# Patient Record
Sex: MALE | Race: BLACK OR AFRICAN AMERICAN | NOT HISPANIC OR LATINO | Employment: OTHER | URBAN - METROPOLITAN AREA
[De-identification: names, ages, dates, MRNs, and addresses within clinical notes are randomized per-mention and may not be internally consistent; named-entity substitution may affect disease eponyms.]

---

## 2023-04-12 DIAGNOSIS — R12 HEARTBURN: ICD-10-CM

## 2023-04-12 RX ORDER — ASCORBIC ACID 500 MG
TABLET ORAL
COMMUNITY

## 2023-04-12 RX ORDER — METFORMIN HYDROCHLORIDE 500 MG/1
TABLET, EXTENDED RELEASE ORAL
COMMUNITY
Start: 2019-12-04 | End: 2023-07-17 | Stop reason: SDUPTHER

## 2023-04-12 RX ORDER — BLOOD SUGAR DIAGNOSTIC
STRIP MISCELLANEOUS
COMMUNITY
Start: 2022-05-17 | End: 2024-03-12 | Stop reason: WASHOUT

## 2023-04-12 RX ORDER — LISINOPRIL AND HYDROCHLOROTHIAZIDE 12.5; 2 MG/1; MG/1
2 TABLET ORAL DAILY
COMMUNITY
Start: 2015-07-20 | End: 2023-07-10 | Stop reason: ENTERED-IN-ERROR

## 2023-04-12 RX ORDER — OMEPRAZOLE 40 MG/1
1 CAPSULE, DELAYED RELEASE ORAL DAILY
COMMUNITY
Start: 2013-02-28 | End: 2023-04-12 | Stop reason: SDUPTHER

## 2023-04-12 RX ORDER — LOSARTAN POTASSIUM 50 MG/1
1 TABLET ORAL DAILY
COMMUNITY
Start: 2023-03-27 | End: 2023-07-10 | Stop reason: SDUPTHER

## 2023-04-12 RX ORDER — CARVEDILOL 25 MG/1
1 TABLET ORAL 2 TIMES DAILY
COMMUNITY
Start: 2020-06-30 | End: 2023-07-10 | Stop reason: DRUGHIGH

## 2023-04-12 RX ORDER — NITROGLYCERIN 0.4 MG/1
0.4 TABLET SUBLINGUAL EVERY 5 MIN PRN
COMMUNITY
Start: 2022-08-11

## 2023-04-12 RX ORDER — AMLODIPINE BESYLATE 10 MG/1
1 TABLET ORAL DAILY
COMMUNITY
Start: 2019-12-04 | End: 2023-07-10 | Stop reason: SDUPTHER

## 2023-04-12 RX ORDER — ATORVASTATIN CALCIUM 80 MG/1
1 TABLET, FILM COATED ORAL DAILY
COMMUNITY
Start: 2019-12-04 | End: 2023-07-17 | Stop reason: SDUPTHER

## 2023-04-13 RX ORDER — OMEPRAZOLE 40 MG/1
40 CAPSULE, DELAYED RELEASE ORAL DAILY
Qty: 30 CAPSULE | Refills: 0 | Status: SHIPPED | OUTPATIENT
Start: 2023-04-13 | End: 2023-06-05 | Stop reason: SDUPTHER

## 2023-05-31 ENCOUNTER — TELEPHONE (OUTPATIENT)
Dept: PRIMARY CARE | Facility: CLINIC | Age: 83
End: 2023-05-31

## 2023-05-31 ENCOUNTER — APPOINTMENT (OUTPATIENT)
Dept: PRIMARY CARE | Facility: CLINIC | Age: 83
End: 2023-05-31
Payer: MEDICARE

## 2023-06-05 ENCOUNTER — OFFICE VISIT (OUTPATIENT)
Dept: PRIMARY CARE | Facility: CLINIC | Age: 83
End: 2023-06-05
Payer: MEDICARE

## 2023-06-05 VITALS
HEIGHT: 66 IN | OXYGEN SATURATION: 94 % | WEIGHT: 147.8 LBS | SYSTOLIC BLOOD PRESSURE: 164 MMHG | HEART RATE: 72 BPM | BODY MASS INDEX: 23.75 KG/M2 | DIASTOLIC BLOOD PRESSURE: 78 MMHG

## 2023-06-05 DIAGNOSIS — R06.2 WHEEZING: ICD-10-CM

## 2023-06-05 DIAGNOSIS — Z00.00 MEDICARE ANNUAL WELLNESS VISIT, SUBSEQUENT: Primary | Chronic | ICD-10-CM

## 2023-06-05 DIAGNOSIS — E11.22 TYPE 2 DIABETES MELLITUS WITH STAGE 3B CHRONIC KIDNEY DISEASE, WITHOUT LONG-TERM CURRENT USE OF INSULIN (MULTI): ICD-10-CM

## 2023-06-05 DIAGNOSIS — J43.8 OTHER EMPHYSEMA (MULTI): ICD-10-CM

## 2023-06-05 DIAGNOSIS — J30.9 ALLERGIC RHINITIS, UNSPECIFIED SEASONALITY, UNSPECIFIED TRIGGER: ICD-10-CM

## 2023-06-05 DIAGNOSIS — R06.02 SOB (SHORTNESS OF BREATH): ICD-10-CM

## 2023-06-05 DIAGNOSIS — R05.9 COUGH, UNSPECIFIED TYPE: ICD-10-CM

## 2023-06-05 DIAGNOSIS — R05.3 CHRONIC COUGH: ICD-10-CM

## 2023-06-05 DIAGNOSIS — R12 HEARTBURN: ICD-10-CM

## 2023-06-05 DIAGNOSIS — E13.51: ICD-10-CM

## 2023-06-05 DIAGNOSIS — N18.32 TYPE 2 DIABETES MELLITUS WITH STAGE 3B CHRONIC KIDNEY DISEASE, WITHOUT LONG-TERM CURRENT USE OF INSULIN (MULTI): ICD-10-CM

## 2023-06-05 DIAGNOSIS — I10 PRIMARY HYPERTENSION: ICD-10-CM

## 2023-06-05 PROBLEM — R05.2 SUBACUTE COUGH: Status: ACTIVE | Noted: 2023-06-05

## 2023-06-05 PROBLEM — R05.2 SUBACUTE COUGH: Status: RESOLVED | Noted: 2023-06-05 | Resolved: 2023-06-05

## 2023-06-05 PROCEDURE — G0439 PPPS, SUBSEQ VISIT: HCPCS | Performed by: INTERNAL MEDICINE

## 2023-06-05 PROCEDURE — 3077F SYST BP >= 140 MM HG: CPT | Performed by: INTERNAL MEDICINE

## 2023-06-05 PROCEDURE — 1170F FXNL STATUS ASSESSED: CPT | Performed by: INTERNAL MEDICINE

## 2023-06-05 PROCEDURE — 3078F DIAST BP <80 MM HG: CPT | Performed by: INTERNAL MEDICINE

## 2023-06-05 PROCEDURE — 1160F RVW MEDS BY RX/DR IN RCRD: CPT | Performed by: INTERNAL MEDICINE

## 2023-06-05 PROCEDURE — 1036F TOBACCO NON-USER: CPT | Performed by: INTERNAL MEDICINE

## 2023-06-05 PROCEDURE — 1159F MED LIST DOCD IN RCRD: CPT | Performed by: INTERNAL MEDICINE

## 2023-06-05 RX ORDER — PREDNISONE 20 MG/1
TABLET ORAL
Qty: 15 TABLET | Refills: 0 | Status: SHIPPED | OUTPATIENT
Start: 2023-06-05 | End: 2023-10-02 | Stop reason: ENTERED-IN-ERROR

## 2023-06-05 RX ORDER — AZITHROMYCIN 250 MG/1
TABLET, FILM COATED ORAL
Qty: 6 TABLET | Refills: 0 | Status: SHIPPED | OUTPATIENT
Start: 2023-06-05 | End: 2023-06-10

## 2023-06-05 RX ORDER — OMEPRAZOLE 40 MG/1
40 CAPSULE, DELAYED RELEASE ORAL DAILY
Qty: 90 CAPSULE | Refills: 2 | Status: SHIPPED | OUTPATIENT
Start: 2023-06-05 | End: 2024-02-26 | Stop reason: SDUPTHER

## 2023-06-05 RX ORDER — MONTELUKAST SODIUM 10 MG/1
10 TABLET ORAL NIGHTLY
Qty: 90 TABLET | Refills: 2 | Status: SHIPPED | OUTPATIENT
Start: 2023-06-05 | End: 2024-02-26 | Stop reason: SDUPTHER

## 2023-06-05 RX ORDER — AZITHROMYCIN 250 MG/1
TABLET, FILM COATED ORAL
Qty: 6 TABLET | Refills: 0 | Status: SHIPPED | OUTPATIENT
Start: 2023-06-05 | End: 2023-06-05 | Stop reason: SDUPTHER

## 2023-06-05 RX ORDER — ALBUTEROL SULFATE 90 UG/1
2 AEROSOL, METERED RESPIRATORY (INHALATION) EVERY 4 HOURS PRN
Qty: 8.5 G | Refills: 0 | Status: SHIPPED | OUTPATIENT
Start: 2023-06-05 | End: 2023-06-21 | Stop reason: SDUPTHER

## 2023-06-05 RX ORDER — FLUTICASONE PROPIONATE AND SALMETEROL 250; 50 UG/1; UG/1
1 POWDER RESPIRATORY (INHALATION)
Qty: 1 EACH | Refills: 1 | Status: SHIPPED | OUTPATIENT
Start: 2023-06-05 | End: 2023-08-07 | Stop reason: SDUPTHER

## 2023-06-05 RX ORDER — FLUTICASONE PROPIONATE 50 MCG
1 SPRAY, SUSPENSION (ML) NASAL DAILY
Qty: 16 G | Refills: 5 | Status: SHIPPED | OUTPATIENT
Start: 2023-06-05 | End: 2023-06-05 | Stop reason: SDUPTHER

## 2023-06-05 RX ORDER — ALBUTEROL SULFATE 90 UG/1
2 AEROSOL, METERED RESPIRATORY (INHALATION) EVERY 4 HOURS PRN
Qty: 8.5 G | Refills: 0 | Status: SHIPPED | OUTPATIENT
Start: 2023-06-05 | End: 2023-06-05 | Stop reason: SDUPTHER

## 2023-06-05 RX ORDER — PREDNISONE 20 MG/1
20 TABLET ORAL SEE ADMIN INSTRUCTIONS
Qty: 15 TABLET | Refills: 0 | Status: SHIPPED | OUTPATIENT
Start: 2023-06-05 | End: 2023-06-05

## 2023-06-05 RX ORDER — FLUTICASONE PROPIONATE 50 MCG
1 SPRAY, SUSPENSION (ML) NASAL DAILY
Qty: 16 G | Refills: 5 | Status: SHIPPED | OUTPATIENT
Start: 2023-06-05 | End: 2023-08-07 | Stop reason: SDUPTHER

## 2023-06-05 ASSESSMENT — ACTIVITIES OF DAILY LIVING (ADL)
DRESSING: INDEPENDENT
TAKING_MEDICATION: INDEPENDENT
GROCERY_SHOPPING: INDEPENDENT
BATHING: INDEPENDENT
MANAGING_FINANCES: INDEPENDENT
DOING_HOUSEWORK: INDEPENDENT

## 2023-06-05 ASSESSMENT — ENCOUNTER SYMPTOMS
WHEEZING: 1
HEARTBURN: 1
SHORTNESS OF BREATH: 1
HEMOPTYSIS: 0
HEADACHES: 0
SORE THROAT: 0
CHILLS: 0
FEVER: 0
COUGH: 1

## 2023-06-05 ASSESSMENT — PATIENT HEALTH QUESTIONNAIRE - PHQ9
SUM OF ALL RESPONSES TO PHQ9 QUESTIONS 1 AND 2: 0
2. FEELING DOWN, DEPRESSED OR HOPELESS: NOT AT ALL
1. LITTLE INTEREST OR PLEASURE IN DOING THINGS: NOT AT ALL

## 2023-06-05 NOTE — PROGRESS NOTES
Subjective   Reason for Visit: Mann Abrams is an 82 y.o. male here for a Medicare Wellness visit.          Reviewed all medications by prescribing practitioner or clinical pharmacist (such as prescriptions, OTCs, herbal therapies and supplements) and documented in the medical record.    Cough  This is a new problem. The current episode started 1 to 4 weeks ago. The problem has been gradually worsening. The problem occurs every few hours. The cough is Non-productive. Associated symptoms include heartburn, nasal congestion, postnasal drip, shortness of breath and wheezing. Pertinent negatives include no chest pain, chills, ear congestion, ear pain, fever, headaches, hemoptysis or sore throat. He has tried OTC cough suppressant for the symptoms. The treatment provided no relief. His past medical history is significant for asthma and emphysema.       HTN ON MEDICATIONS UNCONTROLLED         DIABETES WITH CKD STAGE III B         DIABETES WITH PVD     Patient Self Assessment of Health Status  Patient Self Assessment: Fair    Nutrition and Exercise  Current Diet: Well Balanced Diet  Adequate Fluid Intake: Yes  Caffeine: Yes  Exercise Frequency: Infrequently    Functional Ability/Level of Safety  Cognitive Impairment Observed: No cognitive impairment observed  Cognitive Impairment Reported: No cognitive impairment reported by patient or family    Home Safety Risk Factors: None    Patient Care Team:  Leah Owens MD as PCP - General  Leah Owens MD as PCP - [employer] PCP     Review of Systems   Constitutional:  Negative for chills and fever.   HENT:  Positive for postnasal drip. Negative for ear pain and sore throat.    Respiratory:  Positive for cough, shortness of breath and wheezing. Negative for hemoptysis.    Cardiovascular:  Negative for chest pain.   Gastrointestinal:  Positive for heartburn.   Neurological:  Negative for headaches.       Objective   Vitals:  /78   Pulse 72   Ht 1.67 m  "(5' 5.75\")   Wt 67 kg (147 lb 12.8 oz)   SpO2 94%   BMI 24.04 kg/m²       Physical Exam    Lab Results   Component Value Date    WBC 5.0 06/05/2020    HGB 12.4 (L) 06/05/2020    HCT 38.2 (L) 06/05/2020     06/05/2020    CHOL 133 12/28/2022    TRIG 127 04/21/2022    HDL 47.3 12/28/2022    LDLDIRECT 76 12/28/2022    ALT 25 12/28/2022    AST 21 12/28/2022     (L) 12/28/2022    K 4.0 12/28/2022    CL 92 (L) 12/28/2022    CREATININE 1.43 (H) 12/28/2022    BUN 17 12/28/2022    CO2 26 12/28/2022    HGBA1C 6.5 (A) 02/01/2023       Assessment/Plan   Problem List Items Addressed This Visit    None           A/P         ZITHROMAX TAKE     DIRECTED   DRINK ADEQUATE AMOUNT OF FLUID   MONTELUKAST FOLLOW THE DIRECTION   ALBUTEROL INHALER USE WRITTEN   PREDNISONE 20MG 1 PILL IN AM AND HALF PILL IN PM FOR 10 DAYS  WIXELLA INHALER  250-50MCG 1 PUFF BID , RINSE MOUTH AFTER USING THE INHALER   PFT WITHOUT ABG   F/U WITH ME IN 2WKS TIME       Advance Care Planning Note     Discussion Date: 06/05/23   Discussion Participants: patient    The patient wishes to discuss Advance Care Planning today and the following is a brief summary of our discussion.     Patient has capacity to make their own medical decisions: Yes  Health Care Agent/Surrogate Decision Maker documented in chart: No    Documents on file and valid:  Advance Directive/Living Will: No   Health Care Power of : No  Other: NA    Communication of Medical Status/Prognosis:   NA     Communication of Treatment Goals/Options:         Discussed with the patient end-of-life choices patient is DNR if he is terminally ill and sick with poor or bad outcome he does not have a living will or healthcare power of  he will think about getting it done later on whenever he can and then will bring it back into the next office visit so that it can be scanned into the chart for the purpose of documentation    Treatment Decisions  DISCUSSED    Time Statement: Total " face to face time spent on advance care planning was 5  minutes with 5 minutes spent in counseling, including the explanation.    Nataliya Grant MD  6/5/2023 7:56 AM

## 2023-06-21 ENCOUNTER — OFFICE VISIT (OUTPATIENT)
Dept: PRIMARY CARE | Facility: CLINIC | Age: 83
End: 2023-06-21
Payer: MEDICARE

## 2023-06-21 VITALS
WEIGHT: 153 LBS | HEART RATE: 73 BPM | SYSTOLIC BLOOD PRESSURE: 156 MMHG | DIASTOLIC BLOOD PRESSURE: 70 MMHG | OXYGEN SATURATION: 93 % | BODY MASS INDEX: 24.88 KG/M2

## 2023-06-21 DIAGNOSIS — E11.29 DIABETES MELLITUS WITH MICROALBUMINURIA (MULTI): ICD-10-CM

## 2023-06-21 DIAGNOSIS — E13.51: ICD-10-CM

## 2023-06-21 DIAGNOSIS — I10 PRIMARY HYPERTENSION: ICD-10-CM

## 2023-06-21 DIAGNOSIS — J43.8 OTHER EMPHYSEMA (MULTI): Primary | Chronic | ICD-10-CM

## 2023-06-21 DIAGNOSIS — R80.9 DIABETES MELLITUS WITH MICROALBUMINURIA (MULTI): ICD-10-CM

## 2023-06-21 DIAGNOSIS — E11.22 TYPE 2 DIABETES MELLITUS WITH STAGE 3B CHRONIC KIDNEY DISEASE, WITHOUT LONG-TERM CURRENT USE OF INSULIN (MULTI): ICD-10-CM

## 2023-06-21 DIAGNOSIS — R06.2 WHEEZING: ICD-10-CM

## 2023-06-21 DIAGNOSIS — R05.3 CHRONIC COUGH: ICD-10-CM

## 2023-06-21 DIAGNOSIS — N18.32 TYPE 2 DIABETES MELLITUS WITH STAGE 3B CHRONIC KIDNEY DISEASE, WITHOUT LONG-TERM CURRENT USE OF INSULIN (MULTI): ICD-10-CM

## 2023-06-21 LAB
ALBUMIN (MG/L) IN URINE: 512.3 MG/L
ALBUMIN/CREATININE (UG/MG) IN URINE: 814.5 UG/MG CRT (ref 0–30)
CREATININE (MG/DL) IN URINE: 62.9 MG/DL (ref 20–370)

## 2023-06-21 PROCEDURE — 99213 OFFICE O/P EST LOW 20 MIN: CPT | Performed by: INTERNAL MEDICINE

## 2023-06-21 PROCEDURE — 82570 ASSAY OF URINE CREATININE: CPT

## 2023-06-21 PROCEDURE — 1159F MED LIST DOCD IN RCRD: CPT | Performed by: INTERNAL MEDICINE

## 2023-06-21 PROCEDURE — 1036F TOBACCO NON-USER: CPT | Performed by: INTERNAL MEDICINE

## 2023-06-21 PROCEDURE — 1160F RVW MEDS BY RX/DR IN RCRD: CPT | Performed by: INTERNAL MEDICINE

## 2023-06-21 PROCEDURE — 3077F SYST BP >= 140 MM HG: CPT | Performed by: INTERNAL MEDICINE

## 2023-06-21 PROCEDURE — 83036 HEMOGLOBIN GLYCOSYLATED A1C: CPT

## 2023-06-21 PROCEDURE — 82043 UR ALBUMIN QUANTITATIVE: CPT

## 2023-06-21 PROCEDURE — 3078F DIAST BP <80 MM HG: CPT | Performed by: INTERNAL MEDICINE

## 2023-06-21 RX ORDER — ALBUTEROL SULFATE 90 UG/1
2 AEROSOL, METERED RESPIRATORY (INHALATION) EVERY 4 HOURS PRN
Qty: 8.5 G | Refills: 3 | Status: SHIPPED | OUTPATIENT
Start: 2023-06-21 | End: 2023-11-30 | Stop reason: SDUPTHER

## 2023-06-21 ASSESSMENT — ENCOUNTER SYMPTOMS
COUGH: 0
PND: 0
CONSTITUTIONAL NEGATIVE: 1
ORTHOPNEA: 0
PALPITATIONS: 0
WHEEZING: 1
HYPERTENSION: 1
BLURRED VISION: 0
SHORTNESS OF BREATH: 1

## 2023-06-21 ASSESSMENT — PATIENT HEALTH QUESTIONNAIRE - PHQ9
1. LITTLE INTEREST OR PLEASURE IN DOING THINGS: NOT AT ALL
2. FEELING DOWN, DEPRESSED OR HOPELESS: NOT AT ALL
SUM OF ALL RESPONSES TO PHQ9 QUESTIONS 1 AND 2: 0

## 2023-06-21 NOTE — PROGRESS NOTES
Patient ID: Mann Abrams is a 83 y.o. male who presents for Anticoagulation and Follow-up.    /70   Pulse 73   Wt 69.4 kg (153 lb)   SpO2 93%   BMI 24.88 kg/m²     Cough  This is a chronic problem. The current episode started 1 to 4 weeks ago. The problem has been resolved. Associated symptoms include shortness of breath and wheezing. Pertinent negatives include no chest pain, nasal congestion or postnasal drip. Risk factors for lung disease include smoking/tobacco exposure. He has tried a beta-agonist inhaler and steroid inhaler for the symptoms. The treatment provided significant relief.   Hypertension  This is a chronic problem. The current episode started more than 1 year ago. The problem is unchanged. The problem is uncontrolled. Associated symptoms include shortness of breath. Pertinent negatives include no blurred vision, chest pain, orthopnea, palpitations, peripheral edema or PND. Agents associated with hypertension include steroids. Risk factors for coronary artery disease include male gender, diabetes mellitus and dyslipidemia. Past treatments include angiotensin blockers and beta blockers. The current treatment provides mild improvement. There are no compliance problems.  Hypertensive end-organ damage includes kidney disease. There is no history of angina, CAD/MI, CVA, heart failure, left ventricular hypertrophy, PVD or retinopathy. Identifiable causes of hypertension include chronic renal disease. There is no history of sleep apnea.       CKD stage 3b stable     Subjective     Review of Systems   Constitutional: Negative.    HENT:  Negative for postnasal drip.    Eyes:  Negative for blurred vision.   Respiratory:  Positive for shortness of breath and wheezing. Negative for cough.    Cardiovascular:  Negative for chest pain, palpitations, orthopnea and PND.   All other systems reviewed and are negative.      Objective     Physical Exam  Vitals and nursing note reviewed.   Cardiovascular:       Rate and Rhythm: Normal rate and regular rhythm.      Pulses: Normal pulses.      Heart sounds: Normal heart sounds. No murmur heard.  Pulmonary:      Breath sounds: Examination of the right-upper field reveals decreased breath sounds and wheezing. Examination of the left-upper field reveals decreased breath sounds and wheezing. Examination of the right-middle field reveals decreased breath sounds and wheezing. Examination of the left-middle field reveals decreased breath sounds and wheezing. Examination of the right-lower field reveals decreased breath sounds and wheezing. Examination of the left-lower field reveals decreased breath sounds and wheezing. Decreased breath sounds and wheezing present.         Lab Results   Component Value Date    WBC 5.0 06/05/2020    HGB 12.4 (L) 06/05/2020    HCT 38.2 (L) 06/05/2020    MCV 95 06/05/2020     06/05/2020           Problem List Items Addressed This Visit          Respiratory    Other emphysema (CMS/[facility]) - Primary    Relevant Medications    albuterol (ProAir HFA) 90 mcg/actuation inhaler    Chronic cough       Circulatory    Primary hypertension    DM (diabetes mellitus), secondary, w/peripheral vascular complications (CMS/[facility])       Endocrine/Metabolic    Type 2 diabetes mellitus with stage 3b chronic kidney disease, without long-term current use of insulin (CMS/[facility])    Relevant Orders    Hemoglobin A1c    Albumin, urine, random     Other Visit Diagnoses       Wheezing        Relevant Medications    albuterol (ProAir HFA) 90 mcg/actuation inhaler               A/P        Since his cough is better  I told him to continue Wixela inhaler as prescribed and as demonstrated how to use it  He will use albuterol inhaler only as needed  He is scheduled to have the  PFT on 23rd of this month  He will follow-up with me at the end of the month to go over the PFT test results and follow-up on the management of his blood pressure medication  We will get the A1c, urine  microalbumin today

## 2023-06-22 LAB
ESTIMATED AVERAGE GLUCOSE FOR HBA1C: 157 MG/DL
HEMOGLOBIN A1C/HEMOGLOBIN TOTAL IN BLOOD: 7.1 %

## 2023-06-26 ENCOUNTER — TELEPHONE (OUTPATIENT)
Dept: PRIMARY CARE | Facility: CLINIC | Age: 83
End: 2023-06-26
Payer: MEDICARE

## 2023-07-03 ASSESSMENT — ENCOUNTER SYMPTOMS
WHEEZING: 1
SHORTNESS OF BREATH: 1

## 2023-07-10 ENCOUNTER — OFFICE VISIT (OUTPATIENT)
Dept: PRIMARY CARE | Facility: CLINIC | Age: 83
End: 2023-07-10
Payer: MEDICARE

## 2023-07-10 VITALS
BODY MASS INDEX: 24.23 KG/M2 | DIASTOLIC BLOOD PRESSURE: 73 MMHG | HEART RATE: 75 BPM | SYSTOLIC BLOOD PRESSURE: 172 MMHG | OXYGEN SATURATION: 94 % | WEIGHT: 149 LBS

## 2023-07-10 DIAGNOSIS — E11.22 TYPE 2 DIABETES MELLITUS WITH STAGE 3B CHRONIC KIDNEY DISEASE, WITHOUT LONG-TERM CURRENT USE OF INSULIN (MULTI): ICD-10-CM

## 2023-07-10 DIAGNOSIS — N18.32 STAGE 3B CHRONIC KIDNEY DISEASE (CKD) (MULTI): ICD-10-CM

## 2023-07-10 DIAGNOSIS — I10 PRIMARY HYPERTENSION: Primary | ICD-10-CM

## 2023-07-10 DIAGNOSIS — E13.51: ICD-10-CM

## 2023-07-10 DIAGNOSIS — N18.32 TYPE 2 DIABETES MELLITUS WITH STAGE 3B CHRONIC KIDNEY DISEASE, WITHOUT LONG-TERM CURRENT USE OF INSULIN (MULTI): ICD-10-CM

## 2023-07-10 DIAGNOSIS — J43.8 OTHER EMPHYSEMA (MULTI): ICD-10-CM

## 2023-07-10 PROCEDURE — 1126F AMNT PAIN NOTED NONE PRSNT: CPT | Performed by: INTERNAL MEDICINE

## 2023-07-10 PROCEDURE — 1159F MED LIST DOCD IN RCRD: CPT | Performed by: INTERNAL MEDICINE

## 2023-07-10 PROCEDURE — 99213 OFFICE O/P EST LOW 20 MIN: CPT | Performed by: INTERNAL MEDICINE

## 2023-07-10 PROCEDURE — 3078F DIAST BP <80 MM HG: CPT | Performed by: INTERNAL MEDICINE

## 2023-07-10 PROCEDURE — 3077F SYST BP >= 140 MM HG: CPT | Performed by: INTERNAL MEDICINE

## 2023-07-10 PROCEDURE — 1160F RVW MEDS BY RX/DR IN RCRD: CPT | Performed by: INTERNAL MEDICINE

## 2023-07-10 PROCEDURE — 1036F TOBACCO NON-USER: CPT | Performed by: INTERNAL MEDICINE

## 2023-07-10 RX ORDER — DOXAZOSIN 4 MG/1
4 TABLET ORAL NIGHTLY
Qty: 90 TABLET | Refills: 2 | Status: SHIPPED | OUTPATIENT
Start: 2023-07-10 | End: 2023-07-25

## 2023-07-10 RX ORDER — AMLODIPINE BESYLATE 10 MG/1
10 TABLET ORAL DAILY
Qty: 90 TABLET | Refills: 2 | Status: SHIPPED | OUTPATIENT
Start: 2023-07-10 | End: 2024-02-26 | Stop reason: SDUPTHER

## 2023-07-10 RX ORDER — CARVEDILOL 25 MG/1
TABLET ORAL
Qty: 90 TABLET | Refills: 3 | Status: SHIPPED | OUTPATIENT
Start: 2023-07-10 | End: 2023-07-17 | Stop reason: SDUPTHER

## 2023-07-10 RX ORDER — LOSARTAN POTASSIUM 50 MG/1
50 TABLET ORAL DAILY
Qty: 90 TABLET | Refills: 3 | Status: SHIPPED | OUTPATIENT
Start: 2023-07-10 | End: 2023-07-25 | Stop reason: SDUPTHER

## 2023-07-10 ASSESSMENT — ENCOUNTER SYMPTOMS
ORTHOPNEA: 0
NECK PAIN: 0
PND: 0
SHORTNESS OF BREATH: 0
WHEEZING: 1
BLURRED VISION: 0
CONSTITUTIONAL NEGATIVE: 1
HYPERTENSION: 1
PALPITATIONS: 0
HEADACHES: 0

## 2023-07-10 NOTE — PROGRESS NOTES
Patient ID: Mann Abrams is a 83 y.o. male who presents for Medicare Annual Wellness Visit Subsequent.    /73   Pulse 75   Wt 67.6 kg (149 lb)   SpO2 94%   BMI 24.23 kg/m²     Shortness of Breath  This is a recurrent problem. The current episode started more than 1 month ago. The problem occurs daily. The problem has been gradually worsening. Associated symptoms include wheezing. Pertinent negatives include no chest pain, headaches, neck pain, orthopnea or PND. There is no history of a heart failure.   Hypertension  This is a chronic problem. The current episode started more than 1 year ago. The problem is unchanged. The problem is uncontrolled. Pertinent negatives include no anxiety, blurred vision, chest pain, headaches, malaise/fatigue, neck pain, orthopnea, palpitations, peripheral edema, PND or shortness of breath. There are no associated agents to hypertension. Risk factors for coronary artery disease include male gender, diabetes mellitus and dyslipidemia. Past treatments include angiotensin blockers, calcium channel blockers and diuretics. The current treatment provides significant improvement. There are no compliance problems.  There is no history of angina, kidney disease, CAD/MI, CVA, heart failure, left ventricular hypertrophy, PVD or retinopathy. There is no history of chronic renal disease or sleep apnea.       Subjective     Review of Systems   Constitutional: Negative.  Negative for malaise/fatigue.   Eyes:  Negative for blurred vision.   Respiratory:  Positive for wheezing. Negative for shortness of breath.    Cardiovascular:  Negative for chest pain, palpitations, orthopnea and PND.   Musculoskeletal:  Negative for neck pain.   Neurological:  Negative for headaches.   All other systems reviewed and are negative.      Objective     Physical Exam  Vitals and nursing note reviewed.   Neck:      Vascular: No carotid bruit.   Cardiovascular:      Rate and Rhythm: Normal rate and regular  rhythm.      Pulses: Normal pulses.      Heart sounds: Normal heart sounds.   Pulmonary:      Effort: Pulmonary effort is normal.      Breath sounds: Normal breath sounds.   Musculoskeletal:      Right lower leg: No edema.      Left lower leg: No edema.   Lymphadenopathy:      Cervical: No cervical adenopathy.   Skin:     Capillary Refill: Capillary refill takes more than 3 seconds.   Neurological:      General: No focal deficit present.      Mental Status: He is oriented to person, place, and time.   Psychiatric:         Mood and Affect: Mood normal.         Behavior: Behavior normal.         Thought Content: Thought content normal.         Judgment: Judgment normal.         Lab Results   Component Value Date    WBC 5.0 06/05/2020    HGB 12.4 (L) 06/05/2020    HCT 38.2 (L) 06/05/2020    MCV 95 06/05/2020     06/05/2020           Problem List Items Addressed This Visit       Primary hypertension - Primary    Relevant Medications    doxazosin (Cardura) 4 mg tablet    amLODIPine (Norvasc) 10 mg tablet    losartan (Cozaar) 50 mg tablet    Other emphysema (CMS/[facility])    Type 2 diabetes mellitus with stage 3b chronic kidney disease, without long-term current use of insulin (CMS/[facility])    DM (diabetes mellitus), secondary, w/peripheral vascular complications (CMS/[facility])    Stage 3b chronic kidney disease (CKD) (CMS/[facility])              A/P         METFORMIN 500MG ER 1 PILL EVERY DAY FILLED  LOSARTAN 50MG 1 PILL EVERY DAY FILLED  AMLODIPINE 10MG 1 PILL EVERY DAY FILLED   DOXAZOSIN 4MG 1 PILL EVERY DAY   RECHECK BLOOD PRESSURE IN 1 MONTH TIME  NO EXTRA SALT WHILE EATING   REDUCE CARVEDILOL 25MG HALF PILL BID

## 2023-07-17 DIAGNOSIS — E11.69 TYPE 2 DIABETES MELLITUS WITH OTHER SPECIFIED COMPLICATION, WITHOUT LONG-TERM CURRENT USE OF INSULIN (MULTI): Primary | ICD-10-CM

## 2023-07-17 DIAGNOSIS — E78.5 HYPERLIPIDEMIA, UNSPECIFIED HYPERLIPIDEMIA TYPE: ICD-10-CM

## 2023-07-17 DIAGNOSIS — I10 HYPERTENSION, UNSPECIFIED TYPE: ICD-10-CM

## 2023-07-17 RX ORDER — ATORVASTATIN CALCIUM 80 MG/1
80 TABLET, FILM COATED ORAL DAILY
Qty: 90 TABLET | Refills: 2 | Status: SHIPPED | OUTPATIENT
Start: 2023-07-17 | End: 2024-04-22 | Stop reason: SDUPTHER

## 2023-07-17 RX ORDER — CARVEDILOL 25 MG/1
TABLET ORAL
Qty: 90 TABLET | Refills: 3 | Status: SHIPPED | OUTPATIENT
Start: 2023-07-17

## 2023-07-17 RX ORDER — METFORMIN HYDROCHLORIDE 500 MG/1
500 TABLET, EXTENDED RELEASE ORAL
Qty: 90 TABLET | Refills: 2 | Status: SHIPPED | OUTPATIENT
Start: 2023-07-17 | End: 2023-08-11 | Stop reason: SDUPTHER

## 2023-07-25 DIAGNOSIS — I10 PRIMARY HYPERTENSION: ICD-10-CM

## 2023-07-25 RX ORDER — LOSARTAN POTASSIUM 50 MG/1
50 TABLET ORAL 2 TIMES DAILY
Qty: 180 TABLET | Refills: 3 | Status: SHIPPED | OUTPATIENT
Start: 2023-07-25 | End: 2024-02-05 | Stop reason: SDUPTHER

## 2023-08-07 ENCOUNTER — TELEPHONE (OUTPATIENT)
Dept: PRIMARY CARE | Facility: CLINIC | Age: 83
End: 2023-08-07
Payer: MEDICARE

## 2023-08-07 DIAGNOSIS — J30.9 ALLERGIC RHINITIS, UNSPECIFIED SEASONALITY, UNSPECIFIED TRIGGER: ICD-10-CM

## 2023-08-07 DIAGNOSIS — J43.8 OTHER EMPHYSEMA (MULTI): ICD-10-CM

## 2023-08-07 NOTE — TELEPHONE ENCOUNTER
Pt. States wants to make sure he should still be using Wixela. If so, he would like a refill sent to [facility] pharmacy. Also requesting Rx of Fluticasone nasal spray to go to mail order, does not want it at [facility].

## 2023-08-08 RX ORDER — FLUTICASONE PROPIONATE AND SALMETEROL 250; 50 UG/1; UG/1
1 POWDER RESPIRATORY (INHALATION)
Qty: 1 EACH | Refills: 6 | Status: SHIPPED | OUTPATIENT
Start: 2023-08-08 | End: 2024-01-19 | Stop reason: SDUPTHER

## 2023-08-08 RX ORDER — FLUTICASONE PROPIONATE AND SALMETEROL 250; 50 UG/1; UG/1
1 POWDER RESPIRATORY (INHALATION)
Qty: 1 EACH | Refills: 1 | Status: SHIPPED | OUTPATIENT
Start: 2023-08-08 | End: 2023-08-08 | Stop reason: SDUPTHER

## 2023-08-08 RX ORDER — FLUTICASONE PROPIONATE 50 MCG
1 SPRAY, SUSPENSION (ML) NASAL DAILY
Qty: 16 G | Refills: 5 | Status: SHIPPED | OUTPATIENT
Start: 2023-08-08 | End: 2024-01-19 | Stop reason: SDUPTHER

## 2023-08-11 DIAGNOSIS — E11.69 TYPE 2 DIABETES MELLITUS WITH OTHER SPECIFIED COMPLICATION, WITHOUT LONG-TERM CURRENT USE OF INSULIN (MULTI): ICD-10-CM

## 2023-08-11 RX ORDER — METFORMIN HYDROCHLORIDE 500 MG/1
500 TABLET, EXTENDED RELEASE ORAL
Qty: 90 TABLET | Refills: 2 | Status: SHIPPED | OUTPATIENT
Start: 2023-08-11 | End: 2023-08-14 | Stop reason: SDUPTHER

## 2023-08-14 ENCOUNTER — OFFICE VISIT (OUTPATIENT)
Dept: PRIMARY CARE | Facility: CLINIC | Age: 83
End: 2023-08-14
Payer: MEDICARE

## 2023-08-14 VITALS
SYSTOLIC BLOOD PRESSURE: 161 MMHG | HEART RATE: 73 BPM | DIASTOLIC BLOOD PRESSURE: 73 MMHG | WEIGHT: 149 LBS | BODY MASS INDEX: 24.23 KG/M2 | OXYGEN SATURATION: 95 %

## 2023-08-14 DIAGNOSIS — S89.92XA KNEE INJURIES, LEFT, INITIAL ENCOUNTER: Primary | Chronic | ICD-10-CM

## 2023-08-14 DIAGNOSIS — M25.562 ACUTE PAIN OF LEFT KNEE: ICD-10-CM

## 2023-08-14 PROCEDURE — 3078F DIAST BP <80 MM HG: CPT | Performed by: INTERNAL MEDICINE

## 2023-08-14 PROCEDURE — 1160F RVW MEDS BY RX/DR IN RCRD: CPT | Performed by: INTERNAL MEDICINE

## 2023-08-14 PROCEDURE — 20610 DRAIN/INJ JOINT/BURSA W/O US: CPT | Performed by: INTERNAL MEDICINE

## 2023-08-14 PROCEDURE — 96372 THER/PROPH/DIAG INJ SC/IM: CPT | Performed by: INTERNAL MEDICINE

## 2023-08-14 PROCEDURE — 1159F MED LIST DOCD IN RCRD: CPT | Performed by: INTERNAL MEDICINE

## 2023-08-14 PROCEDURE — 1126F AMNT PAIN NOTED NONE PRSNT: CPT | Performed by: INTERNAL MEDICINE

## 2023-08-14 PROCEDURE — 3077F SYST BP >= 140 MM HG: CPT | Performed by: INTERNAL MEDICINE

## 2023-08-14 PROCEDURE — 99214 OFFICE O/P EST MOD 30 MIN: CPT | Performed by: INTERNAL MEDICINE

## 2023-08-14 PROCEDURE — 1036F TOBACCO NON-USER: CPT | Performed by: INTERNAL MEDICINE

## 2023-08-14 RX ORDER — TRIAMCINOLONE ACETONIDE 40 MG/ML
40 INJECTION, SUSPENSION INTRA-ARTICULAR; INTRAMUSCULAR ONCE
Status: COMPLETED | OUTPATIENT
Start: 2023-08-14 | End: 2023-08-14

## 2023-08-14 RX ADMIN — TRIAMCINOLONE ACETONIDE 40 MG: 40 INJECTION, SUSPENSION INTRA-ARTICULAR; INTRAMUSCULAR at 09:31

## 2023-08-14 RX ADMIN — TRIAMCINOLONE ACETONIDE 40 MG: 40 INJECTION, SUSPENSION INTRA-ARTICULAR; INTRAMUSCULAR at 09:35

## 2023-08-14 ASSESSMENT — ENCOUNTER SYMPTOMS
LOSS OF MOTION: 0
LOSS OF SENSATION IN FEET: 0
CONSTITUTIONAL NEGATIVE: 1
NUMBNESS: 0
DEPRESSION: 0
OCCASIONAL FEELINGS OF UNSTEADINESS: 0
INABILITY TO BEAR WEIGHT: 0

## 2023-08-14 ASSESSMENT — PATIENT HEALTH QUESTIONNAIRE - PHQ9
1. LITTLE INTEREST OR PLEASURE IN DOING THINGS: NOT AT ALL
1. LITTLE INTEREST OR PLEASURE IN DOING THINGS: NOT AT ALL
2. FEELING DOWN, DEPRESSED OR HOPELESS: NOT AT ALL
1. LITTLE INTEREST OR PLEASURE IN DOING THINGS: NOT AT ALL
SUM OF ALL RESPONSES TO PHQ9 QUESTIONS 1 AND 2: 0
SUM OF ALL RESPONSES TO PHQ9 QUESTIONS 1 AND 2: 0
2. FEELING DOWN, DEPRESSED OR HOPELESS: NOT AT ALL
SUM OF ALL RESPONSES TO PHQ9 QUESTIONS 1 AND 2: 0
2. FEELING DOWN, DEPRESSED OR HOPELESS: NOT AT ALL

## 2023-08-14 NOTE — PROGRESS NOTES
Patient ID: Mann Abrams is a 83 y.o. male who presents for Knee Injury (Hurt left knee 3 weeks ago) and Follow-up.    /73   Pulse 73   Wt 67.6 kg (149 lb)   SpO2 95%   BMI 24.23 kg/m²     Knee Pain   The incident occurred more than 1 week ago. Incident location: UNKNOWN. The pain is present in the left knee. The quality of the pain is described as aching. The pain is at a severity of 6/10. The pain is moderate. The pain has been Constant since onset. Pertinent negatives include no inability to bear weight, loss of motion or numbness. The symptoms are aggravated by movement and palpation. He has tried nothing for the symptoms. The treatment provided mild relief.       Left knee injury causing left knee pain   FEELS STIFF , NO OPEN WOUND   HURTS TO WALK , NO SWELLING         Subjective     Review of Systems   Constitutional: Negative.    Musculoskeletal:         LEFT KNEE PAIN , FEELS STIFF    Neurological:  Negative for numbness.   All other systems reviewed and are negative.      Objective     Physical Exam  Vitals and nursing note reviewed.   Cardiovascular:      Rate and Rhythm: Normal rate and regular rhythm.      Pulses: Normal pulses.      Heart sounds: Normal heart sounds. No murmur heard.  Pulmonary:      Effort: Pulmonary effort is normal.      Breath sounds: Normal breath sounds.   Musculoskeletal:      Right lower leg: No edema.      Left lower leg: No edema.      Comments: LEFT KNEE EXTREME FLEXION PAINFUL   COARSE CREPITATION    Skin:     Capillary Refill: Capillary refill takes more than 3 seconds.   Psychiatric:         Mood and Affect: Mood normal.         Behavior: Behavior normal.         Thought Content: Thought content normal.         Judgment: Judgment normal.         Lab Results   Component Value Date    WBC 5.0 06/05/2020    HGB 12.4 (L) 06/05/2020    HCT 38.2 (L) 06/05/2020    MCV 95 06/05/2020     06/05/2020           Problem List Items Addressed This Visit    None  Visit  Diagnoses       Knee injuries, left, initial encounter  (Chronic)   -  Primary    Relevant Orders    XR knee left 3 views    Acute pain of left knee        Relevant Orders    XR knee left 3 views                     A/P         X-RAY LEFT  KNEE  ARTHRITIS   With patient's consent and after sterilizing the skin of the left knee with alcohol swab x4 and spraying with topical anesthetic adequately to numb the area injection Kenalog 80 mg  + 1 ml 1% lidocaine injected into the left knee pain is immediately relieved no complications noted        Patient ID: Mann Abrams is a 83 y.o. male.    Joint Injection Large/Arthrocentesis: L knee on 8/14/2023 9:27 AM  Indications: (PAIN LEFT KNEE)  Details: 22 G needle, anteromedial approach (guidance: Under manual guidance)  Medications: (With patient's consent and after sterilizing the skin of the left knee with alcohol swab x4 and spraying with topical anesthetic adequately to numb the area injection Kenalog 80 + 1 cc 1% lidocaine injected into the left knee patient tolerated the procedure very well no complications noted pain is immediately relieved)  Outcome: tolerated well, no immediate complications  Immediately prior to procedure a time out was called to verify the correct patient, procedure, equipment, support staff and site/side marked as required.

## 2023-08-15 RX ORDER — METFORMIN HYDROCHLORIDE 500 MG/1
500 TABLET, EXTENDED RELEASE ORAL
Qty: 90 TABLET | Refills: 3 | Status: SHIPPED | OUTPATIENT
Start: 2023-08-15 | End: 2024-06-10 | Stop reason: ENTERED-IN-ERROR

## 2023-08-28 LAB
ALBUMIN (G/DL) IN SER/PLAS: 4.1 G/DL (ref 3.4–5)
ALBUMIN (MG/L) IN URINE: 1560.8 MG/L
ALBUMIN/CREATININE (UG/MG) IN URINE: 1406.1 UG/MG CRT (ref 0–30)
ANION GAP IN SER/PLAS: 16 MMOL/L (ref 10–20)
APPEARANCE, URINE: CLEAR
BILIRUBIN, URINE: NEGATIVE
BLOOD, URINE: NEGATIVE
CALCIDIOL (25 OH VITAMIN D3) (NG/ML) IN SER/PLAS: 49 NG/ML
CALCIUM (MG/DL) IN SER/PLAS: 9.5 MG/DL (ref 8.6–10.6)
CARBON DIOXIDE, TOTAL (MMOL/L) IN SER/PLAS: 22 MMOL/L (ref 21–32)
CHLORIDE (MMOL/L) IN SER/PLAS: 100 MMOL/L (ref 98–107)
COLOR, URINE: YELLOW
COMPLEMENT C3 (MG/DL) IN SER/PLAS: 134 MG/DL (ref 87–200)
COMPLEMENT C4 (MG/DL) IN SER/PLAS: 39 MG/DL (ref 10–50)
CREATININE (MG/DL) IN SER/PLAS: 1.52 MG/DL (ref 0.5–1.3)
CREATININE (MG/DL) IN URINE: 111 MG/DL (ref 20–370)
CREATININE (MG/DL) IN URINE: 111 MG/DL (ref 20–370)
GFR MALE: 46 ML/MIN/1.73M2
GLUCOSE (MG/DL) IN SER/PLAS: 162 MG/DL (ref 74–99)
GLUCOSE, URINE: NEGATIVE MG/DL
HEPATITIS B VIRUS SURFACE AG PRESENCE IN SERUM: NONREACTIVE
HEPATITIS C VIRUS AB PRESENCE IN SERUM: NONREACTIVE
KETONES, URINE: NEGATIVE MG/DL
LEUKOCYTE ESTERASE, URINE: NEGATIVE
MUCUS, URINE: NORMAL /LPF
NITRITE, URINE: NEGATIVE
PH, URINE: 5 (ref 5–8)
PHOSPHATE (MG/DL) IN SER/PLAS: 3.3 MG/DL (ref 2.5–4.9)
POTASSIUM (MMOL/L) IN SER/PLAS: 4 MMOL/L (ref 3.5–5.3)
PROTEIN (MG/DL) IN URINE: 230 MG/DL (ref 5–25)
PROTEIN, URINE: ABNORMAL MG/DL
PROTEIN/CREATININE (MG/MG) IN URINE: 2.07 MG/MG CREAT (ref 0–0.17)
RBC, URINE: <1 /HPF (ref 0–5)
SODIUM (MMOL/L) IN SER/PLAS: 134 MMOL/L (ref 136–145)
SPECIFIC GRAVITY, URINE: 1.02 (ref 1–1.03)
SQUAMOUS EPITHELIAL CELLS, URINE: <1 /HPF
UREA NITROGEN (MG/DL) IN SER/PLAS: 25 MG/DL (ref 6–23)
UROBILINOGEN, URINE: <2 MG/DL (ref 0–1.9)
WBC, URINE: <1 /HPF (ref 0–5)

## 2023-08-29 LAB
ANA PATTERN: ABNORMAL
ANA TITER: ABNORMAL
ANTI-CENTROMERE: <0.2 AI
ANTI-CHROMATIN: <0.2 AI
ANTI-DNA (DS): <1 IU/ML
ANTI-JO-1 IGG: <0.2 AI
ANTI-NUCLEAR ANTIBODY (ANA): POSITIVE
ANTI-RIBOSOMAL P: <0.2 AI
ANTI-RNP: <0.2 AI
ANTI-SCL-70: <0.2 AI
ANTI-SM/RNP: <0.2 AI
ANTI-SM: <0.2 AI
ANTI-SSA: <0.2 AI
ANTI-SSB: <0.2 AI
IMMUNOGLOBULIN LIGHT CHAINS KAPPA/LAMBDA (MASS RATIO) IN SERUM: 1.57 (ref 0.26–1.65)
IMMUNOGLOBULIN LIGHT CHAINS.KAPPA (MG/DL) IN SERUM: 4.09 MG/DL (ref 0.33–1.94)
IMMUNOGLOBULIN LIGHT CHAINS.LAMBDA (MG/DL) IN SERUM: 2.61 MG/DL (ref 0.57–2.63)

## 2023-09-01 ENCOUNTER — OFFICE VISIT (OUTPATIENT)
Dept: PRIMARY CARE | Facility: CLINIC | Age: 83
End: 2023-09-01
Payer: MEDICARE

## 2023-09-01 VITALS
BODY MASS INDEX: 24.46 KG/M2 | DIASTOLIC BLOOD PRESSURE: 67 MMHG | OXYGEN SATURATION: 94 % | HEART RATE: 76 BPM | WEIGHT: 150.4 LBS | SYSTOLIC BLOOD PRESSURE: 162 MMHG

## 2023-09-01 DIAGNOSIS — J43.8 OTHER EMPHYSEMA (MULTI): ICD-10-CM

## 2023-09-01 DIAGNOSIS — N18.32 STAGE 3B CHRONIC KIDNEY DISEASE (CKD) (MULTI): ICD-10-CM

## 2023-09-01 DIAGNOSIS — N18.32 TYPE 2 DIABETES MELLITUS WITH STAGE 3B CHRONIC KIDNEY DISEASE, WITHOUT LONG-TERM CURRENT USE OF INSULIN (MULTI): ICD-10-CM

## 2023-09-01 DIAGNOSIS — E11.22 TYPE 2 DIABETES MELLITUS WITH STAGE 3B CHRONIC KIDNEY DISEASE, WITHOUT LONG-TERM CURRENT USE OF INSULIN (MULTI): ICD-10-CM

## 2023-09-01 DIAGNOSIS — I10 HYPERTENSION, UNCONTROLLED: Primary | ICD-10-CM

## 2023-09-01 LAB
ALBUMIN (G/DL) IN SER/PLAS: 3.7 G/DL (ref 3.4–5)
ANCA IFA PATTERN: NORMAL
ANCA IFA TITER: NORMAL
ANION GAP IN SER/PLAS: 15 MMOL/L (ref 10–20)
CALCIUM (MG/DL) IN SER/PLAS: 9.1 MG/DL (ref 8.6–10.6)
CARBON DIOXIDE, TOTAL (MMOL/L) IN SER/PLAS: 23 MMOL/L (ref 21–32)
CHLORIDE (MMOL/L) IN SER/PLAS: 101 MMOL/L (ref 98–107)
CHOLESTEROL (MG/DL) IN SER/PLAS: 165 MG/DL (ref 0–199)
CHOLESTEROL IN HDL (MG/DL) IN SER/PLAS: 68.8 MG/DL
CHOLESTEROL IN LDL (MG/DL) IN SER/PLAS BY DIRECT ASSAY: 72 MG/DL (ref 0–129)
CHOLESTEROL/HDL RATIO: 2.4
CREATININE (MG/DL) IN SER/PLAS: 1.55 MG/DL (ref 0.5–1.3)
ERYTHROCYTE DISTRIBUTION WIDTH (RATIO) BY AUTOMATED COUNT: 13.4 % (ref 11.5–14.5)
ERYTHROCYTE MEAN CORPUSCULAR HEMOGLOBIN CONCENTRATION (G/DL) BY AUTOMATED: 33.4 G/DL (ref 32–36)
ERYTHROCYTE MEAN CORPUSCULAR VOLUME (FL) BY AUTOMATED COUNT: 96 FL (ref 80–100)
ERYTHROCYTES (10*6/UL) IN BLOOD BY AUTOMATED COUNT: 3.44 X10E12/L (ref 4.5–5.9)
GFR MALE: 45 ML/MIN/1.73M2
GLUCOSE (MG/DL) IN SER/PLAS: 155 MG/DL (ref 74–99)
HEMATOCRIT (%) IN BLOOD BY AUTOMATED COUNT: 32.9 % (ref 41–52)
HEMOGLOBIN (G/DL) IN BLOOD: 11 G/DL (ref 13.5–17.5)
IRON (UG/DL) IN SER/PLAS: 108 UG/DL (ref 35–150)
IRON BINDING CAPACITY (UG/DL) IN SER/PLAS: 290 UG/DL (ref 240–445)
IRON SATURATION (%) IN SER/PLAS: 37 % (ref 25–45)
LEUKOCYTES (10*3/UL) IN BLOOD BY AUTOMATED COUNT: 6.4 X10E9/L (ref 4.4–11.3)
MAGNESIUM (MG/DL) IN SER/PLAS: 2.01 MG/DL (ref 1.6–2.4)
MYELOPEROXIDASE (MPO) AB, IGG: 0 AU/ML (ref 0–19)
NATRIURETIC PEPTIDE B (PG/ML) IN SER/PLAS: 124 PG/ML (ref 0–99)
NON-HDL CHOLESTEROL: 96 MG/DL
NRBC (PER 100 WBCS) BY AUTOMATED COUNT: 0 /100 WBC (ref 0–0)
PHOSPHATE (MG/DL) IN SER/PLAS: 3.8 MG/DL (ref 2.5–4.9)
PLATELETS (10*3/UL) IN BLOOD AUTOMATED COUNT: 235 X10E9/L (ref 150–450)
POTASSIUM (MMOL/L) IN SER/PLAS: 3.9 MMOL/L (ref 3.5–5.3)
SERINE PROTEINASE 3 (PR3) AB, IGG: 2 AU/ML (ref 0–19)
SODIUM (MMOL/L) IN SER/PLAS: 135 MMOL/L (ref 136–145)
TRIGLYCERIDE (MG/DL) IN SER/PLAS: 75 MG/DL (ref 0–149)
TROPONIN I, HIGH SENSITIVITY: 15 NG/L (ref 0–53)
UREA NITROGEN (MG/DL) IN SER/PLAS: 22 MG/DL (ref 6–23)

## 2023-09-01 PROCEDURE — 1126F AMNT PAIN NOTED NONE PRSNT: CPT | Performed by: INTERNAL MEDICINE

## 2023-09-01 PROCEDURE — 1159F MED LIST DOCD IN RCRD: CPT | Performed by: INTERNAL MEDICINE

## 2023-09-01 PROCEDURE — 3078F DIAST BP <80 MM HG: CPT | Performed by: INTERNAL MEDICINE

## 2023-09-01 PROCEDURE — 1036F TOBACCO NON-USER: CPT | Performed by: INTERNAL MEDICINE

## 2023-09-01 PROCEDURE — 3077F SYST BP >= 140 MM HG: CPT | Performed by: INTERNAL MEDICINE

## 2023-09-01 PROCEDURE — 1160F RVW MEDS BY RX/DR IN RCRD: CPT | Performed by: INTERNAL MEDICINE

## 2023-09-01 PROCEDURE — 99213 OFFICE O/P EST LOW 20 MIN: CPT | Performed by: INTERNAL MEDICINE

## 2023-09-01 ASSESSMENT — ENCOUNTER SYMPTOMS
OCCASIONAL FEELINGS OF UNSTEADINESS: 0
PALPITATIONS: 0
LOSS OF SENSATION IN FEET: 0
PND: 0
SHORTNESS OF BREATH: 0
CONSTITUTIONAL NEGATIVE: 1
HYPERTENSION: 1
DEPRESSION: 0
HEADACHES: 0
ORTHOPNEA: 0
BLURRED VISION: 0

## 2023-09-01 ASSESSMENT — PATIENT HEALTH QUESTIONNAIRE - PHQ9
1. LITTLE INTEREST OR PLEASURE IN DOING THINGS: NOT AT ALL
2. FEELING DOWN, DEPRESSED OR HOPELESS: NOT AT ALL
SUM OF ALL RESPONSES TO PHQ9 QUESTIONS 1 AND 2: 0
SUM OF ALL RESPONSES TO PHQ9 QUESTIONS 1 AND 2: 0
2. FEELING DOWN, DEPRESSED OR HOPELESS: NOT AT ALL
1. LITTLE INTEREST OR PLEASURE IN DOING THINGS: NOT AT ALL

## 2023-09-01 NOTE — PROGRESS NOTES
Patient ID: Mann Abrams is a 83 y.o. male who presents for Follow-up (2 week follow up).    /67   Pulse 76   Wt 68.2 kg (150 lb 6.4 oz)   SpO2 94%   BMI 24.46 kg/m²     Hypertension  This is a chronic problem. The current episode started more than 1 year ago. The problem is unchanged. The problem is uncontrolled. Pertinent negatives include no anxiety, blurred vision, chest pain, headaches, orthopnea, palpitations, peripheral edema, PND or shortness of breath. There are no associated agents to hypertension. Risk factors for coronary artery disease include male gender. Past treatments include ACE inhibitors, calcium channel blockers and beta blockers. The current treatment provides no improvement. Hypertensive end-organ damage includes kidney disease. There is no history of angina, CAD/MI, CVA, heart failure, left ventricular hypertrophy, PVD or retinopathy. There is no history of chronic renal disease or sleep apnea.       Subjective     Review of Systems   Constitutional: Negative.    Eyes:  Negative for blurred vision.   Respiratory:  Negative for shortness of breath.    Cardiovascular:  Negative for chest pain, palpitations, orthopnea and PND.   Neurological:  Negative for headaches.   All other systems reviewed and are negative.      Objective     Physical Exam  Vitals and nursing note reviewed.   Neck:      Vascular: No carotid bruit.   Cardiovascular:      Rate and Rhythm: Normal rate and regular rhythm.      Pulses: Normal pulses.      Heart sounds: Normal heart sounds. No murmur heard.  Pulmonary:      Effort: Pulmonary effort is normal.      Breath sounds: Normal breath sounds.   Musculoskeletal:      Right lower leg: No edema.      Left lower leg: No edema.   Skin:     Capillary Refill: Capillary refill takes more than 3 seconds.   Neurological:      General: No focal deficit present.      Mental Status: He is oriented to person, place, and time. Mental status is at baseline.   Psychiatric:          Mood and Affect: Mood normal.         Behavior: Behavior normal.         Thought Content: Thought content normal.         Judgment: Judgment normal.         Lab Results   Component Value Date    WBC 5.0 06/05/2020    HGB 12.4 (L) 06/05/2020    HCT 38.2 (L) 06/05/2020    MCV 95 06/05/2020     06/05/2020           Problem List Items Addressed This Visit       Other emphysema (CMS/[facility])    Type 2 diabetes mellitus with stage 3b chronic kidney disease, without long-term current use of insulin (CMS/[facility])    Hypertension, uncontrolled - Primary    Stage 3b chronic kidney disease (CKD) (CMS/[facility])            A/P        START  SPIRONOLACTONE 12.5MG 1 PILL EVERY DAY   WILL  SEE HIM ON 10/2023

## 2023-09-02 LAB
ALBUMIN ELP: 4 G/DL (ref 3.4–5)
ALBUMIN/PROTEIN TOTAL (%) IN URINE BY ELECTROPHORESIS: 83 %
ALPHA 1 GLOBULIN/PROTEIN TOTAL (%) IN URINE BY ELECTROPHORESIS: 2.7 %
ALPHA 1: 0.3 G/DL (ref 0.2–0.6)
ALPHA 2 GLOBULIN/PROTEIN TOTAL (%) IN URINE BY ELECTROPHORESIS: 3.8 %
ALPHA 2: 0.7 G/DL (ref 0.4–1.1)
BETA GLOBULIN/PROTEIN TOTAL (%) IN URINE BY ELECTROPHORESIS: 6.6 %
BETA: 0.8 G/DL (ref 0.5–1.2)
GAMMA GLOBULIN/PROTEIN TOTAL (%) IN URINE BY ELECTROPHORESIS: 3.9 %
GAMMA GLOBULIN: 0.8 G/DL (ref 0.5–1.4)
IMMUNOFIXATION INTERPRETATION: NORMAL
PATH REVIEW - SERUM IMMUNOFIXATION: NORMAL
PATH REVIEW - URINE IMMUNOFIXATION: NORMAL
PATH REVIEW-SERUM PROTEIN ELECTROPHORESIS: NORMAL
PATH REVIEW-URINE PROTEIN ELECTROPHORESIS: NORMAL
PROTEIN (MG/DL) IN URINE: 230 MG/DL (ref 5–25)
PROTEIN ELECTROPHORESIS INTERPRETATION: NORMAL
PROTEIN TOTAL: 6.6 G/DL (ref 6.4–8.2)
SERUM IMMUNOFIXATION INTERPRETATION: NORMAL
UPEP INTERPRETATION: ABNORMAL

## 2023-09-04 LAB
CRYOCRIT IMMUNODIFFUSION (SJC): NORMAL
CRYOCRIT IMMUNOFIXATION (SJC): NORMAL
CRYOGLOBULIN  (SJC): NEGATIVE
PERCENT CRYOCRIT (SJC): NORMAL %
RHEUMATOID FACTOR (SJC): NORMAL IU/ML

## 2023-09-29 ASSESSMENT — ENCOUNTER SYMPTOMS
HYPERTENSION: 1
SHORTNESS OF BREATH: 1

## 2023-10-02 ENCOUNTER — TELEPHONE (OUTPATIENT)
Dept: PRIMARY CARE | Facility: CLINIC | Age: 83
End: 2023-10-02

## 2023-10-02 ENCOUNTER — OFFICE VISIT (OUTPATIENT)
Dept: PRIMARY CARE | Facility: CLINIC | Age: 83
End: 2023-10-02
Payer: MEDICARE

## 2023-10-02 VITALS
DIASTOLIC BLOOD PRESSURE: 72 MMHG | BODY MASS INDEX: 23.35 KG/M2 | HEART RATE: 76 BPM | SYSTOLIC BLOOD PRESSURE: 154 MMHG | WEIGHT: 143.6 LBS | OXYGEN SATURATION: 95 %

## 2023-10-02 DIAGNOSIS — E13.51: ICD-10-CM

## 2023-10-02 DIAGNOSIS — G89.29 CHRONIC PAIN OF LEFT KNEE: Primary | ICD-10-CM

## 2023-10-02 DIAGNOSIS — I10 HYPERTENSION, UNSPECIFIED TYPE: ICD-10-CM

## 2023-10-02 DIAGNOSIS — E11.22 TYPE 2 DIABETES MELLITUS WITH STAGE 3B CHRONIC KIDNEY DISEASE, WITHOUT LONG-TERM CURRENT USE OF INSULIN (MULTI): ICD-10-CM

## 2023-10-02 DIAGNOSIS — J43.8 OTHER EMPHYSEMA (MULTI): ICD-10-CM

## 2023-10-02 DIAGNOSIS — I10 HYPERTENSION, UNCONTROLLED: Primary | Chronic | ICD-10-CM

## 2023-10-02 DIAGNOSIS — M25.562 CHRONIC PAIN OF LEFT KNEE: Primary | ICD-10-CM

## 2023-10-02 DIAGNOSIS — N18.32 STAGE 3B CHRONIC KIDNEY DISEASE (CKD) (MULTI): ICD-10-CM

## 2023-10-02 DIAGNOSIS — N18.32 TYPE 2 DIABETES MELLITUS WITH STAGE 3B CHRONIC KIDNEY DISEASE, WITHOUT LONG-TERM CURRENT USE OF INSULIN (MULTI): ICD-10-CM

## 2023-10-02 PROCEDURE — 3078F DIAST BP <80 MM HG: CPT | Performed by: INTERNAL MEDICINE

## 2023-10-02 PROCEDURE — 1126F AMNT PAIN NOTED NONE PRSNT: CPT | Performed by: INTERNAL MEDICINE

## 2023-10-02 PROCEDURE — 1159F MED LIST DOCD IN RCRD: CPT | Performed by: INTERNAL MEDICINE

## 2023-10-02 PROCEDURE — 3077F SYST BP >= 140 MM HG: CPT | Performed by: INTERNAL MEDICINE

## 2023-10-02 PROCEDURE — 1160F RVW MEDS BY RX/DR IN RCRD: CPT | Performed by: INTERNAL MEDICINE

## 2023-10-02 PROCEDURE — 1036F TOBACCO NON-USER: CPT | Performed by: INTERNAL MEDICINE

## 2023-10-02 PROCEDURE — 99213 OFFICE O/P EST LOW 20 MIN: CPT | Performed by: INTERNAL MEDICINE

## 2023-10-02 RX ORDER — CARVEDILOL 25 MG/1
25 TABLET ORAL
Qty: 180 TABLET | Refills: 3 | Status: SHIPPED | OUTPATIENT
Start: 2023-10-02 | End: 2024-03-12 | Stop reason: ALTCHOICE

## 2023-10-02 RX ORDER — SPIRONOLACTONE 25 MG/1
0.5 TABLET ORAL 2 TIMES DAILY
COMMUNITY
Start: 2023-08-28

## 2023-10-02 ASSESSMENT — ENCOUNTER SYMPTOMS
PND: 0
LOSS OF SENSATION IN FEET: 0
SHORTNESS OF BREATH: 1
HYPERTENSION: 1
OCCASIONAL FEELINGS OF UNSTEADINESS: 0
BLURRED VISION: 0
CONSTITUTIONAL NEGATIVE: 1
DEPRESSION: 0

## 2023-10-02 ASSESSMENT — PATIENT HEALTH QUESTIONNAIRE - PHQ9
2. FEELING DOWN, DEPRESSED OR HOPELESS: NOT AT ALL
1. LITTLE INTEREST OR PLEASURE IN DOING THINGS: NOT AT ALL
SUM OF ALL RESPONSES TO PHQ9 QUESTIONS 1 AND 2: 0
2. FEELING DOWN, DEPRESSED OR HOPELESS: NOT AT ALL
SUM OF ALL RESPONSES TO PHQ9 QUESTIONS 1 AND 2: 0
1. LITTLE INTEREST OR PLEASURE IN DOING THINGS: NOT AT ALL

## 2023-10-02 NOTE — PROGRESS NOTES
Patient ID: Mann Abrams is a 83 y.o. male who presents for Follow-up.    /72   Pulse 76   Wt 65.1 kg (143 lb 9.6 oz)   SpO2 95%   BMI 23.35 kg/m²     Hypertension  This is a chronic problem. The current episode started more than 1 year ago. The problem has been gradually improving since onset. The problem is uncontrolled. Associated symptoms include shortness of breath. Pertinent negatives include no anxiety, blurred vision, malaise/fatigue, peripheral edema or PND. There are no associated agents to hypertension. Risk factors for coronary artery disease include male gender and dyslipidemia.       Subjective     Review of Systems   Constitutional: Negative.  Negative for malaise/fatigue.   Eyes:  Negative for blurred vision.   Respiratory:  Positive for shortness of breath.    Cardiovascular:  Negative for PND.   All other systems reviewed and are negative.      Objective     Physical Exam  Vitals and nursing note reviewed.   Cardiovascular:      Rate and Rhythm: Normal rate and regular rhythm.      Pulses: Normal pulses.      Heart sounds: Normal heart sounds.   Pulmonary:      Effort: Pulmonary effort is normal.      Breath sounds: Normal breath sounds.   Musculoskeletal:      Right lower leg: No edema.      Left lower leg: No edema.   Skin:     Capillary Refill: Capillary refill takes more than 3 seconds.   Neurological:      General: No focal deficit present.      Mental Status: He is oriented to person, place, and time.   Psychiatric:         Mood and Affect: Mood normal.         Behavior: Behavior normal.         Thought Content: Thought content normal.         Judgment: Judgment normal.         Lab Results   Component Value Date    WBC 6.4 09/01/2023    HGB 11.0 (L) 09/01/2023    HCT 32.9 (L) 09/01/2023    MCV 96 09/01/2023     09/01/2023           Problem List Items Addressed This Visit       Other emphysema (CMS/[facility])    Type 2 diabetes mellitus with stage 3b chronic kidney disease,  without long-term current use of insulin (CMS/[facility])    DM (diabetes mellitus), secondary, w/peripheral vascular complications (CMS/[facility])    Hypertension, uncontrolled - Primary    Stage 3b chronic kidney disease (CKD) (CMS/[facility])            A/P         CONTINUE THE SAME MEDICATION   WHAT YOU ARE TAKING NOW

## 2023-10-18 PROBLEM — E78.00 PURE HYPERCHOLESTEROLEMIA: Status: ACTIVE | Noted: 2023-10-18

## 2023-10-18 PROBLEM — R05.3 PERSISTENT COUGH: Status: ACTIVE | Noted: 2023-10-18

## 2023-10-18 PROBLEM — I73.9 PERIPHERAL VASCULAR DISEASE (CMS-HCC): Status: ACTIVE | Noted: 2023-10-18

## 2023-10-18 PROBLEM — H26.9 CATARACT: Status: ACTIVE | Noted: 2023-10-18

## 2023-10-18 PROBLEM — Z96.1 PSEUDOPHAKIA OF LEFT EYE: Status: ACTIVE | Noted: 2023-10-18

## 2023-10-18 PROBLEM — R07.9 CHEST PAIN, EXERTIONAL: Status: ACTIVE | Noted: 2023-10-18

## 2023-10-18 PROBLEM — E78.5 HYPERLIPIDEMIA: Status: ACTIVE | Noted: 2023-10-18

## 2023-10-18 PROBLEM — H52.10 MYOPIA WITH ASTIGMATISM AND PRESBYOPIA: Status: ACTIVE | Noted: 2023-10-18

## 2023-10-18 PROBLEM — Q66.70 CAVUS DEFORMITY OF FOOT: Status: ACTIVE | Noted: 2023-10-18

## 2023-10-18 PROBLEM — D64.9 MILD ANEMIA: Status: ACTIVE | Noted: 2023-10-18

## 2023-10-18 PROBLEM — K21.9 GERD (GASTROESOPHAGEAL REFLUX DISEASE): Status: ACTIVE | Noted: 2023-10-18

## 2023-10-18 PROBLEM — E11.22 CHRONIC KIDNEY DISEASE IN TYPE 2 DIABETES MELLITUS (MULTI): Status: ACTIVE | Noted: 2023-10-18

## 2023-10-18 PROBLEM — R94.39 ABNORMAL STRESS TEST: Status: ACTIVE | Noted: 2023-10-18

## 2023-10-18 PROBLEM — R06.02 SOB (SHORTNESS OF BREATH) ON EXERTION: Status: ACTIVE | Noted: 2023-10-18

## 2023-10-18 PROBLEM — R06.09 DYSPNEA ON MINIMAL EXERTION: Status: ACTIVE | Noted: 2023-10-18

## 2023-10-18 PROBLEM — M20.40 HAMMERTOE: Status: ACTIVE | Noted: 2023-10-18

## 2023-10-18 PROBLEM — R79.81 LOW OXYGEN SATURATION: Status: ACTIVE | Noted: 2023-10-18

## 2023-10-18 PROBLEM — M19.90 OSTEOARTHRITIS: Status: ACTIVE | Noted: 2023-10-18

## 2023-10-18 PROBLEM — L60.0 INGROWING NAIL: Status: ACTIVE | Noted: 2023-10-18

## 2023-10-18 PROBLEM — H25.11 AGE-RELATED NUCLEAR CATARACT OF RIGHT EYE: Status: ACTIVE | Noted: 2023-10-18

## 2023-10-18 PROBLEM — H52.209 MYOPIA WITH ASTIGMATISM AND PRESBYOPIA: Status: ACTIVE | Noted: 2023-10-18

## 2023-10-18 PROBLEM — E11.9 DIABETES MELLITUS (MULTI): Status: ACTIVE | Noted: 2023-10-18

## 2023-10-18 PROBLEM — H52.4 MYOPIA WITH ASTIGMATISM AND PRESBYOPIA: Status: ACTIVE | Noted: 2023-10-18

## 2023-10-18 RX ORDER — INSULIN PUMP SYRINGE, 3 ML
EACH MISCELLANEOUS
COMMUNITY

## 2023-10-19 ENCOUNTER — OFFICE VISIT (OUTPATIENT)
Dept: ORTHOPEDIC SURGERY | Facility: CLINIC | Age: 83
End: 2023-10-19
Payer: MEDICARE

## 2023-10-19 DIAGNOSIS — M25.562 CHRONIC PAIN OF LEFT KNEE: ICD-10-CM

## 2023-10-19 DIAGNOSIS — G89.29 CHRONIC PAIN OF LEFT KNEE: ICD-10-CM

## 2023-10-19 DIAGNOSIS — M17.12 PRIMARY OSTEOARTHRITIS OF LEFT KNEE: Primary | ICD-10-CM

## 2023-10-19 PROCEDURE — 1126F AMNT PAIN NOTED NONE PRSNT: CPT | Performed by: ORTHOPAEDIC SURGERY

## 2023-10-19 PROCEDURE — 1160F RVW MEDS BY RX/DR IN RCRD: CPT | Performed by: ORTHOPAEDIC SURGERY

## 2023-10-19 PROCEDURE — 99214 OFFICE O/P EST MOD 30 MIN: CPT | Performed by: ORTHOPAEDIC SURGERY

## 2023-10-19 PROCEDURE — 99204 OFFICE O/P NEW MOD 45 MIN: CPT | Performed by: ORTHOPAEDIC SURGERY

## 2023-10-19 PROCEDURE — 1036F TOBACCO NON-USER: CPT | Performed by: ORTHOPAEDIC SURGERY

## 2023-10-19 PROCEDURE — 1159F MED LIST DOCD IN RCRD: CPT | Performed by: ORTHOPAEDIC SURGERY

## 2023-10-19 ASSESSMENT — PAIN - FUNCTIONAL ASSESSMENT
PAIN_FUNCTIONAL_ASSESSMENT: NO/DENIES PAIN

## 2023-10-19 ASSESSMENT — PAIN SCALES - GENERAL
PAINLEVEL_OUTOF10: 0 - NO PAIN
PAINLEVEL_OUTOF10: 0 - NO PAIN

## 2023-10-19 NOTE — PROGRESS NOTES
Left knee pain    This 83-year-old gentleman was seen for a history of left knee pain.  The patient notes he is pain-free at present following an intra-articular steroid injection by his primary care physician.    Patient notes that August 2, 2023 was on a ladder and twisted his left knee.  He noted marked pain and swelling in the knee.  This was successfully treated with the intra-articular steroid injection.  The patient has had no pain or swelling since the steroid injection.    Patient denies any previous problems with his knee.  He has had no pain swelling giving way locking or crepitus prior to his injury.  The patient denies any neurologic symptoms in the lower extremity.    The patient was a cigarette smoker and stopped over 30 years ago.  Further complicating his situation is his past medical history which includes cervical disorder with myelopathy, hypertension, emphysema, type 2 diabetes with stage IIIb chronic kidney disease, gastroesophageal reflux disease, peripheral vascular disease and shortness of breath on exertion.    Review of systems:  A complete review of the patient's past medical history and review of 30 systems and all medications and allergies was performed. Please see adult medical record for details.    A Family history for genetic or familial inheritance issues and Social history including smoking history, alcohol consumption and exercise activities was also reviewed and significant findings noted in the patients history of present illness.    Physical Exam:  The patient is well-nourished and well-developed and in no acute distress. The patient displayed normal mood and affect. The patient's pupils were equal, round sclerae are white. Patient's respirations appear to be regular and unlabored.     Physical examination of the left knee revealed no effusion in the knee and no deformity.  There were no skin abnormalities or lymphangitis. The knee demonstrated normal alignment. There was no  tenderness about the knee, thigh or calf. There was no tenderness at the medial or lateral joint space. There was no pain with patellofemoral compression. The knee came to full extension and flexed to 120°. Straight leg raising was without lag.  There was full ligamentous stability.  The neurovascular examination of the extremity was intact although also has were only trace in the foot.The neurological exam including motor and sensory exam was performed. The vascular examination including palpation of pulses and capillary refill of the foot was performed and determined to be intact.    Imaging:  I personally reviewed and measured the radiographs including AP and lateral views of the extremity and they revealed minimal arthritic changes in the knee with mild narrowing of the medial joint space and mild varus alignment.    It is my impression that this patient has minimal arthritis in his knee.  He is currently asymptomatic.  I suspect the flareup of his knee symptoms were related to the his injury.  His problem was successfully treated by his primary care physician with the intra-articular steroid injection.    I would be delighted to see the patient back the future should  they  have further problems.    Note dictated with [employer] software.  Completed without full type editing and sent to avoid delay.

## 2023-11-30 DIAGNOSIS — R06.2 WHEEZING: ICD-10-CM

## 2023-11-30 DIAGNOSIS — J43.8 OTHER EMPHYSEMA (MULTI): Chronic | ICD-10-CM

## 2023-11-30 RX ORDER — ALBUTEROL SULFATE 90 UG/1
2 AEROSOL, METERED RESPIRATORY (INHALATION) EVERY 4 HOURS PRN
Qty: 25.5 G | Refills: 1 | Status: SHIPPED | OUTPATIENT
Start: 2023-11-30 | End: 2024-03-12 | Stop reason: WASHOUT

## 2023-12-18 ENCOUNTER — OFFICE VISIT (OUTPATIENT)
Dept: NEPHROLOGY | Facility: CLINIC | Age: 83
End: 2023-12-18
Payer: MEDICARE

## 2023-12-18 ENCOUNTER — LAB (OUTPATIENT)
Dept: LAB | Facility: LAB | Age: 83
End: 2023-12-18
Payer: MEDICARE

## 2023-12-18 VITALS
WEIGHT: 141.8 LBS | DIASTOLIC BLOOD PRESSURE: 75 MMHG | TEMPERATURE: 97.8 F | RESPIRATION RATE: 16 BRPM | BODY MASS INDEX: 23.06 KG/M2 | SYSTOLIC BLOOD PRESSURE: 155 MMHG | HEART RATE: 72 BPM

## 2023-12-18 DIAGNOSIS — N18.32 STAGE 3B CHRONIC KIDNEY DISEASE (CKD) (MULTI): Primary | ICD-10-CM

## 2023-12-18 DIAGNOSIS — N18.32 STAGE 3B CHRONIC KIDNEY DISEASE (CKD) (MULTI): ICD-10-CM

## 2023-12-18 LAB
ALBUMIN SERPL BCP-MCNC: 4 G/DL (ref 3.4–5)
ANION GAP SERPL CALC-SCNC: 15 MMOL/L (ref 10–20)
BUN SERPL-MCNC: 27 MG/DL (ref 6–23)
CALCIUM SERPL-MCNC: 9.2 MG/DL (ref 8.6–10.6)
CHLORIDE SERPL-SCNC: 103 MMOL/L (ref 98–107)
CO2 SERPL-SCNC: 22 MMOL/L (ref 21–32)
CREAT SERPL-MCNC: 1.75 MG/DL (ref 0.5–1.3)
CREAT UR-MCNC: 199.6 MG/DL (ref 20–370)
GFR SERPL CREATININE-BSD FRML MDRD: 39 ML/MIN/1.73M*2
GLUCOSE SERPL-MCNC: 153 MG/DL (ref 74–99)
MICROALBUMIN UR-MCNC: 843.9 MG/L
MICROALBUMIN/CREAT UR: 422.8 UG/MG CREAT
PHOSPHATE SERPL-MCNC: 3.8 MG/DL (ref 2.5–4.9)
POTASSIUM SERPL-SCNC: 4.5 MMOL/L (ref 3.5–5.3)
PTH-INTACT SERPL-MCNC: 65 PG/ML (ref 18.5–88)
SODIUM SERPL-SCNC: 135 MMOL/L (ref 136–145)

## 2023-12-18 PROCEDURE — 1036F TOBACCO NON-USER: CPT | Performed by: INTERNAL MEDICINE

## 2023-12-18 PROCEDURE — 82570 ASSAY OF URINE CREATININE: CPT

## 2023-12-18 PROCEDURE — 1159F MED LIST DOCD IN RCRD: CPT | Performed by: INTERNAL MEDICINE

## 2023-12-18 PROCEDURE — 99213 OFFICE O/P EST LOW 20 MIN: CPT | Performed by: INTERNAL MEDICINE

## 2023-12-18 PROCEDURE — 3078F DIAST BP <80 MM HG: CPT | Performed by: INTERNAL MEDICINE

## 2023-12-18 PROCEDURE — 36415 COLL VENOUS BLD VENIPUNCTURE: CPT

## 2023-12-18 PROCEDURE — 82043 UR ALBUMIN QUANTITATIVE: CPT

## 2023-12-18 PROCEDURE — 83970 ASSAY OF PARATHORMONE: CPT

## 2023-12-18 PROCEDURE — 1160F RVW MEDS BY RX/DR IN RCRD: CPT | Performed by: INTERNAL MEDICINE

## 2023-12-18 PROCEDURE — 1126F AMNT PAIN NOTED NONE PRSNT: CPT | Performed by: INTERNAL MEDICINE

## 2023-12-18 PROCEDURE — 80069 RENAL FUNCTION PANEL: CPT

## 2023-12-18 PROCEDURE — 3075F SYST BP GE 130 - 139MM HG: CPT | Performed by: INTERNAL MEDICINE

## 2023-12-18 ASSESSMENT — PAIN SCALES - GENERAL: PAINLEVEL: 0-NO PAIN

## 2023-12-18 NOTE — PROGRESS NOTES
"For follow up, doing well.  No complaints  No hospitalizations/illness since last visit  Home BP not higher than 130s  Denies orthostatic symptoms    RoS negative for all other systems except as noted above.    Vitals:    12/18/23 1108   BP: 155/75   Pulse: 72   Resp:    Temp:        No distress  HEENT:  moist, no pallor  No edema jyotsna LE  Breath sounds jyotsna equal, clear  S1 S2 regular, normal, no rub or murmur  Abdomen soft, non tender  AAO x3, non focal    No labs since Sept 2023    83 year old with DM, htn and CKD      1. CKD G3aA3: Likely due to long standing DM and htn    Lab Results   Component Value Date    CREATININE 1.55 (H) 09/01/2023    No components found for: \"MICROALBUCREA\", \"3\"   Lab Results   Component Value Date    GFRMALE 45 (A) 09/01/2023    GFRMALE 46 (A) 08/28/2023    GFRMALE 49 (A) 12/28/2022     Advised to repeat labs today    2. Volume status:  Euvolemic    3. Anemia:   Lab Results   Component Value Date    HGB 11.0 (L) 09/01/2023      Lab Results   Component Value Date    IRON 108 09/01/2023    TIBC 290 09/01/2023   Monitor    4. MBD:   Lab Results   Component Value Date    CALCIUM 9.1 09/01/2023    PHOS 3.8 09/01/2023      Lab Results   Component Value Date    VITD25 49 08/28/2023    VITD25 80 06/05/2020     Check Ca, PO4, 25 OH vit D and PTH before next visit    5.Acid base  Lab Results   Component Value Date    CO2 23 09/01/2023        6. Hypertension: At goal, continue current meds  Low sodium diet      RTC:  4 mo, sooner if needed based on labs        "

## 2024-01-03 ENCOUNTER — OFFICE VISIT (OUTPATIENT)
Dept: PRIMARY CARE | Facility: CLINIC | Age: 84
End: 2024-01-03
Payer: MEDICARE

## 2024-01-03 VITALS
BODY MASS INDEX: 23.93 KG/M2 | OXYGEN SATURATION: 94 % | DIASTOLIC BLOOD PRESSURE: 67 MMHG | HEIGHT: 65 IN | SYSTOLIC BLOOD PRESSURE: 127 MMHG | WEIGHT: 143.6 LBS | HEART RATE: 75 BPM

## 2024-01-03 DIAGNOSIS — N18.32 DIABETES MELLITUS DUE TO UNDERLYING CONDITION WITH STAGE 3B CHRONIC KIDNEY DISEASE, WITH LONG-TERM CURRENT USE OF INSULIN (MULTI): ICD-10-CM

## 2024-01-03 DIAGNOSIS — Z13.6 SCREENING, ISCHEMIC HEART DISEASE: ICD-10-CM

## 2024-01-03 DIAGNOSIS — J43.8 OTHER EMPHYSEMA (MULTI): ICD-10-CM

## 2024-01-03 DIAGNOSIS — E13.51: ICD-10-CM

## 2024-01-03 DIAGNOSIS — E08.22 DIABETES MELLITUS DUE TO UNDERLYING CONDITION WITH STAGE 3B CHRONIC KIDNEY DISEASE, WITH LONG-TERM CURRENT USE OF INSULIN (MULTI): ICD-10-CM

## 2024-01-03 DIAGNOSIS — Z79.4 DIABETES MELLITUS DUE TO UNDERLYING CONDITION WITH STAGE 3B CHRONIC KIDNEY DISEASE, WITH LONG-TERM CURRENT USE OF INSULIN (MULTI): ICD-10-CM

## 2024-01-03 DIAGNOSIS — Z00.00 MEDICARE ANNUAL WELLNESS VISIT, SUBSEQUENT: Primary | Chronic | ICD-10-CM

## 2024-01-03 DIAGNOSIS — R09.89 BRUIT OF LEFT CAROTID ARTERY: ICD-10-CM

## 2024-01-03 DIAGNOSIS — R09.89 FEMORAL BRUIT: ICD-10-CM

## 2024-01-03 DIAGNOSIS — K40.90 NON-RECURRENT UNILATERAL INGUINAL HERNIA WITHOUT OBSTRUCTION OR GANGRENE: ICD-10-CM

## 2024-01-03 DIAGNOSIS — E78.5 HYPERLIPIDEMIA, UNSPECIFIED HYPERLIPIDEMIA TYPE: ICD-10-CM

## 2024-01-03 LAB
ALBUMIN SERPL BCP-MCNC: 3.9 G/DL (ref 3.4–5)
ALP SERPL-CCNC: 81 U/L (ref 33–136)
ALT SERPL W P-5'-P-CCNC: 20 U/L (ref 10–52)
ANION GAP SERPL CALC-SCNC: 10 MMOL/L (ref 10–20)
AST SERPL W P-5'-P-CCNC: 18 U/L (ref 9–39)
BILIRUB SERPL-MCNC: 0.6 MG/DL (ref 0–1.2)
BUN SERPL-MCNC: 23 MG/DL (ref 6–23)
CALCIUM SERPL-MCNC: 9.3 MG/DL (ref 8.6–10.6)
CHLORIDE SERPL-SCNC: 100 MMOL/L (ref 98–107)
CO2 SERPL-SCNC: 28 MMOL/L (ref 21–32)
CREAT SERPL-MCNC: 1.7 MG/DL (ref 0.5–1.3)
CREAT UR-MCNC: 91.9 MG/DL (ref 20–370)
EST. AVERAGE GLUCOSE BLD GHB EST-MCNC: 154 MG/DL
GFR SERPL CREATININE-BSD FRML MDRD: 40 ML/MIN/1.73M*2
GLUCOSE SERPL-MCNC: 124 MG/DL (ref 74–99)
HBA1C MFR BLD: 7 %
MICROALBUMIN UR-MCNC: 259.8 MG/L
MICROALBUMIN/CREAT UR: 282.7 UG/MG CREAT
POTASSIUM SERPL-SCNC: 4.4 MMOL/L (ref 3.5–5.3)
PROT SERPL-MCNC: 6.4 G/DL (ref 6.4–8.2)
SODIUM SERPL-SCNC: 134 MMOL/L (ref 136–145)

## 2024-01-03 PROCEDURE — 99397 PER PM REEVAL EST PAT 65+ YR: CPT | Performed by: INTERNAL MEDICINE

## 2024-01-03 PROCEDURE — 83036 HEMOGLOBIN GLYCOSYLATED A1C: CPT

## 2024-01-03 PROCEDURE — 82043 UR ALBUMIN QUANTITATIVE: CPT

## 2024-01-03 PROCEDURE — 1126F AMNT PAIN NOTED NONE PRSNT: CPT | Performed by: INTERNAL MEDICINE

## 2024-01-03 PROCEDURE — 36415 COLL VENOUS BLD VENIPUNCTURE: CPT

## 2024-01-03 PROCEDURE — 82570 ASSAY OF URINE CREATININE: CPT

## 2024-01-03 PROCEDURE — 1160F RVW MEDS BY RX/DR IN RCRD: CPT | Performed by: INTERNAL MEDICINE

## 2024-01-03 PROCEDURE — 80053 COMPREHEN METABOLIC PANEL: CPT

## 2024-01-03 PROCEDURE — 3074F SYST BP LT 130 MM HG: CPT | Performed by: INTERNAL MEDICINE

## 2024-01-03 PROCEDURE — 1036F TOBACCO NON-USER: CPT | Performed by: INTERNAL MEDICINE

## 2024-01-03 PROCEDURE — G0439 PPPS, SUBSEQ VISIT: HCPCS | Performed by: INTERNAL MEDICINE

## 2024-01-03 PROCEDURE — 3078F DIAST BP <80 MM HG: CPT | Performed by: INTERNAL MEDICINE

## 2024-01-03 PROCEDURE — 1159F MED LIST DOCD IN RCRD: CPT | Performed by: INTERNAL MEDICINE

## 2024-01-03 PROCEDURE — 1170F FXNL STATUS ASSESSED: CPT | Performed by: INTERNAL MEDICINE

## 2024-01-03 ASSESSMENT — ACTIVITIES OF DAILY LIVING (ADL)
MANAGING_FINANCES: INDEPENDENT
BATHING: INDEPENDENT
DRESSING: INDEPENDENT
DOING_HOUSEWORK: INDEPENDENT
GROCERY_SHOPPING: INDEPENDENT
TAKING_MEDICATION: INDEPENDENT

## 2024-01-03 ASSESSMENT — PATIENT HEALTH QUESTIONNAIRE - PHQ9
1. LITTLE INTEREST OR PLEASURE IN DOING THINGS: NOT AT ALL
SUM OF ALL RESPONSES TO PHQ9 QUESTIONS 1 AND 2: 0
2. FEELING DOWN, DEPRESSED OR HOPELESS: NOT AT ALL
1. LITTLE INTEREST OR PLEASURE IN DOING THINGS: NOT AT ALL
SUM OF ALL RESPONSES TO PHQ9 QUESTIONS 1 AND 2: 0
2. FEELING DOWN, DEPRESSED OR HOPELESS: NOT AT ALL

## 2024-01-03 ASSESSMENT — ENCOUNTER SYMPTOMS: CONSTITUTIONAL NEGATIVE: 1

## 2024-01-03 NOTE — PROGRESS NOTES
"Patient ID: Mann Abrams is a 83 y.o. male who presents for Medicare Annual Wellness Visit Subsequent.    /67   Pulse 75   Ht 1.657 m (5' 5.25\")   Wt 65.1 kg (143 lb 9.6 oz)   SpO2 94%   BMI 23.71 kg/m²     HPI    Patient is here for Medicare wellness visit, he is doing reasonably better      Hypertension controlled  Blood pressure is 126/64  In both arms  Checked with office BP machine  And his home BP machine      Diabetes with microalbuminuria  Seeing nephrology      Diabetes with dyslipidemia   On atorvastatin 80mg 1 pill every day       Copd stable using   Wixella inhaler doing better     He has diabetes with CKD stage III    Pt does not have claudication     I HAVE NOTED SMALL  SWELLING RT GROIN       Subjective     Review of Systems   Constitutional: Negative.    All other systems reviewed and are negative.      Objective     Physical Exam  Vitals and nursing note reviewed.   Neck:      Vascular: Carotid bruit present.      Comments:   LEFT CAROTID BRUIT     No TIA/STROKE LIKE SYMPTOMS   Cardiovascular:      Rate and Rhythm: Normal rate and regular rhythm.      Pulses: Normal pulses.      Heart sounds: Normal heart sounds. No murmur heard.  Pulmonary:      Effort: Pulmonary effort is normal.      Breath sounds: Normal breath sounds.   Abdominal:      General: Abdomen is flat. There is no distension.      Palpations: Abdomen is soft. There is no mass.      Tenderness: There is no abdominal tenderness. There is no right CVA tenderness, left CVA tenderness, guarding or rebound.      Hernia: A hernia is present.      Comments: RT FEMORAL BRUIT   RT INGUINAL HERNIA    Musculoskeletal:      Right lower leg: No edema.      Left lower leg: No edema.   Skin:     Capillary Refill: Capillary refill takes more than 3 seconds.   Neurological:      General: No focal deficit present.      Mental Status: He is oriented to person, place, and time. Mental status is at baseline.         Lab Results   Component " Value Date    WBC 6.4 09/01/2023    HGB 11.0 (L) 09/01/2023    HCT 32.9 (L) 09/01/2023    MCV 96 09/01/2023     09/01/2023           Problem List Items Addressed This Visit       Diabetes mellitus (CMS/HCC) - Primary    Relevant Orders    Comprehensive metabolic panel    Hemoglobin A1c    Albumin, urine, random    Hyperlipidemia     Other Visit Diagnoses       Bruit of left carotid artery        Relevant Orders    US carotid doppler left    US carotid doppler right    Femoral bruit        Relevant Orders    Vascular US PVR with exercise    Screening, ischemic heart disease        Relevant Orders    CT cardiac scoring wo IV contrast                 A/P         CMP, LIPID, A1C , PSA   CAROTID  DUPLEX BILATERAL   CT CHEST W/O CONTRAST TO DETERMINE CCS  VASCULAR STUDIES LEFT WITH EXERCISE   FOLLOW UP AFTER THE TEST RESULT           Advance Care Planning Note     Discussion Date: 01/03/24   Discussion Participants: patient    The patient wishes to discuss Advance Care Planning today and the following is a brief summary of our discussion.     Patient has capacity to make their own medical decisions: Yes  Health Care Agent/Surrogate Decision Maker documented in chart: No    Documents on file and valid:  Advance Directive/Living Will: No   Health Care Power of : No  Other:     Communication of Medical Status/Prognosis:        Communication of Treatment Goals/Options:       Discussed with the patient end-of-life choices patient is presently full code, I could not find living will or power of  on the chart I told him he needs to get that done and when it is done then he needs to either drop this paper so that we can scanned into the chart or he can bring it personally on the next office visit so that it can be scanned into the chart for the purpose of documentation    Treatment Decisions  Goals of Care: survival is prioritized, if goals for quality or survival can reasonably be achieved     Follow Up  Plan    Team Members    Time Statement: Total face to face time spent on advance care planning was 5 minutes with 5 minutes spent in counseling, including the explanation.    Nataliya Grant MD  1/3/2024 8:49 AM

## 2024-01-04 ENCOUNTER — HOSPITAL ENCOUNTER (OUTPATIENT)
Dept: VASCULAR MEDICINE | Facility: CLINIC | Age: 84
Discharge: HOME | End: 2024-01-04
Payer: MEDICARE

## 2024-01-04 ENCOUNTER — APPOINTMENT (OUTPATIENT)
Dept: RADIOLOGY | Facility: CLINIC | Age: 84
End: 2024-01-04
Payer: MEDICARE

## 2024-01-04 ENCOUNTER — ANCILLARY PROCEDURE (OUTPATIENT)
Dept: RADIOLOGY | Facility: CLINIC | Age: 84
End: 2024-01-04
Payer: MEDICARE

## 2024-01-04 DIAGNOSIS — I73.9 PERIPHERAL VASCULAR DISEASE, UNSPECIFIED (CMS-HCC): ICD-10-CM

## 2024-01-04 DIAGNOSIS — R09.89 BRUIT OF LEFT CAROTID ARTERY: ICD-10-CM

## 2024-01-04 DIAGNOSIS — R09.89 FEMORAL BRUIT: ICD-10-CM

## 2024-01-04 DIAGNOSIS — Z13.6 SCREENING, ISCHEMIC HEART DISEASE: ICD-10-CM

## 2024-01-04 PROCEDURE — 93924 LWR XTR VASC STDY BILAT: CPT

## 2024-01-04 PROCEDURE — 93924 LWR XTR VASC STDY BILAT: CPT | Mod: MUE | Performed by: SURGERY

## 2024-01-04 PROCEDURE — 93880 EXTRACRANIAL BILAT STUDY: CPT

## 2024-01-04 PROCEDURE — 75571 CT HRT W/O DYE W/CA TEST: CPT

## 2024-01-04 PROCEDURE — 93880 EXTRACRANIAL BILAT STUDY: CPT | Performed by: RADIOLOGY

## 2024-01-14 ASSESSMENT — ENCOUNTER SYMPTOMS
SPEECH DIFFICULTY: 0
BLURRED VISION: 0
HUNGER: 0
POLYDIPSIA: 0
SWEATS: 0
SEIZURES: 0
FATIGUE: 0
TREMORS: 0
DIZZINESS: 0
BLACKOUTS: 0
WEAKNESS: 0
NERVOUS/ANXIOUS: 0
VISUAL CHANGE: 0
HEADACHES: 0
POLYPHAGIA: 0
CONFUSION: 0

## 2024-01-15 ENCOUNTER — OFFICE VISIT (OUTPATIENT)
Dept: PRIMARY CARE | Facility: CLINIC | Age: 84
End: 2024-01-15
Payer: MEDICARE

## 2024-01-15 VITALS
WEIGHT: 140.6 LBS | SYSTOLIC BLOOD PRESSURE: 140 MMHG | BODY MASS INDEX: 23.22 KG/M2 | HEART RATE: 71 BPM | DIASTOLIC BLOOD PRESSURE: 74 MMHG

## 2024-01-15 DIAGNOSIS — R93.1 ELEVATED CORONARY ARTERY CALCIUM SCORE: ICD-10-CM

## 2024-01-15 DIAGNOSIS — I65.21 CAROTID OCCLUSION, RIGHT: Primary | ICD-10-CM

## 2024-01-15 DIAGNOSIS — E11.22 TYPE 2 DIABETES MELLITUS WITH STAGE 3B CHRONIC KIDNEY DISEASE, WITHOUT LONG-TERM CURRENT USE OF INSULIN (MULTI): ICD-10-CM

## 2024-01-15 DIAGNOSIS — R09.89 BRUIT OF RIGHT CAROTID ARTERY: ICD-10-CM

## 2024-01-15 DIAGNOSIS — J43.8 OTHER EMPHYSEMA (MULTI): ICD-10-CM

## 2024-01-15 DIAGNOSIS — E78.5 HYPERLIPIDEMIA, UNSPECIFIED HYPERLIPIDEMIA TYPE: ICD-10-CM

## 2024-01-15 DIAGNOSIS — N18.32 TYPE 2 DIABETES MELLITUS WITH STAGE 3B CHRONIC KIDNEY DISEASE, WITHOUT LONG-TERM CURRENT USE OF INSULIN (MULTI): ICD-10-CM

## 2024-01-15 DIAGNOSIS — I10 PRIMARY HYPERTENSION: ICD-10-CM

## 2024-01-15 PROBLEM — I73.9 PERIPHERAL VASCULAR DISEASE (CMS-HCC): Status: RESOLVED | Noted: 2023-10-18 | Resolved: 2024-01-15

## 2024-01-15 PROCEDURE — 1159F MED LIST DOCD IN RCRD: CPT | Performed by: INTERNAL MEDICINE

## 2024-01-15 PROCEDURE — 1036F TOBACCO NON-USER: CPT | Performed by: INTERNAL MEDICINE

## 2024-01-15 PROCEDURE — 99212 OFFICE O/P EST SF 10 MIN: CPT | Performed by: INTERNAL MEDICINE

## 2024-01-15 PROCEDURE — 1160F RVW MEDS BY RX/DR IN RCRD: CPT | Performed by: INTERNAL MEDICINE

## 2024-01-15 PROCEDURE — 1126F AMNT PAIN NOTED NONE PRSNT: CPT | Performed by: INTERNAL MEDICINE

## 2024-01-15 PROCEDURE — 3078F DIAST BP <80 MM HG: CPT | Performed by: INTERNAL MEDICINE

## 2024-01-15 PROCEDURE — 3077F SYST BP >= 140 MM HG: CPT | Performed by: INTERNAL MEDICINE

## 2024-01-15 ASSESSMENT — ENCOUNTER SYMPTOMS
WEAKNESS: 0
SWEATS: 0
DIZZINESS: 0
CONSTITUTIONAL NEGATIVE: 1
DEPRESSION: 0
BLACKOUTS: 0
BLURRED VISION: 0
HEADACHES: 0
CONFUSION: 0
TREMORS: 0
OCCASIONAL FEELINGS OF UNSTEADINESS: 0
POLYDIPSIA: 0
POLYPHAGIA: 0
HUNGER: 0
VISUAL CHANGE: 0
SEIZURES: 0
LOSS OF SENSATION IN FEET: 0
NERVOUS/ANXIOUS: 0
SPEECH DIFFICULTY: 0
FATIGUE: 0

## 2024-01-15 ASSESSMENT — PATIENT HEALTH QUESTIONNAIRE - PHQ9
SUM OF ALL RESPONSES TO PHQ9 QUESTIONS 1 AND 2: 0
1. LITTLE INTEREST OR PLEASURE IN DOING THINGS: NOT AT ALL
SUM OF ALL RESPONSES TO PHQ9 QUESTIONS 1 AND 2: 0
2. FEELING DOWN, DEPRESSED OR HOPELESS: NOT AT ALL
1. LITTLE INTEREST OR PLEASURE IN DOING THINGS: NOT AT ALL
2. FEELING DOWN, DEPRESSED OR HOPELESS: NOT AT ALL

## 2024-01-15 NOTE — PROGRESS NOTES
Patient ID: Mann Abrams is a 83 y.o. male who presents for Follow-up.    /74   Pulse 71   Wt 63.8 kg (140 lb 9.6 oz)   BMI 23.22 kg/m²     Diabetes  Pertinent negatives for hypoglycemia include no confusion, dizziness, headaches, hunger, mood changes, nervousness/anxiousness, pallor, seizures, sleepiness, speech difficulty, sweats or tremors. Associated symptoms include polyuria. Pertinent negatives for diabetes include no blurred vision, no chest pain, no fatigue, no foot paresthesias, no foot ulcerations, no polydipsia, no polyphagia, no visual change and no weakness. Pertinent negatives for hypoglycemia complications include no blackouts, no hospitalization, no nocturnal hypoglycemia, no required assistance and no required glucagon injection. Symptoms are stable. Diabetic complications include nephropathy and PVD. Pertinent negatives for diabetic complications include no CVA, heart disease, impotence, peripheral neuropathy or retinopathy. Risk factors for coronary artery disease include dyslipidemia and hypertension. Current diabetic treatment includes oral agent (monotherapy). He is compliant with treatment all of the time. His weight is stable. He is following a generally healthy diet. Meal planning includes ADA exchanges. He has not had a previous visit with a dietitian. He rarely participates in exercise. He monitors blood glucose at home 1-2 x per day. He monitors urine at home <1 x per month. Blood glucose monitoring compliance is good. His home blood glucose trend is fluctuating minimally. His breakfast blood glucose is taken between 8-9 am. His breakfast blood glucose range is generally 130-140 mg/dl. His lunch blood glucose is taken between 1-2 pm. His lunch blood glucose range is generally 140-180 mg/dl. His dinner blood glucose is taken after 8 pm. His dinner blood glucose range is generally 130-140 mg/dl. His overall blood glucose range is 140-180 mg/dl. He does not see a podiatrist.Eye  exam is current.         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 Brant Garcia on March 15, 2024  He does not have any chest pain      He has history of severe COPD  Which seems to be controlled with  Steroid and anticholinergic inhalers      He was past smoker  Presently on aspirin 81 mg a day          Subjective     Review of Systems   Constitutional: Negative.  Negative for fatigue.   Eyes:  Negative for blurred vision.   Cardiovascular:  Negative for chest pain.   Endocrine: Positive for polyuria. Negative for polydipsia and polyphagia.   Genitourinary:  Negative for impotence.   Skin:  Negative for pallor.   Neurological:  Negative for dizziness, tremors, seizures, speech difficulty, weakness and headaches.   Psychiatric/Behavioral:  Negative for confusion. The patient is not nervous/anxious.    All other systems reviewed and are negative.      Objective     Physical Exam  Vitals and nursing note reviewed.   Neck:      Vascular: Carotid bruit present.      Comments: Right carotid bruit  No palpable thrill  Cardiovascular:      Rate and Rhythm: Normal rate and regular rhythm.      Pulses: Normal pulses.      Heart sounds: Normal heart sounds.   Pulmonary:      Breath sounds: Examination of the right-upper field reveals decreased breath sounds. Examination of the left-upper field reveals decreased breath sounds. Examination of the right-middle field reveals decreased breath sounds. Examination of the left-middle field reveals decreased breath  sounds. Examination of the right-lower field reveals decreased breath sounds. Examination of the left-lower field reveals decreased breath sounds. Decreased breath sounds present.         Lab Results   Component Value Date    WBC 6.4 09/01/2023    HGB 11.0 (L) 09/01/2023    HCT 32.9 (L) 09/01/2023    MCV 96 09/01/2023     09/01/2023           Problem List Items Addressed This Visit       Primary hypertension    Other emphysema (CMS/[facility])    Type 2 diabetes mellitus with stage 3b chronic kidney disease, without long-term current use of insulin (CMS/[facility])    Hyperlipidemia    Elevated coronary artery calcium score    Bruit of right carotid artery     Other Visit Diagnoses       Carotid occlusion, right    -  Primary    Relevant Orders    Referral to Vascular Surgery                 A/P         Referral to vascular Dr. PATT PRASAD   Patient will call to schedule appointment with Dr. Brant Garcia his cardiologist  I think he is scheduled on 3/15/2024  Advised to continue aspirin 81 mg every day  Diabetes seems to be reasonably controlled A1c 7  Recommend to get tighter diabetes control

## 2024-01-19 DIAGNOSIS — J43.8 OTHER EMPHYSEMA (MULTI): ICD-10-CM

## 2024-01-19 DIAGNOSIS — J30.9 ALLERGIC RHINITIS, UNSPECIFIED SEASONALITY, UNSPECIFIED TRIGGER: ICD-10-CM

## 2024-01-19 RX ORDER — FLUTICASONE PROPIONATE AND SALMETEROL 250; 50 UG/1; UG/1
1 POWDER RESPIRATORY (INHALATION)
Qty: 3 EACH | Refills: 1 | Status: SHIPPED | OUTPATIENT
Start: 2024-01-19

## 2024-01-19 RX ORDER — FLUTICASONE PROPIONATE 50 MCG
1 SPRAY, SUSPENSION (ML) NASAL DAILY
Qty: 48 G | Refills: 1 | Status: SHIPPED | OUTPATIENT
Start: 2024-01-19 | End: 2025-01-18

## 2024-02-02 ENCOUNTER — TELEPHONE (OUTPATIENT)
Dept: PRIMARY CARE | Facility: CLINIC | Age: 84
End: 2024-02-02
Payer: MEDICARE

## 2024-02-02 NOTE — TELEPHONE ENCOUNTER
Pt. States has been throwing up and has had diarrhea for the last 4 days. States no fever or body aches. Asking if there is something you could order him to help. His pharmacy is on file.

## 2024-02-05 ENCOUNTER — OFFICE VISIT (OUTPATIENT)
Dept: OPHTHALMOLOGY | Facility: CLINIC | Age: 84
End: 2024-02-05
Payer: MEDICARE

## 2024-02-05 DIAGNOSIS — H25.11 AGE-RELATED NUCLEAR CATARACT OF RIGHT EYE: Primary | ICD-10-CM

## 2024-02-05 DIAGNOSIS — I10 PRIMARY HYPERTENSION: ICD-10-CM

## 2024-02-05 DIAGNOSIS — E11.9 CONTROLLED TYPE 2 DIABETES MELLITUS WITHOUT COMPLICATION, UNSPECIFIED WHETHER LONG TERM INSULIN USE (MULTI): ICD-10-CM

## 2024-02-05 PROCEDURE — 92014 COMPRE OPH EXAM EST PT 1/>: CPT | Performed by: OPHTHALMOLOGY

## 2024-02-05 ASSESSMENT — REFRACTION_MANIFEST
OD_ADD: +3.00
OS_SPHERE: +0.75
OD_CYLINDER: -0.75
OD_AXIS: 060
OS_ADD: +3.00
OS_AXIS: 105
OD_SPHERE: -1.75
OS_CYLINDER: -0.50

## 2024-02-05 ASSESSMENT — VISUAL ACUITY
METHOD: SNELLEN - LINEAR
OD_CC: 20/50
CORRECTION_TYPE: GLASSES
OS_CC: 20/25

## 2024-02-05 ASSESSMENT — CONF VISUAL FIELD
METHOD: COUNTING FINGERS
OS_SUPERIOR_TEMPORAL_RESTRICTION: 0
OD_NORMAL: 1
OS_SUPERIOR_NASAL_RESTRICTION: 0
OS_INFERIOR_TEMPORAL_RESTRICTION: 0
OD_SUPERIOR_TEMPORAL_RESTRICTION: 0
OD_SUPERIOR_NASAL_RESTRICTION: 0
OS_INFERIOR_NASAL_RESTRICTION: 0
OS_NORMAL: 1
OD_INFERIOR_NASAL_RESTRICTION: 0
OD_INFERIOR_TEMPORAL_RESTRICTION: 0

## 2024-02-05 ASSESSMENT — ENCOUNTER SYMPTOMS
HEMATOLOGIC/LYMPHATIC NEGATIVE: 0
CARDIOVASCULAR NEGATIVE: 0
RESPIRATORY NEGATIVE: 0
MUSCULOSKELETAL NEGATIVE: 0
CONSTITUTIONAL NEGATIVE: 0
NEUROLOGICAL NEGATIVE: 0
PSYCHIATRIC NEGATIVE: 0
GASTROINTESTINAL NEGATIVE: 0
ENDOCRINE NEGATIVE: 0
ALLERGIC/IMMUNOLOGIC NEGATIVE: 0
EYES NEGATIVE: 0

## 2024-02-05 ASSESSMENT — TONOMETRY
OD_IOP_MMHG: 16
OS_IOP_MMHG: 16
IOP_METHOD: GOLDMANN APPLANATION

## 2024-02-05 ASSESSMENT — SLIT LAMP EXAM - LIDS
COMMENTS: GOOD POSITION
COMMENTS: GOOD POSITION

## 2024-02-05 ASSESSMENT — EXTERNAL EXAM - RIGHT EYE: OD_EXAM: NORMAL

## 2024-02-05 ASSESSMENT — REFRACTION_WEARINGRX
OD_AXIS: 080
OS_SPHERE: +0.25
OS_AXIS: 100
OS_CYLINDER: -0.75
OD_CYLINDER: -1.00
OD_SPHERE: -1.25

## 2024-02-05 ASSESSMENT — CUP TO DISC RATIO
OS_RATIO: .4
OD_RATIO: .4

## 2024-02-05 ASSESSMENT — EXTERNAL EXAM - LEFT EYE: OS_EXAM: NORMAL

## 2024-02-05 NOTE — PROGRESS NOTES
Assessment/Plan   Diagnoses and all orders for this visit:  Age-related nuclear cataract of right eye  Cataract not visually significant at this time. Discussed cataract surgery indications,20/50 or worse, glare dropping vision 2 lines or more and affecting activities of daily living  Observe for progression   Controlled type 2 diabetes mellitus without complication, unspecified whether long term insulin use (CMS/[facility])  no retinopathy observed on exam today od/os, pt ed to continue good BGlc, blood pressure and lipid control, rtc with any changes in vision, otherwise monitor 1 year

## 2024-02-20 RX ORDER — LOSARTAN POTASSIUM 50 MG/1
50 TABLET ORAL 2 TIMES DAILY
Qty: 60 TABLET | Refills: 1 | Status: SHIPPED | OUTPATIENT
Start: 2024-02-20 | End: 2024-03-22 | Stop reason: SDUPTHER

## 2024-02-20 NOTE — TELEPHONE ENCOUNTER
Blood Pressure Regimen      Carvedilol 25 mg Twice Daily  ( Apparently stopped?)   Amlodipine 10 mg Daily  ( Apparently Stopped? )    Spironolacton 12.5 mg Twice Daily   Losartan 100 mg Total Daily Dose)

## 2024-02-26 DIAGNOSIS — I10 PRIMARY HYPERTENSION: ICD-10-CM

## 2024-02-26 DIAGNOSIS — R12 HEARTBURN: ICD-10-CM

## 2024-02-26 DIAGNOSIS — J43.8 OTHER EMPHYSEMA (MULTI): ICD-10-CM

## 2024-03-02 RX ORDER — AMLODIPINE BESYLATE 10 MG/1
10 TABLET ORAL DAILY
Qty: 90 TABLET | Refills: 1 | Status: SHIPPED | OUTPATIENT
Start: 2024-03-02

## 2024-03-02 RX ORDER — MONTELUKAST SODIUM 10 MG/1
10 TABLET ORAL NIGHTLY
Qty: 90 TABLET | Refills: 3 | Status: SHIPPED | OUTPATIENT
Start: 2024-03-02

## 2024-03-02 RX ORDER — OMEPRAZOLE 40 MG/1
40 CAPSULE, DELAYED RELEASE ORAL DAILY
Qty: 90 CAPSULE | Refills: 3 | Status: SHIPPED | OUTPATIENT
Start: 2024-03-02

## 2024-03-12 ENCOUNTER — OFFICE VISIT (OUTPATIENT)
Dept: CARDIOLOGY | Facility: CLINIC | Age: 84
End: 2024-03-12
Payer: MEDICARE

## 2024-03-12 VITALS
BODY MASS INDEX: 22.82 KG/M2 | OXYGEN SATURATION: 95 % | WEIGHT: 142 LBS | HEART RATE: 82 BPM | HEIGHT: 66 IN | DIASTOLIC BLOOD PRESSURE: 71 MMHG | SYSTOLIC BLOOD PRESSURE: 144 MMHG

## 2024-03-12 DIAGNOSIS — R09.89 BRUIT OF RIGHT CAROTID ARTERY: ICD-10-CM

## 2024-03-12 DIAGNOSIS — R94.39 ABNORMAL STRESS TEST: ICD-10-CM

## 2024-03-12 DIAGNOSIS — R06.09 DYSPNEA ON MINIMAL EXERTION: ICD-10-CM

## 2024-03-12 DIAGNOSIS — R93.1 ELEVATED CORONARY ARTERY CALCIUM SCORE: Primary | ICD-10-CM

## 2024-03-12 DIAGNOSIS — E78.2 MIXED HYPERLIPIDEMIA: ICD-10-CM

## 2024-03-12 DIAGNOSIS — I70.1 RENAL ARTERY STENOSIS, NATIVE, BILATERAL (CMS-HCC): ICD-10-CM

## 2024-03-12 DIAGNOSIS — I10 HYPERTENSION, UNCONTROLLED: ICD-10-CM

## 2024-03-12 PROCEDURE — 3078F DIAST BP <80 MM HG: CPT | Performed by: INTERNAL MEDICINE

## 2024-03-12 PROCEDURE — 3077F SYST BP >= 140 MM HG: CPT | Performed by: INTERNAL MEDICINE

## 2024-03-12 PROCEDURE — 1159F MED LIST DOCD IN RCRD: CPT | Performed by: INTERNAL MEDICINE

## 2024-03-12 PROCEDURE — 1123F ACP DISCUSS/DSCN MKR DOCD: CPT | Performed by: INTERNAL MEDICINE

## 2024-03-12 PROCEDURE — 99417 PROLNG OP E/M EACH 15 MIN: CPT | Performed by: INTERNAL MEDICINE

## 2024-03-12 PROCEDURE — 1036F TOBACCO NON-USER: CPT | Performed by: INTERNAL MEDICINE

## 2024-03-12 PROCEDURE — 99215 OFFICE O/P EST HI 40 MIN: CPT | Mod: 25 | Performed by: INTERNAL MEDICINE

## 2024-03-12 PROCEDURE — 1160F RVW MEDS BY RX/DR IN RCRD: CPT | Performed by: INTERNAL MEDICINE

## 2024-03-12 PROCEDURE — 93010 ELECTROCARDIOGRAM REPORT: CPT | Performed by: INTERNAL MEDICINE

## 2024-03-12 PROCEDURE — 93005 ELECTROCARDIOGRAM TRACING: CPT | Performed by: INTERNAL MEDICINE

## 2024-03-12 PROCEDURE — 1126F AMNT PAIN NOTED NONE PRSNT: CPT | Performed by: INTERNAL MEDICINE

## 2024-03-12 PROCEDURE — 99215 OFFICE O/P EST HI 40 MIN: CPT | Performed by: INTERNAL MEDICINE

## 2024-03-12 ASSESSMENT — ENCOUNTER SYMPTOMS
DEPRESSION: 0
OCCASIONAL FEELINGS OF UNSTEADINESS: 0
LOSS OF SENSATION IN FEET: 0

## 2024-03-12 ASSESSMENT — PAIN SCALES - GENERAL: PAINLEVEL: 0-NO PAIN

## 2024-03-12 NOTE — ASSESSMENT & PLAN NOTE
Managed Medically   -- However, he notes that he has been having some increased shortness of breath with exertion.  --The patient's overall symptomatology and risk factors are concerning for possible obstructive coronary artery disease. Multiple options were discussed with the patient including alternatives, risks and benefits of cardiac catheterization. After prolonged discussion, it was decided to proceed with cardiac catheterization with coronary angiography.  This will be scheduled at [facility].

## 2024-03-12 NOTE — PATIENT INSTRUCTIONS
"It was a pleasure seeing you today. I would like to see you back in clinic after your heart catheterization you can call my office if questions arise between now and our next visit.     Today, we talked about your findings from your CAT scan at [facility]  -- The CAT scan showed multiple things.  The biggest 1 is that it looks like you have cholesterol and calcium building up in front of the arteries that supply the kidneys.  This is what is likely making it difficult to control your blood pressure.  -- He did see a nephrologist, and they did do a renal ultrasound, but does not appear that they did the Doppler studies to check the velocities of the arteries feeding vessels.  I have placed an order and you can have this scheduled at [facility] in the vascular department.  This will also help your vascular surgeon when you meet with them to discuss your carotid artery stenosis    We also talked about your symptoms of shortness of breath and you being somewhat tired and having to rest when you are doing daily things.  In light of your coronary artery calcium score, as well as your previous stress test, I think We need to proceed with a heart catheterization.  I have reached out to Dr. Jerez who does most of the heart catheterizations for me as he has the training to allow him to fix problems when he sees them.  Will try to have this scheduled at [facility] in late March, early April.  I think it is important.  His team will call you either later today or tomorrow to help set up an appointment that works for your schedule.    Continue on your other current medications.    Below are some Heart Health Tips that we provide to all of our patients. I hope you fing them useful.     - If you are having problems with medications, consider looking at the following websites.   --  \"GoodRx\"   --  \"[facility] [employer] [facility]\"      - We are happy to supply written prescriptions if needed to allow you to obtain your medications from " different pharmacies. Additionally, if you are having issues with mail order delivery, please let us know. We can send a limited supply of your medications to your local pharmacy.     -  We recommend you follow a heart healthy diet. Watch food labels and try not to eat more than 2,500 mg of sodium per day. Avoid foods high in salt like processed meats (lunch meats, saini, and sausage), processed foods (boxed dinners, canned soups), fried and fast foods. Monitor serving sizes and if the sodium per serving size is more than 200 mg, avoid those foods. If the sodium per serving size is between 100-200 mg, you can use those in limited quantities. Try to choose foods where the amount of sodium per serving size is less than 100 mg. Try to eat a diet rich in fruits and vegetables, whole grains, low fat dairy products, skinless poultry and fish, nuts, beans, non-tropical vegetable oils. Limit saturated fat, trans fat, sodium, red meats, and sugar-sweetened beverages.   Limit alcohol     -The combination of a reduced-calorie diet and increased physical activity is recommended. Adults should aim to get at least 150 minutes of moderate physical activity per week (30 minutes of moderate physical activities at least 5 days per week). Examples of moderate physical activities include brisk walking, swimming, aerobic dancing, heavy gardening, jumping rope, bicycling 10 MPH or faster, tennis, hiking uphill or with a heavy backpack. Please let us know if you would like to learn more about your nutrition and calories and additional options including weight loss programs to help you reach your goal.     -If you smoke, stop smoking. If you stop smoking you can help get rid of a major source of stress to your heart. Smoking makes your heart rate and blood pressure go up and increases your risk or developing cardiovascular diseases and worsen symptoms associated with heart failure.     -Obtain a BP monitor and monitor your BP daily. Check  it around the same time each day; at least 1 hour after taking your medications. Record your BP in a log and bring your log with you to your doctors appointment.     -F/u with your PCP as recommended.

## 2024-03-12 NOTE — ASSESSMENT & PLAN NOTE
Currently vascular surgery 5/2024  -=-Continue to control blood pressure, cardiovascular risk factors

## 2024-03-12 NOTE — PROGRESS NOTES
"Chief Complaint:   Follow-up (6 month)     History Of Present Illness:    Mann Abrams is a 83 y.o. male presenting with a pertinent medical history notable for chronic kidney disease stage III, diabetes mellitus, peripheral vascular disease, hypercholesterolemia who is seen today for follow up cardiovascular care.      Recall that Mr. Abrams is a very active individual who usually walks 2 miles every day in good weather or spends at least 30 minutes on a treadmill 6 days out of the week. He had one episode where he all of a sudden felt very tired while he was riding his lawnmower. He felt lightheaded so he laid down and rested briefly. After that he got up and finish mowing the lawn without any incident Following this he went and talked to his PCP who referred him on for cardiac stress testing. He underwent exercise echocardiography stress testing. He exercised for 9 minutes and was able to make 10.1 METS. On his EKG, he had ST depressions in the lateral leads. On his actual imaging he had a hyperdynamic precordium at baseline that then increased at maximal stress. Of note he had no symptoms during this time. Based off of this his stress test was recorded as abnormal and he was referred to cardiology.     Following that initial episode, he has not had any repeat occurrences of chest discomfort or the feeling that he had.  He has no personal history of coronary artery disease. He has reduced his carvedilol in the interim period, using it once daily instead of BID. States that he does this because he is more tired when taking it twice daily. Describe it as \"like all my energy is sucked out of me\"      Today ( 1/10/2023) He presents for follow up. He has been well, but in December had a \"cold\" and went to Urgent Care. Told them of cough, congestion and a \"Burning pain in his chest:\" He was referred to ER but \"they had over a hundred people in there and told me to get in line so I went home. I knew I wasn;t dying\" " "States that his pain occurred mostly when he was bending over to tie his shoes. No change in symptoms when he was walking, going about his daily activities. States that he thinks that \"lisinopril i making me cough and I see on TV that it causes Cancer.\" States that he has not had any recurrence of chest discomfort.         8/3/2023 -- He returns for follow up. He had seen his PCP who has been adjusting his medication. He had placed him on Doxazosin 4 mg Daily + Amlodipine 10 ( Had already been on this) and Losartan 50 mg ( Had been on this ) and told to decrease the Carvedilol. Probably doxazosin, but noted that his back \"felt like it was on fire\". He subsequently stopped it. Since these change, his blood pressure remains poorly controlled.      9/29/2023  -- He presents for follow up. He has followed up with Nephrology for CKD and HTN. He started spironolactone at 25 mg taken as 1/2 tablet twice daily. He notes improved blood pressure with this but also notes increased urination. He had tried to take it as a full dose once daily but noted heavy urination frequency and urgency that was difficult to manage. He notes that since starting the spironolactone that his SOB / PATEL has improved noticeably.     3/12/2024 -- He returns for follow up.  He was seen at [facility] For 4 days of Nausea .vomiting. A CT-CAP showed an inguianl hernia, diffuse atherosclerosis disease and stenosis of the renal arteries. He was provided with paperwork bhumi cantrell\" an abnormality was found an you should discuss this with your doctor\" however, no information regarding what was discovered was noted.  He notes that he continues to struggle with some shortness of breath.  He although it is better now that his blood pressure is better controlled, this still is occurring especially when going up and down stairs, or even taking his trash can out.  He notes that he to stop and rest and catch his breath.  A recent coronary artery calcium score does " "show increased calcifications specifically within the LAD territory.     Patient denies chest pain and angina. Pt denies shortness of breath, dyspnea on exertion, orthopnea, and paroxysmal nocturnal dyspnea. Pt denies worsening lower extremity edema. Pt denies palpitations or syncope. No recent falls. No fever or chills. No cough. No change in bowel or bladder habits. No travel. No sick contacts. No recent travel     Past medical history: As above.  Medications: Reviewed.  Allergies: Reviewed.  Social history: Patient denies smoking, alcohol abuse, or illicit drug use.  Family history: No sudden cardiac death or premature coronary artery disease..     Last Recorded Vitals:  Vitals:    03/12/24 0843   BP: 144/71   Patient Position: Sitting   Pulse: 82   SpO2: 95%   Weight: 64.4 kg (142 lb)   Height: 1.676 m (5' 6\")       Past Medical History:  He has a past medical history of Chronic kidney disease, stage 3 unspecified (CMS/HCC) (08/22/2022), Other secondary cataract, left eye (08/01/2016), and Person consulting for explanation of examination or test findings (01/07/2020).    Past Surgical History:  He has a past surgical history that includes Eye surgery (08/07/2014); Other surgical history (12/04/2019); and Other surgical history (12/04/2019).      Social History:  He reports that he quit smoking about 38 years ago. His smoking use included cigarettes. He has never used smokeless tobacco. He reports that he does not drink alcohol and does not use drugs.    Family History:  Family History   Problem Relation Name Age of Onset    Cancer Mother      Other (cardiac disorder) Father      Diabetes Sister          Allergies:  Doxazosin    Outpatient Medications:  Current Outpatient Medications   Medication Instructions    amLODIPine (NORVASC) 10 mg, oral, Daily    ascorbic acid (Vitamin C) 500 mg tablet oral    atorvastatin (LIPITOR) 80 mg, oral, Daily    carvedilol (Coreg) 25 mg tablet HALF PILL BID PLEASE NOTE CHANGE IN " DOSE    fluticasone (Flonase) 50 mcg/actuation nasal spray 1 spray, Each Nostril, Daily, Shake gently. Before first use, prime pump. After use, clean tip and replace cap.    fluticasone propion-salmeteroL (Wixela Inhub) 250-50 mcg/dose diskus inhaler 1 puff, inhalation, 2 times daily RT, Rinse mouth with water after use to reduce aftertaste and incidence of candidiasis. Do not swallow.    FreeStyle glucose monitoring kit miscellaneous, Use to test blood sugar twice daily    losartan (COZAAR) 50 mg, oral, 2 times daily    metFORMIN XR (GLUCOPHAGE-XR) 500 mg, oral, Daily with evening meal    montelukast (SINGULAIR) 10 mg, oral, Nightly    nitroglycerin (NITROSTAT) 0.4 mg, sublingual, Every 5 min PRN, place 1 tablet under tongue every 5 minutes for up to 3 doses as needed for chest pain. Call 911 if pain persists    omeprazole (PRILOSEC) 40 mg, oral, Daily    spironolactone (Aldactone) 25 mg tablet 0.5 tablets, oral, 2 times daily       Physical Exam:  PHYSICAL EXAMINATION    GENERAL APPEARANCE: Well developed, well nourished, in no acute distress.  VITAL SIGNS: reviewed and confirmed.   SKIN: Inspection of the skin reveals no rashes, ulcerations or petechiae.  HEENT: The sclerae were anicteric and conjunctivae were pink and moist. Extraocular movements were intact and pupils were equal, round, and reactive to light with normal accommodation. External inspection of the ears and nose showed no scars, lesions, or masses.   NECK: Supple and symmetric. There was no thyroid enlargement, and no tenderness, or masses were felt.  CHEST: Normal AP diameter and normal contour without any kyphoscoliosis.  LUNGS: Auscultation of the lungs revealed normal breath sounds without any other adventitious sounds or rubs.  CARDIOVASCULAR: There was a regular rate and rhythm without any murmurs, gallops, rubs. The carotid pulses were normal and 2+ bilaterally without bruits. Peripheral pulses were 2+ and symmetric.  ABDOMEN: Soft and  nontender with normal bowel sounds.  LYMPH NODES: No lymphadenopathy was appreciated in the neck  MUSCULOSKELETAL: Gait was normal. There was no tenderness or effusions noted. Muscle strength and tone were normal.  EXTREMITIES: No cyanosis, clubbing or edema.  NEUROLOGIC: Alert and oriented x 3. Normal affect. Gait was normal. Normal deep tendon reflexes with no pathological reflexes. Sensation to touch was normal.       Last Labs:  CBC -  Lab Results   Component Value Date    WBC 6.4 09/01/2023    HGB 11.0 (L) 09/01/2023    HCT 32.9 (L) 09/01/2023    MCV 96 09/01/2023     09/01/2023       CMP -  Lab Results   Component Value Date    CALCIUM 9.3 01/03/2024    PHOS 3.8 12/18/2023    PROT 6.4 01/03/2024    ALBUMIN 3.9 01/03/2024    AST 18 01/03/2024    ALT 20 01/03/2024    ALKPHOS 81 01/03/2024    BILITOT 0.6 01/03/2024       LIPID PANEL -   Lab Results   Component Value Date    CHOL 165 09/01/2023    TRIG 75 09/01/2023    HDL 68.8 09/01/2023    CHHDL 2.4 09/01/2023    LDLF 76 04/21/2022    VLDL 25 04/21/2022       RENAL FUNCTION PANEL -   Lab Results   Component Value Date    GLUCOSE 124 (H) 01/03/2024     (L) 01/03/2024    K 4.4 01/03/2024     01/03/2024    CO2 28 01/03/2024    ANIONGAP 10 01/03/2024    BUN 23 01/03/2024    CREATININE 1.70 (H) 01/03/2024    GFRMALE 45 (A) 09/01/2023    CALCIUM 9.3 01/03/2024    PHOS 3.8 12/18/2023    ALBUMIN 3.9 01/03/2024        Lab Results   Component Value Date     (H) 09/01/2023    HGBA1C 7.0 (H) 01/03/2024       Last Cardiology Tests:  ECG:      In Office EKG 3/12/2024 -- Sinus Rhythm at 82 BPM  -- NO Ischemic changes   In Office EKG 1/10/2023  -- Normal Sinus Rhythm @ 84 bpmNo results found for this or any previous visit from the past 1095 days.      Echo:  Echocardiogram 09/2023  1. Left ventricular systolic function is normal with a 60-65% estimated ejection fraction.  2. Spectral Doppler shows an impaired relaxation pattern of left ventricular  "diastolic filling.  3. Moderately elevated right ventricular systolic pressure. ( RVSP ~ 52 mmHg)      Ejection Fractions:  No results found for: \"EF\"    Cath:  No results found for this or any previous visit from the past 1095 days.      Stress Test:      Exercise echocardiogram stress testing 06/2020   1. Abnormal Stress Test.  2. ECG changes consistent with ischemia.  3. Hyperdynamic global left ventricular systolic function.  4. Patients baseline LV function was hyperdynamic with peak stress showing almost complete cavity obliteration . -- No regional wall motion changes noted at peak exercise.  5. Noted Increased in RVSP from baseline 31 mmHg to 54 mmHg at Peak Stress     Cardiac Imaging:  CT cardiac scoring wo IV contrast 01/04/2024  The score and distribution of calcium in the coronary arteries is as  follows:      .81,  .03,  LCx 0,  .93,      Total 1328.77      The visualized ascending thoracic aorta measures 3.5 cm in diameter.  Extensive atherosclerotic calcification at the level of the aortic  ductus. The heart is normal in size. No pericardial effusion is  present.      No gross evidence of mediastinal or hilar lymphadenopathy or masses  is identified. Scattered mediastinal lymph nodes are felt to be  reactive in nature. The visualized segments of the lungs demonstrates  peribronchial thickening with mild emphysematous changes. Subpleural  reticulation consistent with smoking-related interstitial changes..  Calcified granuloma within the lingula.      The main pulmonary artery, right and left pulmonary artery are normal  in size.      The visualized subdiaphragmatic structures appear intact.      Lab review: I have personally reviewed the laboratory result(s)   Diagnostic review: I have personally reviewed the result(s) of the EKG and Echocardiogram .   Imaging review: I have  personally reviewed the result(s) Stress Testing / OSH Imaging / CTA Coronary Artery Calcium Score "     Assessment/Plan     Problem List Items Addressed This Visit       Abnormal stress test    Overview     Exercise echocardiogram stress testing 06/2020   1. Abnormal Stress Test.  2. ECG changes consistent with ischemia.  3. Hyperdynamic global left ventricular systolic function.  4. Patients baseline LV function was hyperdynamic with peak stress showing almost complete cavity obliteration . -- No regional wall motion changes noted at peak exercise.  5. Noted Increased in RVSP from baseline 31 mmHg to 54 mmHg at Peak Stress         Current Assessment & Plan     Managed Medically   -- However, he notes that he has been having some increased shortness of breath with exertion.  --The patient's overall symptomatology and risk factors are concerning for possible obstructive coronary artery disease. Multiple options were discussed with the patient including alternatives, risks and benefits of cardiac catheterization. After prolonged discussion, it was decided to proceed with cardiac catheterization with coronary angiography.  This will be scheduled at [facility].          Relevant Orders    ECG 12 lead (Clinic Performed)    Case Request Cath Lab: Left Heart Cath with Coronary Angiography and LV (Completed)    Bruit of right carotid artery    Overview     Utilizing the peak systolic velocities, the estimated degree of  stenosis within the internal carotid arteries is greater than 70% on the right and less than 50% on the left. However, the ICA to CCA ratio on the left is closer to 70%.         Current Assessment & Plan     Currently vascular surgery 5/2024  -=-Continue to control blood pressure, cardiovascular risk factors         Relevant Orders    ECG 12 lead (Clinic Performed)    Dyspnea on minimal exertion    Relevant Orders    ECG 12 lead (Clinic Performed)    Case Request Cath Lab: Left Heart Cath with Coronary Angiography and LV (Completed)    Elevated coronary artery calcium score - Primary    Overview  "     Coronary Artery Calcium Score 1/2024  .81,  .03,  LCx 0,  .93,      Total 1328.77         Relevant Orders    ECG 12 lead (Clinic Performed)    Case Request Cath Lab: Left Heart Cath with Coronary Angiography and LV (Completed)    Hyperlipidemia    Overview     The ASCVD Risk score (Margareth JORDAN, et al., 2019) failed to calculate for the following reasons:    The 2019 ASCVD risk score is only valid for ages 40 to 79  Lab Results   Component Value Date    CHOL 165 09/01/2023    CHOL 133 12/28/2022    CHOL 150 04/21/2022     Lab Results   Component Value Date    HDL 68.8 09/01/2023    HDL 47.3 12/28/2022    HDL 48.8 04/21/2022   No results found for: \"LDLCALC\"  Lab Results   Component Value Date    TRIG 75 09/01/2023    TRIG 127 04/21/2022    TRIG 93 06/10/2021   No components found for: \"CHOLHDL\"           Relevant Orders    ECG 12 lead (Clinic Performed)    Hypertension, uncontrolled    Relevant Orders    ECG 12 lead (Clinic Performed)    Renal artery stenosis, native, bilateral (CMS/HCC)    Overview     Incidentally noted on CTA- CAP 01/2024 while at [facility]   \"Abdominal and pelvic vasculature: Atherosclerotic wall calcifications are present without abdominal aortic aneurysm. This causes severe narrowing of the left renal artery origin as well as moderate to severe right renal artery origin stenosis. No celiac axis or SMA stenosis identified. Atherosclerotic plaque within the aorta and iliacs is severe without stenosis. \"          Current Assessment & Plan     Will obtain Renal Vascular Studies to further quantify flow          Relevant Orders    Vascular US renal artery duplex complete     Extended patient encounter due to personal review of imaging from outside facility, coordination of care with additional testing for heart catheterization and liaison with performing provider    Brant Garcia, DO  "

## 2024-03-15 ENCOUNTER — APPOINTMENT (OUTPATIENT)
Dept: CARDIOLOGY | Facility: CLINIC | Age: 84
End: 2024-03-15
Payer: MEDICARE

## 2024-03-19 ENCOUNTER — HOSPITAL ENCOUNTER (OUTPATIENT)
Facility: HOSPITAL | Age: 84
Setting detail: OUTPATIENT SURGERY
End: 2024-03-19
Attending: HOSPITALIST | Admitting: HOSPITALIST
Payer: MEDICARE

## 2024-03-19 DIAGNOSIS — R93.1 ELEVATED CORONARY ARTERY CALCIUM SCORE: ICD-10-CM

## 2024-03-19 DIAGNOSIS — R06.09 DYSPNEA ON MINIMAL EXERTION: ICD-10-CM

## 2024-03-19 DIAGNOSIS — R94.39 ABNORMAL STRESS TEST: ICD-10-CM

## 2024-03-19 LAB
ATRIAL RATE: 82 BPM
P AXIS: 61 DEGREES
P OFFSET: 204 MS
P ONSET: 151 MS
PR INTERVAL: 146 MS
Q ONSET: 224 MS
QRS COUNT: 14 BEATS
QRS DURATION: 68 MS
QT INTERVAL: 354 MS
QTC CALCULATION(BAZETT): 413 MS
QTC FREDERICIA: 392 MS
R AXIS: 55 DEGREES
T AXIS: 57 DEGREES
T OFFSET: 401 MS
VENTRICULAR RATE: 82 BPM

## 2024-03-22 ENCOUNTER — APPOINTMENT (OUTPATIENT)
Dept: RADIOLOGY | Facility: HOSPITAL | Age: 84
End: 2024-03-22
Payer: MEDICARE

## 2024-03-22 ENCOUNTER — HOSPITAL ENCOUNTER (OUTPATIENT)
Dept: VASCULAR MEDICINE | Facility: HOSPITAL | Age: 84
Discharge: HOME | End: 2024-03-22
Payer: MEDICARE

## 2024-03-22 DIAGNOSIS — I10 PRIMARY HYPERTENSION: ICD-10-CM

## 2024-03-22 DIAGNOSIS — I70.1 RENAL ARTERY STENOSIS, NATIVE, BILATERAL (CMS-HCC): ICD-10-CM

## 2024-03-22 PROCEDURE — 93975 VASCULAR STUDY: CPT

## 2024-03-22 PROCEDURE — 93975 VASCULAR STUDY: CPT | Performed by: INTERNAL MEDICINE

## 2024-03-22 RX ORDER — LOSARTAN POTASSIUM 50 MG/1
50 TABLET ORAL 2 TIMES DAILY
Qty: 180 TABLET | Refills: 1 | Status: SHIPPED | OUTPATIENT
Start: 2024-03-22 | End: 2024-05-14 | Stop reason: ALTCHOICE

## 2024-04-10 DIAGNOSIS — N18.32 STAGE 3B CHRONIC KIDNEY DISEASE (CKD) (MULTI): Primary | ICD-10-CM

## 2024-04-11 ENCOUNTER — LAB (OUTPATIENT)
Dept: LAB | Facility: LAB | Age: 84
End: 2024-04-11
Payer: MEDICARE

## 2024-04-11 DIAGNOSIS — N18.32 STAGE 3B CHRONIC KIDNEY DISEASE (CKD) (MULTI): ICD-10-CM

## 2024-04-11 LAB
25(OH)D3 SERPL-MCNC: 94 NG/ML (ref 30–100)
ALBUMIN SERPL BCP-MCNC: 3.5 G/DL (ref 3.4–5)
ANION GAP SERPL CALC-SCNC: 14 MMOL/L (ref 10–20)
BUN SERPL-MCNC: 30 MG/DL (ref 6–23)
CALCIUM SERPL-MCNC: 9.2 MG/DL (ref 8.6–10.6)
CHLORIDE SERPL-SCNC: 105 MMOL/L (ref 98–107)
CO2 SERPL-SCNC: 21 MMOL/L (ref 21–32)
CREAT SERPL-MCNC: 2.08 MG/DL (ref 0.5–1.3)
CREAT UR-MCNC: 117.9 MG/DL (ref 20–370)
EGFRCR SERPLBLD CKD-EPI 2021: 31 ML/MIN/1.73M*2
GLUCOSE SERPL-MCNC: 140 MG/DL (ref 74–99)
MICROALBUMIN UR-MCNC: 124 MG/L
MICROALBUMIN/CREAT UR: 105.2 UG/MG CREAT
PHOSPHATE SERPL-MCNC: 3.6 MG/DL (ref 2.5–4.9)
POTASSIUM SERPL-SCNC: 4.2 MMOL/L (ref 3.5–5.3)
PTH-INTACT SERPL-MCNC: 46.2 PG/ML (ref 18.5–88)
SODIUM SERPL-SCNC: 136 MMOL/L (ref 136–145)

## 2024-04-11 PROCEDURE — 36415 COLL VENOUS BLD VENIPUNCTURE: CPT

## 2024-04-11 PROCEDURE — 82306 VITAMIN D 25 HYDROXY: CPT

## 2024-04-11 PROCEDURE — 82043 UR ALBUMIN QUANTITATIVE: CPT

## 2024-04-11 PROCEDURE — 80069 RENAL FUNCTION PANEL: CPT

## 2024-04-11 PROCEDURE — 83970 ASSAY OF PARATHORMONE: CPT

## 2024-04-11 PROCEDURE — 82570 ASSAY OF URINE CREATININE: CPT

## 2024-04-12 ENCOUNTER — LAB (OUTPATIENT)
Dept: LAB | Facility: LAB | Age: 84
End: 2024-04-12
Payer: MEDICARE

## 2024-04-12 DIAGNOSIS — I70.1 RENAL ARTERY STENOSIS, NATIVE, BILATERAL (CMS-HCC): ICD-10-CM

## 2024-04-12 DIAGNOSIS — E78.2 MIXED HYPERLIPIDEMIA: ICD-10-CM

## 2024-04-12 DIAGNOSIS — R09.89 BRUIT OF RIGHT CAROTID ARTERY: ICD-10-CM

## 2024-04-12 DIAGNOSIS — R93.1 ELEVATED CORONARY ARTERY CALCIUM SCORE: ICD-10-CM

## 2024-04-12 DIAGNOSIS — I10 HYPERTENSION, UNCONTROLLED: ICD-10-CM

## 2024-04-12 DIAGNOSIS — R94.39 ABNORMAL STRESS TEST: ICD-10-CM

## 2024-04-12 DIAGNOSIS — R06.09 DYSPNEA ON MINIMAL EXERTION: ICD-10-CM

## 2024-04-12 DIAGNOSIS — D50.0 IRON DEFICIENCY ANEMIA DUE TO CHRONIC BLOOD LOSS: ICD-10-CM

## 2024-04-12 LAB
ALBUMIN SERPL BCP-MCNC: 3.6 G/DL (ref 3.4–5)
ALP SERPL-CCNC: 57 U/L (ref 33–136)
ALT SERPL W P-5'-P-CCNC: 17 U/L (ref 10–52)
ANION GAP SERPL CALC-SCNC: 14 MMOL/L (ref 10–20)
AST SERPL W P-5'-P-CCNC: 17 U/L (ref 9–39)
BASOPHILS # BLD AUTO: 0.02 X10*3/UL (ref 0–0.1)
BASOPHILS NFR BLD AUTO: 0.3 %
BILIRUB SERPL-MCNC: 0.3 MG/DL (ref 0–1.2)
BNP SERPL-MCNC: 71 PG/ML (ref 0–99)
BUN SERPL-MCNC: 26 MG/DL (ref 6–23)
CALCIUM SERPL-MCNC: 9 MG/DL (ref 8.6–10.6)
CARDIAC TROPONIN I PNL SERPL HS: 8 NG/L (ref 0–53)
CHLORIDE SERPL-SCNC: 105 MMOL/L (ref 98–107)
CO2 SERPL-SCNC: 21 MMOL/L (ref 21–32)
CREAT SERPL-MCNC: 1.88 MG/DL (ref 0.5–1.3)
EGFRCR SERPLBLD CKD-EPI 2021: 35 ML/MIN/1.73M*2
EOSINOPHIL # BLD AUTO: 0.08 X10*3/UL (ref 0–0.4)
EOSINOPHIL NFR BLD AUTO: 1.3 %
ERYTHROCYTE [DISTWIDTH] IN BLOOD BY AUTOMATED COUNT: 13.4 % (ref 11.5–14.5)
GLUCOSE SERPL-MCNC: 145 MG/DL (ref 74–99)
HCT VFR BLD AUTO: 23.1 % (ref 41–52)
HGB BLD-MCNC: 7.6 G/DL (ref 13.5–17.5)
IMM GRANULOCYTES # BLD AUTO: 0.15 X10*3/UL (ref 0–0.5)
IMM GRANULOCYTES NFR BLD AUTO: 2.5 % (ref 0–0.9)
LYMPHOCYTES # BLD AUTO: 1.49 X10*3/UL (ref 0.8–3)
LYMPHOCYTES NFR BLD AUTO: 25.1 %
MCH RBC QN AUTO: 32.5 PG (ref 26–34)
MCHC RBC AUTO-ENTMCNC: 32.9 G/DL (ref 32–36)
MCV RBC AUTO: 99 FL (ref 80–100)
MONOCYTES # BLD AUTO: 0.44 X10*3/UL (ref 0.05–0.8)
MONOCYTES NFR BLD AUTO: 7.4 %
NEUTROPHILS # BLD AUTO: 3.76 X10*3/UL (ref 1.6–5.5)
NEUTROPHILS NFR BLD AUTO: 63.4 %
NRBC BLD-RTO: 0 /100 WBCS (ref 0–0)
PLATELET # BLD AUTO: 243 X10*3/UL (ref 150–450)
POTASSIUM SERPL-SCNC: 4.4 MMOL/L (ref 3.5–5.3)
PROT SERPL-MCNC: 5.8 G/DL (ref 6.4–8.2)
RBC # BLD AUTO: 2.34 X10*6/UL (ref 4.5–5.9)
SODIUM SERPL-SCNC: 136 MMOL/L (ref 136–145)
WBC # BLD AUTO: 5.9 X10*3/UL (ref 4.4–11.3)

## 2024-04-12 PROCEDURE — 84484 ASSAY OF TROPONIN QUANT: CPT

## 2024-04-12 PROCEDURE — 85025 COMPLETE CBC W/AUTO DIFF WBC: CPT

## 2024-04-12 PROCEDURE — 83540 ASSAY OF IRON: CPT

## 2024-04-12 PROCEDURE — 83880 ASSAY OF NATRIURETIC PEPTIDE: CPT

## 2024-04-12 PROCEDURE — 83550 IRON BINDING TEST: CPT

## 2024-04-12 PROCEDURE — 36415 COLL VENOUS BLD VENIPUNCTURE: CPT

## 2024-04-12 PROCEDURE — 82728 ASSAY OF FERRITIN: CPT

## 2024-04-12 PROCEDURE — 80053 COMPREHEN METABOLIC PANEL: CPT

## 2024-04-15 ENCOUNTER — OFFICE VISIT (OUTPATIENT)
Dept: NEPHROLOGY | Facility: CLINIC | Age: 84
End: 2024-04-15
Payer: MEDICARE

## 2024-04-15 ENCOUNTER — LAB (OUTPATIENT)
Dept: LAB | Facility: LAB | Age: 84
End: 2024-04-15
Payer: MEDICARE

## 2024-04-15 ENCOUNTER — TELEPHONE (OUTPATIENT)
Dept: PRIMARY CARE | Facility: CLINIC | Age: 84
End: 2024-04-15

## 2024-04-15 VITALS
TEMPERATURE: 97.9 F | BODY MASS INDEX: 23.24 KG/M2 | HEART RATE: 81 BPM | DIASTOLIC BLOOD PRESSURE: 70 MMHG | WEIGHT: 144 LBS | SYSTOLIC BLOOD PRESSURE: 133 MMHG

## 2024-04-15 DIAGNOSIS — D50.0 IRON DEFICIENCY ANEMIA DUE TO CHRONIC BLOOD LOSS: Primary | ICD-10-CM

## 2024-04-15 DIAGNOSIS — D50.0 IRON DEFICIENCY ANEMIA DUE TO CHRONIC BLOOD LOSS: ICD-10-CM

## 2024-04-15 DIAGNOSIS — D64.9 ANEMIA, UNSPECIFIED TYPE: ICD-10-CM

## 2024-04-15 DIAGNOSIS — D64.9 ANEMIA, UNSPECIFIED TYPE: Primary | ICD-10-CM

## 2024-04-15 LAB
FERRITIN SERPL-MCNC: 74 NG/ML (ref 20–300)
FOLATE SERPL-MCNC: >24 NG/ML
HGB RETIC QN: 35 PG (ref 28–38)
IMMATURE RETIC FRACTION: 28.3 %
IRON SATN MFR SERPL: 21 % (ref 25–45)
IRON SERPL-MCNC: 62 UG/DL (ref 35–150)
LDH SERPL L TO P-CCNC: 142 U/L (ref 84–246)
RETICS #: 0.14 X10*6/UL (ref 0.02–0.11)
RETICS/RBC NFR AUTO: 5.8 % (ref 0.5–2)
TIBC SERPL-MCNC: 297 UG/DL (ref 240–445)
UIBC SERPL-MCNC: 235 UG/DL (ref 110–370)
VIT B12 SERPL-MCNC: >2000 PG/ML (ref 211–911)

## 2024-04-15 PROCEDURE — 83070 ASSAY OF HEMOSIDERIN QUAL: CPT

## 2024-04-15 PROCEDURE — 85045 AUTOMATED RETICULOCYTE COUNT: CPT

## 2024-04-15 PROCEDURE — 3078F DIAST BP <80 MM HG: CPT | Performed by: INTERNAL MEDICINE

## 2024-04-15 PROCEDURE — 82607 VITAMIN B-12: CPT

## 2024-04-15 PROCEDURE — 83010 ASSAY OF HAPTOGLOBIN QUANT: CPT

## 2024-04-15 PROCEDURE — 83615 LACTATE (LD) (LDH) ENZYME: CPT

## 2024-04-15 PROCEDURE — 1160F RVW MEDS BY RX/DR IN RCRD: CPT | Performed by: INTERNAL MEDICINE

## 2024-04-15 PROCEDURE — 1159F MED LIST DOCD IN RCRD: CPT | Performed by: INTERNAL MEDICINE

## 2024-04-15 PROCEDURE — 99214 OFFICE O/P EST MOD 30 MIN: CPT | Performed by: INTERNAL MEDICINE

## 2024-04-15 PROCEDURE — 1123F ACP DISCUSS/DSCN MKR DOCD: CPT | Performed by: INTERNAL MEDICINE

## 2024-04-15 PROCEDURE — 36415 COLL VENOUS BLD VENIPUNCTURE: CPT

## 2024-04-15 PROCEDURE — 82746 ASSAY OF FOLIC ACID SERUM: CPT

## 2024-04-15 PROCEDURE — 3075F SYST BP GE 130 - 139MM HG: CPT | Performed by: INTERNAL MEDICINE

## 2024-04-15 PROCEDURE — 1036F TOBACCO NON-USER: CPT | Performed by: INTERNAL MEDICINE

## 2024-04-15 NOTE — TELEPHONE ENCOUNTER
Spoke with patient and advised of message   States is not having any of the symptoms listed below.

## 2024-04-15 NOTE — TELEPHONE ENCOUNTER
----- Message from Nataliya Grant MD sent at 4/15/2024  9:02 AM EDT -----  TELL HIM SINCE HE  HAS SEVERE ANEMIA PLEASE FIND OUT FROM HIM IF HE IS  HAVING ANY BLOOD IN STOOL , OR HAVING ANY BLACK TARRY STOOL  IF HE IS FEELING LIGHTHEADED , DIZZY , I WANT HIM TO GET MORE BLOOD TEST AND URINE TEST , ALSO NEED TO GET OTHER BLOOD TEST  ORDERED BY HIS KIDNEY DOCTOR TOO , PLEASE CONSIDER THIS A PRIORITY MESSAGE

## 2024-04-15 NOTE — PROGRESS NOTES
"For follow up, doing well.  Had a fall from ladder in March, went to [facility], not admitted  Was at [facility] in Feb for GI symptoms  BP at home 130s-135s/70s  Denies orthostatic symptoms  No Cerda but has worsening fatigue    RoS negative for all other systems except as noted above.        1/3/2024     7:40 AM 1/3/2024     8:42 AM 1/15/2024     7:35 AM 1/15/2024     7:42 AM 1/15/2024     8:03 AM 3/12/2024     8:43 AM 4/15/2024     8:19 AM   Vitals   Systolic 149 127 151 147 140 144 133   Diastolic 79 67 74 67 74 71 70   Heart Rate   71   82 81   Temp       36.6 °C (97.9 °F)   Height (in)      1.676 m (5' 6\")    Weight (lb)   140.6   142 144   BMI   23.22 kg/m2   22.92 kg/m2 23.24 kg/m2   BSA (m2)   1.71 m2   1.73 m2 1.74 m2   Visit Report Report Report Report Report Report Report Report     No distress  HEENT:  moist, pallor +  No edema jyotsna LE  Breath sounds jyotsna equal, clear  S1 S2 regular, normal, no rub or murmur  Abdomen soft  AAO x3, non focal    83 year old with DM, htn and CKD      1. CKD G3aA3 : Likely due to DM and htn  Labs from EMR reviewed.   Lab Results   Component Value Date    CREATININE 1.88 (H) 04/12/2024    No components found for: \"MICROALBUCREA\", \"3\"   Lab Results   Component Value Date    GFRMALE 45 (A) 09/01/2023    GFRMALE 46 (A) 08/28/2023    GFRMALE 49 (A) 12/28/2022     Renal function stable, 2 yr and 5 yr risk using KFRE 1.8% and 5.5%. Avoid NSAIDs and iodinated contrast. Discussed importance of good BP and blood sugar control      2. Volume status:  Euvolemic    3. Anemia:   Lab Results   Component Value Date    HGB 7.6 (L) 04/12/2024      Lab Results   Component Value Date    IRON 108 09/01/2023    TIBC 290 09/01/2023   Hgb progressively decreasing was 10.7 ion 2/2/ 24 at [facility] now 7.6, on inquiry said he had been having black stools as he was eating black oreos but now have stopped. Will send iron studies, B12, folate as add on tests, stressed that he needs to call PCP ASAP to get this worked " up. Anemia not due to CKD as CKD not severe enough and anemia is rapidly progressive indicative of ongoing blood loss    4. MBD:   Lab Results   Component Value Date    CALCIUM 9.0 04/12/2024    PHOS 3.6 04/11/2024      Lab Results   Component Value Date    VITD25 94 04/11/2024    VITD25 49 08/28/2023    VITD25 80 06/05/2020      Lab Results   Component Value Date    PTH 46.2 04/11/2024    PTH 65.0 12/18/2023       Monitor    5.Acid base  Lab Results   Component Value Date    CO2 21 04/12/2024       6. Hypertension: At goal, continue current meds   2 g/day sodium diet  Monitor BP at home and call with readings    RTC:  4 mo

## 2024-04-15 NOTE — TELEPHONE ENCOUNTER
Per Dr. Grant, Dr. Hammer has  contacted him. He has placed lab orders. These are to be done first, then get an appt. Pt. Informed.

## 2024-04-15 NOTE — TELEPHONE ENCOUNTER
Pt. States that Dr. Hammer told him that he needs to see his pcp ASAP because he is losing blood somewhere. Your first opening is this Thursday. Please advise if you need to see him sooner.

## 2024-04-16 ENCOUNTER — LAB (OUTPATIENT)
Dept: LAB | Facility: LAB | Age: 84
End: 2024-04-16
Payer: MEDICARE

## 2024-04-16 ENCOUNTER — OFFICE VISIT (OUTPATIENT)
Dept: PRIMARY CARE | Facility: CLINIC | Age: 84
End: 2024-04-16
Payer: MEDICARE

## 2024-04-16 VITALS
DIASTOLIC BLOOD PRESSURE: 69 MMHG | WEIGHT: 145.4 LBS | SYSTOLIC BLOOD PRESSURE: 139 MMHG | HEART RATE: 80 BPM | OXYGEN SATURATION: 92 % | BODY MASS INDEX: 23.47 KG/M2

## 2024-04-16 DIAGNOSIS — N18.31 TYPE 2 DIABETES MELLITUS WITH STAGE 3A CHRONIC KIDNEY DISEASE, WITH LONG-TERM CURRENT USE OF INSULIN (MULTI): ICD-10-CM

## 2024-04-16 DIAGNOSIS — N18.32 TYPE 2 DIABETES MELLITUS WITH STAGE 3B CHRONIC KIDNEY DISEASE, WITHOUT LONG-TERM CURRENT USE OF INSULIN (MULTI): ICD-10-CM

## 2024-04-16 DIAGNOSIS — D50.0 IRON DEFICIENCY ANEMIA DUE TO CHRONIC BLOOD LOSS: Chronic | ICD-10-CM

## 2024-04-16 DIAGNOSIS — K21.9 GASTROESOPHAGEAL REFLUX DISEASE WITHOUT ESOPHAGITIS: ICD-10-CM

## 2024-04-16 DIAGNOSIS — D50.0 IRON DEFICIENCY ANEMIA DUE TO CHRONIC BLOOD LOSS: Primary | Chronic | ICD-10-CM

## 2024-04-16 DIAGNOSIS — E78.2 MIXED HYPERLIPIDEMIA: ICD-10-CM

## 2024-04-16 DIAGNOSIS — I10 PRIMARY HYPERTENSION: ICD-10-CM

## 2024-04-16 DIAGNOSIS — E11.22 TYPE 2 DIABETES MELLITUS WITH STAGE 3A CHRONIC KIDNEY DISEASE, WITH LONG-TERM CURRENT USE OF INSULIN (MULTI): ICD-10-CM

## 2024-04-16 DIAGNOSIS — Z79.4 TYPE 2 DIABETES MELLITUS WITH STAGE 3A CHRONIC KIDNEY DISEASE, WITH LONG-TERM CURRENT USE OF INSULIN (MULTI): ICD-10-CM

## 2024-04-16 DIAGNOSIS — E11.22 TYPE 2 DIABETES MELLITUS WITH STAGE 3B CHRONIC KIDNEY DISEASE, WITHOUT LONG-TERM CURRENT USE OF INSULIN (MULTI): ICD-10-CM

## 2024-04-16 LAB
HAPTOGLOB SERPL-MCNC: 124 MG/DL (ref 37–246)
HEMOSIDERIN UR QL MICRO: NEGATIVE

## 2024-04-16 PROCEDURE — 36415 COLL VENOUS BLD VENIPUNCTURE: CPT

## 2024-04-16 PROCEDURE — 99214 OFFICE O/P EST MOD 30 MIN: CPT | Performed by: INTERNAL MEDICINE

## 2024-04-16 PROCEDURE — 3078F DIAST BP <80 MM HG: CPT | Performed by: INTERNAL MEDICINE

## 2024-04-16 PROCEDURE — 85027 COMPLETE CBC AUTOMATED: CPT

## 2024-04-16 PROCEDURE — 1123F ACP DISCUSS/DSCN MKR DOCD: CPT | Performed by: INTERNAL MEDICINE

## 2024-04-16 PROCEDURE — 1159F MED LIST DOCD IN RCRD: CPT | Performed by: INTERNAL MEDICINE

## 2024-04-16 PROCEDURE — 83013 H PYLORI (C-13) BREATH: CPT

## 2024-04-16 PROCEDURE — 1036F TOBACCO NON-USER: CPT | Performed by: INTERNAL MEDICINE

## 2024-04-16 PROCEDURE — 1160F RVW MEDS BY RX/DR IN RCRD: CPT | Performed by: INTERNAL MEDICINE

## 2024-04-16 PROCEDURE — 3075F SYST BP GE 130 - 139MM HG: CPT | Performed by: INTERNAL MEDICINE

## 2024-04-16 ASSESSMENT — ENCOUNTER SYMPTOMS
ABDOMINAL PAIN: 0
LIGHT-HEADEDNESS: 0
NAUSEA: 0
ABDOMINAL DISTENTION: 0
FATIGUE: 1
DIZZINESS: 0
BLOOD IN STOOL: 0

## 2024-04-16 ASSESSMENT — PATIENT HEALTH QUESTIONNAIRE - PHQ9
SUM OF ALL RESPONSES TO PHQ9 QUESTIONS 1 AND 2: 0
1. LITTLE INTEREST OR PLEASURE IN DOING THINGS: NOT AT ALL
2. FEELING DOWN, DEPRESSED OR HOPELESS: NOT AT ALL

## 2024-04-16 NOTE — PROGRESS NOTES
Patient ID: Mann Abrams is a 83 y.o. male who presents for Results (Follow up on anemia).    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   Gastrointestinal:  Negative for abdominal distention, abdominal pain, blood in stool and nausea.   Neurological:  Negative for dizziness and light-headedness.   All other systems reviewed and are negative.      Objective     Physical Exam  Vitals and nursing note reviewed.   Cardiovascular:      Rate and Rhythm: Normal rate and regular rhythm.      Pulses: Normal pulses.      Heart sounds: Normal heart sounds.   Pulmonary:      Breath sounds: Examination of the right-upper field reveals decreased breath sounds. Examination of the left-upper field reveals decreased breath sounds. Examination of the right-middle field reveals decreased breath sounds. Examination of the left-middle field reveals decreased breath sounds. Examination of the right-lower field reveals decreased breath sounds. Examination of the left-lower field reveals decreased breath sounds. Decreased breath sounds present.   Abdominal:      General: There is no distension.      Palpations: There is no mass.      Tenderness: There is no abdominal tenderness. There is no guarding or rebound.      Hernia: No hernia is present.      Comments: CRIS -  STOOL NORMAL COLOR   GUAIC NEGATIVE          Lab Results   Component Value Date    WBC 5.9 04/12/2024    HGB 7.6 (L) 04/12/2024    HCT 23.1 (L) 04/12/2024    MCV 99 04/12/2024     04/12/2024           Problem List Items Addressed This Visit       Primary hypertension    Type 2 diabetes mellitus with stage 3b chronic kidney disease, without long-term current use of insulin (Multi)    Chronic kidney disease in type 2 diabetes mellitus (Multi)    GERD (gastroesophageal reflux disease)    Relevant Orders    H. Pylori Breath Test    Hyperlipidemia    Iron deficiency anemia due to chronic blood loss - Primary    Relevant Orders    H. Pylori Breath Test    Colonoscopy Diagnostic    EGD    CBC             A/P         I TOLD HIM TO HOLD ASPIRIN   TILL FURTHER ADVISE  RECHECK CBC ON 04/30/2024   REFFERAL EGD/COLONOSCOPY   HELICOBACTER PYLORI BREATH TEST   WILL FOLLOW THROUGH

## 2024-04-17 LAB
ERYTHROCYTE [DISTWIDTH] IN BLOOD BY AUTOMATED COUNT: 13.9 % (ref 11.5–14.5)
HCT VFR BLD AUTO: 23.7 % (ref 41–52)
HGB BLD-MCNC: 7.7 G/DL (ref 13.5–17.5)
MCH RBC QN AUTO: 32.2 PG (ref 26–34)
MCHC RBC AUTO-ENTMCNC: 32.5 G/DL (ref 32–36)
MCV RBC AUTO: 99 FL (ref 80–100)
NRBC BLD-RTO: 0 /100 WBCS (ref 0–0)
PLATELET # BLD AUTO: 276 X10*3/UL (ref 150–450)
RBC # BLD AUTO: 2.39 X10*6/UL (ref 4.5–5.9)
UREA BREATH TEST QL: NEGATIVE
WBC # BLD AUTO: 5.4 X10*3/UL (ref 4.4–11.3)

## 2024-04-22 DIAGNOSIS — E78.5 HYPERLIPIDEMIA, UNSPECIFIED HYPERLIPIDEMIA TYPE: ICD-10-CM

## 2024-04-22 RX ORDER — ATORVASTATIN CALCIUM 80 MG/1
80 TABLET, FILM COATED ORAL DAILY
Qty: 90 TABLET | Refills: 1 | Status: SHIPPED | OUTPATIENT
Start: 2024-04-22

## 2024-04-30 ENCOUNTER — LAB (OUTPATIENT)
Dept: LAB | Facility: LAB | Age: 84
End: 2024-04-30
Payer: MEDICARE

## 2024-04-30 DIAGNOSIS — D64.9 ANEMIA, UNSPECIFIED TYPE: ICD-10-CM

## 2024-04-30 LAB — HCT VFR BLD AUTO: 27.2 % (ref 41–52)

## 2024-04-30 PROCEDURE — 36415 COLL VENOUS BLD VENIPUNCTURE: CPT

## 2024-04-30 PROCEDURE — 85014 HEMATOCRIT: CPT

## 2024-05-04 ENCOUNTER — APPOINTMENT (OUTPATIENT)
Dept: RADIOLOGY | Facility: HOSPITAL | Age: 84
End: 2024-05-04
Payer: MEDICARE

## 2024-05-04 ENCOUNTER — HOSPITAL ENCOUNTER (EMERGENCY)
Facility: HOSPITAL | Age: 84
Discharge: HOME | End: 2024-05-04
Attending: EMERGENCY MEDICINE
Payer: MEDICARE

## 2024-05-04 ENCOUNTER — APPOINTMENT (OUTPATIENT)
Dept: CARDIOLOGY | Facility: HOSPITAL | Age: 84
End: 2024-05-04
Payer: MEDICARE

## 2024-05-04 VITALS
TEMPERATURE: 97.5 F | DIASTOLIC BLOOD PRESSURE: 70 MMHG | SYSTOLIC BLOOD PRESSURE: 134 MMHG | RESPIRATION RATE: 16 BRPM | BODY MASS INDEX: 22.5 KG/M2 | HEART RATE: 74 BPM | HEIGHT: 66 IN | OXYGEN SATURATION: 96 % | WEIGHT: 140 LBS

## 2024-05-04 DIAGNOSIS — R06.09 DYSPNEA ON EXERTION: Primary | ICD-10-CM

## 2024-05-04 DIAGNOSIS — R53.83 OTHER FATIGUE: ICD-10-CM

## 2024-05-04 DIAGNOSIS — D64.9 ANEMIA, UNSPECIFIED TYPE: ICD-10-CM

## 2024-05-04 LAB
ABO GROUP (TYPE) IN BLOOD: NORMAL
ALBUMIN SERPL BCP-MCNC: 3.8 G/DL (ref 3.4–5)
ALP SERPL-CCNC: 68 U/L (ref 33–136)
ALT SERPL W P-5'-P-CCNC: 18 U/L (ref 10–52)
ANION GAP SERPL CALC-SCNC: 13 MMOL/L (ref 10–20)
ANTIBODY SCREEN: NORMAL
AST SERPL W P-5'-P-CCNC: 19 U/L (ref 9–39)
BASOPHILS # BLD AUTO: 0.01 X10*3/UL (ref 0–0.1)
BASOPHILS NFR BLD AUTO: 0.2 %
BILIRUB SERPL-MCNC: 0.3 MG/DL (ref 0–1.2)
BNP SERPL-MCNC: 46 PG/ML (ref 0–99)
BUN SERPL-MCNC: 27 MG/DL (ref 6–23)
CALCIUM SERPL-MCNC: 8.6 MG/DL (ref 8.6–10.3)
CARDIAC TROPONIN I PNL SERPL HS: 7 NG/L (ref 0–20)
CARDIAC TROPONIN I PNL SERPL HS: 8 NG/L (ref 0–20)
CHLORIDE SERPL-SCNC: 101 MMOL/L (ref 98–107)
CO2 SERPL-SCNC: 21 MMOL/L (ref 21–32)
CREAT SERPL-MCNC: 1.96 MG/DL (ref 0.5–1.3)
EGFRCR SERPLBLD CKD-EPI 2021: 34 ML/MIN/1.73M*2
EOSINOPHIL # BLD AUTO: 0.09 X10*3/UL (ref 0–0.4)
EOSINOPHIL NFR BLD AUTO: 1.8 %
ERYTHROCYTE [DISTWIDTH] IN BLOOD BY AUTOMATED COUNT: 12.8 % (ref 11.5–14.5)
GLUCOSE SERPL-MCNC: 170 MG/DL (ref 74–99)
HCT VFR BLD AUTO: 26 % (ref 41–52)
HGB BLD-MCNC: 8.7 G/DL (ref 13.5–17.5)
IMM GRANULOCYTES # BLD AUTO: 0.18 X10*3/UL (ref 0–0.5)
IMM GRANULOCYTES NFR BLD AUTO: 3.7 % (ref 0–0.9)
LYMPHOCYTES # BLD AUTO: 1.17 X10*3/UL (ref 0.8–3)
LYMPHOCYTES NFR BLD AUTO: 23.9 %
MCH RBC QN AUTO: 33 PG (ref 26–34)
MCHC RBC AUTO-ENTMCNC: 33.5 G/DL (ref 32–36)
MCV RBC AUTO: 99 FL (ref 80–100)
MONOCYTES # BLD AUTO: 0.41 X10*3/UL (ref 0.05–0.8)
MONOCYTES NFR BLD AUTO: 8.4 %
NEUTROPHILS # BLD AUTO: 3.03 X10*3/UL (ref 1.6–5.5)
NEUTROPHILS NFR BLD AUTO: 62 %
NRBC BLD-RTO: 0 /100 WBCS (ref 0–0)
PLATELET # BLD AUTO: 242 X10*3/UL (ref 150–450)
POTASSIUM SERPL-SCNC: 4.3 MMOL/L (ref 3.5–5.3)
PROT SERPL-MCNC: 6 G/DL (ref 6.4–8.2)
RBC # BLD AUTO: 2.64 X10*6/UL (ref 4.5–5.9)
RH FACTOR (ANTIGEN D): NORMAL
SODIUM SERPL-SCNC: 131 MMOL/L (ref 136–145)
WBC # BLD AUTO: 4.9 X10*3/UL (ref 4.4–11.3)

## 2024-05-04 PROCEDURE — 84484 ASSAY OF TROPONIN QUANT: CPT | Performed by: EMERGENCY MEDICINE

## 2024-05-04 PROCEDURE — 93005 ELECTROCARDIOGRAM TRACING: CPT

## 2024-05-04 PROCEDURE — 85025 COMPLETE CBC W/AUTO DIFF WBC: CPT | Performed by: EMERGENCY MEDICINE

## 2024-05-04 PROCEDURE — 36415 COLL VENOUS BLD VENIPUNCTURE: CPT | Performed by: EMERGENCY MEDICINE

## 2024-05-04 PROCEDURE — 71046 X-RAY EXAM CHEST 2 VIEWS: CPT

## 2024-05-04 PROCEDURE — 99223 1ST HOSP IP/OBS HIGH 75: CPT | Performed by: INTERNAL MEDICINE

## 2024-05-04 PROCEDURE — 83880 ASSAY OF NATRIURETIC PEPTIDE: CPT | Performed by: EMERGENCY MEDICINE

## 2024-05-04 PROCEDURE — 71046 X-RAY EXAM CHEST 2 VIEWS: CPT | Performed by: RADIOLOGY

## 2024-05-04 PROCEDURE — 80053 COMPREHEN METABOLIC PANEL: CPT | Performed by: EMERGENCY MEDICINE

## 2024-05-04 PROCEDURE — 99284 EMERGENCY DEPT VISIT MOD MDM: CPT | Mod: 25

## 2024-05-04 PROCEDURE — 86901 BLOOD TYPING SEROLOGIC RH(D): CPT | Performed by: EMERGENCY MEDICINE

## 2024-05-04 ASSESSMENT — COLUMBIA-SUICIDE SEVERITY RATING SCALE - C-SSRS
1. IN THE PAST MONTH, HAVE YOU WISHED YOU WERE DEAD OR WISHED YOU COULD GO TO SLEEP AND NOT WAKE UP?: NO
2. HAVE YOU ACTUALLY HAD ANY THOUGHTS OF KILLING YOURSELF?: NO
6. HAVE YOU EVER DONE ANYTHING, STARTED TO DO ANYTHING, OR PREPARED TO DO ANYTHING TO END YOUR LIFE?: NO

## 2024-05-04 ASSESSMENT — PAIN SCALES - GENERAL
PAINLEVEL_OUTOF10: 0 - NO PAIN
PAINLEVEL_OUTOF10: 0 - NO PAIN

## 2024-05-04 ASSESSMENT — PAIN - FUNCTIONAL ASSESSMENT: PAIN_FUNCTIONAL_ASSESSMENT: 0-10

## 2024-05-04 NOTE — ED PROVIDER NOTES
HPI   Chief Complaint   Patient presents with    Fatigue       HPI  Patient is an 83-year-old male with a past medical history significant for hypertension, diabetes, COPD, GERD, anemia who presented to the emergency room with a chief complaint of dyspnea on exertion.  He states that for the past 3 weeks he has been feeling short of breath when he exerts himself.  He also feels some fatigue.  At rest he does not have any symptoms.  He went to see his primary care physician and received a call last night telling him that he needed to come into the emergency room to get a blood transfusion secondary to anemia.  He denies any leg pain or swelling, chest pain, fever, chills, cough, weakness.  Denies any other symptoms at this time.  He denies any melena or hematochezia.  He has never had a blood transfusion in the past.      PMHx: As above   PSHx: Denies.  FamilyHx: Denies  SocialHx: Denies  Allergies: Doxazosin  Medications: See Medication Reconciliation     ROS  As above but otherwise denies    Physical Exam    GENERAL: Awake and Alert, No Acute Distress  HEENT: AT/NC, PERRL, EOMI, Normal Oropharynx, No Signs of Dehydration  NECK: Normal Inspection, No JVD  CARDIOVASCULAR: RRR, No M/R/G  RESPIRATORY: CTA Bilaterally, No Wheezes, Rales or Rhonchi, Chest Wall Non-tender  ABDOMEN: Soft, non-tender abdomen, Normal Bowel Sounds, No Distention  BACK: No CVA Tenderness  SKIN: Normal Color, Warm, Dry, No Rashes   EXTREMITIES: Non-Tender, Full ROM, No Pedal Edema  NEURO: A&O x 3, Normal Motor and Sensation, Normal Mood and Affect    Nursing Assessment and Vitals Reviewed    EKG showed a normal sinus rhythm at 78 bpm.  No significant interval prolongations.  No ischemic ST changes.    Medical Decision  Patient is an 83-year-old male with a past medical history significant for hypertension, diabetes, COPD, GERD, anemia who presented to the emergency room with a chief complaint of dyspnea on exertion.  He states that for the past  3 weeks he has been feeling short of breath when he exerts himself.  He also feels some fatigue.  At rest he does not have any symptoms.  He went to see his primary care physician and received a call last night telling him that he needed to come into the emergency room to get a blood transfusion secondary to anemia.  He denies any leg pain or swelling, chest pain, fever, chills, cough, weakness.  Denies any other symptoms at this time.  He denies any melena or hematochezia.  He has never had a blood transfusion in the past.    On evaluation patient is very well-appearing and in no acute distress.  Lungs are clear heart is regular.  Abdomen is soft nontender nondistended.  Vital signs show soft blood pressure patient is maintaining appropriately.    Workup is performed due to concern for shortness of breath, fatigue and reported anemia.  Patient signed out to oncoming physician, Dr Pérez, at 6 AM pending workup and disposition.                            Christos Coma Scale Score: 15                     Patient History   Past Medical History:   Diagnosis Date    Chronic kidney disease, stage 3 unspecified (Multi) 2022    CKD (chronic kidney disease), stage III    Other secondary cataract, left eye 2016    Posterior capsular opacification visually significant of left eye    Person consulting for explanation of examination or test findings 2020    Encounter to discuss test results     Past Surgical History:   Procedure Laterality Date    EYE SURGERY  2014    Eye Surgery    OTHER SURGICAL HISTORY  2019    Foot surgery    OTHER SURGICAL HISTORY  2019    Back surgery     Family History   Problem Relation Name Age of Onset    Cancer Mother      Other (cardiac disorder) Father      Diabetes Sister       Social History     Tobacco Use    Smoking status: Former     Current packs/day: 0.00     Types: Cigarettes     Quit date:      Years since quittin.3    Smokeless tobacco: Never    Substance Use Topics    Alcohol use: Never    Drug use: Never       Physical Exam   ED Triage Vitals [05/04/24 0517]   Temperature Heart Rate Respirations BP   36.4 °C (97.5 °F) 80 18 104/64      Pulse Ox Temp Source Heart Rate Source Patient Position   96 % Temporal -- --      BP Location FiO2 (%)     -- --       Physical Exam    ED Course & MDM        Medical Decision Making      Procedure  Procedures     Kerri Varner MD  05/04/24 0606

## 2024-05-04 NOTE — ED TRIAGE NOTES
Pt presents today after his doctor told him he needed a blood transfusion. Pt states he had labs drawn in the last three weeks, but states his doctor did not mention the results of his labs. When the pt saw his PCP yesterday and told him that he was tired and SOB, his doctor instructed him to come in for a transfusion.

## 2024-05-04 NOTE — CONSULTS
Inpatient consult to Cardiology  Consult performed by: Kelvin Faye DO  Consult ordered by: Salazar Pérez MD MPH  Reason for consult: jacobs, anginal equivalent        History Of Present Illness:    Mann Abrams is a 83 y.o. male with CKD, diabetes, PAD, HPL who presents to the ED with 3 weeks of progressive shortness of breath.  He reports having progressive dyspnea with almost any activity.  He called his PCP yesterday and was concerned he would need a blood transfusion as his hemoglobin was low from a couple of weeks ago.  PCP advised he come into the ER for further evaluation.  Hemoglobin of 7.7 but this was resulted 2 weeks ago.  Hemoglobin 8.7 when checked overnight.  BNP normal, troponin negative x 2.  Cardiology's been consulted whether this is anginal equivalent and whether he needs to stay for further evaluation.    He was seen by his cardiologist 3/12/2024 Dr. Garcia with some increasing dyspnea with exertion at that time.  Due to his severely elevated coronary calcium score cardiac catheterization was recommended.  Patient is unsure whether he is getting this catheterization as he was told it was not approved by insurance.  It appears he may be scheduled for heart cath 2024 with Dr. Jerez.    Reports he feels much better knowing that his hemoglobin is not severely low and he does not need a blood transfusion.  Denies any chest pain or pressure, lightheadedness dizziness presyncope or syncope.      Past Cardiology Tests (Last 3 Years):  EK2024: NSR, normal ECG  Echo:  2023:  1. Left ventricular systolic function is normal with a 60-65% estimated ejection fraction.  2. Spectral Doppler shows an impaired relaxation pattern of left ventricular diastolic filling.  3. Moderately elevated right ventricular systolic pressure.    Cath:  None    Stress Test:  2020:  1. Abnormal Stress Test.   2. ECG changes consistent with ischemia.   3. Hyperdynamic global left ventricular systolic  function.   4. Patients baseline LV function was hyperdynamic with peak stress showing almost complete cavity obliteration . No regional wall motion changes noted at peak exercise.   5. Noted Increased in RVSP from baseline 31 mmHg to 54 mmHg at Peak Stress.    Cardiac Imaging:  CT coronary calcium score 2024: 1328 (, , )    Past Medical History:  He has a past medical history of Chronic kidney disease, stage 3 unspecified (Multi) (2022), Other secondary cataract, left eye (2016), and Person consulting for explanation of examination or test findings (2020).    Past Surgical History:  He has a past surgical history that includes Eye surgery (2014); Other surgical history (2019); and Other surgical history (2019).      Social History:  He reports that he quit smoking about 38 years ago. His smoking use included cigarettes. He has never used smokeless tobacco. He reports that he does not drink alcohol and does not use drugs.    Family History:  Family History   Problem Relation Name Age of Onset    Cancer Mother      Other (cardiac disorder) Father      Diabetes Sister          Allergies:  Doxazosin    ROS:  10 point review of systems including (Constitutional, Eyes, ENMT, Respiratory, Cardiac, Gastrointestinal, Neurological, Psychiatric, and Hematologic) was performed and is otherwise negative.    Objective Data:  Last Recorded Vitals:  Vitals:    24 0815 24 0830 24 0845 24 0900   BP: 118/85 115/62 115/64 113/60   Pulse: 73 70 74 65   Resp: 18 14 15 20   Temp:       TempSrc:       SpO2: 96% 95% 94% 94%   Weight:       Height:         Medical Gas Therapy: None (Room air)  Weight  Av.5 kg (140 lb)  Min: 63.5 kg (140 lb)  Max: 63.5 kg (140 lb)      LABS:  CMP:  Results from last 7 days   Lab Units 24  0631   SODIUM mmol/L 131*   POTASSIUM mmol/L 4.3   CHLORIDE mmol/L 101   CO2 mmol/L 21   ANION GAP mmol/L 13   BUN mg/dL 27*    CREATININE mg/dL 1.96*   EGFR mL/min/1.73m*2 34*   ALBUMIN g/dL 3.8   ALT U/L 18   AST U/L 19   BILIRUBIN TOTAL mg/dL 0.3     CBC:  Results from last 7 days   Lab Units 05/04/24  0631 04/30/24  0724   WBC AUTO x10*3/uL 4.9  --    HEMOGLOBIN g/dL 8.7*  --    HEMATOCRIT % 26.0* 27.2*   PLATELETS AUTO x10*3/uL 242  --    MCV fL 99  --      COAG:     ABO:   ABO TYPE   Date Value Ref Range Status   05/04/2024 O  Final     HEME/ENDO:     CARDIAC:   Results from last 7 days   Lab Units 05/04/24  0715 05/04/24  0631   TROPHS ng/L 7 8   BNP pg/mL  --  46             Last I/O:  No intake or output data in the 24 hours ending 05/04/24 0914  Net IO Since Admission: No IO data has been entered for this period [05/04/24 0914]      Imaging Results:  XR chest 2 views    Result Date: 5/4/2024  Interpreted By:  Maggy Cleary, STUDY: XR CHEST 2 VIEWS 5/4/2024 6:32 am   INDICATION: Signs/Symptoms:sob   COMPARISON: 06/05/2023   ACCESSION NUMBER(S): [id number]   ORDERING CLINICIAN: ABY MIRANDA   TECHNIQUE: PA and lateral views   FINDINGS: The lungs appear clear. The pulmonary vascularity and cardiomediastinum appear normal. The bony structures appear intact.       Satisfactory exam. No acute abnormalities   Signed by: Maggy Cleary 5/4/2024 7:03 AM Dictation workstation:   [id number]      Inpatient Medications:          Outpatient Medications:  Current Outpatient Medications   Medication Instructions    amLODIPine (NORVASC) 10 mg, oral, Daily    ascorbic acid (Vitamin C) 500 mg tablet oral    atorvastatin (LIPITOR) 80 mg, oral, Daily    carvedilol (Coreg) 25 mg tablet HALF PILL BID PLEASE NOTE CHANGE IN DOSE    fluticasone (Flonase) 50 mcg/actuation nasal spray 1 spray, Each Nostril, Daily, Shake gently. Before first use, prime pump. After use, clean tip and replace cap.    fluticasone propion-salmeteroL (Wixela Inhub) 250-50 mcg/dose diskus inhaler 1 puff, inhalation, 2 times daily RT, Rinse mouth with water after use  to reduce aftertaste and incidence of candidiasis. Do not swallow.    FreeStyle glucose monitoring kit miscellaneous, Use to test blood sugar twice daily    losartan (COZAAR) 50 mg, oral, 2 times daily    metFORMIN XR (GLUCOPHAGE-XR) 500 mg, oral, Daily with evening meal    montelukast (SINGULAIR) 10 mg, oral, Nightly    nitroglycerin (NITROSTAT) 0.4 mg, sublingual, Every 5 min PRN, place 1 tablet under tongue every 5 minutes for up to 3 doses as needed for chest pain. Call 911 if pain persists    omeprazole (PRILOSEC) 40 mg, oral, Daily    spironolactone (Aldactone) 25 mg tablet 0.5 tablets, oral, 2 times daily       Physical Exam:  GENERAL: alert, cooperative, pleasant, in no acute distress  SKIN: warm, dry, no rash.  NECK: no JVD, no MARIE  CARDIAC: Regular rate and rhythm with no rubs, murmurs, or gallops  CHEST: Normal respiratory efforts, lungs clear to auscultation bilaterally.  ABDOMEN: soft, nontender, nondistended  EXTREMITIES: no edema  NEURO: Alert and oriented x 3.  Grossly normal.  Moves all 4 extremities.       Assessment/Plan   83 y.o. male with CKD, diabetes, PAD, HPL who presents to the ED with 3 weeks of progressive shortness of breath.    #Coronary artery disease  - Severely elevated coronary calcium score and with progressive dyspnea this could be anginal equivalent.  He is already on 2 antianginals with amlodipine and carvedilol and may be having breakthrough symptoms.  I agree that left heart catheterization is warranted, however not likely needs to be completed during this hospitalization.  Troponin and BNP are negative.  He would like to go home and proceed with hopeful catheterization later this month that seems to be tentatively scheduled.  I will ask Dr. Garcia and Dr. Jerez to reach out to him this week to confirm    #Anemia  - Hemoglobin a little bit better at 8.7.  Recommend continued further workup of this with his primary care provider.    #CKD  - Creatinine stable around  1.7-2.0    No further cardiovascular workup recommended at this time  Stable for discharge from cardiac standpoint and his cardiology team should reach out to him later this week.  Reviewed with ER attending Dr. Pérez.        Kelvin Faye DO

## 2024-05-04 NOTE — ED NOTES
The pt called his DR to walk into the ED for a blood transfusion D/T low HgB.     Julio Lunsford RN  05/04/24 3021

## 2024-05-04 NOTE — PROGRESS NOTES
Emergency Medicine Transition of Care Note.    I received Mann Abrams in signout from Dr. Fabian.  Please see the previous ED provider note for all HPI, PE and MDM up to the time of signout at 0600. This is in addition to the primary record.    In brief Mann Abrams is an 83 y.o. male presenting for   Chief Complaint   Patient presents with    Fatigue     At the time of signout we were awaiting: Remaining labs    ED Course as of 05/04/24 0936   Sat May 04, 2024   0701 ECG 12 lead  EKG ordered interpreted by ED physician demonstrates sinus rhythm.  70 bpm.  Low voltage otherwise unremarkable.  No ischemic findings. [KS]      ED Course User Index  [KS] Salazar Pérez MD MPH         Diagnoses as of 05/04/24 0936   Dyspnea on exertion   Other fatigue   Anemia, unspecified type       Medical Decision Making    Labs relatively benign with hemoglobin 8.7.  Moderate renal insufficiency but within relatively unremarkable range for patient.  Initial troponin and BNP negative chest x-ray nonacute.    Patient with what sounds like dyspnea on exertion/fatigue on exertion.  Has had abnormal cardiac stress testing in the past, has somewhat elevated cardiac calcium scores, but is not anemic and does not require transfusion with a hemoglobin of 8.7.    Concern for dyspnea/anginal equivalent.  At some point he was recommended to have a cardiac catheterization back in March but that did not happen he states based on insurance reasons.    Case discussed with cardiology who will evaluate in the ED for consult to determine if they feel this is stable enough anginal equivalent for outpatient workup or he requires further inpatient evaluation.    Disposition pending cardiology consult in ED.      Cardiology evaluated the patient in the ED.  Dr. Faye feels that the patient can be followed up as outpatient.  Likely stable anginal type equivalent concern, scheduled for heart cath in the very near future.  Patient prefers to be  discharged at this time given negative troponins, patient reasonable for outpatient follow-up as per my discussion and their discussion with the patient.    Patient has no other questions or concerns.  Patient aware of his anemia, but again no need for immediate transfusion.    Final diagnoses:   [R06.09] Dyspnea on exertion   [R53.83] Other fatigue   [D64.9] Anemia, unspecified type           Procedure  Procedures          Salazar Reeves MD,MPH  [facility] Emergency Department  [phone]

## 2024-05-07 LAB
ATRIAL RATE: 78 BPM
P AXIS: 47 DEGREES
P OFFSET: 201 MS
P ONSET: 159 MS
PR INTERVAL: 130 MS
Q ONSET: 224 MS
QRS COUNT: 13 BEATS
QRS DURATION: 70 MS
QT INTERVAL: 366 MS
QTC CALCULATION(BAZETT): 417 MS
QTC FREDERICIA: 399 MS
R AXIS: 62 DEGREES
T AXIS: 60 DEGREES
T OFFSET: 407 MS
VENTRICULAR RATE: 78 BPM

## 2024-05-09 ENCOUNTER — OFFICE VISIT (OUTPATIENT)
Dept: VASCULAR SURGERY | Facility: CLINIC | Age: 84
End: 2024-05-09
Payer: MEDICARE

## 2024-05-09 VITALS
SYSTOLIC BLOOD PRESSURE: 154 MMHG | HEART RATE: 86 BPM | HEIGHT: 66 IN | DIASTOLIC BLOOD PRESSURE: 78 MMHG | OXYGEN SATURATION: 98 % | BODY MASS INDEX: 23.46 KG/M2 | WEIGHT: 146 LBS

## 2024-05-09 DIAGNOSIS — E13.51: Primary | ICD-10-CM

## 2024-05-09 DIAGNOSIS — I65.21 CAROTID STENOSIS, ASYMPTOMATIC, RIGHT: ICD-10-CM

## 2024-05-09 DIAGNOSIS — I65.21 CAROTID OCCLUSION, RIGHT: ICD-10-CM

## 2024-05-09 PROBLEM — M25.569 KNEE PAIN: Status: ACTIVE | Noted: 2023-08-14

## 2024-05-09 PROBLEM — J30.9 ALLERGIC RHINITIS: Status: ACTIVE | Noted: 2024-05-09

## 2024-05-09 PROBLEM — R06.2 WHEEZING: Status: ACTIVE | Noted: 2023-06-05

## 2024-05-09 PROBLEM — S89.92XA INJURY OF LEFT KNEE: Status: ACTIVE | Noted: 2023-08-14

## 2024-05-09 PROBLEM — R12 HEARTBURN: Status: ACTIVE | Noted: 2024-05-09

## 2024-05-09 PROBLEM — M25.559 ARTHRALGIA OF HIP: Status: ACTIVE | Noted: 2024-05-09

## 2024-05-09 PROCEDURE — 99205 OFFICE O/P NEW HI 60 MIN: CPT | Performed by: SURGERY

## 2024-05-09 PROCEDURE — 1123F ACP DISCUSS/DSCN MKR DOCD: CPT | Performed by: SURGERY

## 2024-05-09 PROCEDURE — 3078F DIAST BP <80 MM HG: CPT | Performed by: SURGERY

## 2024-05-09 PROCEDURE — 1036F TOBACCO NON-USER: CPT | Performed by: SURGERY

## 2024-05-09 PROCEDURE — 99215 OFFICE O/P EST HI 40 MIN: CPT | Performed by: SURGERY

## 2024-05-09 PROCEDURE — 1126F AMNT PAIN NOTED NONE PRSNT: CPT | Performed by: SURGERY

## 2024-05-09 PROCEDURE — 3077F SYST BP >= 140 MM HG: CPT | Performed by: SURGERY

## 2024-05-09 PROCEDURE — 1160F RVW MEDS BY RX/DR IN RCRD: CPT | Performed by: SURGERY

## 2024-05-09 PROCEDURE — 1159F MED LIST DOCD IN RCRD: CPT | Performed by: SURGERY

## 2024-05-09 RX ORDER — ASPIRIN 81 MG/1
81 TABLET ORAL DAILY
Qty: 30 TABLET | Refills: 11 | Status: SHIPPED | OUTPATIENT
Start: 2024-05-09 | End: 2024-06-08 | Stop reason: HOSPADM

## 2024-05-09 ASSESSMENT — PAIN SCALES - GENERAL: PAINLEVEL: 0-NO PAIN

## 2024-05-09 ASSESSMENT — ENCOUNTER SYMPTOMS
LOSS OF SENSATION IN FEET: 0
DEPRESSION: 0
OCCASIONAL FEELINGS OF UNSTEADINESS: 0

## 2024-05-09 NOTE — PATIENT INSTRUCTIONS
It was a pleasure taking care of you today and appreciate your seeing us at our [facility].     Today's plan is as follows:  1) I would like to see you back at end of June (can make an appointment with myself or Chelle Serna CNP with an ultrasound on the same day  2) your catheterization with Dr. Riley in cardioloy is set up for 5/23 and they will give you a call  3) I will reach out to your doctors regarding aspirin and your anemia -this has been confirmed and we will start 81 mg daily of aspirin.      Please call the office with any questions at [phone].   You can speak to our secretaries or our clinical nurses for specific questions.   For [facility] specific questions, you can also call [phone] or email at [email or web address]  If you need coordinating your appointments and testing you can do these at the  or by calling my office shortly after your visit.

## 2024-05-09 NOTE — PROGRESS NOTES
NPV REASON: asymptomatic carotid stenosis    CURRENT ENCOUNTER:  Mann Abrams is 83 y.o. male here for asymptomatic carotid stenosis. Right hand dominant.   Medical history notable for: DM, CKD, HLP, HTN, CAD, renal artery stenosis  diabetes, COPDv (on inhalers), GERD, anemia   Not on aspirin, previously on it  On atorvastatin 80mg     No h/o CVA, vision, no focal weakness  No prior surgeries   Has an implant in the left eye -     Going up basements steps to 1st floor induces sob. No chest tightness; no pain at all.   No prior cath -   At rest he is fine  No smoking - quit in   Took aspirin for 3 months - was taken off due to anemia;  No blood in stool - in  - was ok - good for 10 yrs  No pain with walking  No h/o aneurysms or sudden death    FamHx: mom with cervical cancer;   Father - CAD  Sister: breast cancer    Recent ER for SOB - evaluated by Dr. Faye -   Hemoglobin of 7.7 but this was resulted 2 weeks ago.  Hemoglobin 8.7 when checked overnight   It appears he may be scheduled for heart cath 2024 with Dr. Jerez.     EK2024: NSR, normal ECG  Echo:  2023:  1. Left ventricular systolic function is normal with a 60-65% estimated ejection fraction.  2. Spectral Doppler shows an impaired relaxation pattern of left ventricular diastolic filling.  3. Moderately elevated right ventricular systolic pressure.    #CKD  - Creatinine stable around 1.7-2.0; GFR 34  LDL 72  A1C - 7 (2024)        PastMedHX:  Past Medical History:   Diagnosis Date    Chronic kidney disease, stage 3 unspecified (Multi) 2022    CKD (chronic kidney disease), stage III    Other secondary cataract, left eye 2016    Posterior capsular opacification visually significant of left eye    Person consulting for explanation of examination or test findings 2020    Encounter to discuss test results       Meds:     Current Outpatient Medications:     amLODIPine (Norvasc) 10 mg tablet, Take 1 tablet (10 mg) by  mouth once daily., Disp: 90 tablet, Rfl: 1    ascorbic acid (Vitamin C) 500 mg tablet, Take by mouth., Disp: , Rfl:     atorvastatin (Lipitor) 80 mg tablet, Take 1 tablet (80 mg) by mouth once daily., Disp: 90 tablet, Rfl: 1    carvedilol (Coreg) 25 mg tablet, HALF PILL BID PLEASE NOTE CHANGE IN DOSE, Disp: 90 tablet, Rfl: 3    fluticasone (Flonase) 50 mcg/actuation nasal spray, Administer 1 spray into each nostril once daily. Shake gently. Before first use, prime pump. After use, clean tip and replace cap., Disp: 48 g, Rfl: 1    fluticasone propion-salmeteroL (Wixela Inhub) 250-50 mcg/dose diskus inhaler, Inhale 1 puff 2 times a day. Rinse mouth with water after use to reduce aftertaste and incidence of candidiasis. Do not swallow., Disp: 3 each, Rfl: 1    FreeStyle glucose monitoring kit, Use to test blood sugar twice daily, Disp: , Rfl:     losartan (Cozaar) 50 mg tablet, Take 1 tablet (50 mg) by mouth 2 times a day., Disp: 180 tablet, Rfl: 1    montelukast (Singulair) 10 mg tablet, Take 1 tablet (10 mg) by mouth once daily at bedtime., Disp: 90 tablet, Rfl: 3    nitroglycerin (Nitrostat) 0.4 mg SL tablet, Place 1 tablet (0.4 mg) under the tongue every 5 minutes if needed for chest pain. place 1 tablet under tongue every 5 minutes for up to 3 doses as needed for chest pain. Call 911 if pain persists, Disp: , Rfl:     omeprazole (PriLOSEC) 40 mg DR capsule, Take 1 capsule (40 mg) by mouth once daily., Disp: 90 capsule, Rfl: 3    spironolactone (Aldactone) 25 mg tablet, Take 0.5 tablets (12.5 mg) by mouth 2 times a day., Disp: , Rfl:     metFORMIN XR (Glucophage-XR) 500 mg 24 hr tablet, Take 1 tablet (500 mg) by mouth once daily in the evening. Take with meals., Disp: 90 tablet, Rfl: 3    Allergies:   Allergies   Allergen Reactions    Doxazosin Itching       ROS:  Review of Systems     Objective:  Vitals:  Vitals:    05/09/24 0908   BP: 154/78   Pulse:    SpO2:         Exam:  No distress  Breathing comfortably    Not tachycardic  Palpable bilateral radial pulses  Abd soft, nt, nd  B/l legs well perfused feet  No leg edema  No focal deficits  Carotid pulses b/l intact  B/l radial pulses  Right PT pulse  Left AT pulse      Labs:  Lab Results   Component Value Date    WBC 4.9 05/04/2024    WBC 5.4 04/16/2024    WBC 5.9 04/12/2024    HGB 8.7 (L) 05/04/2024    HGB 7.7 (L) 04/16/2024    HGB 7.6 (L) 04/12/2024    HCT 26.0 (L) 05/04/2024    HCT 27.2 (L) 04/30/2024    HCT 23.7 (L) 04/16/2024    MCV 99 05/04/2024    MCV 99 04/16/2024    MCV 99 04/12/2024     05/04/2024     Lab Results   Component Value Date    CREATININE 1.96 (H) 05/04/2024    CREATININE 1.88 (H) 04/12/2024    CREATININE 2.08 (H) 04/11/2024    BUN 27 (H) 05/04/2024    BUN 26 (H) 04/12/2024    BUN 30 (H) 04/11/2024     (L) 05/04/2024     04/12/2024     04/11/2024    K 4.3 05/04/2024    K 4.4 04/12/2024    K 4.2 04/11/2024     05/04/2024     04/12/2024     04/11/2024    CO2 21 05/04/2024    CO2 21 04/12/2024    CO2 21 04/11/2024         Imaging:  Reviewed radiology carotid duplex from January 2024 with velocities under 300 cm² on the right side.    Assessment & Plan:  Mann Abrams is 83 y.o. male  here for asymptomatic carotid stenosis. Right hand dominant.   Medical history notable for: DM, CKD, HLP, HTN, CAD, renal artery stenosis  diabetes, COPDv (on inhalers), GERD, anemia   Not on aspirin, previously on it  On atorvastatin 80mg     No h/o CVA, vision, no focal weakness  No prior surgeries   Has an implant in the left eye -     Going up basements steps to 1st floor induces sob. No chest tightness; no pain at all.   No prior cath -   At rest he is fine  No smoking - quit in 1986  Took aspirin for 3 months - was taken off due to anemia;  Recent ER for SOB - evaluated by Dr. Faye -   Hemoglobin of 7.7 but this was resulted 2 weeks ago.  Hemoglobin 8.7 when checked overnight   It appears he may be scheduled for heart  cath 5/23/2024 with Dr. Jerez.     #CKD  - Creatinine stable around 1.7-2.0; GFR 34  LDL 72  A1C - 7 (1/2024)    1) I would like to see you back at end of June (can make an appointment with myself or Chelle Serna CNP with an ultrasound on the same day  2) your catheterization with Dr. Riley in cardioloy is set up for 5/23 and they will give you a call  3) I will reach out to your doctors regarding aspirin and your anemia -this has been confirmed and we will restart aspirin 81 mg daily.    Edel Sequeira MD, MHS, RPVI  , [employer]  Director, [facility], [facility] [place]  Co-Director, Vascular Laboratories, [facility] [place]  Division of Vascular Surgery and Endovascular Therapy  [facility]

## 2024-05-14 ENCOUNTER — LAB (OUTPATIENT)
Dept: LAB | Facility: LAB | Age: 84
End: 2024-05-14
Payer: MEDICARE

## 2024-05-14 ENCOUNTER — OFFICE VISIT (OUTPATIENT)
Dept: CARDIOLOGY | Facility: CLINIC | Age: 84
End: 2024-05-14
Payer: MEDICARE

## 2024-05-14 VITALS
RESPIRATION RATE: 18 BRPM | BODY MASS INDEX: 22.98 KG/M2 | HEIGHT: 66 IN | DIASTOLIC BLOOD PRESSURE: 67 MMHG | WEIGHT: 143 LBS | SYSTOLIC BLOOD PRESSURE: 153 MMHG | OXYGEN SATURATION: 94 % | HEART RATE: 74 BPM

## 2024-05-14 DIAGNOSIS — R07.9 CHEST PAIN, EXERTIONAL: Primary | ICD-10-CM

## 2024-05-14 DIAGNOSIS — R07.9 CHEST PAIN, EXERTIONAL: ICD-10-CM

## 2024-05-14 DIAGNOSIS — R93.1 ELEVATED CORONARY ARTERY CALCIUM SCORE: ICD-10-CM

## 2024-05-14 DIAGNOSIS — R94.39 ABNORMAL STRESS TEST: ICD-10-CM

## 2024-05-14 DIAGNOSIS — E78.2 MIXED HYPERLIPIDEMIA: ICD-10-CM

## 2024-05-14 DIAGNOSIS — D64.9 ANEMIA, UNSPECIFIED TYPE: ICD-10-CM

## 2024-05-14 LAB
ALBUMIN SERPL BCP-MCNC: 3.8 G/DL (ref 3.4–5)
ANION GAP SERPL CALC-SCNC: 13 MMOL/L (ref 10–20)
BASOPHILS # BLD AUTO: 0.02 X10*3/UL (ref 0–0.1)
BASOPHILS NFR BLD AUTO: 0.3 %
BUN SERPL-MCNC: 24 MG/DL (ref 6–23)
CALCIUM SERPL-MCNC: 8.8 MG/DL (ref 8.6–10.6)
CHLORIDE SERPL-SCNC: 105 MMOL/L (ref 98–107)
CO2 SERPL-SCNC: 22 MMOL/L (ref 21–32)
CREAT SERPL-MCNC: 1.88 MG/DL (ref 0.5–1.3)
EGFRCR SERPLBLD CKD-EPI 2021: 35 ML/MIN/1.73M*2
EOSINOPHIL # BLD AUTO: 0.1 X10*3/UL (ref 0–0.4)
EOSINOPHIL NFR BLD AUTO: 1.6 %
ERYTHROCYTE [DISTWIDTH] IN BLOOD BY AUTOMATED COUNT: 13.6 % (ref 11.5–14.5)
GLUCOSE SERPL-MCNC: 192 MG/DL (ref 74–99)
HCT VFR BLD AUTO: 26.8 % (ref 41–52)
HGB BLD-MCNC: 8.4 G/DL (ref 13.5–17.5)
IMM GRANULOCYTES # BLD AUTO: 0.15 X10*3/UL (ref 0–0.5)
IMM GRANULOCYTES NFR BLD AUTO: 2.5 % (ref 0–0.9)
LYMPHOCYTES # BLD AUTO: 1.54 X10*3/UL (ref 0.8–3)
LYMPHOCYTES NFR BLD AUTO: 25.2 %
MCH RBC QN AUTO: 32.4 PG (ref 26–34)
MCHC RBC AUTO-ENTMCNC: 31.3 G/DL (ref 32–36)
MCV RBC AUTO: 104 FL (ref 80–100)
MONOCYTES # BLD AUTO: 0.5 X10*3/UL (ref 0.05–0.8)
MONOCYTES NFR BLD AUTO: 8.2 %
NEUTROPHILS # BLD AUTO: 3.8 X10*3/UL (ref 1.6–5.5)
NEUTROPHILS NFR BLD AUTO: 62.2 %
NRBC BLD-RTO: 0 /100 WBCS (ref 0–0)
PHOSPHATE SERPL-MCNC: 3.9 MG/DL (ref 2.5–4.9)
PLATELET # BLD AUTO: 285 X10*3/UL (ref 150–450)
POTASSIUM SERPL-SCNC: 4.9 MMOL/L (ref 3.5–5.3)
RBC # BLD AUTO: 2.59 X10*6/UL (ref 4.5–5.9)
SODIUM SERPL-SCNC: 135 MMOL/L (ref 136–145)
WBC # BLD AUTO: 6.1 X10*3/UL (ref 4.4–11.3)

## 2024-05-14 PROCEDURE — 3077F SYST BP >= 140 MM HG: CPT | Performed by: INTERNAL MEDICINE

## 2024-05-14 PROCEDURE — 36415 COLL VENOUS BLD VENIPUNCTURE: CPT

## 2024-05-14 PROCEDURE — 1123F ACP DISCUSS/DSCN MKR DOCD: CPT | Performed by: INTERNAL MEDICINE

## 2024-05-14 PROCEDURE — 93005 ELECTROCARDIOGRAM TRACING: CPT | Performed by: INTERNAL MEDICINE

## 2024-05-14 PROCEDURE — 1036F TOBACCO NON-USER: CPT | Performed by: INTERNAL MEDICINE

## 2024-05-14 PROCEDURE — 1160F RVW MEDS BY RX/DR IN RCRD: CPT | Performed by: INTERNAL MEDICINE

## 2024-05-14 PROCEDURE — 99215 OFFICE O/P EST HI 40 MIN: CPT | Performed by: INTERNAL MEDICINE

## 2024-05-14 PROCEDURE — 3078F DIAST BP <80 MM HG: CPT | Performed by: INTERNAL MEDICINE

## 2024-05-14 PROCEDURE — 80069 RENAL FUNCTION PANEL: CPT

## 2024-05-14 PROCEDURE — 1159F MED LIST DOCD IN RCRD: CPT | Performed by: INTERNAL MEDICINE

## 2024-05-14 PROCEDURE — 85025 COMPLETE CBC W/AUTO DIFF WBC: CPT

## 2024-05-14 RX ORDER — ISOSORBIDE MONONITRATE 30 MG/1
30 TABLET, EXTENDED RELEASE ORAL DAILY
Qty: 90 TABLET | Refills: 3 | Status: SHIPPED | OUTPATIENT
Start: 2024-05-14 | End: 2025-05-14

## 2024-05-14 RX ORDER — OLMESARTAN MEDOXOMIL 40 MG/1
40 TABLET ORAL DAILY
Qty: 90 TABLET | Refills: 3 | Status: SHIPPED | OUTPATIENT
Start: 2024-05-14 | End: 2025-05-14

## 2024-05-14 ASSESSMENT — ENCOUNTER SYMPTOMS
DEPRESSION: 0
LOSS OF SENSATION IN FEET: 0
OCCASIONAL FEELINGS OF UNSTEADINESS: 0

## 2024-05-14 ASSESSMENT — PATIENT HEALTH QUESTIONNAIRE - PHQ9
1. LITTLE INTEREST OR PLEASURE IN DOING THINGS: NOT AT ALL
2. FEELING DOWN, DEPRESSED OR HOPELESS: NOT AT ALL
SUM OF ALL RESPONSES TO PHQ9 QUESTIONS 1 & 2: 0

## 2024-05-14 NOTE — ASSESSMENT & PLAN NOTE
He has previously had an Abnormal stress echocardiogram that previously been attributed to a hyperdynamic heart at baseline with stress causing obliteration of the LV Cavity. We have addressed this by increasing fluids and adding Carvedilol and Amlidpine which had helped symptoms, but now worsening in the setting of SEVERE CAC despite medication optimization.   The patient's overall symptomatology and risk factors are concerning for possible obstructive coronary artery disease. Multiple options were discussed with the patient including alternatives, risks and benefits of cardiac catheterization. After prolonged discussion, it was decided to proceed with cardiac catheterization with coronary angiography.  This will be scheduled at [facility].

## 2024-05-14 NOTE — ASSESSMENT & PLAN NOTE
-- Add Imdur 30 mg For additional antianginal support   -- The patient's overall symptomatology and risk factors are concerning for possible obstructive coronary artery disease. Multiple options were discussed with the patient including alternatives, risks and benefits of cardiac catheterization. After prolonged discussion, it was decided to proceed with cardiac catheterization with coronary angiography.  This will be scheduled at [facility] [place].

## 2024-05-14 NOTE — PATIENT INSTRUCTIONS
"It was a pleasure seeing you today. I would like to see you back in clinic after your heart catheterization you can call my office if questions arise between now and our next visit.     Please STOP Losartan 50 mg Twice Daily and Begin Olmesartan 40 mg Daily. This will lower your blood pressure better    Please begin Imdur 30 mg Daily. This is a long acting Nitrate to assist with your chest discomfort    Please continue to take 81 mg of Aspirin ( Either from the Drug Store or from [facility]) We need to make sure that you can tolerate this.     Please have a Blood Count performed Next week     We also talked about your symptoms of shortness of breath and you being somewhat tired and having to rest when you are doing daily things.  In light of your coronary artery calcium score, as well as your previous stress test, I think We need to proceed with a heart catheterization.  I have reached out to Dr. Jerez who does most of the heart catheterizations for me as he has the training to allow him to fix problems when he sees them. Continue on your other current medications.    Below are some Heart Health Tips that we provide to all of our patients. I hope you fing them useful.     - If you are having problems with medications, consider looking at the following websites.   --  \"GoodRx\"   --  \"[employer] [facility]\"      - We are happy to supply written prescriptions if needed to allow you to obtain your medications from different pharmacies. Additionally, if you are having issues with mail order delivery, please let us know. We can send a limited supply of your medications to your local pharmacy.     -  We recommend you follow a heart healthy diet. Watch food labels and try not to eat more than 2,500 mg of sodium per day. Avoid foods high in salt like processed meats (lunch meats, saini, and sausage), processed foods (boxed dinners, canned soups), fried and fast foods. Monitor serving sizes and if the sodium per " serving size is more than 200 mg, avoid those foods. If the sodium per serving size is between 100-200 mg, you can use those in limited quantities. Try to choose foods where the amount of sodium per serving size is less than 100 mg. Try to eat a diet rich in fruits and vegetables, whole grains, low fat dairy products, skinless poultry and fish, nuts, beans, non-tropical vegetable oils. Limit saturated fat, trans fat, sodium, red meats, and sugar-sweetened beverages.   Limit alcohol     -The combination of a reduced-calorie diet and increased physical activity is recommended. Adults should aim to get at least 150 minutes of moderate physical activity per week (30 minutes of moderate physical activities at least 5 days per week). Examples of moderate physical activities include brisk walking, swimming, aerobic dancing, heavy gardening, jumping rope, bicycling 10 MPH or faster, tennis, hiking uphill or with a heavy backpack. Please let us know if you would like to learn more about your nutrition and calories and additional options including weight loss programs to help you reach your goal.     -If you smoke, stop smoking. If you stop smoking you can help get rid of a major source of stress to your heart. Smoking makes your heart rate and blood pressure go up and increases your risk or developing cardiovascular diseases and worsen symptoms associated with heart failure.     -Obtain a BP monitor and monitor your BP daily. Check it around the same time each day; at least 1 hour after taking your medications. Record your BP in a log and bring your log with you to your doctors appointment.     -F/u with your PCP as recommended.

## 2024-05-14 NOTE — PROGRESS NOTES
"Chief Complaint:   Follow-up     History Of Present Illness:    Mann Abrams is a 83 y.o. male presenting with a pertinent medical history notable for chronic kidney disease stage III, diabetes mellitus, peripheral vascular disease, hypercholesterolemia who is seen today for follow up cardiovascular care.      Recall that Mr. Abrams is a very active individual who usually walks 2 miles every day in good weather or spends at least 30 minutes on a treadmill 6 days out of the week. He had one episode where he all of a sudden felt very tired while he was riding his lawnmower. He felt lightheaded so he laid down and rested briefly. After that he got up and finish mowing the lawn without any incident Following this he went and talked to his PCP who referred him on for cardiac stress testing. He underwent exercise echocardiography stress testing. He exercised for 9 minutes and was able to make 10.1 METS. On his EKG, he had ST depressions in the lateral leads. On his actual imaging he had a hyperdynamic precordium at baseline that then increased at maximal stress. Of note he had no symptoms during this time. Based off of this his stress test was recorded as abnormal and he was referred to cardiology.     Following that initial episode, he has not had any repeat occurrences of chest discomfort or the feeling that he had.  He has no personal history of coronary artery disease. He has reduced his carvedilol in the interim period, using it once daily instead of BID. States that he does this because he is more tired when taking it twice daily. Describe it as \"like all my energy is sucked out of me\"      Today ( 1/10/2023) He presents for follow up. He has been well, but in December had a \"cold\" and went to Urgent Care. Told them of cough, congestion and a \"Burning pain in his chest:\" He was referred to ER but \"they had over a hundred people in there and told me to get in line so I went home. I knew I wasn;t dying\" States that " "his pain occurred mostly when he was bending over to tie his shoes. No change in symptoms when he was walking, going about his daily activities. States that he thinks that \"lisinopril i making me cough and I see on TV that it causes Cancer.\" States that he has not had any recurrence of chest discomfort.         8/3/2023 -- He returns for follow up. He had seen his PCP who has been adjusting his medication. He had placed him on Doxazosin 4 mg Daily + Amlodipine 10 ( Had already been on this) and Losartan 50 mg ( Had been on this ) and told to decrease the Carvedilol. Probably doxazosin, but noted that his back \"felt like it was on fire\". He subsequently stopped it. Since these change, his blood pressure remains poorly controlled.      9/29/2023  -- He presents for follow up. He has followed up with Nephrology for CKD and HTN. He started spironolactone at 25 mg taken as 1/2 tablet twice daily. He notes improved blood pressure with this but also notes increased urination. He had tried to take it as a full dose once daily but noted heavy urination frequency and urgency that was difficult to manage. He notes that since starting the spironolactone that his SOB / PATEL has improved noticeably.     3/12/2024 -- He returns for follow up.  He was seen at [facility] For 4 days of Nausea .vomiting. A CT-CAP showed an inguianl hernia, diffuse atherosclerosis disease and stenosis of the renal arteries. He was provided with paperwork bhumi cantrell\" an abnormality was found an you should discuss this with your doctor\" however, no information regarding what was discovered was noted.  He notes that he continues to struggle with some shortness of breath.  He although it is better now that his blood pressure is better controlled, this still is occurring especially when going up and down stairs, or even taking his trash can out.  He notes that he to stop and rest and catch his breath.  A recent coronary artery calcium score does show " "increased calcifications specifically within the LAD territory.     Patient denies chest pain and angina. Pt denies shortness of breath, dyspnea on exertion, orthopnea, and paroxysmal nocturnal dyspnea. Pt denies worsening lower extremity edema. Pt denies palpitations or syncope. No recent falls. No fever or chills. No cough. No change in bowel or bladder habits. No travel. No sick contacts. No recent travel     Past medical history: As above.  Medications: Reviewed.  Allergies: Reviewed.  Social history: Patient denies smoking, alcohol abuse, or illicit drug use.  Family history: No sudden cardiac death or premature coronary artery disease..     Last Recorded Vitals:  Vitals:    05/14/24 1129   BP: 153/67   Pulse: 74   Resp: 18   SpO2: 94%   Weight: 64.9 kg (143 lb)   Height: 1.676 m (5' 6\")       Past Medical History:  He has a past medical history of Chronic kidney disease, stage 3 unspecified (Multi) (08/22/2022), Other secondary cataract, left eye (08/01/2016), and Person consulting for explanation of examination or test findings (01/07/2020).    Past Surgical History:  He has a past surgical history that includes Eye surgery (08/07/2014); Other surgical history (12/04/2019); and Other surgical history (12/04/2019).      Social History:  He reports that he quit smoking about 38 years ago. His smoking use included cigarettes. He has never used smokeless tobacco. He reports that he does not drink alcohol and does not use drugs.    Family History:  Family History   Problem Relation Name Age of Onset    Cancer Mother      Other (cardiac disorder) Father      Diabetes Sister          Allergies:  Doxazosin    Outpatient Medications:  Current Outpatient Medications   Medication Instructions    amLODIPine (NORVASC) 10 mg, oral, Daily    ascorbic acid (Vitamin C) 500 mg tablet oral    aspirin 81 mg, oral, Daily    atorvastatin (LIPITOR) 80 mg, oral, Daily    carvedilol (Coreg) 25 mg tablet HALF PILL BID PLEASE NOTE " CHANGE IN DOSE    fluticasone (Flonase) 50 mcg/actuation nasal spray 1 spray, Each Nostril, Daily, Shake gently. Before first use, prime pump. After use, clean tip and replace cap.    fluticasone propion-salmeteroL (Wixela Inhub) 250-50 mcg/dose diskus inhaler 1 puff, inhalation, 2 times daily RT, Rinse mouth with water after use to reduce aftertaste and incidence of candidiasis. Do not swallow.    FreeStyle glucose monitoring kit miscellaneous, Use to test blood sugar twice daily    isosorbide mononitrate ER (IMDUR) 30 mg, oral, Daily, Do not crush or chew.    metFORMIN XR (GLUCOPHAGE-XR) 500 mg, oral, Daily with evening meal    montelukast (SINGULAIR) 10 mg, oral, Nightly    nitroglycerin (NITROSTAT) 0.4 mg, sublingual, Every 5 min PRN, place 1 tablet under tongue every 5 minutes for up to 3 doses as needed for chest pain. Call 911 if pain persists    olmesartan (BENICAR) 40 mg, oral, Daily    omeprazole (PRILOSEC) 40 mg, oral, Daily    spironolactone (Aldactone) 25 mg tablet 0.5 tablets, oral, 2 times daily       Physical Exam:  PHYSICAL EXAMINATION    GENERAL APPEARANCE: Well developed, well nourished, in no acute distress.  VITAL SIGNS: reviewed and confirmed.   SKIN: Inspection of the skin reveals no rashes, ulcerations or petechiae.  HEENT: The sclerae were anicteric and conjunctivae were pink and moist. Extraocular movements were intact and pupils were equal, round, and reactive to light with normal accommodation. External inspection of the ears and nose showed no scars, lesions, or masses.   NECK: Supple and symmetric. There was no thyroid enlargement, and no tenderness, or masses were felt.  CHEST: Normal AP diameter and normal contour without any kyphoscoliosis.  LUNGS: Auscultation of the lungs revealed normal breath sounds without any other adventitious sounds or rubs.  CARDIOVASCULAR: There was a regular rate and rhythm without any murmurs, gallops, rubs. The carotid pulses were normal and 2+  bilaterally without bruits. Peripheral pulses were 2+ and symmetric.  ABDOMEN: Soft and nontender with normal bowel sounds.  LYMPH NODES: No lymphadenopathy was appreciated in the neck  MUSCULOSKELETAL: Gait was normal. There was no tenderness or effusions noted. Muscle strength and tone were normal.  EXTREMITIES: No cyanosis, clubbing or edema.  NEUROLOGIC: Alert and oriented x 3. Normal affect. Gait was normal. Normal deep tendon reflexes with no pathological reflexes. Sensation to touch was normal.       Last Labs:  CBC -  Lab Results   Component Value Date    WBC 4.9 05/04/2024    HGB 8.7 (L) 05/04/2024    HCT 26.0 (L) 05/04/2024    MCV 99 05/04/2024     05/04/2024       CMP -  Lab Results   Component Value Date    CALCIUM 8.6 05/04/2024    PHOS 3.6 04/11/2024    PROT 6.0 (L) 05/04/2024    ALBUMIN 3.8 05/04/2024    AST 19 05/04/2024    ALT 18 05/04/2024    ALKPHOS 68 05/04/2024    BILITOT 0.3 05/04/2024       LIPID PANEL -   Lab Results   Component Value Date    CHOL 165 09/01/2023    TRIG 75 09/01/2023    HDL 68.8 09/01/2023    CHHDL 2.4 09/01/2023    LDLF 76 04/21/2022    VLDL 25 04/21/2022       RENAL FUNCTION PANEL -   Lab Results   Component Value Date    GLUCOSE 170 (H) 05/04/2024     (L) 05/04/2024    K 4.3 05/04/2024     05/04/2024    CO2 21 05/04/2024    ANIONGAP 13 05/04/2024    BUN 27 (H) 05/04/2024    CREATININE 1.96 (H) 05/04/2024    GFRMALE 45 (A) 09/01/2023    CALCIUM 8.6 05/04/2024    PHOS 3.6 04/11/2024    ALBUMIN 3.8 05/04/2024        Lab Results   Component Value Date    BNP 46 05/04/2024    HGBA1C 7.0 (H) 01/03/2024       Last Cardiology Tests:  ECG:      In Office EKG 3/12/2024 -- Sinus Rhythm at 82 BPM  -- NO Ischemic changes   In Office EKG 1/10/2023  -- Normal Sinus Rhythm @ 84 bpmNo results found for this or any previous visit from the past 1095 days.      Echo:  Echocardiogram 09/2023  1. Left ventricular systolic function is normal with a 60-65% estimated ejection  "fraction.  2. Spectral Doppler shows an impaired relaxation pattern of left ventricular diastolic filling.  3. Moderately elevated right ventricular systolic pressure. ( RVSP ~ 52 mmHg)      Ejection Fractions:  No results found for: \"EF\"    Cath:  No results found for this or any previous visit from the past 1095 days.      Stress Test:  Exercise echocardiogram stress testing 06/2020   1. Abnormal Stress Test.  2. ECG changes consistent with ischemia.  3. Hyperdynamic global left ventricular systolic function.  4. Patients baseline LV function was hyperdynamic with peak stress showing almost complete cavity obliteration . -- No regional wall motion changes noted at peak exercise.  5. Noted Increased in RVSP from baseline 31 mmHg to 54 mmHg at Peak Stress     Cardiac Imaging:  CT cardiac scoring wo IV contrast 01/04/2024  The score and distribution of calcium in the coronary arteries is as  follows:      .81,  .03,  LCx 0,  .93,      Total 1328.77      The visualized ascending thoracic aorta measures 3.5 cm in diameter.  Extensive atherosclerotic calcification at the level of the aortic  ductus. The heart is normal in size. No pericardial effusion is  present.      No gross evidence of mediastinal or hilar lymphadenopathy or masses  is identified. Scattered mediastinal lymph nodes are felt to be  reactive in nature. The visualized segments of the lungs demonstrates  peribronchial thickening with mild emphysematous changes. Subpleural  reticulation consistent with smoking-related interstitial changes..  Calcified granuloma within the lingula.      The main pulmonary artery, right and left pulmonary artery are normal  in size.      The visualized subdiaphragmatic structures appear intact.      Lab review: I have personally reviewed the laboratory result(s)   Diagnostic review: I have personally reviewed the result(s) of the EKG and Echocardiogram .   Imaging review: I have  personally reviewed the " result(s) Stress Testing / OSH Imaging / CTA Coronary Artery Calcium Score     Assessment/Plan     Problem List Items Addressed This Visit       Abnormal stress test    Overview     Exercise echocardiogram stress testing 06/2020   1. Abnormal Stress Test.  2. ECG changes consistent with ischemia.  3. Hyperdynamic global left ventricular systolic function.  4. Patients baseline LV function was hyperdynamic with peak stress showing almost complete cavity obliteration . -- No regional wall motion changes noted at peak exercise.  5. Noted Increased in RVSP from baseline 31 mmHg to 54 mmHg at Peak Stress         Current Assessment & Plan     He has previously had an Abnormal stress echocardiogram that previously been attributed to a hyperdynamic heart at baseline with stress causing obliteration of the LV Cavity. We have addressed this by increasing fluids and adding Carvedilol and Amlidpine which had helped symptoms, but now worsening in the setting of SEVERE CAC despite medication optimization.   The patient's overall symptomatology and risk factors are concerning for possible obstructive coronary artery disease. Multiple options were discussed with the patient including alternatives, risks and benefits of cardiac catheterization. After prolonged discussion, it was decided to proceed with cardiac catheterization with coronary angiography.  This will be scheduled at [facility].          Relevant Medications    isosorbide mononitrate ER (Imdur) 30 mg 24 hr tablet    olmesartan (Benicar) 40 mg tablet    Other Relevant Orders    ECG 12 lead (Clinic Performed)    Renal function panel    CBC and Auto Differential    Chest pain, exertional - Primary    Overview     Worsening Chest Pain with Exertion in the setting of Elevated CAC. His symptoms are typical anginal symptoms at this time.   -- They are worsening in nature.   -- He notes that his tightness and pain is occurring when he walks < 100 feet         Current  "Assessment & Plan     -- Add Imdur 30 mg For additional antianginal support   -- The patient's overall symptomatology and risk factors are concerning for possible obstructive coronary artery disease. Multiple options were discussed with the patient including alternatives, risks and benefits of cardiac catheterization. After prolonged discussion, it was decided to proceed with cardiac catheterization with coronary angiography.  This will be scheduled at [facility].           Relevant Medications    isosorbide mononitrate ER (Imdur) 30 mg 24 hr tablet    olmesartan (Benicar) 40 mg tablet    Other Relevant Orders    ECG 12 lead (Clinic Performed)    Case Request Cath Lab: Left Heart Cath with Coronary Angiography and LV (Completed)    Renal function panel    CBC and Auto Differential    Elevated coronary artery calcium score    Overview      Coronary Artery Calcium Score 1/2024  .81,  .03,  LCx 0,  .93,      Total 1328.77         Relevant Medications    isosorbide mononitrate ER (Imdur) 30 mg 24 hr tablet    olmesartan (Benicar) 40 mg tablet    Other Relevant Orders    ECG 12 lead (Clinic Performed)    Renal function panel    CBC and Auto Differential    Hyperlipidemia    Overview     The ASCVD Risk score (Margareth DK, et al., 2019) failed to calculate for the following reasons:    The 2019 ASCVD risk score is only valid for ages 40 to 79  Lab Results   Component Value Date    CHOL 165 09/01/2023    CHOL 133 12/28/2022    CHOL 150 04/21/2022     Lab Results   Component Value Date    HDL 68.8 09/01/2023    HDL 47.3 12/28/2022    HDL 48.8 04/21/2022     No results found for: \"LDLCALC\"  Lab Results   Component Value Date    TRIG 75 09/01/2023    TRIG 127 04/21/2022    TRIG 93 06/10/2021     No components found for: \"CHOLHDL\"           Relevant Medications    olmesartan (Benicar) 40 mg tablet    Other Relevant Orders    ECG 12 lead (Clinic Performed)    Renal function panel    CBC and Auto " Differential       Brant Garcia, DO

## 2024-05-20 LAB
ATRIAL RATE: 74 BPM
P AXIS: 72 DEGREES
P OFFSET: 201 MS
P ONSET: 150 MS
PR INTERVAL: 146 MS
Q ONSET: 223 MS
QRS COUNT: 13 BEATS
QRS DURATION: 70 MS
QT INTERVAL: 370 MS
QTC CALCULATION(BAZETT): 410 MS
QTC FREDERICIA: 397 MS
R AXIS: 73 DEGREES
T AXIS: 64 DEGREES
T OFFSET: 408 MS
VENTRICULAR RATE: 74 BPM

## 2024-05-30 ENCOUNTER — HOSPITAL ENCOUNTER (OUTPATIENT)
Facility: HOSPITAL | Age: 84
Setting detail: OUTPATIENT SURGERY
Discharge: HOME | End: 2024-05-30
Attending: HOSPITALIST | Admitting: HOSPITALIST
Payer: MEDICARE

## 2024-05-30 VITALS
SYSTOLIC BLOOD PRESSURE: 120 MMHG | RESPIRATION RATE: 18 BRPM | DIASTOLIC BLOOD PRESSURE: 66 MMHG | TEMPERATURE: 97.2 F | HEART RATE: 83 BPM | OXYGEN SATURATION: 95 %

## 2024-05-30 DIAGNOSIS — R07.9 CHEST PAIN, EXERTIONAL: Primary | ICD-10-CM

## 2024-05-30 DIAGNOSIS — R94.39 ABNORMAL RESULT OF OTHER CARDIOVASCULAR FUNCTION STUDY: ICD-10-CM

## 2024-05-30 LAB
GLUCOSE BLD MANUAL STRIP-MCNC: 191 MG/DL (ref 74–99)
GLUCOSE BLD MANUAL STRIP-MCNC: 198 MG/DL (ref 74–99)

## 2024-05-30 PROCEDURE — C1894 INTRO/SHEATH, NON-LASER: HCPCS | Performed by: HOSPITALIST

## 2024-05-30 PROCEDURE — 7100000009 HC PHASE TWO TIME - INITIAL BASE CHARGE: Performed by: HOSPITALIST

## 2024-05-30 PROCEDURE — 2500000004 HC RX 250 GENERAL PHARMACY W/ HCPCS (ALT 636 FOR OP/ED): Performed by: HOSPITALIST

## 2024-05-30 PROCEDURE — 96373 THER/PROPH/DIAG INJ IA: CPT | Performed by: HOSPITALIST

## 2024-05-30 PROCEDURE — 99153 MOD SED SAME PHYS/QHP EA: CPT | Performed by: HOSPITALIST

## 2024-05-30 PROCEDURE — 2500000005 HC RX 250 GENERAL PHARMACY W/O HCPCS: Performed by: HOSPITALIST

## 2024-05-30 PROCEDURE — 2720000007 HC OR 272 NO HCPCS: Performed by: HOSPITALIST

## 2024-05-30 PROCEDURE — C1760 CLOSURE DEV, VASC: HCPCS | Performed by: HOSPITALIST

## 2024-05-30 PROCEDURE — C1769 GUIDE WIRE: HCPCS | Performed by: HOSPITALIST

## 2024-05-30 PROCEDURE — 7100000010 HC PHASE TWO TIME - EACH INCREMENTAL 1 MINUTE: Performed by: HOSPITALIST

## 2024-05-30 PROCEDURE — 85347 COAGULATION TIME ACTIVATED: CPT

## 2024-05-30 PROCEDURE — C1887 CATHETER, GUIDING: HCPCS | Performed by: HOSPITALIST

## 2024-05-30 PROCEDURE — 93458 L HRT ARTERY/VENTRICLE ANGIO: CPT | Performed by: HOSPITALIST

## 2024-05-30 PROCEDURE — 82947 ASSAY GLUCOSE BLOOD QUANT: CPT

## 2024-05-30 PROCEDURE — 99152 MOD SED SAME PHYS/QHP 5/>YRS: CPT | Performed by: HOSPITALIST

## 2024-05-30 PROCEDURE — 2550000001 HC RX 255 CONTRASTS: Performed by: HOSPITALIST

## 2024-05-30 PROCEDURE — 92978 ENDOLUMINL IVUS OCT C 1ST: CPT | Performed by: HOSPITALIST

## 2024-05-30 PROCEDURE — C1753 CATH, INTRAVAS ULTRASOUND: HCPCS | Performed by: HOSPITALIST

## 2024-05-30 RX ORDER — MIDAZOLAM HYDROCHLORIDE 1 MG/ML
INJECTION, SOLUTION INTRAMUSCULAR; INTRAVENOUS AS NEEDED
Status: DISCONTINUED | OUTPATIENT
Start: 2024-05-30 | End: 2024-05-30 | Stop reason: HOSPADM

## 2024-05-30 RX ORDER — HEPARIN SODIUM 1000 [USP'U]/ML
INJECTION, SOLUTION INTRAVENOUS; SUBCUTANEOUS AS NEEDED
Status: DISCONTINUED | OUTPATIENT
Start: 2024-05-30 | End: 2024-05-30 | Stop reason: HOSPADM

## 2024-05-30 RX ORDER — FENTANYL CITRATE 50 UG/ML
INJECTION, SOLUTION INTRAMUSCULAR; INTRAVENOUS AS NEEDED
Status: DISCONTINUED | OUTPATIENT
Start: 2024-05-30 | End: 2024-05-30 | Stop reason: HOSPADM

## 2024-05-30 RX ORDER — LIDOCAINE HYDROCHLORIDE 10 MG/ML
INJECTION, SOLUTION EPIDURAL; INFILTRATION; INTRACAUDAL; PERINEURAL AS NEEDED
Status: DISCONTINUED | OUTPATIENT
Start: 2024-05-30 | End: 2024-05-30 | Stop reason: HOSPADM

## 2024-05-30 RX ORDER — SODIUM CHLORIDE 9 MG/ML
INJECTION, SOLUTION INTRAVENOUS CONTINUOUS PRN
Status: COMPLETED | OUTPATIENT
Start: 2024-05-30 | End: 2024-05-30

## 2024-05-30 RX ORDER — ACETAMINOPHEN 325 MG/1
650 TABLET ORAL EVERY 6 HOURS PRN
Status: DISCONTINUED | OUTPATIENT
Start: 2024-05-30 | End: 2024-05-30 | Stop reason: HOSPADM

## 2024-05-30 RX ORDER — SODIUM CHLORIDE 9 MG/ML
350 INJECTION, SOLUTION INTRAVENOUS CONTINUOUS
Status: ACTIVE | OUTPATIENT
Start: 2024-05-30 | End: 2024-05-30

## 2024-05-30 RX ORDER — NITROGLYCERIN 40 MG/100ML
INJECTION INTRAVENOUS AS NEEDED
Status: DISCONTINUED | OUTPATIENT
Start: 2024-05-30 | End: 2024-05-30 | Stop reason: HOSPADM

## 2024-05-30 RX ORDER — ACETAMINOPHEN 160 MG/5ML
650 SOLUTION ORAL EVERY 6 HOURS PRN
Status: DISCONTINUED | OUTPATIENT
Start: 2024-05-30 | End: 2024-05-30 | Stop reason: HOSPADM

## 2024-05-30 RX ORDER — SODIUM CHLORIDE 9 MG/ML
100 INJECTION, SOLUTION INTRAVENOUS CONTINUOUS
Status: DISCONTINUED | OUTPATIENT
Start: 2024-05-30 | End: 2024-05-30

## 2024-05-30 RX ORDER — ACETAMINOPHEN 650 MG/1
650 SUPPOSITORY RECTAL EVERY 6 HOURS PRN
Status: DISCONTINUED | OUTPATIENT
Start: 2024-05-30 | End: 2024-05-30 | Stop reason: HOSPADM

## 2024-05-30 ASSESSMENT — COLUMBIA-SUICIDE SEVERITY RATING SCALE - C-SSRS
6. HAVE YOU EVER DONE ANYTHING, STARTED TO DO ANYTHING, OR PREPARED TO DO ANYTHING TO END YOUR LIFE?: NO
2. HAVE YOU ACTUALLY HAD ANY THOUGHTS OF KILLING YOURSELF?: NO
1. IN THE PAST MONTH, HAVE YOU WISHED YOU WERE DEAD OR WISHED YOU COULD GO TO SLEEP AND NOT WAKE UP?: NO

## 2024-05-30 ASSESSMENT — PAIN - FUNCTIONAL ASSESSMENT
PAIN_FUNCTIONAL_ASSESSMENT: 0-10

## 2024-05-30 ASSESSMENT — PAIN SCALES - GENERAL
PAINLEVEL_OUTOF10: 0 - NO PAIN

## 2024-05-30 NOTE — NURSING NOTE
1708 pt transferred from cath lab to PACU  Pt arrived to pacu bay 47 at this time, report received from staff. Phase 1 care started. Assuming care of pt at this time. Bedside report received from outgoing RN.     1720- 2ml removed from TR band, no bleeding or hematoma at site     1735- 2ml removed from TR band, no bleeding or hematoma at site     1750- 2ml removed from TR band, no bleeding or hematoma at site, pt eating dinner tray at this time    1805- 2ml removed from TR band, no bleeding or hematoma at site, Discharge instructions provided to pt and family. Educated on medications, effects of anesthesia, and homecoming care. Pt and family verbalizing understanding of all instructions provided at this time. All questions and concerns answered. Pt given contact information for provider.     1813     1815 Handoff given to Addie

## 2024-05-30 NOTE — ASSESSMENT & PLAN NOTE
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   -- Medical Management at this time

## 2024-05-30 NOTE — H&P
History Of Present Illness  Mann Abrams is a 83 y.o. male with past medical history significant for  CKD, diabetes, PAD, HPL. Patient with PATEL.   Plan for scheduled LHC with Dr. Riley. Denies any chest pain at present.      Past Medical History  He has a past medical history of Chronic kidney disease, stage 3 unspecified (Multi) (08/22/2022), Other secondary cataract, left eye (08/01/2016), and Person consulting for explanation of examination or test findings (01/07/2020).    Surgical History  He has a past surgical history that includes Eye surgery (08/07/2014); Other surgical history (12/04/2019); and Other surgical history (12/04/2019).     Social History  He reports that he quit smoking about 38 years ago. His smoking use included cigarettes. He has never used smokeless tobacco. He reports that he does not drink alcohol and does not use drugs.    Family History  Family History   Problem Relation Name Age of Onset    Cancer Mother      Other (cardiac disorder) Father      Diabetes Sister          Allergies  Doxazosin    Home Medications  No current facility-administered medications on file prior to encounter.     Current Outpatient Medications on File Prior to Encounter   Medication Sig Dispense Refill    amLODIPine (Norvasc) 10 mg tablet Take 1 tablet (10 mg) by mouth once daily. 90 tablet 1    ascorbic acid (Vitamin C) 500 mg tablet Take by mouth.      aspirin 81 mg EC tablet Take 1 tablet (81 mg) by mouth once daily. 30 tablet 11    atorvastatin (Lipitor) 80 mg tablet Take 1 tablet (80 mg) by mouth once daily. 90 tablet 1    carvedilol (Coreg) 25 mg tablet HALF PILL BID PLEASE NOTE CHANGE IN DOSE 90 tablet 3    fluticasone (Flonase) 50 mcg/actuation nasal spray Administer 1 spray into each nostril once daily. Shake gently. Before first use, prime pump. After use, clean tip and replace cap. 48 g 1    isosorbide mononitrate ER (Imdur) 30 mg 24 hr tablet Take 1 tablet (30 mg) by mouth once daily. Do not  crush or chew. 90 tablet 3    metFORMIN XR (Glucophage-XR) 500 mg 24 hr tablet Take 1 tablet (500 mg) by mouth once daily in the evening. Take with meals. 90 tablet 3    montelukast (Singulair) 10 mg tablet Take 1 tablet (10 mg) by mouth once daily at bedtime. 90 tablet 3    olmesartan (Benicar) 40 mg tablet Take 1 tablet (40 mg) by mouth once daily. 90 tablet 3    omeprazole (PriLOSEC) 40 mg DR capsule Take 1 capsule (40 mg) by mouth once daily. 90 capsule 3    spironolactone (Aldactone) 25 mg tablet Take 0.5 tablets (12.5 mg) by mouth 2 times a day.      fluticasone propion-salmeteroL (Wixela Inhub) 250-50 mcg/dose diskus inhaler Inhale 1 puff 2 times a day. Rinse mouth with water after use to reduce aftertaste and incidence of candidiasis. Do not swallow. 3 each 1    FreeStyle glucose monitoring kit Use to test blood sugar twice daily      nitroglycerin (Nitrostat) 0.4 mg SL tablet Place 1 tablet (0.4 mg) under the tongue every 5 minutes if needed for chest pain. place 1 tablet under tongue every 5 minutes for up to 3 doses as needed for chest pain. Call 911 if pain persists            Inpatient Medications:  Scheduled medications   Medication Dose Route Frequency     PRN medications   Medication    fentaNYL PF    heparin    heparin    lidocaine PF    midazolam    nitroglycerin    oxygen    sodium chloride    sodium chloride 0.9%     Continuous Medications   Medication Dose Last Rate    oxygen   2 L/min (05/30/24 1313)    sodium chloride        sodium chloride 0.9%  100 mL/hr      sodium chloride 0.9%   100 mL/hr (05/30/24 1312)         Review of Systems   Respiratory:          Dyspnea on exertion           Physical Exam  HENT:      Mouth/Throat:      Mouth: Mucous membranes are dry.   Cardiovascular:      Rate and Rhythm: Normal rate and regular rhythm.      Pulses:           Radial pulses are 2+ on the right side and 2+ on the left side.        Dorsalis pedis pulses are 2+ on the right side and 2+ on the left  side.      Heart sounds: No murmur heard.  Pulmonary:      Effort: Pulmonary effort is normal.      Breath sounds: Normal breath sounds.   Abdominal:      General: Abdomen is flat.      Palpations: Abdomen is soft.   Musculoskeletal:      Right lower leg: No edema.      Left lower leg: No edema.   Skin:     General: Skin is warm and dry.   Neurological:      Mental Status: He is alert and oriented to person, place, and time.        Sedation Plan      Mallampati class: II.         Last Recorded Vitals  Blood pressure 118/58, pulse 76, temperature 36.5 °C (97.7 °F), temperature source Oral, resp. rate 20, SpO2 93%.    Oxygen Dose: *2 L/min    Vitals from the Past 24 Hours  Heart Rate:  [76]   Temp:  [36.5 °C (97.7 °F)]   Resp:  [20]   BP: (118)/(58)   SpO2:  [93 %]     Oxygen Dose: *2 L/min    Relevant Results    Labs    CBC:   Recent Labs     05/14/24  1335 05/04/24  0631 04/30/24  0724 04/16/24  1724 04/12/24  0732 09/01/23  0752 06/05/20  0705   WBC 6.1 4.9  --  5.4 5.9 6.4 5.0   HGB 8.4* 8.7*  --  7.7* 7.6* 11.0* 12.4*   HCT 26.8* 26.0* 27.2* 23.7* 23.1* 32.9* 38.2*    242  --  276 243 235 281   * 99  --  99 99 96 95     BMP/CMP:   Recent Labs     05/14/24  1335 05/04/24  0631 04/12/24  0732 04/11/24  0732 01/03/24  0836 12/18/23  1323 09/01/23  0752 08/28/23  1038 12/28/22  1146 08/22/22  1029 04/21/22  0947 11/18/21  0931 06/10/21  0850 04/07/20  0910 12/04/19  0933   * 131* 136 136 134* 135*   < > 134* 128*   < > 134*   < > 135*   < > 135*   K 4.9 4.3 4.4 4.2 4.4 4.5   < > 4.0 4.0   < > 4.0   < > 3.9   < > 4.3    101 105 105 100 103   < > 100 92*   < > 99   < > 97*   < > 98   BUN 24* 27* 26* 30* 23 27*   < > 25* 17   < > 23   < > 34*   < > 28*   CREATININE 1.88* 1.96* 1.88* 2.08* 1.70* 1.75*   < > 1.52* 1.43*   < > 1.67*   < > 1.86*   < > 1.72*   CO2 22 21 21 21 28 22   < > 22 26   < > 26   < > 29   < > 29   CALCIUM 8.8 8.6 9.0 9.2 9.3 9.2   < > 9.5 9.0   < > 9.4   < > 9.7   < > 10.0  "  PROT  --  6.0* 5.8*  --  6.4  --   --  6.6 7.2  --   --   --   --   --  7.3   BILITOT  --  0.3 0.3  --  0.6  --   --   --  0.4  --   --   --   --   --  0.4   ALKPHOS  --  68 57  --  81  --   --   --  99  --   --   --   --   --  60   ALT  --  18 17  --  20  --   --   --  25  --  18  --  19  --  12   AST  --  19 17  --  18  --   --   --  21  --  20  --  20  --  17   GLUCOSE 192* 170* 145* 140* 124* 153*   < > 162* 106*   < > 132*   < > 110*   < > 113*    < > = values in this interval not displayed.      Lipid Panel:   Recent Labs     09/01/23  0752 12/28/22  1146 04/21/22  0947 06/10/21  0850 12/10/20  0900 04/07/20  0910 12/04/19  0933   CHOL 165 133 150 169 173 156 285*   HDL 68.8 47.3 48.8 57.0 54.7 55.3 61.4   CHHDL 2.4 2.8 3.1 3.0 3.2 2.8 4.6   VLDL  --   --  25 19 21 22 26   TRIG 75  --  127 93 104 111 129     Cardiac       No lab exists for component: \"CK\", \"CKMBP\"   Hemoglobin A1C:   Recent Labs     01/03/24  0836 06/21/23  0810 02/01/23  0944 08/22/22  1029 04/21/22  0947 11/18/21  0931 06/10/21  0850 12/10/20  0900 04/07/20  0910 12/04/19  0933   HGBA1C 7.0* 7.1* 6.5* 6.6* 6.8* 6.8* 6.8 6.7 7.0 6.5     TSH/ Free T4: No results for input(s): \"TSH\", \"FREET4\" in the last 30125 hours.  Iron:   Recent Labs     05/04/24  0631 04/12/24  0732 09/01/23  0752 12/28/22  1146   FERRITIN  --  74  --   --    TIBC  --  297 290  --    IRONSAT  --  21* 37  --    BNP 46 71 124* 50     Coag:     ABO: No results found for: \"ABO\"    Past Cardiology Tests (Last 3 Years):    EKG:  Recent Labs     05/14/24  1120 05/04/24  0528 03/12/24  0840   ATRRATE 74 78 82   VENTRATE 74 78 82   PRINT 146 130 146   QRSDUR 70 70 68   QTCFRED 397 399 392   QTCCALCB 410 417 413     Encounter Date: 05/14/24   ECG 12 lead (Clinic Performed)   Result Value    Ventricular Rate 74    Atrial Rate 74    AK Interval 146    QRS Duration 70    QT Interval 370    QTC Calculation(Bazett) 410    P Axis 72    R Axis 73    T Axis 64    QRS Count 13    Q Onset " 223    P Onset 150    P Offset 201    T Offset 408    QTC Fredericia 397    Narrative    Normal sinus rhythm  Normal ECG  When compared with ECG of 04-MAY-2024 05:26,  No significant change was found     Echo:  Echocardiogram:   Echocardiogram     Narrative  [facility], [id number] [facility] [address], [place] [address]  Tel [phone] and Fax [phone]    TRANSTHORACIC ECHOCARDIOGRAM REPORT      Patient Name:     PROSPER VIRGEN EDNA Reading Physician:  [id number] Brant Parish DO  Study Date:       9/15/2023       Referring           BRANT PARISH  Physician:  MRN/PID:          [id number]        PCP:  Accession/Order#: [id number]    Department          [facility]  Location:           Invasive  YOB: 1940        Fellow:  Gender:           M               Nurse:  Admit Date:                       Sonographer:        Jl Calles [facility]  Admission Status: Outpatient      Additional Staff:  Height:           167.64 cm       CC Report to:  Weight:           68.04 kg        Study Type:         Echocardiogram  BSA:              1.77 m2    Diagnosis/ICD: R06.09-Other forms of dyspnea  Indication:    PATEL  Procedure/CPT: Echo Complete w Full [id number]    Patient History:  Pertinent History: PATEL, SOB, HLD, HTN.    Study Detail: The following Echo studies were performed: 2D, M-Mode, Doppler and  color flow.      PHYSICIAN INTERPRETATION:  Left Ventricle: The left ventricular systolic function is normal, with an estimated ejection fraction of 60-65%. The calculated ejection fraction is normal at 64 % using the Kimble's Bi-plane MOD calculation. There are no regional wall motion abnormalities. The left ventricular cavity size is normal. The left ventricular septal wall thickness is mildly increased. There is normal left ventricular posterior wall thickness. Spectral Doppler shows an impaired relaxation pattern of left ventricular diastolic filling.  Left Atrium: The left atrium is upper  limits of normal in size.  Right Ventricle: The right ventricle is normal in size. There is normal right ventricular global systolic function. TAPSE = 23 mm.  Right Atrium: The right atrium is normal in size.  Aortic Valve: The aortic valve is trileaflet. There is no evidence of aortic valve regurgitation. The peak instantaneous gradient of the aortic valve is 8.6 mmHg.  Mitral Valve: The mitral valve is mildly thickened. There is trace mitral valve regurgitation.  Tricuspid Valve: The tricuspid valve is structurally normal. There is trace to mild tricuspid regurgitation. The Doppler estimated RVSP is moderately elevated at 52.5 mmHg.  Pulmonic Valve: The pulmonic valve is structurally normal. There is physiologic pulmonic valve regurgitation.  Pericardium: There is no pericardial effusion noted.  Aorta: The aortic root is normal.  Systemic Veins: The inferior vena cava appears to be of normal size.  In comparison to the previous echocardiogram(s): There are no prior studies on this patient for comparison purposes.      CONCLUSIONS:  1. Left ventricular systolic function is normal with a 60-65% estimated ejection fraction.  2. Spectral Doppler shows an impaired relaxation pattern of left ventricular diastolic filling.  3. Moderately elevated right ventricular systolic pressure.    QUANTITATIVE DATA SUMMARY:  2D MEASUREMENTS:  Normal Ranges:  LAs:           3.51 cm   (2.7-4.0cm)  IVSd:          1.27 cm   (0.6-1.1cm)  LVPWd:         0.89 cm   (0.6-1.1cm)  LVIDd:         4.36 cm   (3.9-5.9cm)  LVIDs:         2.75 cm  LV Mass Index: 91.6 g/m2  LV % FS        36.9 %    LA VOLUME:  Normal Ranges:  LA Vol A4C:        48.9 ml    (22+/-6mL/m2)  LA Vol A2C:        46.3 ml  LA Vol BP:         48.1 ml  LA Vol Index A4C:  27.6 ml/m2  LA Vol Index A2C:  26.2 ml/m2  LA Vol Index BP:   27.2 ml/m2  LA Area A4C:       17.3 cm2  LA Area A2C:       17.0 cm2  LA Major Axis A4C: 5.2 cm  LA Major Axis A2C: 5.3 cm  LA Vol A4C:        47.0  "ml  LA Vol A2C:        44.8 ml    RA VOLUME BY A/L METHOD:  Normal Ranges:  RA Area A4C: 13.3 cm2    M-MODE MEASUREMENTS:  Normal Ranges:  Ao Root: 3.20 cm (2.0-3.7cm)  LAs:     4.33 cm (2.7-4.0cm)    AORTA MEASUREMENTS:  Normal Ranges:  Ao Sinus, d: 2.90 cm (2.1-3.5cm)  Asc Ao, d:   3.00 cm (2.1-3.4cm)    LV SYSTOLIC FUNCTION BY 2D PLANIMETRY (MOD):  Normal Ranges:  EF-A4C View: 60.9 % (>=55%)  EF-A2C View: 67.6 %  EF-Biplane:  64.5 %    LV DIASTOLIC FUNCTION:  Normal Ranges:  MV Peak E:        0.70 m/s    (0.7-1.2 m/s)  MV Peak A:        1.07 m/s    (0.42-0.7 m/s)  E/A Ratio:        0.65        (1.0-2.2)  MV e'             0.07 m/s    (>8.0)  MV lateral e'     0.07 m/s  MV medial e'      0.07 m/s  MV A Dur:         108.42 msec  E/e' Ratio:       9.95        (<8.0)  PulmV Sys Darius:    64.34 cm/s  PulmV Chavira Darius:   40.77 cm/s  PulmV S/D Darius:    1.58  PulmV A Revs Darius: 42.78 cm/s  PulmV A Revs Dur: 103.81 msec    MITRAL VALVE:  Normal Ranges:  MV DT: 173 msec (150-240msec)    AORTIC VALVE:  Normal Ranges:  AoV Vmax:      1.46 m/s (<=1.7m/s)  AoV Peak P.6 mmHg (<20mmHg)  LVOT Max Darius:  1.40 m/s (<=1.1m/s)  LVOT VTI:      27.72 cm  LVOT Diameter: 1.93 cm  (1.8-2.4cm)  AoV Area,Vmax: 2.80 cm2 (2.5-4.5cm2)      RIGHT VENTRICLE:  RV Basal 3.40 cm  RV Mid   1.60 cm  RV Major 6.3 cm  TAPSE:   23.0 mm  RV s'    0.18 m/s    TRICUSPID VALVE/RVSP:  Normal Ranges:  Peak TR Velocity: 3.52 m/s  RV Syst Pressure: 52.5 mmHg (< 30mmHg)  IVC Diam:         1.20 cm    PULMONIC VALVE:  Normal Ranges:  PV Max Darius: 0.9 m/s  (0.6-0.9m/s)  PV Max PG:  3.6 mmHg    Pulmonary Veins:  PulmV A Revs Dur: 103.81 msec  PulmV A Revs Darius: 42.78 cm/s  PulmV Chavira Darius:   40.77 cm/s  PulmV S/D Darius:    1.58  PulmV Sys Darius:    64.34 cm/s    AORTA:  Asc Ao Diam 2.89 cm      [id number] Brant Garcia DO  Electronically signed on 9/15/2023 at 4:18:09 PM        Ejection Fractions:  No results found for: \"EF\"  Cath:  Coronary Angiography: No results found for " this or any previous visit from the past 1800 days.    Right Heart Cath: No results found for this or any previous visit from the past 1800 days.    Stress Test:  Nuclear:No results found for this or any previous visit from the past 1800 days.    Metabolic Stress: No results found for this or any previous visit from the past 1800 days.    Cardiac Imaging:  Cardiac Scoring:   CT cardiac scoring wo IV contrast 01/04/2024    Narrative  Interpreted By:  Jacob Whittington,  STUDY:  CT CARDIAC SCORING WO IV CONTRAST;  1/4/2024 2:58 pm    INDICATION:  Signs/Symptoms:screening ischemic heart disease.    COMPARISON:  None.    ACCESSION NUMBER(S):  [id number]    ORDERING CLINICIAN:  BOUCHRA YOO    TECHNIQUE:  Using prospective ECG gating, CT scan of the coronary arteries was  performed without intravenous contrast. Coronary calcium scoring  was  performed according to the method of Agatston.    FINDINGS:  The score and distribution of calcium in the coronary arteries is as  follows:    .81,  .03,  LCx 0,  .93,    Total 1328.77    The visualized ascending thoracic aorta measures 3.5 cm in diameter.  Extensive atherosclerotic calcification at the level of the aortic  ductus. The heart is normal in size. No pericardial effusion is  present.    No gross evidence of mediastinal or hilar lymphadenopathy or masses  is identified. Scattered mediastinal lymph nodes are felt to be  reactive in nature. The visualized segments of the lungs demonstrates  peribronchial thickening with mild emphysematous changes. Subpleural  reticulation consistent with smoking-related interstitial changes..  Calcified granuloma within the lingula.    The main pulmonary artery, right and left pulmonary artery are normal  in size.    The visualized subdiaphragmatic structures appear intact.    Impression  1. Coronary artery calcium score of 1328.77*.  2. Given patient's extremely high coronary artery calcium score  consider  correlation with anatomic or physiologic coronary artery  testing.  3. Emphysematous changes with smoking-related interstitial changes    *Coronary Artery  Agatston score    Score  risk  Very low 1-99  Mildly increased 100-299  Moderately increased >300  Moderate to severely increased >800    Timothy et al. JCCT 2016 (http://dx.doi.org/10.1016/j.jcct.2016.11.003)    [facility]O 10-Year CHD Risk with Coronary Artery Calcification can be  calcuate using link below  [email or web address]  Chelly prasad al. JACC 2015 (http://dx.doi.org/10.1016/j.j  acc.2015.08.035)    Signed by: Jacob Whittington 1/4/2024 6:26 PM  Dictation workstation:   [id number]    Cardiac MRI: No results found for this or any previous visit from the past 1800 days.         Assessment/Plan  Assessment/Plan   Principal Problem:    Chest pain, exertional  Dyspnea on exertion  -Plan for [facility] with Dr. Riley.        I spent 35 minutes in the professional and overall care of this patient.      Fahad Campbell, APRN-CNP

## 2024-05-30 NOTE — POST-PROCEDURE NOTE
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  LAD was wired using a Runthrough wire.  IVUS was performed.IVUS: Reference area of proximal LAD is a 20 mm² and MLA is 14 mm²].  Final angiogram with no complications.    Access of the Procedure:   6 Dutch right radial artery, closed with TR band.    Complications:   None.    Stents/Implants:   None.    Anticoagulation/Antiplatelet Plan:   Heparin boluses to maintain therapeutic ACT for IVC use.      Estimated Blood Loss:   2 mL    Anesthesia: Moderate Sedation Anesthesia Staff: No anesthesia staff entered.    Any Specimen(s) Removed:   No specimens collected during this procedure.    Disposition:   -Medical management of proximal LCx disease given the absence of angina or high risk stress test findings.  -Further management as per primary cardiology Dr. Brant Garcia.      Electronically signed by: Denny Riley MD, 5/30/2024 3:23 PM

## 2024-05-31 LAB
ACT BLD: 260 SEC (ref 89–169)
ACT BLD: 261 SEC (ref 89–169)

## 2024-06-05 ENCOUNTER — HOSPITAL ENCOUNTER (INPATIENT)
Facility: HOSPITAL | Age: 84
LOS: 2 days | Discharge: HOME | DRG: 379 | End: 2024-06-08
Attending: EMERGENCY MEDICINE | Admitting: INTERNAL MEDICINE
Payer: MEDICARE

## 2024-06-05 ENCOUNTER — CLINICAL SUPPORT (OUTPATIENT)
Dept: EMERGENCY MEDICINE | Facility: HOSPITAL | Age: 84
DRG: 379 | End: 2024-06-05
Payer: MEDICARE

## 2024-06-05 ENCOUNTER — APPOINTMENT (OUTPATIENT)
Dept: RADIOLOGY | Facility: HOSPITAL | Age: 84
DRG: 379 | End: 2024-06-05
Payer: MEDICARE

## 2024-06-05 DIAGNOSIS — R06.02 SOB (SHORTNESS OF BREATH) ON EXERTION: ICD-10-CM

## 2024-06-05 DIAGNOSIS — D50.0 IRON DEFICIENCY ANEMIA DUE TO CHRONIC BLOOD LOSS: ICD-10-CM

## 2024-06-05 DIAGNOSIS — N18.32 STAGE 3B CHRONIC KIDNEY DISEASE (CKD) (MULTI): ICD-10-CM

## 2024-06-05 DIAGNOSIS — R06.02 SOB (SHORTNESS OF BREATH): ICD-10-CM

## 2024-06-05 DIAGNOSIS — D64.9 ACUTE ON CHRONIC ANEMIA: ICD-10-CM

## 2024-06-05 DIAGNOSIS — R07.9 CHEST PAIN, UNSPECIFIED TYPE: Primary | ICD-10-CM

## 2024-06-05 DIAGNOSIS — R07.1 CHEST PAIN ON BREATHING: ICD-10-CM

## 2024-06-05 LAB
ABO GROUP (TYPE) IN BLOOD: NORMAL
ABO GROUP (TYPE) IN BLOOD: NORMAL
ALBUMIN SERPL BCP-MCNC: 3.6 G/DL (ref 3.4–5)
ALP SERPL-CCNC: 53 U/L (ref 33–136)
ALT SERPL W P-5'-P-CCNC: 14 U/L (ref 10–52)
ANION GAP SERPL CALC-SCNC: 15 MMOL/L (ref 10–20)
ANTIBODY SCREEN: NORMAL
APTT PPP: 21 SECONDS (ref 27–38)
AST SERPL W P-5'-P-CCNC: 46 U/L (ref 9–39)
ATRIAL RATE: 86 BPM
BASOPHILS # BLD AUTO: 0.01 X10*3/UL (ref 0–0.1)
BASOPHILS NFR BLD AUTO: 0.1 %
BILIRUB DIRECT SERPL-MCNC: 0 MG/DL (ref 0–0.3)
BILIRUB SERPL-MCNC: 0.3 MG/DL (ref 0–1.2)
BLOOD EXPIRATION DATE: NORMAL
BLOOD EXPIRATION DATE: NORMAL
BUN SERPL-MCNC: 26 MG/DL (ref 6–23)
CALCIUM SERPL-MCNC: 8.7 MG/DL (ref 8.6–10.6)
CARDIAC TROPONIN I PNL SERPL HS: 11 NG/L (ref 0–53)
CARDIAC TROPONIN I PNL SERPL HS: 11 NG/L (ref 0–53)
CHLORIDE SERPL-SCNC: 106 MMOL/L (ref 98–107)
CO2 SERPL-SCNC: 19 MMOL/L (ref 21–32)
CREAT SERPL-MCNC: 1.97 MG/DL (ref 0.5–1.3)
DISPENSE STATUS: NORMAL
DISPENSE STATUS: NORMAL
EGFRCR SERPLBLD CKD-EPI 2021: 34 ML/MIN/1.73M*2
EOSINOPHIL # BLD AUTO: 0.1 X10*3/UL (ref 0–0.4)
EOSINOPHIL NFR BLD AUTO: 1.4 %
ERYTHROCYTE [DISTWIDTH] IN BLOOD BY AUTOMATED COUNT: 13.9 % (ref 11.5–14.5)
EST. AVERAGE GLUCOSE BLD GHB EST-MCNC: 117 MG/DL
FERRITIN SERPL-MCNC: 36 NG/ML (ref 20–300)
GLUCOSE BLD MANUAL STRIP-MCNC: 105 MG/DL (ref 74–99)
GLUCOSE SERPL-MCNC: 122 MG/DL (ref 74–99)
HBA1C MFR BLD: 5.7 %
HCT VFR BLD AUTO: 17.6 % (ref 41–52)
HGB BLD-MCNC: 6 G/DL (ref 13.5–17.5)
HGB RETIC QN: 31 PG (ref 28–38)
IMM GRANULOCYTES # BLD AUTO: 0.21 X10*3/UL (ref 0–0.5)
IMM GRANULOCYTES NFR BLD AUTO: 2.9 % (ref 0–0.9)
IMMATURE RETIC FRACTION: 34.4 %
INR PPP: 1 (ref 0.9–1.1)
IRON SATN MFR SERPL: 13 % (ref 25–45)
IRON SERPL-MCNC: 39 UG/DL (ref 35–150)
LDH SERPL L TO P-CCNC: 421 U/L (ref 84–246)
LYMPHOCYTES # BLD AUTO: 1.19 X10*3/UL (ref 0.8–3)
LYMPHOCYTES NFR BLD AUTO: 16.6 %
MAGNESIUM SERPL-MCNC: 2.23 MG/DL (ref 1.6–2.4)
MCH RBC QN AUTO: 32.1 PG (ref 26–34)
MCHC RBC AUTO-ENTMCNC: 34.1 G/DL (ref 32–36)
MCV RBC AUTO: 94 FL (ref 80–100)
MONOCYTES # BLD AUTO: 0.6 X10*3/UL (ref 0.05–0.8)
MONOCYTES NFR BLD AUTO: 8.4 %
NEUTROPHILS # BLD AUTO: 5.04 X10*3/UL (ref 1.6–5.5)
NEUTROPHILS NFR BLD AUTO: 70.6 %
NRBC BLD-RTO: 0 /100 WBCS (ref 0–0)
P AXIS: 55 DEGREES
P OFFSET: 204 MS
P ONSET: 154 MS
PLATELET # BLD AUTO: 239 X10*3/UL (ref 150–450)
POTASSIUM SERPL-SCNC: 5.3 MMOL/L (ref 3.5–5.3)
PR INTERVAL: 140 MS
PRODUCT BLOOD TYPE: 5100
PRODUCT BLOOD TYPE: 5100
PRODUCT CODE: NORMAL
PRODUCT CODE: NORMAL
PROT SERPL-MCNC: 6.2 G/DL (ref 6.4–8.2)
PROTHROMBIN TIME: 10.8 SECONDS (ref 9.8–12.8)
Q ONSET: 224 MS
QRS COUNT: 14 BEATS
QRS DURATION: 62 MS
QT INTERVAL: 342 MS
QTC CALCULATION(BAZETT): 409 MS
QTC FREDERICIA: 385 MS
R AXIS: 69 DEGREES
RBC # BLD AUTO: 1.87 X10*6/UL (ref 4.5–5.9)
RETICS #: 0.13 X10*6/UL (ref 0.02–0.11)
RETICS/RBC NFR AUTO: 6.9 % (ref 0.5–2)
RH FACTOR (ANTIGEN D): NORMAL
RH FACTOR (ANTIGEN D): NORMAL
SODIUM SERPL-SCNC: 135 MMOL/L (ref 136–145)
T AXIS: 69 DEGREES
T OFFSET: 395 MS
TIBC SERPL-MCNC: 296 UG/DL (ref 240–445)
UIBC SERPL-MCNC: 257 UG/DL (ref 110–370)
UNIT ABO: NORMAL
UNIT ABO: NORMAL
UNIT NUMBER: NORMAL
UNIT NUMBER: NORMAL
UNIT RH: NORMAL
UNIT RH: NORMAL
UNIT VOLUME: 350
UNIT VOLUME: 350
VENTRICULAR RATE: 86 BPM
WBC # BLD AUTO: 7.2 X10*3/UL (ref 4.4–11.3)
XM INTEP: NORMAL
XM INTEP: NORMAL

## 2024-06-05 PROCEDURE — 85045 AUTOMATED RETICULOCYTE COUNT: CPT | Performed by: STUDENT IN AN ORGANIZED HEALTH CARE EDUCATION/TRAINING PROGRAM

## 2024-06-05 PROCEDURE — G0378 HOSPITAL OBSERVATION PER HR: HCPCS

## 2024-06-05 PROCEDURE — 83880 ASSAY OF NATRIURETIC PEPTIDE: CPT | Performed by: STUDENT IN AN ORGANIZED HEALTH CARE EDUCATION/TRAINING PROGRAM

## 2024-06-05 PROCEDURE — 83735 ASSAY OF MAGNESIUM: CPT

## 2024-06-05 PROCEDURE — 85025 COMPLETE CBC W/AUTO DIFF WBC: CPT | Performed by: EMERGENCY MEDICINE

## 2024-06-05 PROCEDURE — 86850 RBC ANTIBODY SCREEN: CPT

## 2024-06-05 PROCEDURE — 83550 IRON BINDING TEST: CPT

## 2024-06-05 PROCEDURE — 83010 ASSAY OF HAPTOGLOBIN QUANT: CPT | Mod: WESLAB | Performed by: STUDENT IN AN ORGANIZED HEALTH CARE EDUCATION/TRAINING PROGRAM

## 2024-06-05 PROCEDURE — 83036 HEMOGLOBIN GLYCOSYLATED A1C: CPT | Performed by: STUDENT IN AN ORGANIZED HEALTH CARE EDUCATION/TRAINING PROGRAM

## 2024-06-05 PROCEDURE — 71046 X-RAY EXAM CHEST 2 VIEWS: CPT

## 2024-06-05 PROCEDURE — 83615 LACTATE (LD) (LDH) ENZYME: CPT | Performed by: STUDENT IN AN ORGANIZED HEALTH CARE EDUCATION/TRAINING PROGRAM

## 2024-06-05 PROCEDURE — 96374 THER/PROPH/DIAG INJ IV PUSH: CPT

## 2024-06-05 PROCEDURE — 36415 COLL VENOUS BLD VENIPUNCTURE: CPT | Performed by: EMERGENCY MEDICINE

## 2024-06-05 PROCEDURE — 30233N1 TRANSFUSION OF NONAUTOLOGOUS RED BLOOD CELLS INTO PERIPHERAL VEIN, PERCUTANEOUS APPROACH: ICD-10-PCS | Performed by: INTERNAL MEDICINE

## 2024-06-05 PROCEDURE — C9113 INJ PANTOPRAZOLE SODIUM, VIA: HCPCS

## 2024-06-05 PROCEDURE — 71046 X-RAY EXAM CHEST 2 VIEWS: CPT | Mod: FOREIGN READ | Performed by: RADIOLOGY

## 2024-06-05 PROCEDURE — 80053 COMPREHEN METABOLIC PANEL: CPT | Performed by: EMERGENCY MEDICINE

## 2024-06-05 PROCEDURE — P9016 RBC LEUKOCYTES REDUCED: HCPCS

## 2024-06-05 PROCEDURE — 82728 ASSAY OF FERRITIN: CPT | Performed by: STUDENT IN AN ORGANIZED HEALTH CARE EDUCATION/TRAINING PROGRAM

## 2024-06-05 PROCEDURE — 82248 BILIRUBIN DIRECT: CPT | Mod: WESLAB | Performed by: STUDENT IN AN ORGANIZED HEALTH CARE EDUCATION/TRAINING PROGRAM

## 2024-06-05 PROCEDURE — 99285 EMERGENCY DEPT VISIT HI MDM: CPT

## 2024-06-05 PROCEDURE — 84484 ASSAY OF TROPONIN QUANT: CPT | Performed by: EMERGENCY MEDICINE

## 2024-06-05 PROCEDURE — 99223 1ST HOSP IP/OBS HIGH 75: CPT | Performed by: STUDENT IN AN ORGANIZED HEALTH CARE EDUCATION/TRAINING PROGRAM

## 2024-06-05 PROCEDURE — 86923 COMPATIBILITY TEST ELECTRIC: CPT

## 2024-06-05 PROCEDURE — 84484 ASSAY OF TROPONIN QUANT: CPT | Mod: 91

## 2024-06-05 PROCEDURE — 2500000004 HC RX 250 GENERAL PHARMACY W/ HCPCS (ALT 636 FOR OP/ED)

## 2024-06-05 PROCEDURE — 99285 EMERGENCY DEPT VISIT HI MDM: CPT | Mod: 25

## 2024-06-05 PROCEDURE — 36430 TRANSFUSION BLD/BLD COMPNT: CPT

## 2024-06-05 PROCEDURE — 85610 PROTHROMBIN TIME: CPT

## 2024-06-05 PROCEDURE — 82947 ASSAY GLUCOSE BLOOD QUANT: CPT

## 2024-06-05 PROCEDURE — 93005 ELECTROCARDIOGRAM TRACING: CPT

## 2024-06-05 PROCEDURE — 93010 ELECTROCARDIOGRAM REPORT: CPT

## 2024-06-05 PROCEDURE — 36415 COLL VENOUS BLD VENIPUNCTURE: CPT

## 2024-06-05 RX ORDER — PANTOPRAZOLE SODIUM 40 MG/10ML
40 INJECTION, POWDER, LYOPHILIZED, FOR SOLUTION INTRAVENOUS 2 TIMES DAILY
Status: CANCELLED | OUTPATIENT
Start: 2024-06-05

## 2024-06-05 RX ORDER — DEXTROSE 50 % IN WATER (D50W) INTRAVENOUS SYRINGE
25
Status: DISCONTINUED | OUTPATIENT
Start: 2024-06-05 | End: 2024-06-08 | Stop reason: HOSPADM

## 2024-06-05 RX ORDER — INSULIN LISPRO 100 [IU]/ML
0-5 INJECTION, SOLUTION INTRAVENOUS; SUBCUTANEOUS
Status: DISCONTINUED | OUTPATIENT
Start: 2024-06-05 | End: 2024-06-08 | Stop reason: HOSPADM

## 2024-06-05 RX ORDER — DEXTROSE 50 % IN WATER (D50W) INTRAVENOUS SYRINGE
12.5
Status: DISCONTINUED | OUTPATIENT
Start: 2024-06-05 | End: 2024-06-08 | Stop reason: HOSPADM

## 2024-06-05 RX ORDER — PANTOPRAZOLE SODIUM 40 MG/10ML
40 INJECTION, POWDER, LYOPHILIZED, FOR SOLUTION INTRAVENOUS ONCE
Status: COMPLETED | OUTPATIENT
Start: 2024-06-05 | End: 2024-06-05

## 2024-06-05 RX ADMIN — PANTOPRAZOLE SODIUM 40 MG: 40 INJECTION, POWDER, FOR SOLUTION INTRAVENOUS at 11:05

## 2024-06-05 SDOH — HEALTH STABILITY: MENTAL HEALTH: HAVE YOU ACTUALLY HAD ANY THOUGHTS OF KILLING YOURSELF?: NO

## 2024-06-05 SDOH — HEALTH STABILITY: MENTAL HEALTH: SUICIDE ASSESSMENT: ADULT (C-SSRS)

## 2024-06-05 SDOH — HEALTH STABILITY: MENTAL HEALTH: HAVE YOU WISHED YOU WERE DEAD OR WISHED YOU COULD GO TO SLEEP AND NOT WAKE UP?: NO

## 2024-06-05 SDOH — HEALTH STABILITY: MENTAL HEALTH: HAVE YOU EVER DONE ANYTHING, STARTED TO DO ANYTHING, OR PREPARED TO DO ANYTHING TO END YOUR LIFE?: NO

## 2024-06-05 ASSESSMENT — LIFESTYLE VARIABLES
TOTAL SCORE: 0
EVER HAD A DRINK FIRST THING IN THE MORNING TO STEADY YOUR NERVES TO GET RID OF A HANGOVER: NO
EVER FELT BAD OR GUILTY ABOUT YOUR DRINKING: NO
HAVE PEOPLE ANNOYED YOU BY CRITICIZING YOUR DRINKING: NO
HAVE YOU EVER FELT YOU SHOULD CUT DOWN ON YOUR DRINKING: NO

## 2024-06-05 ASSESSMENT — PAIN SCALES - GENERAL
PAINLEVEL_OUTOF10: 0 - NO PAIN

## 2024-06-05 ASSESSMENT — PAIN - FUNCTIONAL ASSESSMENT: PAIN_FUNCTIONAL_ASSESSMENT: 0-10

## 2024-06-05 ASSESSMENT — COLUMBIA-SUICIDE SEVERITY RATING SCALE - C-SSRS
2. HAVE YOU ACTUALLY HAD ANY THOUGHTS OF KILLING YOURSELF?: NO
6. HAVE YOU EVER DONE ANYTHING, STARTED TO DO ANYTHING, OR PREPARED TO DO ANYTHING TO END YOUR LIFE?: NO
1. IN THE PAST MONTH, HAVE YOU WISHED YOU WERE DEAD OR WISHED YOU COULD GO TO SLEEP AND NOT WAKE UP?: NO

## 2024-06-05 NOTE — H&P
"@Subjective   Chief Complaint:     HPI:   Mann Abrams is  83 y.o. male with a PMH of CKD stage 3 (baseline Cr 1.7-2.0), DM, PAD, HLD,  HTN and anemia who presents with chest pain/dyspnea on exertion.       On 4/12/24 at outpatient PCP it was noted his hgb was 7.6, hematocrit 23.1 down from hemoglobin 11.0, hematocrit 32.9 on 9/1/2023. He was ordered h pylori breath test, colonoscopy, EGD. Breath test was negative, did not complete scopes. Leading up this appointment, the pt noted increasing fatigue and shortness of breath and chest pain on exertion that would go away following sitting down for 5 minutes. The pain did not radiate, and felt like a burning pain that was across his chest. He went to [facility] ED on 5/4/24 for these symptoms and his hgb was 8.7 when checked again. They recommended working up anemia outpatient and did not change anginal drugs. He got a cardiac cath on 5/15/24 that showed proximal LCx has 70-80%, 40% mid RCA, ostial LAD with eccentric 30% eccentric stenosis. It was decided to not place a stent due to absence of high risk stress test findings and risk for bleeding.     Following this, his symptoms persisted and would reoccur whenever he started to walk or do any exertion. He came in today due his symptoms not improving.     Pt endorses black, maybe tarry stools, but he is taking home iron supplements that started several months ago. When showed a picture of melena, he said \"well mine aren't that ugly.\" He does not think he had this color prior to taking the iron pills. He denies any changes in stool caliber, color, consistency, or blood in stool. No there overt bleeding he can remember. He endorses a history of acid reflux that he takes omeprazole for. He says he regurgitates/vomits foods after eating. The emesis is never bloody and is clear or undigested food.  He does not have heart burn like pain. He was prescribed baby aspirin several months ago, but denies any other NSAID use. He " denies ever having pain related to eating, early satiety, fullness, weight loss, fevers/chills, or night sweats.       Cardiac history:  Left heart cath 5/30/24:  Was ordered due to high calcium score and increasing anginal symptoms.   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 Medical management of proximal LCx disease given the absence of angina or high risk stress test findings.   3. Further management as per primary cardiology Dr. Brant Garcia.    ECG 5/4/24:  Normal sinus rhythm  Low voltage QRS    CT coronary calcium score 1/4/2024:   1328 (, , )     Echo 9/15/23:   1. Left ventricular systolic function is normal with a 60-65% estimated ejection fraction.  2. Spectral Doppler shows an impaired relaxation pattern of left ventricular diastolic filling.  3. Moderately elevated right ventricular systolic pressure.    Carotid artery duplex 1/3/24:  Utilizing the peak systolic velocities, the estimated degree of  stenosis within the internal carotid arteries is greater than 70% on  the right and less than 50% on the left. However, the ICA to CCA  ratio on the left is closer to 70%.    Stress test 6/20  Exercise echocardiogram stress testing 06/2020   1. Abnormal Stress Test.  2. ECG changes consistent with ischemia.  3. Hyperdynamic global left ventricular systolic function.  4. Patients baseline LV function was hyperdynamic with peak stress showing almost complete cavity obliteration . -- No regional wall motion changes noted at peak exercise.  5. Noted Increased in RVSP from baseline 31 mmHg to 54 mmHg at Peak Stress       GI history:  Colonoscopy 7/28/20:  Internal hemroids, diverticula in sigmoid through ascending colon and no specimens were collected.     Never has had an EGD     In the ED  Vitals:   T 36.4, HR 95, /59, RR 18, SpO2 96 on RA  Labs  - CBC: wbc 7.2, Hgb 6, plt 239  -  BMP: Na 135, K 5.3, Cl 106, HCO3 19, BUN 19, Cr 1.97, glu 122  - LFT: Ca 8.7, tprot 6.2, alb 3.6, alkphos 53, AST 46, ALT 14, tbili 0.3  - Electrolytes: Mg 2.23  - Heme: PT 10.8, INR 1, aPTT 21  - hs-TnI 11  - Iron 39, TIBC 296, % saturation 13   Imaging:  CXR: There is an infiltrate medially in the left lower lobe.   ECG: NSR     In the ED, 2 Packed RBCs ordered.       PMHx:   Past Medical History:   Diagnosis Date    Chronic kidney disease, stage 3 unspecified (Multi) 08/22/2022    CKD (chronic kidney disease), stage III    Other secondary cataract, left eye 08/01/2016    Posterior capsular opacification visually significant of left eye    Person consulting for explanation of examination or test findings 01/07/2020    Encounter to discuss test results       SurgHx:   Past Surgical History:   Procedure Laterality Date    CARDIAC CATHETERIZATION N/A 5/30/2024    Procedure: Left Heart Cath with Coronary Angiography and LV;  Surgeon: Denny Riley MD;  Location: [facility] Cardiac Cath Lab;  Service: Cardiovascular;  Laterality: N/A;    EYE SURGERY  08/07/2014    Eye Surgery    OTHER SURGICAL HISTORY  12/04/2019    Foot surgery    OTHER SURGICAL HISTORY  12/04/2019    Back surgery       Allergies:   Allergies   Allergen Reactions    Doxazosin Itching       SocHx:    reports that he quit smoking about 38 years ago. His smoking use included cigarettes. He has never used smokeless tobacco. He reports that he does not drink alcohol and does not use drugs.  Has about a 30 pack year history.   FamHx:   Family History   Problem Relation Name Age of Onset    Cancer Mother      Other (cardiac disorder) Father      Diabetes Sister         Home Medications@   Prior to Admission medications    Medication Sig Start Date End Date Taking? Authorizing Provider   amLODIPine (Norvasc) 10 mg tablet Take 1 tablet (10 mg) by mouth once daily. 3/2/24   Nataliya Grant MD   ascorbic acid (Vitamin C) 500 mg tablet Take by mouth.    Historical  Provider, MD   aspirin 81 mg EC tablet Take 1 tablet (81 mg) by mouth once daily. 5/9/24 5/9/25  Edel Sequeira MD   atorvastatin (Lipitor) 80 mg tablet Take 1 tablet (80 mg) by mouth once daily. 4/22/24   Nataliya Grant MD   carvedilol (Coreg) 25 mg tablet HALF PILL BID PLEASE NOTE CHANGE IN DOSE 7/17/23   Nataliya Grant MD   fluticasone (Flonase) 50 mcg/actuation nasal spray Administer 1 spray into each nostril once daily. Shake gently. Before first use, prime pump. After use, clean tip and replace cap. 1/19/24 1/18/25  Nataliya Grant MD   fluticasone propion-salmeteroL (Wixela Inhub) 250-50 mcg/dose diskus inhaler Inhale 1 puff 2 times a day. Rinse mouth with water after use to reduce aftertaste and incidence of candidiasis. Do not swallow. 1/19/24   Nataliya Grant MD   FreeStyle glucose monitoring kit Use to test blood sugar twice daily    Historical Provider, MD   isosorbide mononitrate ER (Imdur) 30 mg 24 hr tablet Take 1 tablet (30 mg) by mouth once daily. Do not crush or chew. 5/14/24 5/14/25  Brant Garcia, DO   metFORMIN XR (Glucophage-XR) 500 mg 24 hr tablet Take 1 tablet (500 mg) by mouth once daily in the evening. Take with meals. 8/15/23 5/30/24  Nataliya Grant MD   montelukast (Singulair) 10 mg tablet Take 1 tablet (10 mg) by mouth once daily at bedtime. 3/2/24   Nataliya Grant MD   nitroglycerin (Nitrostat) 0.4 mg SL tablet Place 1 tablet (0.4 mg) under the tongue every 5 minutes if needed for chest pain. place 1 tablet under tongue every 5 minutes for up to 3 doses as needed for chest pain. Call 911 if pain persists 8/11/22   Historical Provider, MD   olmesartan (Benicar) 40 mg tablet Take 1 tablet (40 mg) by mouth once daily. 5/14/24 5/14/25  Brant Garcia DO   omeprazole (PriLOSEC) 40 mg DR capsule Take 1 capsule (40 mg) by mouth once daily. 3/2/24   Nataliya Grant MD   spironolactone (Aldactone) 25 mg tablet Take 0.5 tablets (12.5 mg) by mouth 2 times a day.  23   Historical MD Jose Luis     Medication Documentation Review Audit       Reviewed by Kun Selby RN (Registered Nurse) on 24 at 0952      Medication Order Taking? Sig Documenting Provider Last Dose Status   amLODIPine (Norvasc) 10 mg tablet [id number]  Take 1 tablet (10 mg) by mouth once daily. Nataliya Grant MD  Active   ascorbic acid (Vitamin C) 500 mg tablet [id number]  Take by mouth. Kristyn Amaya MD  Active   aspirin 81 mg EC tablet [id number]  Take 1 tablet (81 mg) by mouth once daily. Edel Sequeira MD  Active   atorvastatin (Lipitor) 80 mg tablet [id number]  Take 1 tablet (80 mg) by mouth once daily. Nataliya Grant MD  Active   carvedilol (Coreg) 25 mg tablet [id number]  HALF PILL BID PLEASE NOTE CHANGE IN DOSE Nataliya Grant MD  Active   fluticasone (Flonase) 50 mcg/actuation nasal spray [id number]  Administer 1 spray into each nostril once daily. Shake gently. Before first use, prime pump. After use, clean tip and replace cap. Nataliya Grant MD  Active   fluticasone propion-salmeteroL (Wixela Inhub) 250-50 mcg/dose diskus inhaler [id number]  Inhale 1 puff 2 times a day. Rinse mouth with water after use to reduce aftertaste and incidence of candidiasis. Do not swallow. Nataliya Grant MD  Active   FreeStyle glucose monitoring kit [id number]  Use to test blood sugar twice daily Historical Provider, MD  Active   isosorbide mononitrate ER (Imdur) 30 mg 24 hr tablet [id number]  Take 1 tablet (30 mg) by mouth once daily. Do not crush or chew. Brant Garcia,   Active   metFORMIN XR (Glucophage-XR) 500 mg 24 hr tablet [id number]  Take 1 tablet (500 mg) by mouth once daily in the evening. Take with meals. Nataliya Grant MD   [id number]   montelukast (Singulair) 10 mg tablet [id number]  Take 1 tablet (10 mg) by mouth once daily at bedtime. Nataliya Grant MD  Active   nitroglycerin (Nitrostat) 0.4 mg SL tablet [id number]  Place 1 tablet (0.4 mg) under the tongue  every 5 minutes if needed for chest pain. place 1 tablet under tongue every 5 minutes for up to 3 doses as needed for chest pain. Call 911 if pain persists Historical Provider, MD  Active   olmesartan (Benicar) 40 mg tablet [id number]  Take 1 tablet (40 mg) by mouth once daily. Brant Garcia DO  Active   omeprazole (PriLOSEC) 40 mg DR capsule [id number]  Take 1 capsule (40 mg) by mouth once daily. Nataliya Grant MD  Active   spironolactone (Aldactone) 25 mg tablet [id number]  Take 0.5 tablets (12.5 mg) by mouth 2 times a day. Historical Provider, MD  Active                    Review of Symptoms:   Constitutional: negative for fevers, chills, weight loss, weight gain, change in appetite, fatigue, weakness.  HEENT: negative for headache, changes in vision or hearing, congestion, sore throat.  Respiratory: negative for SOB, cough, hemoptysis, wheezing  Cardiovascular: negative for chest pain, palpitations, orthopnea, PND  GI: positive for once a week vomiting related to food intake. negative for dysphagia, abdominal pain, diarrhea, constipation, melena, hematochezia, BRBPR  : negative for frequency, urgency, dysuria, hematuria, incontinence  MSK: negative for myalgia, arthralgia, decreased joint ROM, LE swelling  Skin: negative for rash, wounds  Heme/lymph: negative for easy bruising, bleeding, epistaxis  Neuro: negative for LOC, numbness, tingling, tremor, vertigo, dizziness  Stool on rectal exam was dark brown/black. Not tarry     Objective     Vitals:  Vitals:    06/05/24 0759   BP: 116/59   Pulse: 95   Resp: 18   Temp: 36.4 °C (97.5 °F)   SpO2: 96%       No intake/output data recorded.    Physical exam:  Constitutional: in NAD  HEENT: sclerae anicteric, EOM grossly intact  CV: RRR, no murmurs noted  Pulm: CTAB, no increased WOB  GI: abd soft, NT, hepatomegaly   Skin: warm and dry. Cap refill less than 2 seconds   Ext: bilateral LE without pitting edema   Neuro: alert and conversant  Psych: affect  appropriate    Labs:  Results for orders placed or performed during the hospital encounter of 06/05/24 (from the past 24 hour(s))   CBC and Auto Differential   Result Value Ref Range    WBC 7.2 4.4 - 11.3 x10*3/uL    nRBC 0.0 0.0 - 0.0 /100 WBCs    RBC 1.87 (L) 4.50 - 5.90 x10*6/uL    Hemoglobin 6.0 (LL) 13.5 - 17.5 g/dL    Hematocrit 17.6 (L) 41.0 - 52.0 %    MCV 94 80 - 100 fL    MCH 32.1 26.0 - 34.0 pg    MCHC 34.1 32.0 - 36.0 g/dL    RDW 13.9 11.5 - 14.5 %    Platelets 239 150 - 450 x10*3/uL    Neutrophils % 70.6 40.0 - 80.0 %    Immature Granulocytes %, Automated 2.9 (H) 0.0 - 0.9 %    Lymphocytes % 16.6 13.0 - 44.0 %    Monocytes % 8.4 2.0 - 10.0 %    Eosinophils % 1.4 0.0 - 6.0 %    Basophils % 0.1 0.0 - 2.0 %    Neutrophils Absolute 5.04 1.60 - 5.50 x10*3/uL    Immature Granulocytes Absolute, Automated 0.21 0.00 - 0.50 x10*3/uL    Lymphocytes Absolute 1.19 0.80 - 3.00 x10*3/uL    Monocytes Absolute 0.60 0.05 - 0.80 x10*3/uL    Eosinophils Absolute 0.10 0.00 - 0.40 x10*3/uL    Basophils Absolute 0.01 0.00 - 0.10 x10*3/uL   Comprehensive metabolic panel   Result Value Ref Range    Glucose 122 (H) 74 - 99 mg/dL    Sodium 135 (L) 136 - 145 mmol/L    Potassium 5.3 3.5 - 5.3 mmol/L    Chloride 106 98 - 107 mmol/L    Bicarbonate 19 (L) 21 - 32 mmol/L    Anion Gap 15 10 - 20 mmol/L    Urea Nitrogen 26 (H) 6 - 23 mg/dL    Creatinine 1.97 (H) 0.50 - 1.30 mg/dL    eGFR 34 (L) >60 mL/min/1.73m*2    Calcium 8.7 8.6 - 10.6 mg/dL    Albumin 3.6 3.4 - 5.0 g/dL    Alkaline Phosphatase 53 33 - 136 U/L    Total Protein 6.2 (L) 6.4 - 8.2 g/dL    AST 46 (H) 9 - 39 U/L    Bilirubin, Total 0.3 0.0 - 1.2 mg/dL    ALT 14 10 - 52 U/L   Troponin I, High Sensitivity   Result Value Ref Range    Troponin I, High Sensitivity 11 0 - 53 ng/L   Magnesium   Result Value Ref Range    Magnesium 2.23 1.60 - 2.40 mg/dL   Iron and TIBC   Result Value Ref Range    Iron 39 35 - 150 ug/dL    UIBC 257 110 - 370 ug/dL    TIBC 296 240 - 445 ug/dL    %  Saturation 13 (L) 25 - 45 %   Troponin I, High Sensitivity   Result Value Ref Range    Troponin I, High Sensitivity 11 0 - 53 ng/L   Type and Screen   Result Value Ref Range    ABO TYPE O     Rh TYPE POS     ANTIBODY SCREEN NEG    Coagulation Screen   Result Value Ref Range    Protime 10.8 9.8 - 12.8 seconds    INR 1.0 0.9 - 1.1    aPTT 21 (L) 27 - 38 seconds       Imaging:  XR chest 2 views    Result Date: 6/5/2024  STUDY: Chest Radiographs;  [6-5-2024; 9:30 am] INDICATION: Chest pain. COMPARISON: None Available ACCESSION NUMBER(S): [id number] ORDERING CLINICIAN: JAN HAHN TECHNIQUE:  Frontal and lateral chest. FINDINGS: CARDIOMEDIASTINAL SILHOUETTE: Cardiomediastinal silhouette is normal in size and configuration.  LUNGS: There is an infiltrate medially in the left lower lobe.  ABDOMEN: No remarkable upper abdominal findings.  BONES: No acute osseous changes.    Left lower lobe infiltrate. Signed by Matthew Dhaliwal MD    Cardiac Catheterization Procedure    Result Date: 5/30/2024   [facility], Cath Lab, [address] [place] [address] Cardiovascular Catheterization Report Patient Name:     PROSPER BISHOP    Performing Physician:  [id number] Riley MD Study Date:       5/30/2024          Verifying Physician:   Janis Riley MD MRN/PID:          [id number]           Cardiologist/Co-Scrub: Accession#:       [id number]       Ordering Provider:     [id number] Eugenia PARISH Date of           1940 / 83      Cardiologist: Birth/Age:        years Gender:           M                  Fellow:                [id number] Hector Whitley MD Encounter#:       [id number]         Surgeon:  Study:            Left Heart Cath Additional Study: IVUS - Intravascular  Ultrasound  Indications: PROSPER BISHOP is a 84 year old male who presents with dyslipidemia, hypertension, diabetes and a chest pain assessment of atypical angina. Suspected coronary artery disease. Medical History: Stress test performed: No. Agatston accessed: Yes. Agatston Score: 1328. LVEF Assessed: Yes. LVEF = 64%.  Procedure Description: After infiltration with 2% Lidocaine, the right radial artery was cannulated with a modified Seldinger technique. Subsequently a 6 Kosovan sheath was placed in the right radial artery. Selective coronary catheterization was performed using a 5 Fr catheter(s) exchanged over a guide wire to cannulate the coronary arteries. A JL 3.5 tip catheter was used for left coronary injections. A JR 4 tip catheter was used for right coronary injections. Additional catheter(s) used to visualize the coronary arteries were: AR Mod. Multiple injections of contrast were made into the left and right coronary arteries with angiograms recorded in multiple projections. After completion of the procedure, the arterial sheath was pulled and a TR Band Radial Compression Device was utilized to obtain patent hemostasis.  Coronary Angiography: The coronary circulation is right dominant.  Coronary Angiography Comments: A 6F JL 3.5 guide catheter was used for LAD IVUS. LAD was wired using a Runthrough wire. IVUS was performed.IVUS: Reference area of proximal LAD is a 20 mmï¿½ and MLA was 14 mmï¿½]. Final angiogram with no complications.   Left Anterior Descending Coronary Artery Distribution: The left anterior descending coronary artery is a normal caliber vessel. The LAD arises normally from the left main coronary artery. The proximal left anterior descending coronary artery showed 30% stenosis. This lesion was eccentric. The 1st diagonal branch showed no significant disease or stenosis greater than 30%. The 2nd diagonal branch demonstrated no significant disease or stenosis greater than 30%.  Circumflex  Coronary Artery Distribution: The circumflex coronary artery is a normal caliber vessel. 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 The proximal circumflex coronary artery showed 70-80%. The 1st obtuse marginal branch showed no significant disease or stenosis greater than 30%.  Right Coronary Artery Distribution: The right coronary artery is a normal caliber vessel. The RCA arises normally from the right sinus of Valsalva. The mid right coronary artery showed 40% stenosis. The acute marginal branch showed no significant disease or stenosis greater than 30%. The right posterolateral branch showed no significant disease or stenosis greater than 30%. The right posterior descending artery showed no significant disease or stenosis greater than 30%.  Coronary Lesion Summary: Vessel       Stenosis   Vessel Segment LAD        30% stenosis    proximal Circumflex    70-80%       proximal RCA        40% stenosis      mid  Hemo Personnel: +----------------+---------+ Name            Duty      +----------------+---------+ Denny Riley MDPROC MD 1 +----------------+---------+  +----------+ Contrast:  +----------+ Omnipaque: +----------+  Hemodynamic Pressures:  +----+-------------------+---------+------------+-------------+------+---------+ Site     Date Time       Phase    Systolic    Diastolic    ED  Mean mmHg                          Name       mmHg        mmHg      mmHg           +----+-------------------+---------+------------+-------------+------+---------+   AO  5/30/2024 2:15:22 AIR REST          98           47             66                      PM                                                  +----+-------------------+---------+------------+-------------+------+---------+   LV  5/30/2024 2:25:20 AIR REST         108           -2     9                               PM                                                   +----+-------------------+---------+------------+-------------+------+---------+   LV  5/30/2024 2:25:37 AIR REST         102           -1     5                               PM                                                  +----+-------------------+---------+------------+-------------+------+---------+  LVp  5/30/2024 2:25:46 AIR REST         102           -1     6                               PM                                                  +----+-------------------+---------+------------+-------------+------+---------+  AOp  5/30/2024 2:25:53 AIR REST         101           41             66                      PM                                                  +----+-------------------+---------+------------+-------------+------+---------+   AO  5/30/2024 2:42:11 AIR REST         122           59             85                      PM                                                  +----+-------------------+---------+------------+-------------+------+---------+   AO  5/30/2024 2:55:40 AIR REST         116           52             78                      PM                                                  +----+-------------------+---------+------------+-------------+------+---------+  Oxygen Saturation %: +-----------+------------+ Sample SiteHB (g/100ml) +-----------+------------+     SYS ART         8.4 +-----------+------------+     SYS SAQIB         8.4 +-----------+------------+     PUL ART         8.4 +-----------+------------+     PUL SAQIB         8.4 +-----------+------------+  Complications: No in-lab complications observed.  Cardiac Cath Post Procedure Notes: Post Procedure Diagnosis: Left heart cath with IVUS to LAD. Blood Loss:               Estimated blood loss during the procedure was 10cc                           mls. Specimens Removed:        Number of specimen(s) removed:  none. ____________________________________________________________________________________ CONCLUSIONS:  1. 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  2. Medical management of proximal LCx disease given the absence of angina or high risk stress test findings.  3. Further management as per primary cardiology Dr. Brant Garcia. ICD 10 Codes: Abnormal result of other cardiovascular function study-R94.39  CPT Codes: Left Heart Cath (visualization of coronaries) and LV-76087; IVUS, Left Anterior Descending initial Vessel (PCI)-77750.LD; Moderate Sedation Services 1st additional 15 minutes patient >5 years-99158; Moderate Sedation Services 2nd additional 15 minutes patient >5 years-[id number]  [id number] Denny Riley MD Performing Physician Electronically signed by [id number] Denny Riley MD on 5/30/2024 at 5:01:13 PM  ** Final **     ECG 12 lead (Clinic Performed)    Result Date: 5/20/2024  Normal sinus rhythm Normal ECG When compared with ECG of 04-MAY-2024 05:26, No significant change was found           Assessment/Plan   Mann Abrams is a 83 y.o. male with a PMH of CKD stage 3 (baseline Cr 1.7-2.0), DM (last A1c 7 1/24), PAD, HLD, CAD,  HTN and anemia who presents with chest pain/dyspnea on exertion. Most likely diagnosis is exertional angina with known CAD with 70-80% stenosis of LCX that is exacerbated by anemia. Less likely diagnosis include an acute heart failure exacerbation. Evidence supporting CAD with worsening angina due to anemia is known CAD from left heart cath from 5/30 and acute drop in hgb down to 6 today from 11 the year prior. Heart failure is not likely given no pulmonary or peripheral edema. To rule out heart failure, echo will be ordered. GI is consulted to rule out upper GI bleed given dark brown/black stool.        #anemia  #fatigue  ::worsening anemia since 4/24, hgb today 6 getting 2 units RBCs in ED   ::on oral iron for several months   ::rectal exam showed dark brown/black stool. Possible melena  ::not iron deficient based on serum iron and TIBC   ::denies upper/lower GI symptoms. No overt signs of bleeding   ::on home aspirin 81mg  Plan:  - LDH, haptoglobin, retic count, ferritin   - start BID pantoprazole  - hold PO iron and aspirin   - consult GI, possible EGD tomorrow     #CAD   #stable angina  #dyspnea on exertion  ::left heart cath from 5/30 showed significant stenosis 70-80% of LCX. No stent placed   ::typical anginal symptoms, no pain/dyspnea at rest  ::trop and ecg negative   ::on home imdur and nitroglycerin   Plan:  - see symptomatic response to 2 units RBCs, consult cardiology if pain does not improve   - BNP, echo       #left low lobe infiltrate   ::left lower lobe infiltrate seen on CXR on admission   ::no cough, fever, leukocytosis  Plan:  Low concern for pneumonia, will not start antibiotics at this time. Can reconsider if clinical picture changes     #COPD  ::Home fluticasone and montelukast   Plan:  Continue home meds     #CKD stage 3b  ::baseline Cr 1.7-2.0  ::1.97 on admission  Plan:  Monitor RFP, avoid nephrotoxic agents       #DM  ::Last A1c 7 from 1/24  Plan:  - Discontinue metformin  - Start mild sliding scale insulin mild     #HTN  ::On home amlodipine, carvedilol, olmesartan  Plan:  Hold in setting of anemia     #HLD  #PAD  Plan:  Continue home atorvastatin        F: PRN   E: PRN   N: NPO aftermidnight   A: PIV   Code status: Full code (confirmed on admission)   NOK: Lesvia Abrams (wife) [phone]     To be seen and discussed with Dr. Pete Lopez, MS4

## 2024-06-05 NOTE — CONSULTS
[facility]   [facility]  INITIAL CONSULT NOTE       Reason For Consult  SAGE    SUBJECTIVE     History Of Present Illness  Mann Abrams is a 83 y.o. male with a past medical history of severe SAGE, COPD, HTN, DLD, DMT2, and CKD 3b, admitted on 6/5/2024 with CP and dyspnea on exertion. Hgb 6 from 8.4 on the 5/14/24.  Of note he was admitted within the last month at [facility]. 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 No stents were placed.     GI is consulted for SAGE.    Patient denied n/v, abdominal pain, and diarrhea. His stool is dark since he started taking the PO iron.    FH:  Mother - uterine cancer  Sister - breast cancer     SH:  Former smoker.  Denied alcohol and recreational drug use.    Review of Systems  12-point ROS has been reviewed and is negative, except if mentioned otherwise above.    Past Medical History:    Past Medical History:   Diagnosis Date    Chronic kidney disease, stage 3 unspecified (Multi) 08/22/2022    CKD (chronic kidney disease), stage III    Other secondary cataract, left eye 08/01/2016    Posterior capsular opacification visually significant of left eye    Person consulting for explanation of examination or test findings 01/07/2020    Encounter to discuss test results       Home Medications  (Not in a hospital admission)      Surgical History:    Past Surgical History:   Procedure Laterality Date    CARDIAC CATHETERIZATION N/A 5/30/2024    Procedure: Left Heart Cath with Coronary Angiography and LV;  Surgeon: Denny Riley MD;  Location: [facility];  Service: Cardiovascular;  Laterality: N/A;    EYE SURGERY  08/07/2014    Eye Surgery    OTHER SURGICAL HISTORY  12/04/2019    Foot surgery    OTHER SURGICAL HISTORY  12/04/2019    Back surgery       Allergies:    Allergies   Allergen Reactions    Doxazosin Itching       Social History:    Social History     Socioeconomic History     "Marital status:      Spouse name: Not on file    Number of children: Not on file    Years of education: Not on file    Highest education level: Not on file   Occupational History    Not on file   Tobacco Use    Smoking status: Former     Current packs/day: 0.00     Types: Cigarettes     Quit date:      Years since quittin.4    Smokeless tobacco: Never   Substance and Sexual Activity    Alcohol use: Never    Drug use: Never    Sexual activity: Not on file   Other Topics Concern    Not on file   Social History Narrative    Not on file     Social Determinants of Health     Financial Resource Strain: Not on file   Food Insecurity: Not on file   Transportation Needs: Not on file   Physical Activity: Not on file   Stress: Not on file   Social Connections: Not on file   Intimate Partner Violence: Not on file   Housing Stability: Not on file       Family History:    Family History   Problem Relation Name Age of Onset    Cancer Mother      Other (cardiac disorder) Father      Diabetes Sister         EXAM     Vitals:    Vitals:    24 0759   BP: 116/59   BP Location: Right arm   Patient Position: Sitting   Pulse: 95   Resp: 18   Temp: 36.4 °C (97.5 °F)   SpO2: 96%   Weight: 63.5 kg (140 lb)   Height: 1.676 m (5' 6\")     Failed to redirect to the Timeline version of the Anyt[employer] DD SmartLink.  No intake or output data in the 24 hours ending 24 1456    Physical Exam   GENERAL: In no acute distress.  NEUROLOGIC: A and O x 3. Cranial nerves 2 through 12 grossly intact with no gait disturbances. Intact motor and sensory systems.  HEENT: Pupils are equal, round, and reactive to light and accommodation. Extraocular motion intact. No scleral icterus.  CARDIAC: S1 + S2, RRR, no M/R/G.    LUNGS: CTA BL. No wheezes or coarse breath sounds. Symmetric respirations. No increased work of breathing.   ABDOMEN: Soft, non-tender to palpation. No rebound or guarding.  PSYCH: Appropriate mood and behavior.      OBJECTIVE "                                                                              Medications       Current Facility-Administered Medications:     dextrose 50 % injection 12.5 g, 12.5 g, intravenous, q15 min PRN, Seferino Chowdhury MD    dextrose 50 % injection 25 g, 25 g, intravenous, q15 min PRN, Seferino Chowdhury MD    glucagon (Glucagen) injection 1 mg, 1 mg, intramuscular, q15 min PRN, Seferino Chowdhury MD    glucagon (Glucagen) injection 1 mg, 1 mg, intramuscular, q15 min PRN, Seferino Chowdhury MD    Current Outpatient Medications:     amLODIPine (Norvasc) 10 mg tablet, Take 1 tablet (10 mg) by mouth once daily., Disp: 90 tablet, Rfl: 1    ascorbic acid (Vitamin C) 500 mg tablet, Take by mouth., Disp: , Rfl:     aspirin 81 mg EC tablet, Take 1 tablet (81 mg) by mouth once daily., Disp: 30 tablet, Rfl: 11    atorvastatin (Lipitor) 80 mg tablet, Take 1 tablet (80 mg) by mouth once daily., Disp: 90 tablet, Rfl: 1    carvedilol (Coreg) 25 mg tablet, HALF PILL BID PLEASE NOTE CHANGE IN DOSE, Disp: 90 tablet, Rfl: 3    fluticasone (Flonase) 50 mcg/actuation nasal spray, Administer 1 spray into each nostril once daily. Shake gently. Before first use, prime pump. After use, clean tip and replace cap., Disp: 48 g, Rfl: 1    fluticasone propion-salmeteroL (Wixela Inhub) 250-50 mcg/dose diskus inhaler, Inhale 1 puff 2 times a day. Rinse mouth with water after use to reduce aftertaste and incidence of candidiasis. Do not swallow., Disp: 3 each, Rfl: 1    FreeStyle glucose monitoring kit, Use to test blood sugar twice daily, Disp: , Rfl:     isosorbide mononitrate ER (Imdur) 30 mg 24 hr tablet, Take 1 tablet (30 mg) by mouth once daily. Do not crush or chew., Disp: 90 tablet, Rfl: 3    metFORMIN XR (Glucophage-XR) 500 mg 24 hr tablet, Take 1 tablet (500 mg) by mouth once daily in the evening. Take with meals., Disp: 90 tablet, Rfl: 3    montelukast (Singulair) 10 mg tablet, Take 1 tablet (10 mg) by mouth once daily at bedtime.,  Disp: 90 tablet, Rfl: 3    nitroglycerin (Nitrostat) 0.4 mg SL tablet, Place 1 tablet (0.4 mg) under the tongue every 5 minutes if needed for chest pain. place 1 tablet under tongue every 5 minutes for up to 3 doses as needed for chest pain. Call 911 if pain persists, Disp: , Rfl:     olmesartan (Benicar) 40 mg tablet, Take 1 tablet (40 mg) by mouth once daily., Disp: 90 tablet, Rfl: 3    omeprazole (PriLOSEC) 40 mg DR capsule, Take 1 capsule (40 mg) by mouth once daily., Disp: 90 capsule, Rfl: 3    spironolactone (Aldactone) 25 mg tablet, Take 0.5 tablets (12.5 mg) by mouth 2 times a day., Disp: , Rfl:                                                                             Labs     Results for orders placed or performed during the hospital encounter of 06/05/24 (from the past 24 hour(s))   CBC and Auto Differential   Result Value Ref Range    WBC 7.2 4.4 - 11.3 x10*3/uL    nRBC 0.0 0.0 - 0.0 /100 WBCs    RBC 1.87 (L) 4.50 - 5.90 x10*6/uL    Hemoglobin 6.0 (LL) 13.5 - 17.5 g/dL    Hematocrit 17.6 (L) 41.0 - 52.0 %    MCV 94 80 - 100 fL    MCH 32.1 26.0 - 34.0 pg    MCHC 34.1 32.0 - 36.0 g/dL    RDW 13.9 11.5 - 14.5 %    Platelets 239 150 - 450 x10*3/uL    Neutrophils % 70.6 40.0 - 80.0 %    Immature Granulocytes %, Automated 2.9 (H) 0.0 - 0.9 %    Lymphocytes % 16.6 13.0 - 44.0 %    Monocytes % 8.4 2.0 - 10.0 %    Eosinophils % 1.4 0.0 - 6.0 %    Basophils % 0.1 0.0 - 2.0 %    Neutrophils Absolute 5.04 1.60 - 5.50 x10*3/uL    Immature Granulocytes Absolute, Automated 0.21 0.00 - 0.50 x10*3/uL    Lymphocytes Absolute 1.19 0.80 - 3.00 x10*3/uL    Monocytes Absolute 0.60 0.05 - 0.80 x10*3/uL    Eosinophils Absolute 0.10 0.00 - 0.40 x10*3/uL    Basophils Absolute 0.01 0.00 - 0.10 x10*3/uL   Comprehensive metabolic panel   Result Value Ref Range    Glucose 122 (H) 74 - 99 mg/dL    Sodium 135 (L) 136 - 145 mmol/L    Potassium 5.3 3.5 - 5.3 mmol/L    Chloride 106 98 - 107 mmol/L    Bicarbonate 19 (L) 21 - 32 mmol/L     Anion Gap 15 10 - 20 mmol/L    Urea Nitrogen 26 (H) 6 - 23 mg/dL    Creatinine 1.97 (H) 0.50 - 1.30 mg/dL    eGFR 34 (L) >60 mL/min/1.73m*2    Calcium 8.7 8.6 - 10.6 mg/dL    Albumin 3.6 3.4 - 5.0 g/dL    Alkaline Phosphatase 53 33 - 136 U/L    Total Protein 6.2 (L) 6.4 - 8.2 g/dL    AST 46 (H) 9 - 39 U/L    Bilirubin, Total 0.3 0.0 - 1.2 mg/dL    ALT 14 10 - 52 U/L   Troponin I, High Sensitivity   Result Value Ref Range    Troponin I, High Sensitivity 11 0 - 53 ng/L   Magnesium   Result Value Ref Range    Magnesium 2.23 1.60 - 2.40 mg/dL   Iron and TIBC   Result Value Ref Range    Iron 39 35 - 150 ug/dL    UIBC 257 110 - 370 ug/dL    TIBC 296 240 - 445 ug/dL    % Saturation 13 (L) 25 - 45 %   Lactate dehydrogenase   Result Value Ref Range     (H) 84 - 246 U/L   Troponin I, High Sensitivity   Result Value Ref Range    Troponin I, High Sensitivity 11 0 - 53 ng/L   Type and Screen   Result Value Ref Range    ABO TYPE O     Rh TYPE POS     ANTIBODY SCREEN NEG    Coagulation Screen   Result Value Ref Range    Protime 10.8 9.8 - 12.8 seconds    INR 1.0 0.9 - 1.1    aPTT 21 (L) 27 - 38 seconds                                                                              Imaging           2/2/2024 CT A/P w/ IV contrast:  IMPRESSION:   1. Right-sided direct inguinal hernia containing ileal bowel loops, fat, and trace fluid. Although there is no evidence for bowel obstruction, incarceration cannot be excluded. Correlate with hernia reducibility and any pain at this site.   2.  Incidental note is made of bilateral renal artery stenosis. Consider follow-up Doppler renal ultrasound.   3.  Other nonacute incidental findings as above.                                                                          GI Procedures     7/2020 Colonoscopy:  - Diverticulosis in the sigmoid colon, in the descending colon, in the transverse colon and in the ascending colon.  - Internal hemorrhoids.  - The examined portion of the ileus was  normal.  - No specimens collected.    ASSESSMENT / PLAN                  ASSESSMENT/PLAN:    Mann Abrams is a 83 y.o. male with a past medical history of severe SAGE, COPD, HTN, DLD, DMT2, and CKD 3b, admitted on 6/5/2024 with CP and dyspnea on exertion. Hgb 6 from 8.4 on the 5/14/24.  Of note he was admitted within the last month at [facility]. 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 No stents were placed.     GI is consulted for SAGE.    SAGE can be in the setting of occult GIB. We will proceed with EGD and colonoscopy tentatively on the 6/7/2024. Patient is in agreement.     Recommendations:  - Please make sure the patient is on a clear liquid diet on the 6/6/2024.  - Please make sure the patient is NPO at midnight the day before the procedures.  - Please start bowel preparation on the 6/6/2024 per Bowel Prep order set.  - Continue to trend Hgb (goal > 7).  - Ensure adequate IV access, ideally 2 large bore IVs.  - Additional supportive care per primary team.    The above recommendations were communicated to the [facility] team.    Patient was seen and discussed with Dr. Torres.    Gastroenterology will continue to follow.  During weekday hours of 7am-5pm please do not hesitate to contact me on [facility] Chat or page [phone], if there are any further questions between the weekday hours of 7am-5pm.   After hours, on weekends, and on holidays, please [place] [phone]. Thank you.    Shasta Bolden MD  PGY-4 Gastroenterology and Hepatology Fellow  [facility]

## 2024-06-05 NOTE — SIGNIFICANT EVENT
"Mann Abrams is a 83 y.o. male on day 0 of admission presenting with Chest pain, unspecified.    ** This is a senior staffing note, please see excellent H&P written by MS4  Dominic Lopez for comprehensive note**      Subjective   aMnn Abrams is  83 y.o. male with a PMH of CKD stage 3 (baseline Cr 1.7-2.0), DM, PAD, HLD,  HTN and anemia who presents with chest pain/dyspnea on exertion.      On 4/12/24 at outpatient PCP it was noted his hgb was 7.6, hematocrit 23.1 down from hemoglobin 11.0, hematocrit 32.9 on 9/1/2023. He was ordered h pylori breath test, colonoscopy, EGD. Breath test was negative, did not complete scopes. Leading up this appointment, the pt noted increasing fatigue and shortness of breath and chest pain on exertion that would go away following sitting down for 5 minutes. The pain did not radiate, and felt like a burning pain that was across his chest. He went to [facility] ED on 5/4/24 for these symptoms and his hgb was 8.7 when checked again. They recommended working up anemia outpatient and did not change anginal drugs. He got a cardiac cath on 5/15/24 that showed proximal LCx has 70-80%, 40% mid RCA, ostial LAD with eccentric 30% eccentric stenosis. It was decided to not place a stent due to absence of high risk stress test findings and risk for bleeding.      Following this, his symptoms persisted and would reoccur whenever he started to walk or do any exertion. He came in today due his symptoms not improving.      Pt endorses black, maybe tarry stools, but he is taking home iron supplements that started several months ago. When showed a picture of melena, he said \"well mine aren't that ugly.\" He does not think he had this color prior to taking the iron pills. He denies any changes in stool caliber, color, consistency, or blood in stool. No there overt bleeding he can remember. He endorses a history of acid reflux that he takes omeprazole for. He says he regurgitates/vomits foods after eating. " The emesis is never bloody and is clear or undigested food.  He does not have heart burn like pain. He was prescribed baby aspirin several months ago, but denies any other NSAID use. He denies ever having pain related to eating, early satiety, fullness, weight loss, fevers/chills, or night sweats.         Cardiac history:  Left heart cath 5/30/24:  Was ordered due to high calcium score and increasing anginal symptoms.   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 Medical management of proximal LCx disease given the absence of angina or high risk stress test findings.   3. Further management as per primary cardiology Dr. Brant Garcia.     ECG 5/4/24:  Normal sinus rhythm  Low voltage QRS     CT coronary calcium score 1/4/2024:   1328 (, , )      Echo 9/15/23:   1. Left ventricular systolic function is normal with a 60-65% estimated ejection fraction.  2. Spectral Doppler shows an impaired relaxation pattern of left ventricular diastolic filling.  3. Moderately elevated right ventricular systolic pressure.     Carotid artery duplex 1/3/24:  Utilizing the peak systolic velocities, the estimated degree of  stenosis within the internal carotid arteries is greater than 70% on  the right and less than 50% on the left. However, the ICA to CCA  ratio on the left is closer to 70%.     Stress test 6/20  Exercise echocardiogram stress testing 06/2020   1. Abnormal Stress Test.  2. ECG changes consistent with ischemia.  3. Hyperdynamic global left ventricular systolic function.  4. Patients baseline LV function was hyperdynamic with peak stress showing almost complete cavity obliteration . -- No regional wall motion changes noted at peak exercise.  5. Noted Increased in RVSP from baseline 31 mmHg to 54 mmHg at Peak Stress         GI history:  Colonoscopy 7/28/20:  Internal hemroids, diverticula in sigmoid  "through ascending colon and no specimens were collected.      Never has had an EGD          10 point ROS performed and negative unless stated above.          Objective   Weight: 63.5 kg (140 lb) (06/05/24 0759)    Daily Weight  06/05/24 : 63.5 kg (140 lb)      Last Recorded Vitals  Heart Rate:  [69-95]   Temperature:  [36.4 °C (97.5 °F)]   Respirations:  [16-18]   BP: (116-142)/(59-69)   Height:  [167.6 cm (5' 6\")]   Weight:  [63.5 kg (140 lb)]   Pulse Ox:  [96 %-98 %]      Physical Exam  Constitutional:       General: He is not in acute distress.     Appearance: Normal appearance.   HENT:      Head: Normocephalic and atraumatic.      Nose: Nose normal.      Mouth/Throat:      Mouth: Mucous membranes are moist.      Pharynx: Oropharynx is clear. No oropharyngeal exudate.   Eyes:      General: No scleral icterus.     Extraocular Movements: Extraocular movements intact.      Conjunctiva/sclera: Conjunctivae normal.      Pupils: Pupils are equal, round, and reactive to light.   Cardiovascular:      Rate and Rhythm: Normal rate and regular rhythm.      Heart sounds: No murmur heard.     No friction rub. No gallop.   Pulmonary:      Effort: Pulmonary effort is normal. No respiratory distress.      Breath sounds: Normal breath sounds. No wheezing, rhonchi or rales.   Abdominal:      General: Bowel sounds are normal. There is no distension.      Palpations: Abdomen is soft.      Tenderness: There is no abdominal tenderness. There is no guarding or rebound.   Musculoskeletal:         General: No swelling or tenderness. Normal range of motion.      Cervical back: Normal range of motion and neck supple. No tenderness.      Right lower leg: No edema.      Left lower leg: No edema.   Lymphadenopathy:      Cervical: No cervical adenopathy.   Skin:     General: Skin is warm and dry.      Capillary Refill: Capillary refill takes less than 2 seconds.   Neurological:      General: No focal deficit present.      Mental Status: He is " alert and oriented to person, place, and time.      Cranial Nerves: No cranial nerve deficit.      Sensory: No sensory deficit.      Motor: No weakness.   Psychiatric:         Mood and Affect: Mood normal.         Behavior: Behavior normal.           Intake/Output last 3 Shifts:  No intake/output data recorded.    Medications  Scheduled medications  insulin lispro, 0-5 Units, subcutaneous, TID      Continuous medications     PRN medications  PRN medications: dextrose, dextrose, glucagon, glucagon       Relevant Results    Labs  Last CBC:  Lab Results   Component Value Date    WBC 7.2 06/05/2024    HGB 6.0 (LL) 06/05/2024    HCT 17.6 (L) 06/05/2024    MCV 94 06/05/2024     06/05/2024       Last RFP:  Lab Results   Component Value Date    GLUCOSE 122 (H) 06/05/2024    CALCIUM 8.7 06/05/2024     (L) 06/05/2024    K 5.3 06/05/2024    CO2 19 (L) 06/05/2024     06/05/2024    BUN 26 (H) 06/05/2024    CREATININE 1.97 (H) 06/05/2024       Last LFTs:  Lab Results   Component Value Date    ALT 14 06/05/2024    AST 46 (H) 06/05/2024    ALKPHOS 53 06/05/2024    BILITOT 0.3 06/05/2024       Last coags:  Lab Results   Component Value Date    INR 1.0 06/05/2024    APTT 21 (L) 06/05/2024       Micro/culture data:  No results found for the last 90 days.      Imaging:  XR chest 2 views  Narrative: STUDY:  Chest Radiographs;  [6-5-2024; 9:30 am]  INDICATION:  Chest pain.  COMPARISON:  None Available  ACCESSION NUMBER(S):  [id number]  ORDERING CLINICIAN:  JAN HAHN  TECHNIQUE:  Frontal and lateral chest.   FINDINGS:  CARDIOMEDIASTINAL SILHOUETTE:  Cardiomediastinal silhouette is normal in size and configuration.     LUNGS:  There is an infiltrate medially in the left lower lobe.     ABDOMEN:  No remarkable upper abdominal findings.     BONES:  No acute osseous changes.  Impression: Left lower lobe infiltrate.  Signed by Matthew Dhaliwal MD              [facility] 5/2024  CONCLUSIONS:   1. 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   2. Medical management of proximal LCx disease given the absence of angina or high risk stress test findings.   3. Further management as per primary cardiology Dr. Brant Garcia.     CT calcium 1/2024  IMPRESSION:  1. Coronary artery calcium score of 1328.77*.  2. Given patient's extremely high coronary artery calcium score  consider correlation with anatomic or physiologic coronary artery  testing.  3. Emphysematous changes with smoking-related interstitial changes      TTE 9/2023  CONCLUSIONS:  1. Left ventricular systolic function is normal with a 60-65% estimated ejection fraction.  2. Spectral Doppler shows an impaired relaxation pattern of left ventricular diastolic filling.  3. Moderately elevated right ventricular systolic pressure.         Assessment/Plan   Principal Problem:    Chest pain, unspecified    83 y.o. male with a PMH of CKD stage 3 (baseline Cr 1.7-2.0), DM, PAD, HLD,  HTN and anemia who presents with chest pain/dyspnea on exertion. Presentation most c/w symptomatic anemia and stable angina in setting of known CAD with 70-80% stenosis of left Cx combined with suspected UGIB given hgb 6.0 and black stools in the last 4 months, which correlates with timeline of progressive anemia. Given ischemic heart dz, CHF is also on the differential diagnosis. Last TTE 9/2023 without major abnormality.    #CAD  #Stable angina  #Symptomatic anemia, suspect SAGE   #HLD  #COPD  #HTN  -TTE  -GI c/s for anemia  -NPO @MN  -LDH, ferritin, reticulocytes/RI, haptoglobin, direct bili  -BNP  -will consider cardiology c/s pending Hgb improvement and GI evaluation  -resume anti-anginal therapy and HTN therapy as tolerated pending hemodynamics  -2u RBC given in ED, post transfusion CBC pending  -maintain active T/S  -PPI BID            F : as  needed  E: replete lytes prn, K>4, Mg >2  N: cardiac, NPO @MN  DVT prophylaxis: SCDs      Code Status: FULL CODE (discussed with patient at bedside upon admission)            Seferino Chowdhury MD  Internal Medicine

## 2024-06-05 NOTE — ED PROVIDER NOTES
This patient was seen by the advanced practice provider.  I have personally performed a substantive portion of the encounter.  I have seen and examined the patient; agree with the workup, evaluation, MDM, management and diagnosis.  The care plan has been discussed.      I personally saw the patient and made/approved the management plan and take responsibility for the patient management.    History: 83-year-old male who presents with chest pain and shortness of breath, worse with exertion.  Has a history anemia, takes iron tablets.  Also notes dark stools.  Denies any productive cough, fevers.  Exam: Well-appearing, sitting up in bed comfortably, clear to auscultation, abdomen soft nontender, black stools per Julio who performed rectal exam  MDM: Hemoglobin 6 (baseline 8-9), plan for admission, will type and cross for 2 units         Art Hennessy MD MPH  06/05/24 [id number]

## 2024-06-05 NOTE — ED PROVIDER NOTES
HPI   Chief Complaint   Patient presents with    Chest Pain       83-year-old male with history of COPD not requiring oxygen, DM2, HTN, HLD, CAD, CKD 3B presents for chief complaint of chest pain and dyspnea.  Intermittent x 6 weeks.  Worse on exertion.  States that he can typically walk on the treadmill without issue.  However now walking short distances the patient becomes very dyspneic and gets chest pain that is midsternal.  Nonradiating.  8/10.  Pain and dyspnea both improved quickly after rest.  Denies any recent illness but states that he also feels cold and chilly most of the time.  Denies any known fevers.  No cough.  No nausea or vomiting.  No changes in urination or bowel movement.  Patient does note some dark stools that have been ongoing but no changes lately.  Attributes the dark stools to iron supplementation.  Denies any bright red blood.  No rashes or swelling.  Of note he was admitted within the last month at [facility].  Cardiac cath was performed, showing 70 to 80% occlusion of the left circumflex.  Patient has no history of stents.                          [place] Coma Scale Score: 15                     Patient History   Past Medical History:   Diagnosis Date    Chronic kidney disease, stage 3 unspecified (Multi) 08/22/2022    CKD (chronic kidney disease), stage III    Other secondary cataract, left eye 08/01/2016    Posterior capsular opacification visually significant of left eye    Person consulting for explanation of examination or test findings 01/07/2020    Encounter to discuss test results     Past Surgical History:   Procedure Laterality Date    CARDIAC CATHETERIZATION N/A 5/30/2024    Procedure: Left Heart Cath with Coronary Angiography and LV;  Surgeon: Denny Riley MD;  Location: [facility] Cardiac Cath Lab;  Service: Cardiovascular;  Laterality: N/A;    EYE SURGERY  08/07/2014    Eye Surgery    OTHER SURGICAL HISTORY  12/04/2019    Foot surgery    OTHER SURGICAL HISTORY   2019    Back surgery     Family History   Problem Relation Name Age of Onset    Cancer Mother      Other (cardiac disorder) Father      Diabetes Sister       Social History     Tobacco Use    Smoking status: Former     Current packs/day: 0.00     Types: Cigarettes     Quit date:      Years since quittin.4    Smokeless tobacco: Never   Substance Use Topics    Alcohol use: Never    Drug use: Never       Physical Exam   ED Triage Vitals [24 0759]   Temperature Heart Rate Respirations BP   36.4 °C (97.5 °F) 95 18 116/59      Pulse Ox Temp src Heart Rate Source Patient Position   96 % -- Monitor Sitting      BP Location FiO2 (%)     Right arm --       Physical Exam  Constitutional:       Appearance: Normal appearance.   HENT:      Head: Normocephalic and atraumatic.      Mouth/Throat:      Mouth: Mucous membranes are moist.      Pharynx: Oropharynx is clear.   Eyes:      Extraocular Movements: Extraocular movements intact.      Conjunctiva/sclera: Conjunctivae normal.      Pupils: Pupils are equal, round, and reactive to light.   Cardiovascular:      Rate and Rhythm: Normal rate and regular rhythm.      Pulses: Normal pulses.      Heart sounds: Normal heart sounds.   Pulmonary:      Effort: Pulmonary effort is normal.      Breath sounds: Normal breath sounds.   Abdominal:      General: Abdomen is flat.      Palpations: Abdomen is soft.   Musculoskeletal:         General: Normal range of motion.      Cervical back: Normal range of motion and neck supple.   Skin:     General: Skin is warm and dry.      Capillary Refill: Capillary refill takes less than 2 seconds.   Neurological:      General: No focal deficit present.      Mental Status: He is alert and oriented to person, place, and time.   Psychiatric:         Mood and Affect: Mood normal.         Behavior: Behavior normal.         Thought Content: Thought content normal.         Judgment: Judgment normal.         ED Course & MDM   Diagnoses as of  06/05/24 1201   Chest pain, unspecified type   SOB (shortness of breath)   Acute on chronic anemia       Medical Decision Making  Vital signs reviewed, unremarkable at this time.  Patient is well-appearing in no apparent distress.  Speaks full sentences without difficulty.  Diagnostic testing performed.  With patient's history, and now reporting exertional dyspnea/chest pain that improves with rest, concern for possible stable angina versus ACS.  Also can consider anemia with patient's report of fatigue and feeling cold. Twelve-lead EKG shows normal sinus rhythm with rate of 86 with no evidence of ischemia and with normal axis.  CO interval 140, QRS 62, .  No significant changes from previous EKG.  CBC showed her worsening anemia.  Patient's recent baseline within the last month appears to be around 8.7 and 8.4.  However today it is 6.0.  No leukocytosis or leukopenia.  Again patient did not report any bleeding of any kind, but does report some dark stools but has been ongoing and unchanged recently.  CMP showed renal function around the patient's baseline reduced.  Troponin 11 initially.  Second troponin pending.  I reflexively ordered type and screen and coagulation screen in anticipation for possible transfusion of packed red blood cells.  Chest x-ray pending.  Patient states that he would be amicable to blood transfusion if needed. Second troponin unchanged.  Iron studies were added on.  Type and screen finished.  2 units packed red blood cells ordered.  Patient consented.  Of note chest x-ray did show left lower lobe infiltrate as well.  Digital rectal exam was performed.  No leroy blood seen and no active bleeding noted.  However stool was dark in color.  Patient was recommended for admission and he is in agreement with this.  Patient was accepted onto the medicine team.  Awaiting inpatient bed.  Remained calm and cooperative in stable condition.        Procedure  Procedures     Julio Castano,  LUMA-CNP  06/05/24 1200

## 2024-06-05 NOTE — HOSPITAL COURSE
Mann Abrams is a 83 y.o. male with a PMH of CKD stage 3 (baseline Cr 1.7-2.0), DM (last A1c 7 1/24), PAD, HLD, CAD,  HTN and anemia who presents with chest pain/dyspnea on exertion. Most likely diagnosis is exertional angina with known CAD with 70-80% stenosis of LCX that is exacerbated by anemia. He was given 2 units of RBCs in the ED and brought to the floor on 6/5. His hgb increased to 9.2 and his exertional anginal symptoms went away. He got an upper and lower scope on 6/7. The lower scope showed diverticula throughout the colon without any signs of bleeding. The EGD showed multiple polyps in the stomach with 1 having erosion. Samples were taken. In the duodenum, there was an angiectasia that was cauterized and clipped. His hgb remained stable following the procedures and he was discharged with instructions to hold his aspirin unil seeing cardiology next week. Additionally, he was instructed to hold his PO iron and monitor for stool color changes to see if the dark stools were from the iron supplementation or bleeding. He will have his CBC checked next week for monitoring stability of his hgb.

## 2024-06-05 NOTE — ED TRIAGE NOTES
CP x 6 weeks seen at [facility] and had a Cardiac Cath what was Negative for blockage. Discharge home, still CP with walking. Hx of COPD. Never follow up after the Cardiac Cath.

## 2024-06-06 ENCOUNTER — APPOINTMENT (OUTPATIENT)
Dept: CARDIOLOGY | Facility: HOSPITAL | Age: 84
DRG: 379 | End: 2024-06-06
Payer: MEDICARE

## 2024-06-06 PROBLEM — R07.9 CHEST PAIN, UNSPECIFIED TYPE: Status: ACTIVE | Noted: 2024-06-06

## 2024-06-06 LAB
ABO GROUP (TYPE) IN BLOOD: NORMAL
ALBUMIN SERPL BCP-MCNC: 3.1 G/DL (ref 3.4–5)
ALBUMIN SERPL BCP-MCNC: 4.4 G/DL (ref 3.4–5)
ALP SERPL-CCNC: 73 U/L (ref 33–136)
ALT SERPL W P-5'-P-CCNC: 16 U/L (ref 10–52)
ANION GAP SERPL CALC-SCNC: 13 MMOL/L (ref 10–20)
ANION GAP SERPL CALC-SCNC: 16 MMOL/L (ref 10–20)
ANTIBODY SCREEN: NORMAL
AST SERPL W P-5'-P-CCNC: 25 U/L (ref 9–39)
BASOPHILS # BLD AUTO: 0.03 X10*3/UL (ref 0–0.1)
BASOPHILS NFR BLD AUTO: 0.4 %
BILIRUB DIRECT SERPL-MCNC: 0.1 MG/DL (ref 0–0.3)
BILIRUB SERPL-MCNC: 0.7 MG/DL (ref 0–1.2)
BNP SERPL-MCNC: 86 PG/ML (ref 0–99)
BUN SERPL-MCNC: 17 MG/DL (ref 6–23)
BUN SERPL-MCNC: 19 MG/DL (ref 6–23)
CALCIUM SERPL-MCNC: 7.7 MG/DL (ref 8.6–10.6)
CALCIUM SERPL-MCNC: 9.9 MG/DL (ref 8.6–10.6)
CHLORIDE SERPL-SCNC: 104 MMOL/L (ref 98–107)
CHLORIDE SERPL-SCNC: 111 MMOL/L (ref 98–107)
CO2 SERPL-SCNC: 19 MMOL/L (ref 21–32)
CO2 SERPL-SCNC: 21 MMOL/L (ref 21–32)
CREAT SERPL-MCNC: 1.55 MG/DL (ref 0.5–1.3)
CREAT SERPL-MCNC: 1.7 MG/DL (ref 0.5–1.3)
EGFRCR SERPLBLD CKD-EPI 2021: 40 ML/MIN/1.73M*2
EGFRCR SERPLBLD CKD-EPI 2021: 45 ML/MIN/1.73M*2
EOSINOPHIL # BLD AUTO: 0.07 X10*3/UL (ref 0–0.4)
EOSINOPHIL NFR BLD AUTO: 0.9 %
ERYTHROCYTE [DISTWIDTH] IN BLOOD BY AUTOMATED COUNT: 16.2 % (ref 11.5–14.5)
GLUCOSE BLD MANUAL STRIP-MCNC: 142 MG/DL (ref 74–99)
GLUCOSE BLD MANUAL STRIP-MCNC: 260 MG/DL (ref 74–99)
GLUCOSE BLD MANUAL STRIP-MCNC: 90 MG/DL (ref 74–99)
GLUCOSE SERPL-MCNC: 120 MG/DL (ref 74–99)
GLUCOSE SERPL-MCNC: 151 MG/DL (ref 74–99)
HAPTOGLOB SERPL-MCNC: 168 MG/DL (ref 37–246)
HCT VFR BLD AUTO: 25.9 % (ref 41–52)
HGB BLD-MCNC: 9.2 G/DL (ref 13.5–17.5)
IMM GRANULOCYTES # BLD AUTO: 0.12 X10*3/UL (ref 0–0.5)
IMM GRANULOCYTES NFR BLD AUTO: 1.5 % (ref 0–0.9)
LDH SERPL L TO P-CCNC: 188 U/L (ref 84–246)
LYMPHOCYTES # BLD AUTO: 0.89 X10*3/UL (ref 0.8–3)
LYMPHOCYTES NFR BLD AUTO: 11.2 %
MAGNESIUM SERPL-MCNC: 1.74 MG/DL (ref 1.6–2.4)
MAGNESIUM SERPL-MCNC: 2.14 MG/DL (ref 1.6–2.4)
MCH RBC QN AUTO: 30.8 PG (ref 26–34)
MCHC RBC AUTO-ENTMCNC: 35.5 G/DL (ref 32–36)
MCV RBC AUTO: 87 FL (ref 80–100)
MONOCYTES # BLD AUTO: 0.57 X10*3/UL (ref 0.05–0.8)
MONOCYTES NFR BLD AUTO: 7.2 %
NEUTROPHILS # BLD AUTO: 6.28 X10*3/UL (ref 1.6–5.5)
NEUTROPHILS NFR BLD AUTO: 78.8 %
NRBC BLD-RTO: 0 /100 WBCS (ref 0–0)
PHOSPHATE SERPL-MCNC: 3.3 MG/DL (ref 2.5–4.9)
PLATELET # BLD AUTO: 239 X10*3/UL (ref 150–450)
POTASSIUM SERPL-SCNC: 4.1 MMOL/L (ref 3.5–5.3)
POTASSIUM SERPL-SCNC: 4.8 MMOL/L (ref 3.5–5.3)
PROT SERPL-MCNC: 7.6 G/DL (ref 6.4–8.2)
RBC # BLD AUTO: 2.99 X10*6/UL (ref 4.5–5.9)
RH FACTOR (ANTIGEN D): NORMAL
SODIUM SERPL-SCNC: 136 MMOL/L (ref 136–145)
SODIUM SERPL-SCNC: 139 MMOL/L (ref 136–145)
WBC # BLD AUTO: 8 X10*3/UL (ref 4.4–11.3)

## 2024-06-06 PROCEDURE — 80069 RENAL FUNCTION PANEL: CPT

## 2024-06-06 PROCEDURE — 82947 ASSAY GLUCOSE BLOOD QUANT: CPT

## 2024-06-06 PROCEDURE — 83735 ASSAY OF MAGNESIUM: CPT | Mod: 91 | Performed by: STUDENT IN AN ORGANIZED HEALTH CARE EDUCATION/TRAINING PROGRAM

## 2024-06-06 PROCEDURE — 99233 SBSQ HOSP IP/OBS HIGH 50: CPT | Performed by: STUDENT IN AN ORGANIZED HEALTH CARE EDUCATION/TRAINING PROGRAM

## 2024-06-06 PROCEDURE — 2500000002 HC RX 250 W HCPCS SELF ADMINISTERED DRUGS (ALT 637 FOR MEDICARE OP, ALT 636 FOR OP/ED): Performed by: STUDENT IN AN ORGANIZED HEALTH CARE EDUCATION/TRAINING PROGRAM

## 2024-06-06 PROCEDURE — 86901 BLOOD TYPING SEROLOGIC RH(D): CPT | Performed by: STUDENT IN AN ORGANIZED HEALTH CARE EDUCATION/TRAINING PROGRAM

## 2024-06-06 PROCEDURE — 36415 COLL VENOUS BLD VENIPUNCTURE: CPT | Performed by: STUDENT IN AN ORGANIZED HEALTH CARE EDUCATION/TRAINING PROGRAM

## 2024-06-06 PROCEDURE — 94640 AIRWAY INHALATION TREATMENT: CPT

## 2024-06-06 PROCEDURE — 2500000004 HC RX 250 GENERAL PHARMACY W/ HCPCS (ALT 636 FOR OP/ED): Performed by: STUDENT IN AN ORGANIZED HEALTH CARE EDUCATION/TRAINING PROGRAM

## 2024-06-06 PROCEDURE — 82947 ASSAY GLUCOSE BLOOD QUANT: CPT | Mod: 91

## 2024-06-06 PROCEDURE — 99222 1ST HOSP IP/OBS MODERATE 55: CPT | Performed by: INTERNAL MEDICINE

## 2024-06-06 PROCEDURE — 85025 COMPLETE CBC W/AUTO DIFF WBC: CPT | Performed by: STUDENT IN AN ORGANIZED HEALTH CARE EDUCATION/TRAINING PROGRAM

## 2024-06-06 PROCEDURE — 2500000001 HC RX 250 WO HCPCS SELF ADMINISTERED DRUGS (ALT 637 FOR MEDICARE OP): Performed by: STUDENT IN AN ORGANIZED HEALTH CARE EDUCATION/TRAINING PROGRAM

## 2024-06-06 PROCEDURE — 1200000002 HC GENERAL ROOM WITH TELEMETRY DAILY

## 2024-06-06 PROCEDURE — 83615 LACTATE (LD) (LDH) ENZYME: CPT | Performed by: STUDENT IN AN ORGANIZED HEALTH CARE EDUCATION/TRAINING PROGRAM

## 2024-06-06 PROCEDURE — 83735 ASSAY OF MAGNESIUM: CPT

## 2024-06-06 PROCEDURE — 82248 BILIRUBIN DIRECT: CPT | Performed by: INTERNAL MEDICINE

## 2024-06-06 PROCEDURE — 96376 TX/PRO/DX INJ SAME DRUG ADON: CPT

## 2024-06-06 PROCEDURE — C9113 INJ PANTOPRAZOLE SODIUM, VIA: HCPCS | Performed by: STUDENT IN AN ORGANIZED HEALTH CARE EDUCATION/TRAINING PROGRAM

## 2024-06-06 PROCEDURE — 36415 COLL VENOUS BLD VENIPUNCTURE: CPT

## 2024-06-06 RX ORDER — POLYETHYLENE GLYCOL 3350 17 G/17G
238 POWDER, FOR SOLUTION ORAL ONCE AS NEEDED
Status: DISCONTINUED | OUTPATIENT
Start: 2024-06-06 | End: 2024-06-08 | Stop reason: HOSPADM

## 2024-06-06 RX ORDER — FLUTICASONE PROPIONATE AND SALMETEROL 250; 50 UG/1; UG/1
1 POWDER RESPIRATORY (INHALATION)
Status: DISCONTINUED | OUTPATIENT
Start: 2024-06-06 | End: 2024-06-06

## 2024-06-06 RX ORDER — ATORVASTATIN CALCIUM 80 MG/1
80 TABLET, FILM COATED ORAL DAILY
Status: DISCONTINUED | OUTPATIENT
Start: 2024-06-06 | End: 2024-06-08 | Stop reason: HOSPADM

## 2024-06-06 RX ORDER — POLYETHYLENE GLYCOL 3350, SODIUM CHLORIDE, SODIUM BICARBONATE, POTASSIUM CHLORIDE 420; 11.2; 5.72; 1.48 G/4L; G/4L; G/4L; G/4L
4000 POWDER, FOR SOLUTION ORAL ONCE
Status: COMPLETED | OUTPATIENT
Start: 2024-06-06 | End: 2024-06-06

## 2024-06-06 RX ORDER — FLUTICASONE FUROATE AND VILANTEROL 200; 25 UG/1; UG/1
1 POWDER RESPIRATORY (INHALATION) DAILY
Status: DISCONTINUED | OUTPATIENT
Start: 2024-06-06 | End: 2024-06-08 | Stop reason: HOSPADM

## 2024-06-06 RX ORDER — CARVEDILOL 12.5 MG/1
25 TABLET ORAL
Status: DISCONTINUED | OUTPATIENT
Start: 2024-06-06 | End: 2024-06-07

## 2024-06-06 RX ORDER — ASPIRIN 81 MG/1
81 TABLET ORAL DAILY
Status: DISCONTINUED | OUTPATIENT
Start: 2024-06-06 | End: 2024-06-08 | Stop reason: HOSPADM

## 2024-06-06 RX ORDER — SPIRONOLACTONE 25 MG/1
12.5 TABLET ORAL 2 TIMES DAILY
Status: DISCONTINUED | OUTPATIENT
Start: 2024-06-06 | End: 2024-06-08 | Stop reason: HOSPADM

## 2024-06-06 RX ORDER — ISOSORBIDE MONONITRATE 30 MG/1
30 TABLET, EXTENDED RELEASE ORAL DAILY
Status: DISCONTINUED | OUTPATIENT
Start: 2024-06-06 | End: 2024-06-08 | Stop reason: HOSPADM

## 2024-06-06 RX ORDER — AMLODIPINE BESYLATE 10 MG/1
10 TABLET ORAL DAILY
Status: DISCONTINUED | OUTPATIENT
Start: 2024-06-06 | End: 2024-06-08 | Stop reason: HOSPADM

## 2024-06-06 RX ORDER — MONTELUKAST SODIUM 10 MG/1
10 TABLET ORAL NIGHTLY
Status: DISCONTINUED | OUTPATIENT
Start: 2024-06-06 | End: 2024-06-08 | Stop reason: HOSPADM

## 2024-06-06 RX ORDER — POLYETHYLENE GLYCOL 3350, SODIUM CHLORIDE, SODIUM BICARBONATE, POTASSIUM CHLORIDE 420; 11.2; 5.72; 1.48 G/4L; G/4L; G/4L; G/4L
2000 POWDER, FOR SOLUTION ORAL ONCE AS NEEDED
Status: DISCONTINUED | OUTPATIENT
Start: 2024-06-06 | End: 2024-06-08 | Stop reason: HOSPADM

## 2024-06-06 RX ORDER — PANTOPRAZOLE SODIUM 40 MG/10ML
40 INJECTION, POWDER, LYOPHILIZED, FOR SOLUTION INTRAVENOUS 2 TIMES DAILY
Status: DISCONTINUED | OUTPATIENT
Start: 2024-06-06 | End: 2024-06-08 | Stop reason: HOSPADM

## 2024-06-06 RX ADMIN — PANTOPRAZOLE SODIUM 40 MG: 40 INJECTION, POWDER, FOR SOLUTION INTRAVENOUS at 21:56

## 2024-06-06 RX ADMIN — ATORVASTATIN CALCIUM 80 MG: 80 TABLET, FILM COATED ORAL at 21:56

## 2024-06-06 RX ADMIN — INSULIN LISPRO 3 UNITS: 100 INJECTION, SOLUTION INTRAVENOUS; SUBCUTANEOUS at 12:13

## 2024-06-06 RX ADMIN — MONTELUKAST 10 MG: 10 TABLET, FILM COATED ORAL at 21:56

## 2024-06-06 RX ADMIN — FLUTICASONE FUROATE AND VILANTEROL TRIFENATATE 1 PUFF: 200; 25 POWDER RESPIRATORY (INHALATION) at 10:51

## 2024-06-06 RX ADMIN — POLYETHYLENE GLYCOL 3350, SODIUM SULFATE ANHYDROUS, SODIUM BICARBONATE, SODIUM CHLORIDE, POTASSIUM CHLORIDE 4000 ML: 236; 22.74; 6.74; 5.86; 2.97 POWDER, FOR SOLUTION ORAL at 17:58

## 2024-06-06 RX ADMIN — PANTOPRAZOLE SODIUM 40 MG: 40 INJECTION, POWDER, FOR SOLUTION INTRAVENOUS at 12:20

## 2024-06-06 ASSESSMENT — PAIN SCALES - GENERAL
PAINLEVEL_OUTOF10: 0 - NO PAIN

## 2024-06-06 NOTE — PROGRESS NOTES
Daily Progress Note      Subjective   Mann Abrams is  83 y.o. male with a PMH of CKD stage 3 (baseline Cr 1.7-2.0), DM, PAD, HLD,  HTN and anemia who presents with chest pain/dyspnea on exertion.     No acute events over night   Following receiving 2 units of blood in ED, pt reports improved anginal symptoms. Does not have CP/SOB when walking to bathroom.         Objective         Vitals: I/O:   Vitals:    06/06/24 1057   BP: 160/75   Pulse: 81   Resp: 18   Temp:    SpO2: 95%        24hr Min/Max:  Temp  Min: 35.6 °C (96.1 °F)  Max: 36.7 °C (98 °F)  Pulse  Min: 69  Max: 88  BP  Min: 138/68  Max: 166/75  Resp  Min: 16  Max: 18  SpO2  Min: 92 %  Max: 100 %   Intake/Output Summary (Last 24 hours) at 6/6/2024 1410  Last data filed at 6/5/2024 2346  Gross per 24 hour   Intake 729.17 ml   Output --   Net 729.17 ml        Net IO Since Admission: 729.17 mL [06/06/24 1410]      Scheduled Medications:  PRN Medications:    amLODIPine, 10 mg, oral, Daily  [Held by provider] aspirin, 81 mg, oral, Daily  atorvastatin, 80 mg, oral, Daily  [Held by provider] carvedilol, 25 mg, oral, BID  fluticasone furoate-vilanteroL, 1 puff, inhalation, Daily  insulin lispro, 0-5 Units, subcutaneous, TID  isosorbide mononitrate ER, 30 mg, oral, Daily  montelukast, 10 mg, oral, Nightly  pantoprazole, 40 mg, intravenous, BID  perflutren lipid microspheres, 0.5-10 mL of dilution, intravenous, Once in imaging  perflutren protein A microsphere, 0.5 mL, intravenous, Once in imaging  polyethylene glycol-electrolytes, 4,000 mL, oral, Once  [Held by provider] spironolactone, 12.5 mg, oral, BID  sulfur hexafluoride microsphr, 2 mL, intravenous, Once in imaging     PRN medications: dextrose, dextrose, glucagon, glucagon, polyethylene glycol, polyethylene glycol-electrolytes       Physical Exam:  Physical Exam  Constitutional:       General: He is not in acute distress.     Appearance: Normal appearance. He is not ill-appearing.   Cardiovascular:       "Rate and Rhythm: Normal rate and regular rhythm.      Heart sounds: No murmur heard.     No friction rub. No gallop.   Pulmonary:      Effort: Pulmonary effort is normal. No respiratory distress.      Breath sounds: Normal breath sounds. No stridor. No wheezing.   Abdominal:      General: Abdomen is flat. Bowel sounds are normal. There is no distension.      Palpations: Abdomen is soft. There is no mass.      Tenderness: There is no abdominal tenderness.   Skin:     General: Skin is warm.      Capillary Refill: Capillary refill takes less than 2 seconds.   Neurological:      Mental Status: He is alert.          LABS:    CBC RFP   Lab Results   Component Value Date    WBC 8.0 06/06/2024    HGB 9.2 (L) 06/06/2024    HCT 25.9 (L) 06/06/2024    MCV 87 06/06/2024     06/06/2024    NEUTROABS 6.28 (H) 06/06/2024    Lab Results   Component Value Date     06/06/2024    K 4.8 06/06/2024     06/06/2024    CO2 21 06/06/2024    BUN 19 06/06/2024    CREATININE 1.70 (H) 06/06/2024    CREATININE 1.55 (H) 06/06/2024     Lab Results   Component Value Date    MG 2.14 06/06/2024    PHOS 3.3 06/06/2024    CALCIUM 9.9 06/06/2024    ALBUMINELP 4.0 08/28/2023         Hepatic Function ABG/VBG   Lab Results   Component Value Date    ALT 16 06/06/2024    AST 25 06/06/2024    ALKPHOS 73 06/06/2024     Lab Results   Component Value Date    BILIDIR 0.1 06/06/2024      Lab Results   Component Value Date    PROTIME 10.8 06/05/2024    APTT 21 (L) 06/05/2024    INR 1.0 06/05/2024    ALBUMINELP 4.0 08/28/2023      No results found for: \"NONUHFIRE\", \"LACTATE\"             IMAGING:    === Results for orders placed during the hospital encounter of 06/05/24 ===    ECG 12 lead [ECG1] 06/05/2024    Status: Normal    ___________________________________________________________________________    XR chest 2 views [IMG36] 06/05/2024    Status: Normal  Left lower lobe infiltrate.  Signed by Matthew Dhaliwal MD       Updates 6/6:  - got 2 packed " RBCs in ED, hgb up to 9.2  - angina symptoms improving following transfusion   - pt started on bowel prep and NPO diet at midnight for upper and lower scope on 6/7 per GI  - continue to trend hgb   - blood pressure increasing, restarted on home alodipine and imdur for angina           Assessment/Plan    Mann Abrams is a 83 y.o. male with a PMH of CKD stage 3 (baseline Cr 1.7-2.0), DM (last A1c 7 1/24), PAD, HLD, CAD,  HTN and anemia who presents with chest pain/dyspnea on exertion. Most likely diagnosis is exertional angina with known CAD with 70-80% stenosis of LCX that is exacerbated by anemia. Less likely diagnosis include an acute heart failure exacerbation. Evidence supporting CAD with worsening angina due to anemia is known CAD from left heart cath from 5/30 and acute drop in hgb down to 6 today from 11 the year prior. Heart failure is not likely given no pulmonary or peripheral edema. To rule out heart failure, echo will be ordered. GI is consulted to rule out upper GI bleed given dark brown/black stool. Transfusion relieved anginal symptom likely pointing to anemia exacerbating known CAD. Upper and lower scope for evaluation of blood loss via GI         #anemia  #fatigue  ::worsening anemia since 4/24, hgb 6 on 6/5 and got 2 units RBCs in ED, repeat hgb 9.2 on 6/6   ::on oral iron for several months   ::rectal exam showed dark brown/black stool. Possible melena  ::not iron deficient based on serum iron and TIBC   ::denies upper/lower GI symptoms. No overt signs of bleeding   ::on home aspirin 81mg  ::PT, PTT, INR WNL  ::MCV, MCH, MCHC, RDW, LDH,. Haptoglobin WNL  ::increased retic count (6.9%)  ::likely [facility], possibly GI source   Plan:  - LDH, haptoglobin, retic count, ferritin   - start BID pantoprazole  - hold PO iron and aspirin   - consult GI, possible EGD tomorrow      #CAD   #stable angina  #dyspnea on exertion  ::left heart cath from 5/30 showed significant stenosis 70-80% of LCX. No stent placed    ::typical anginal symptoms, no pain/dyspnea at rest  ::trop,ecg, BNP negative   ::on home imdur and nitroglycerin   ::anginal symptoms improved following 2 RBCs   Plan:  - see symptomatic response to 2 units RBCs, consult cardiology if pain does not improve   - BNP, echo         #left low lobe infiltrate   ::left lower lobe infiltrate seen on CXR on admission   ::no cough, fever, leukocytosis  Plan:  Low concern for pneumonia, will not start antibiotics at this time. Can reconsider if clinical picture changes      #COPD  ::Home fluticasone and montelukast   Plan:  Continue home meds      #CKD stage 3b  ::baseline Cr 1.7-2.0  ::1.97 on admission  Plan:  Monitor RFP, avoid nephrotoxic agents         #DM  ::Last A1c 7 from 1/24, 5/7 on admission   Plan:  - Discontinue metformin  - Start mild sliding scale insulin mild      #HTN  ::On home amlodipine, carvedilol, olmesartan  Plan:  Restart home amlodipine for increased Bps      #HLD  #PAD  Plan:  Continue home atorvastatin           F: PRN  E: PRN   N: clear liquid diet during day, NPO aftermidnight   A: PIV   Code status: Full code (confirmed on admission)   NOK: Lesvia Abrams (wife) [phone]        Dominic Lopez, MS4

## 2024-06-06 NOTE — PROGRESS NOTES
"[facility]  [facility] [place]  CONSULT FOLLOW-UP     Reason For Consult  SAGE     SUBJECTIVE     No events over night. Still no signs of overt GIB.    EXAM     Last Recorded Vitals  Blood pressure 160/75, pulse 81, temperature 35.6 °C (96.1 °F), temperature source Tympanic, resp. rate 18, height 1.676 m (5' 6\"), weight 63.5 kg (140 lb), SpO2 95%.      Intake/Output Summary (Last 24 hours) at 6/6/2024 1410  Last data filed at 6/5/2024 2346  Gross per 24 hour   Intake 729.17 ml   Output --   Net 729.17 ml       Physical Exam   GENERAL: In no acute distress.  NEUROLOGIC: A and O x 3. Cranial nerves 2 through 12 grossly intact with no gait disturbances. Intact motor and sensory systems.  HEENT: Pupils are equal, round, and reactive to light and accommodation. Extraocular motion intact. No scleral icterus.  CARDIAC: S1 + S2, RRR, no M/R/G.    LUNGS: CTA BL. No wheezes or coarse breath sounds. Symmetric respirations. No increased work of breathing.   ABDOMEN: Soft, non-tender to palpation. No rebound or guarding.  PSYCH: Appropriate mood and behavior.      OBJECTIVE                                                                              Medications             Current Facility-Administered Medications:     amLODIPine (Norvasc) tablet 10 mg, 10 mg, oral, Daily, Seferino Chowdhury MD    [Held by provider] aspirin EC tablet 81 mg, 81 mg, oral, Daily, Seferino Chowdhury MD    atorvastatin (Lipitor) tablet 80 mg, 80 mg, oral, Daily, Seferino Chowdhury MD    [Held by provider] carvedilol (Coreg) tablet 25 mg, 25 mg, oral, BID, Seferino Chowdhury MD    dextrose 50 % injection 12.5 g, 12.5 g, intravenous, q15 min PRN, Seferino Chowdhury MD    dextrose 50 % injection 25 g, 25 g, intravenous, q15 min PRN, Seferino Chowdhury MD    fluticasone furoate-vilanteroL (Breo Ellipta) 200-25 mcg/dose inhaler 1 puff, 1 puff, inhalation, Daily, Seferino Chowdhury MD, 1 puff at 06/06/24 1051    glucagon " (Glucagen) injection 1 mg, 1 mg, intramuscular, q15 min PRN, Seferino Chowdhury MD    glucagon (Glucagen) injection 1 mg, 1 mg, intramuscular, q15 min PRN, Seferino Chowdhury MD    insulin lispro (HumaLOG) injection 0-5 Units, 0-5 Units, subcutaneous, TID, Seferino Chowdhury MD, 3 Units at 06/06/24 1213    isosorbide mononitrate ER (Imdur) 24 hr tablet 30 mg, 30 mg, oral, Daily, Seferino Chowdhury MD    montelukast (Singulair) tablet 10 mg, 10 mg, oral, Nightly, Seferino Chowdhury MD    pantoprazole (ProtoNix) injection 40 mg, 40 mg, intravenous, BID, Seferino Chowdhury MD, 40 mg at 06/06/24 1220    perflutren lipid microspheres (Definity) injection 0.5-10 mL of dilution, 0.5-10 mL of dilution, intravenous, Once in imaging, Seferino Chowdhury MD    perflutren protein A microsphere (Optison) injection 0.5 mL, 0.5 mL, intravenous, Once in imaging, Seferino Chowdhury MD    polyethylene glycol (Glycolax, Miralax) packet 238 g, 238 g, oral, Once PRN, Seferino Chowdhury MD    polyethylene glycol-electrolytes (Nulytely) solution 2,000 mL, 2,000 mL, oral, Once PRN, Seferino Chowdhury MD    polyethylene glycol-electrolytes (Nulytely) solution 4,000 mL, 4,000 mL, oral, Once, Seferino Chowdhury MD    [Held by provider] spironolactone (Aldactone) tablet 12.5 mg, 12.5 mg, oral, BID, Seferino Chowdhury MD    sulfur hexafluoride microsphr (Lumason) injection 24.28 mg, 2 mL, intravenous, Once in imaging, Seferino Chowdhury MD    Current Outpatient Medications:     amLODIPine (Norvasc) 10 mg tablet, Take 1 tablet (10 mg) by mouth once daily., Disp: 90 tablet, Rfl: 1    ascorbic acid (Vitamin C) 500 mg tablet, Take by mouth., Disp: , Rfl:     aspirin 81 mg EC tablet, Take 1 tablet (81 mg) by mouth once daily., Disp: 30 tablet, Rfl: 11    atorvastatin (Lipitor) 80 mg tablet, Take 1 tablet (80 mg) by mouth once daily., Disp: 90 tablet, Rfl: 1    carvedilol (Coreg) 25 mg tablet, HALF PILL BID PLEASE NOTE CHANGE IN DOSE, Disp: 90 tablet, Rfl: 3    fluticasone  (Flonase) 50 mcg/actuation nasal spray, Administer 1 spray into each nostril once daily. Shake gently. Before first use, prime pump. After use, clean tip and replace cap., Disp: 48 g, Rfl: 1    fluticasone propion-salmeteroL (Wixela Inhub) 250-50 mcg/dose diskus inhaler, Inhale 1 puff 2 times a day. Rinse mouth with water after use to reduce aftertaste and incidence of candidiasis. Do not swallow., Disp: 3 each, Rfl: 1    FreeStyle glucose monitoring kit, Use to test blood sugar twice daily, Disp: , Rfl:     isosorbide mononitrate ER (Imdur) 30 mg 24 hr tablet, Take 1 tablet (30 mg) by mouth once daily. Do not crush or chew., Disp: 90 tablet, Rfl: 3    metFORMIN XR (Glucophage-XR) 500 mg 24 hr tablet, Take 1 tablet (500 mg) by mouth once daily in the evening. Take with meals., Disp: 90 tablet, Rfl: 3    montelukast (Singulair) 10 mg tablet, Take 1 tablet (10 mg) by mouth once daily at bedtime., Disp: 90 tablet, Rfl: 3    nitroglycerin (Nitrostat) 0.4 mg SL tablet, Place 1 tablet (0.4 mg) under the tongue every 5 minutes if needed for chest pain. place 1 tablet under tongue every 5 minutes for up to 3 doses as needed for chest pain. Call 911 if pain persists, Disp: , Rfl:     olmesartan (Benicar) 40 mg tablet, Take 1 tablet (40 mg) by mouth once daily., Disp: 90 tablet, Rfl: 3    omeprazole (PriLOSEC) 40 mg DR capsule, Take 1 capsule (40 mg) by mouth once daily., Disp: 90 capsule, Rfl: 3    spironolactone (Aldactone) 25 mg tablet, Take 0.5 tablets (12.5 mg) by mouth 2 times a day., Disp: , Rfl:                                                                             Labs     Results for orders placed or performed during the hospital encounter of 06/05/24 (from the past 24 hour(s))   Haptoglobin   Result Value Ref Range    Haptoglobin 168 37 - 246 mg/dL   Bilirubin, Direct   Result Value Ref Range    Bilirubin, Direct 0.0 0.0 - 0.3 mg/dL   POCT GLUCOSE   Result Value Ref Range    POCT Glucose 105 (H) 74 - 99 mg/dL    Magnesium   Result Value Ref Range    Magnesium 1.74 1.60 - 2.40 mg/dL   Renal function panel   Result Value Ref Range    Glucose 120 (H) 74 - 99 mg/dL    Sodium 139 136 - 145 mmol/L    Potassium 4.1 3.5 - 5.3 mmol/L    Chloride 111 (H) 98 - 107 mmol/L    Bicarbonate 19 (L) 21 - 32 mmol/L    Anion Gap 13 10 - 20 mmol/L    Urea Nitrogen 17 6 - 23 mg/dL    Creatinine 1.55 (H) 0.50 - 1.30 mg/dL    eGFR 45 (L) >60 mL/min/1.73m*2    Calcium 7.7 (L) 8.6 - 10.6 mg/dL    Phosphorus 3.3 2.5 - 4.9 mg/dL    Albumin 3.1 (L) 3.4 - 5.0 g/dL   Lactate dehydrogenase   Result Value Ref Range     84 - 246 U/L   POCT GLUCOSE   Result Value Ref Range    POCT Glucose 142 (H) 74 - 99 mg/dL   CBC and Auto Differential   Result Value Ref Range    WBC 8.0 4.4 - 11.3 x10*3/uL    nRBC 0.0 0.0 - 0.0 /100 WBCs    RBC 2.99 (L) 4.50 - 5.90 x10*6/uL    Hemoglobin 9.2 (L) 13.5 - 17.5 g/dL    Hematocrit 25.9 (L) 41.0 - 52.0 %    MCV 87 80 - 100 fL    MCH 30.8 26.0 - 34.0 pg    MCHC 35.5 32.0 - 36.0 g/dL    RDW 16.2 (H) 11.5 - 14.5 %    Platelets 239 150 - 450 x10*3/uL    Neutrophils % 78.8 40.0 - 80.0 %    Immature Granulocytes %, Automated 1.5 (H) 0.0 - 0.9 %    Lymphocytes % 11.2 13.0 - 44.0 %    Monocytes % 7.2 2.0 - 10.0 %    Eosinophils % 0.9 0.0 - 6.0 %    Basophils % 0.4 0.0 - 2.0 %    Neutrophils Absolute 6.28 (H) 1.60 - 5.50 x10*3/uL    Immature Granulocytes Absolute, Automated 0.12 0.00 - 0.50 x10*3/uL    Lymphocytes Absolute 0.89 0.80 - 3.00 x10*3/uL    Monocytes Absolute 0.57 0.05 - 0.80 x10*3/uL    Eosinophils Absolute 0.07 0.00 - 0.40 x10*3/uL    Basophils Absolute 0.03 0.00 - 0.10 x10*3/uL   Magnesium   Result Value Ref Range    Magnesium 2.14 1.60 - 2.40 mg/dL   Type And Screen   Result Value Ref Range    ABO TYPE O     Rh TYPE POS     ANTIBODY SCREEN NEG    Basic metabolic panel   Result Value Ref Range    Glucose 151 (H) 74 - 99 mg/dL    Sodium 136 136 - 145 mmol/L    Potassium 4.8 3.5 - 5.3 mmol/L    Chloride 104 98 - 107  mmol/L    Bicarbonate 21 21 - 32 mmol/L    Anion Gap 16 10 - 20 mmol/L    Urea Nitrogen 19 6 - 23 mg/dL    Creatinine 1.70 (H) 0.50 - 1.30 mg/dL    eGFR 40 (L) >60 mL/min/1.73m*2    Calcium 9.9 8.6 - 10.6 mg/dL   Hepatic Function Panel   Result Value Ref Range    Albumin 4.4 3.4 - 5.0 g/dL    Bilirubin, Total 0.7 0.0 - 1.2 mg/dL    Bilirubin, Direct 0.1 0.0 - 0.3 mg/dL    Alkaline Phosphatase 73 33 - 136 U/L    ALT 16 10 - 52 U/L    AST 25 9 - 39 U/L    Total Protein 7.6 6.4 - 8.2 g/dL   POCT GLUCOSE   Result Value Ref Range    POCT Glucose 260 (H) 74 - 99 mg/dL                                                                             Imaging           2/2/2024 CT A/P w/ IV contrast:  IMPRESSION:   1. Right-sided direct inguinal hernia containing ileal bowel loops, fat, and trace fluid. Although there is no evidence for bowel obstruction, incarceration cannot be excluded. Correlate with hernia reducibility and any pain at this site.   2.  Incidental note is made of bilateral renal artery stenosis. Consider follow-up Doppler renal ultrasound.   3.  Other nonacute incidental findings as above.                                                                           GI Procedures      7/2020 Colonoscopy:  - Diverticulosis in the sigmoid colon, in the descending colon, in the transverse colon and in the ascending colon.  - Internal hemorrhoids.  - The examined portion of the ileus was normal.  - No specimens collected.     ASSESSMENT / PLAN                  ASSESSMENT/PLAN:     Mann Abrams is a 83 y.o. male with a past medical history of severe SAGE, COPD, HTN, DLD, DMT2, and CKD 3b, admitted on 6/5/2024 with CP and dyspnea on exertion. Hgb 6 from 8.4 on the 5/14/24.  Of note he was admitted within the last month at [facility]. 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 No stents were placed.      GI is consulted for SAGE.    SAGE can be in the setting of occult GIB. We will  proceed with EGD and colonoscopy tentatively on the 6/7/2024. Patient is in agreement.      Recommendations:  - Please make sure the patient is on a clear liquid diet on the 6/6/2024.  - Please make sure the patient is NPO at midnight the day before the procedures.  - Please start bowel preparation on the 6/6/2024 per Bowel Prep order set.  - Continue to trend Hgb (goal > 7).  - Ensure adequate IV access, ideally 2 large bore IVs.  - Additional supportive care per primary team.     The above recommendations were communicated to the [facility] team.     Patient was seen and discussed with Dr. Torres.     Gastroenterology will continue to follow.  During weekday hours of 7am-5pm please do not hesitate to contact me on [facility] or page [phone], if there are any further questions between the weekday hours of 7am-5pm.   After hours, on weekends, and on holidays, please [place] [phone]. Thank you.     Shasta Bolden MD  PGY-4 Gastroenterology and Hepatology Fellow  [facility]

## 2024-06-07 ENCOUNTER — APPOINTMENT (OUTPATIENT)
Dept: GASTROENTEROLOGY | Facility: HOSPITAL | Age: 84
DRG: 379 | End: 2024-06-07
Payer: MEDICARE

## 2024-06-07 LAB
ALBUMIN SERPL BCP-MCNC: 3.8 G/DL (ref 3.4–5)
ANION GAP SERPL CALC-SCNC: 17 MMOL/L (ref 10–20)
BUN SERPL-MCNC: 16 MG/DL (ref 6–23)
CALCIUM SERPL-MCNC: 8.7 MG/DL (ref 8.6–10.6)
CHLORIDE SERPL-SCNC: 101 MMOL/L (ref 98–107)
CO2 SERPL-SCNC: 21 MMOL/L (ref 21–32)
CREAT SERPL-MCNC: 1.58 MG/DL (ref 0.5–1.3)
EGFRCR SERPLBLD CKD-EPI 2021: 44 ML/MIN/1.73M*2
GLUCOSE BLD MANUAL STRIP-MCNC: 113 MG/DL (ref 74–99)
GLUCOSE BLD MANUAL STRIP-MCNC: 114 MG/DL (ref 74–99)
GLUCOSE BLD MANUAL STRIP-MCNC: 249 MG/DL (ref 74–99)
GLUCOSE SERPL-MCNC: 125 MG/DL (ref 74–99)
MAGNESIUM SERPL-MCNC: 1.95 MG/DL (ref 1.6–2.4)
PHOSPHATE SERPL-MCNC: 3.6 MG/DL (ref 2.5–4.9)
POTASSIUM SERPL-SCNC: 5.5 MMOL/L (ref 3.5–5.3)
SODIUM SERPL-SCNC: 133 MMOL/L (ref 136–145)

## 2024-06-07 PROCEDURE — G0500 MOD SEDAT ENDO SERVICE >5YRS: HCPCS | Performed by: INTERNAL MEDICINE

## 2024-06-07 PROCEDURE — 3700000013 HC SEDATION LEVEL 5+ TIME - EACH ADDITIONAL 15 MINUTES

## 2024-06-07 PROCEDURE — 2500000004 HC RX 250 GENERAL PHARMACY W/ HCPCS (ALT 636 FOR OP/ED): Performed by: STUDENT IN AN ORGANIZED HEALTH CARE EDUCATION/TRAINING PROGRAM

## 2024-06-07 PROCEDURE — 83735 ASSAY OF MAGNESIUM: CPT | Performed by: STUDENT IN AN ORGANIZED HEALTH CARE EDUCATION/TRAINING PROGRAM

## 2024-06-07 PROCEDURE — 82947 ASSAY GLUCOSE BLOOD QUANT: CPT

## 2024-06-07 PROCEDURE — 7100000010 HC PHASE TWO TIME - EACH INCREMENTAL 1 MINUTE

## 2024-06-07 PROCEDURE — 43270 EGD LESION ABLATION: CPT | Performed by: INTERNAL MEDICINE

## 2024-06-07 PROCEDURE — 3700000012 HC SEDATION LEVEL 5+ TIME - INITIAL 15 MINUTES 5/> YEARS

## 2024-06-07 PROCEDURE — 45378 DIAGNOSTIC COLONOSCOPY: CPT | Performed by: INTERNAL MEDICINE

## 2024-06-07 PROCEDURE — 0W3P8ZZ CONTROL BLEEDING IN GASTROINTESTINAL TRACT, VIA NATURAL OR ARTIFICIAL OPENING ENDOSCOPIC: ICD-10-PCS | Performed by: INTERNAL MEDICINE

## 2024-06-07 PROCEDURE — 88305 TISSUE EXAM BY PATHOLOGIST: CPT | Mod: TC,SUR | Performed by: INTERNAL MEDICINE

## 2024-06-07 PROCEDURE — C9113 INJ PANTOPRAZOLE SODIUM, VIA: HCPCS | Performed by: STUDENT IN AN ORGANIZED HEALTH CARE EDUCATION/TRAINING PROGRAM

## 2024-06-07 PROCEDURE — 82947 ASSAY GLUCOSE BLOOD QUANT: CPT | Mod: 91

## 2024-06-07 PROCEDURE — 99232 SBSQ HOSP IP/OBS MODERATE 35: CPT | Performed by: INTERNAL MEDICINE

## 2024-06-07 PROCEDURE — 80069 RENAL FUNCTION PANEL: CPT | Performed by: STUDENT IN AN ORGANIZED HEALTH CARE EDUCATION/TRAINING PROGRAM

## 2024-06-07 PROCEDURE — 43239 EGD BIOPSY SINGLE/MULTIPLE: CPT | Performed by: INTERNAL MEDICINE

## 2024-06-07 PROCEDURE — 0DJD8ZZ INSPECTION OF LOWER INTESTINAL TRACT, VIA NATURAL OR ARTIFICIAL OPENING ENDOSCOPIC: ICD-10-PCS | Performed by: INTERNAL MEDICINE

## 2024-06-07 PROCEDURE — 2720000007 HC OR 272 NO HCPCS

## 2024-06-07 PROCEDURE — 2500000001 HC RX 250 WO HCPCS SELF ADMINISTERED DRUGS (ALT 637 FOR MEDICARE OP): Performed by: STUDENT IN AN ORGANIZED HEALTH CARE EDUCATION/TRAINING PROGRAM

## 2024-06-07 PROCEDURE — 1200000002 HC GENERAL ROOM WITH TELEMETRY DAILY

## 2024-06-07 PROCEDURE — 36415 COLL VENOUS BLD VENIPUNCTURE: CPT | Performed by: STUDENT IN AN ORGANIZED HEALTH CARE EDUCATION/TRAINING PROGRAM

## 2024-06-07 PROCEDURE — 2500000004 HC RX 250 GENERAL PHARMACY W/ HCPCS (ALT 636 FOR OP/ED): Performed by: INTERNAL MEDICINE

## 2024-06-07 PROCEDURE — 0DB68ZX EXCISION OF STOMACH, VIA NATURAL OR ARTIFICIAL OPENING ENDOSCOPIC, DIAGNOSTIC: ICD-10-PCS | Performed by: INTERNAL MEDICINE

## 2024-06-07 PROCEDURE — 7100000009 HC PHASE TWO TIME - INITIAL BASE CHARGE

## 2024-06-07 PROCEDURE — 99153 MOD SED SAME PHYS/QHP EA: CPT | Performed by: INTERNAL MEDICINE

## 2024-06-07 RX ORDER — OLMESARTAN MEDOXOMIL 20 MG/1
40 TABLET ORAL DAILY
Status: CANCELLED | OUTPATIENT
Start: 2024-06-07

## 2024-06-07 RX ORDER — FENTANYL CITRATE 50 UG/ML
INJECTION, SOLUTION INTRAMUSCULAR; INTRAVENOUS AS NEEDED
Status: COMPLETED | OUTPATIENT
Start: 2024-06-07 | End: 2024-06-07

## 2024-06-07 RX ORDER — OLMESARTAN MEDOXOMIL 20 MG/1
40 TABLET ORAL DAILY
Status: DISCONTINUED | OUTPATIENT
Start: 2024-06-07 | End: 2024-06-08 | Stop reason: HOSPADM

## 2024-06-07 RX ORDER — MIDAZOLAM HYDROCHLORIDE 1 MG/ML
INJECTION, SOLUTION INTRAMUSCULAR; INTRAVENOUS AS NEEDED
Status: COMPLETED | OUTPATIENT
Start: 2024-06-07 | End: 2024-06-07

## 2024-06-07 RX ORDER — CARVEDILOL 12.5 MG/1
25 TABLET ORAL
Status: DISCONTINUED | OUTPATIENT
Start: 2024-06-07 | End: 2024-06-08 | Stop reason: HOSPADM

## 2024-06-07 RX ADMIN — PANTOPRAZOLE SODIUM 40 MG: 40 INJECTION, POWDER, FOR SOLUTION INTRAVENOUS at 21:57

## 2024-06-07 RX ADMIN — ISOSORBIDE MONONITRATE 30 MG: 30 TABLET, EXTENDED RELEASE ORAL at 09:35

## 2024-06-07 RX ADMIN — CARVEDILOL 25 MG: 12.5 TABLET, FILM COATED ORAL at 19:13

## 2024-06-07 RX ADMIN — MONTELUKAST 10 MG: 10 TABLET, FILM COATED ORAL at 21:57

## 2024-06-07 RX ADMIN — FENTANYL CITRATE 25 MCG: 50 INJECTION, SOLUTION INTRAMUSCULAR; INTRAVENOUS at 16:51

## 2024-06-07 RX ADMIN — FLUTICASONE FUROATE AND VILANTEROL TRIFENATATE 1 PUFF: 200; 25 POWDER RESPIRATORY (INHALATION) at 09:31

## 2024-06-07 RX ADMIN — MIDAZOLAM 2 MG: 1 INJECTION INTRAMUSCULAR; INTRAVENOUS at 16:34

## 2024-06-07 RX ADMIN — FENTANYL CITRATE 50 MCG: 50 INJECTION, SOLUTION INTRAMUSCULAR; INTRAVENOUS at 16:34

## 2024-06-07 RX ADMIN — PANTOPRAZOLE SODIUM 40 MG: 40 INJECTION, POWDER, FOR SOLUTION INTRAVENOUS at 09:33

## 2024-06-07 RX ADMIN — ATORVASTATIN CALCIUM 80 MG: 80 TABLET, FILM COATED ORAL at 21:57

## 2024-06-07 RX ADMIN — AMLODIPINE BESYLATE 10 MG: 10 TABLET ORAL at 09:33

## 2024-06-07 RX ADMIN — MIDAZOLAM 1 MG: 1 INJECTION INTRAMUSCULAR; INTRAVENOUS at 16:40

## 2024-06-07 SDOH — SOCIAL STABILITY: SOCIAL INSECURITY: DO YOU FEEL ANYONE HAS EXPLOITED OR TAKEN ADVANTAGE OF YOU FINANCIALLY OR OF YOUR PERSONAL PROPERTY?: NO

## 2024-06-07 SDOH — SOCIAL STABILITY: SOCIAL INSECURITY: ARE YOU OR HAVE YOU BEEN THREATENED OR ABUSED PHYSICALLY, EMOTIONALLY, OR SEXUALLY BY ANYONE?: NO

## 2024-06-07 SDOH — SOCIAL STABILITY: SOCIAL INSECURITY: HAS ANYONE EVER THREATENED TO HURT YOUR FAMILY OR YOUR PETS?: NO

## 2024-06-07 SDOH — SOCIAL STABILITY: SOCIAL INSECURITY: DOES ANYONE TRY TO KEEP YOU FROM HAVING/CONTACTING OTHER FRIENDS OR DOING THINGS OUTSIDE YOUR HOME?: NO

## 2024-06-07 SDOH — SOCIAL STABILITY: SOCIAL INSECURITY: ARE THERE ANY APPARENT SIGNS OF INJURIES/BEHAVIORS THAT COULD BE RELATED TO ABUSE/NEGLECT?: NO

## 2024-06-07 SDOH — SOCIAL STABILITY: SOCIAL INSECURITY: HAVE YOU HAD THOUGHTS OF HARMING ANYONE ELSE?: NO

## 2024-06-07 SDOH — SOCIAL STABILITY: SOCIAL INSECURITY: WERE YOU ABLE TO COMPLETE ALL THE BEHAVIORAL HEALTH SCREENINGS?: YES

## 2024-06-07 SDOH — SOCIAL STABILITY: SOCIAL INSECURITY: ABUSE: ADULT

## 2024-06-07 SDOH — SOCIAL STABILITY: SOCIAL INSECURITY: HAVE YOU HAD ANY THOUGHTS OF HARMING ANYONE ELSE?: NO

## 2024-06-07 SDOH — SOCIAL STABILITY: SOCIAL INSECURITY: DO YOU FEEL UNSAFE GOING BACK TO THE PLACE WHERE YOU ARE LIVING?: NO

## 2024-06-07 ASSESSMENT — ACTIVITIES OF DAILY LIVING (ADL)
LACK_OF_TRANSPORTATION: NO
ADEQUATE_TO_COMPLETE_ADL: YES
PATIENT'S MEMORY ADEQUATE TO SAFELY COMPLETE DAILY ACTIVITIES?: YES
TOILETING: INDEPENDENT
FEEDING YOURSELF: INDEPENDENT
BATHING: INDEPENDENT
DRESSING YOURSELF: INDEPENDENT
WALKS IN HOME: INDEPENDENT
JUDGMENT_ADEQUATE_SAFELY_COMPLETE_DAILY_ACTIVITIES: YES
HEARING - RIGHT EAR: FUNCTIONAL
HEARING - LEFT EAR: FUNCTIONAL
GROOMING: INDEPENDENT

## 2024-06-07 ASSESSMENT — COGNITIVE AND FUNCTIONAL STATUS - GENERAL
DAILY ACTIVITIY SCORE: 24
MOBILITY SCORE: 23
PATIENT BASELINE BEDBOUND: NO
CLIMB 3 TO 5 STEPS WITH RAILING: A LITTLE

## 2024-06-07 ASSESSMENT — PAIN - FUNCTIONAL ASSESSMENT
PAIN_FUNCTIONAL_ASSESSMENT: 0-10
PAIN_FUNCTIONAL_ASSESSMENT: UNABLE TO SELF-REPORT
PAIN_FUNCTIONAL_ASSESSMENT: 0-10
PAIN_FUNCTIONAL_ASSESSMENT: UNABLE TO SELF-REPORT
PAIN_FUNCTIONAL_ASSESSMENT: UNABLE TO SELF-REPORT
PAIN_FUNCTIONAL_ASSESSMENT: 0-10
PAIN_FUNCTIONAL_ASSESSMENT: 0-10
PAIN_FUNCTIONAL_ASSESSMENT: UNABLE TO SELF-REPORT
PAIN_FUNCTIONAL_ASSESSMENT: 0-10
PAIN_FUNCTIONAL_ASSESSMENT: UNABLE TO SELF-REPORT
PAIN_FUNCTIONAL_ASSESSMENT: 0-10
PAIN_FUNCTIONAL_ASSESSMENT: 0-10
PAIN_FUNCTIONAL_ASSESSMENT: UNABLE TO SELF-REPORT
PAIN_FUNCTIONAL_ASSESSMENT: UNABLE TO SELF-REPORT

## 2024-06-07 ASSESSMENT — LIFESTYLE VARIABLES
SKIP TO QUESTIONS 9-10: 1
AUDIT-C TOTAL SCORE: 0
HOW OFTEN DO YOU HAVE 6 OR MORE DRINKS ON ONE OCCASION: NEVER
HOW OFTEN DO YOU HAVE A DRINK CONTAINING ALCOHOL: NEVER
HOW MANY STANDARD DRINKS CONTAINING ALCOHOL DO YOU HAVE ON A TYPICAL DAY: PATIENT DOES NOT DRINK
AUDIT-C TOTAL SCORE: 0

## 2024-06-07 ASSESSMENT — PAIN SCALES - GENERAL
PAINLEVEL_OUTOF10: 0 - NO PAIN

## 2024-06-07 ASSESSMENT — PATIENT HEALTH QUESTIONNAIRE - PHQ9
2. FEELING DOWN, DEPRESSED OR HOPELESS: NOT AT ALL
SUM OF ALL RESPONSES TO PHQ9 QUESTIONS 1 & 2: 0
1. LITTLE INTEREST OR PLEASURE IN DOING THINGS: NOT AT ALL

## 2024-06-07 ASSESSMENT — COLUMBIA-SUICIDE SEVERITY RATING SCALE - C-SSRS
2. HAVE YOU ACTUALLY HAD ANY THOUGHTS OF KILLING YOURSELF?: NO
1. IN THE PAST MONTH, HAVE YOU WISHED YOU WERE DEAD OR WISHED YOU COULD GO TO SLEEP AND NOT WAKE UP?: NO
6. HAVE YOU EVER DONE ANYTHING, STARTED TO DO ANYTHING, OR PREPARED TO DO ANYTHING TO END YOUR LIFE?: NO

## 2024-06-07 NOTE — H&P
History Of Present Illness  Mann Abrams is a 83 y.o. male presenting with iron deficiency anemia for inpatient EGD and colonoscopy.     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    Posterior capsular opacification visually significant of left eye    Person consulting for explanation of examination or test findings 01/07/2020    Encounter to discuss test results     Surgical History  Past Surgical History:   Procedure Laterality Date    CARDIAC CATHETERIZATION N/A 5/30/2024    Procedure: Left Heart Cath with Coronary Angiography and LV;  Surgeon: Denny Riley MD;  Location: [facility] Cardiac Cath Lab;  Service: Cardiovascular;  Laterality: N/A;    EYE SURGERY  08/07/2014    Eye Surgery    OTHER SURGICAL HISTORY  12/04/2019    Foot surgery    OTHER SURGICAL HISTORY  12/04/2019    Back surgery     Social History  He reports that he quit smoking about 38 years ago. His smoking use included cigarettes. He has never used smokeless tobacco. He reports that he does not drink alcohol and does not use drugs.    Family History  Family History   Problem Relation Name Age of Onset    Cancer Mother      Other (cardiac disorder) Father      Diabetes Sister          Allergies  Allergies   Allergen Reactions    Doxazosin Itching       Pre-sedation Evaluation:  ASA Classification - ASA 3 - Patient with moderate systemic disease with functional limitations  Mallampati Score - II (hard and soft palate, upper portion of tonsils and uvula visible)    Physical Exam  Constitutional:       Appearance: Normal appearance.   HENT:      Mouth/Throat:      Mouth: Mucous membranes are moist.   Eyes:      Conjunctiva/sclera: Conjunctivae normal.   Cardiovascular:      Rate and Rhythm: Normal rate.   Pulmonary:      Effort:  "Pulmonary effort is normal.   Abdominal:      Palpations: Abdomen is soft.   Skin:     General: Skin is warm.   Neurological:      Mental Status: He is oriented to person, place, and time.   Psychiatric:         Mood and Affect: Mood normal.          Last Recorded Vitals  Blood pressure 144/71, pulse 86, temperature 36.9 °C (98.4 °F), temperature source Temporal, resp. rate 19, height 1.676 m (5' 6\"), weight 63.5 kg (140 lb), SpO2 94%.     Assessment/Plan   laisha deficiency anemia for inpatient EGD and colonoscopy.     PTA/Current Medications:  (Not in a hospital admission)    Current Facility-Administered Medications   Medication Dose Route Frequency Provider Last Rate Last Admin    amLODIPine (Norvasc) tablet 10 mg  10 mg oral Daily Seferino Chowdhury MD   10 mg at 06/07/24 0933    [Held by provider] aspirin EC tablet 81 mg  81 mg oral Daily Seferino Chowdhury MD        atorvastatin (Lipitor) tablet 80 mg  80 mg oral Daily Seferino Chowdhury MD   80 mg at 06/06/24 2156    carvedilol (Coreg) tablet 25 mg  25 mg oral BID Seferino Chowdhury MD        dextrose 50 % injection 12.5 g  12.5 g intravenous q15 min PRN Seferino Chowdhury MD        dextrose 50 % injection 25 g  25 g intravenous q15 min PRN Seferino Chowdhury MD        fluticasone furoate-vilanteroL (Breo Ellipta) 200-25 mcg/dose inhaler 1 puff  1 puff inhalation Daily Seferino Chowdhury MD   1 puff at 06/07/24 0931    glucagon (Glucagen) injection 1 mg  1 mg intramuscular q15 min PRN Seferino Chowdhury MD        glucagon (Glucagen) injection 1 mg  1 mg intramuscular q15 min PRN Seferino Chowdhury MD        insulin lispro (HumaLOG) injection 0-5 Units  0-5 Units subcutaneous TID Seferino Chowdhury MD   3 Units at 06/06/24 1213    isosorbide mononitrate ER (Imdur) 24 hr tablet 30 mg  30 mg oral Daily Seferino Chowdhury MD   30 mg at 06/07/24 0935    montelukast (Singulair) tablet 10 mg  10 mg oral Nightly Seferino Chowdhury MD   10 mg at 06/06/24 2140    [Held by provider] olmesartan " (BENIcar) tablet 40 mg  40 mg oral Daily Seferino Chowdhury MD        pantoprazole (ProtoNix) injection 40 mg  40 mg intravenous BID Seferino Chowdhury MD   40 mg at 06/07/24 0933    perflutren lipid microspheres (Definity) injection 0.5-10 mL of dilution  0.5-10 mL of dilution intravenous Once in imaging Seferino Chowdhury MD        perflutren protein A microsphere (Optison) injection 0.5 mL  0.5 mL intravenous Once in imaging Seferino Chowdhury MD        polyethylene glycol (Glycolax, Miralax) packet 238 g  238 g oral Once PRN Seferino Chowdhury MD        polyethylene glycol-electrolytes (Nulytely) solution 2,000 mL  2,000 mL oral Once PRN Seferino Chowdhury MD        [Held by provider] spironolactone (Aldactone) tablet 12.5 mg  12.5 mg oral BID Seferino Chowdhury MD        sulfur hexafluoride microsphr (Lumason) injection 24.28 mg  2 mL intravenous Once in imaging Seferino Chowdhury MD         Current Outpatient Medications   Medication Sig Dispense Refill    amLODIPine (Norvasc) 10 mg tablet Take 1 tablet (10 mg) by mouth once daily. 90 tablet 1    ascorbic acid (Vitamin C) 500 mg tablet Take by mouth.      aspirin 81 mg EC tablet Take 1 tablet (81 mg) by mouth once daily. 30 tablet 11    atorvastatin (Lipitor) 80 mg tablet Take 1 tablet (80 mg) by mouth once daily. 90 tablet 1    carvedilol (Coreg) 25 mg tablet HALF PILL BID PLEASE NOTE CHANGE IN DOSE 90 tablet 3    fluticasone (Flonase) 50 mcg/actuation nasal spray Administer 1 spray into each nostril once daily. Shake gently. Before first use, prime pump. After use, clean tip and replace cap. 48 g 1    fluticasone propion-salmeteroL (Wixela Inhub) 250-50 mcg/dose diskus inhaler Inhale 1 puff 2 times a day. Rinse mouth with water after use to reduce aftertaste and incidence of candidiasis. Do not swallow. 3 each 1    FreeStyle glucose monitoring kit Use to test blood sugar twice daily      isosorbide mononitrate ER (Imdur) 30 mg 24 hr tablet Take 1 tablet (30 mg) by mouth  once daily. Do not crush or chew. 90 tablet 3    metFORMIN XR (Glucophage-XR) 500 mg 24 hr tablet Take 1 tablet (500 mg) by mouth once daily in the evening. Take with meals. 90 tablet 3    montelukast (Singulair) 10 mg tablet Take 1 tablet (10 mg) by mouth once daily at bedtime. 90 tablet 3    nitroglycerin (Nitrostat) 0.4 mg SL tablet Place 1 tablet (0.4 mg) under the tongue every 5 minutes if needed for chest pain. place 1 tablet under tongue every 5 minutes for up to 3 doses as needed for chest pain. Call 911 if pain persists      olmesartan (Benicar) 40 mg tablet Take 1 tablet (40 mg) by mouth once daily. 90 tablet 3    omeprazole (PriLOSEC) 40 mg DR capsule Take 1 capsule (40 mg) by mouth once daily. 90 capsule 3    spironolactone (Aldactone) 25 mg tablet Take 0.5 tablets (12.5 mg) by mouth 2 times a day.       Matt Torres MD

## 2024-06-07 NOTE — PROGRESS NOTES
Daily Progress Note      Subjective   Mann Abrams is 83 y.o. male with a PMH of CKD stage 3 (baseline Cr 1.7-2.0), DM, PAD, HLD, HTN and anemia who presents with chest pain/dyspnea on exertion.     GOLDY    Pt says chest pain/SOB on exertion are not present. Frequent Bms overnight due to prep. Stool color yellow. No f/c, n/v. Feels well.         Objective         Vitals: I/O:   Vitals:    06/07/24 0528   BP: 154/73   Pulse: 76   Resp: 18   Temp:    SpO2: 99%        24hr Min/Max:  Temp  Min: 36.2 °C (97.2 °F)  Max: 36.4 °C (97.5 °F)  Pulse  Min: 69  Max: 88  BP  Min: 136/69  Max: 194/70  Resp  Min: 12  Max: 18  SpO2  Min: 92 %  Max: 99 % No intake or output data in the 24 hours ending 06/07/24 0810     Net IO Since Admission: 729.17 mL [06/07/24 0810]      Scheduled Medications:  PRN Medications:    amLODIPine, 10 mg, oral, Daily  [Held by provider] aspirin, 81 mg, oral, Daily  atorvastatin, 80 mg, oral, Daily  [Held by provider] carvedilol, 25 mg, oral, BID  fluticasone furoate-vilanteroL, 1 puff, inhalation, Daily  insulin lispro, 0-5 Units, subcutaneous, TID  isosorbide mononitrate ER, 30 mg, oral, Daily  montelukast, 10 mg, oral, Nightly  pantoprazole, 40 mg, intravenous, BID  perflutren lipid microspheres, 0.5-10 mL of dilution, intravenous, Once in imaging  perflutren protein A microsphere, 0.5 mL, intravenous, Once in imaging  [Held by provider] spironolactone, 12.5 mg, oral, BID  sulfur hexafluoride microsphr, 2 mL, intravenous, Once in imaging     PRN medications: dextrose, dextrose, glucagon, glucagon, polyethylene glycol, polyethylene glycol-electrolytes       Physical Exam:  Physical Exam  Constitutional:       General: He is not in acute distress.     Appearance: Normal appearance. He is not ill-appearing.   Cardiovascular:      Rate and Rhythm: Normal rate and regular rhythm.      Heart sounds: No murmur heard.     No friction rub. No gallop.   Pulmonary:      Effort: Pulmonary effort is normal. No  "respiratory distress.      Breath sounds: Normal breath sounds. No stridor.   Abdominal:      General: Abdomen is flat. There is no distension.      Palpations: Abdomen is soft. There is no mass.   Skin:     Capillary Refill: Capillary refill takes less than 2 seconds.   Neurological:      General: No focal deficit present.      Mental Status: He is alert and oriented to person, place, and time.                LABS:    CBC RFP   Lab Results   Component Value Date    WBC 8.0 06/06/2024    HGB 9.2 (L) 06/06/2024    HCT 25.9 (L) 06/06/2024    MCV 87 06/06/2024     06/06/2024    NEUTROABS 6.28 (H) 06/06/2024    Lab Results   Component Value Date     (L) 06/07/2024    K 5.5 (H) 06/07/2024     06/07/2024    CO2 21 06/07/2024    BUN 16 06/07/2024    CREATININE 1.58 (H) 06/07/2024    CREATININE 1.70 (H) 06/06/2024     Lab Results   Component Value Date    MG 1.95 06/07/2024    PHOS 3.6 06/07/2024    CALCIUM 8.7 06/07/2024    ALBUMINELP 4.0 08/28/2023         Hepatic Function ABG/VBG   Lab Results   Component Value Date    ALT 16 06/06/2024    AST 25 06/06/2024    ALKPHOS 73 06/06/2024     Lab Results   Component Value Date    BILIDIR 0.1 06/06/2024      Lab Results   Component Value Date    PROTIME 10.8 06/05/2024    APTT 21 (L) 06/05/2024    INR 1.0 06/05/2024    ALBUMINELP 4.0 08/28/2023      No results found for: \"NONUHFIRE\", \"LACTATE\"             IMAGING:    === Results for orders placed during the hospital encounter of 06/05/24 ===    ECG 12 lead [ECG1] 06/05/2024    Status: Normal    ___________________________________________________________________________    XR chest 2 views [IMG36] 06/05/2024    Status: Normal  Left lower lobe infiltrate.  Signed by Matthew Dhaliwal MD           Assessment/Plan    Assessment/Plan    Mann Abrams is a 83 y.o. male with a PMH of CKD stage 3 (baseline Cr 1.7-2.0), DM (last A1c 7 1/24), PAD, HLD, CAD,  HTN and anemia who presents with chest pain/dyspnea on exertion. " Most likely diagnosis is exertional angina with known CAD with 70-80% stenosis of LCX that is exacerbated by anemia. Less likely diagnosis include an acute heart failure exacerbation. Evidence supporting CAD with worsening angina due to anemia is known CAD from left heart cath from 5/30 and acute drop in hgb down to 6 today from 11 the year prior. Heart failure is not likely given no pulmonary or peripheral edema. To rule out heart failure, echo will be ordered. GI is consulted to rule out upper GI bleed given dark brown/black stool. Transfusion relieved anginal symptom likely pointing to anemia exacerbating known CAD. Upper and lower scope for evaluation of blood loss via GI       Updates 6/7:  - bowel prep completed overnight, getting upper and lower scope today   - angina symptoms gone following transfusion   - blood pressure increasing, restarted on home alodipine and imdur for angina   - potential discharge today.  - depending on scope, might require outpatient GI followup         #anemia  #fatigue  ::worsening anemia since 4/24, hgb 6 on 6/5 and got 2 units RBCs in ED, repeat hgb 9.2 on 6/6   ::on oral iron for several months   ::rectal exam showed dark brown/black stool. Possible melena  ::not iron deficient based on serum iron and TIBC   ::denies upper/lower GI symptoms. No overt signs of bleeding   ::on home aspirin 81mg  ::PT, PTT, INR WNL  ::MCV, MCH, MCHC, RDW, LDH,. Haptoglobin WNL  ::increased retic count (6.9%)  ::likely SAGE, possibly GI source   Plan:  - start BID pantoprazole  - hold PO iron and aspirin   - upper and lower scope 6/7      #CAD   #stable angina  #dyspnea on exertion  ::left heart cath from 5/30 showed significant stenosis 70-80% of LCX. No stent placed   ::typical anginal symptoms, no pain/dyspnea at rest  ::trop,ecg, BNP negative   ::on home imdur and nitroglycerin   ::anginal symptoms improved following 2 RBCs   Plan:   - echo        #left low lobe infiltrate   ::left lower lobe  infiltrate seen on CXR on admission   ::no cough, fever, leukocytosis  Plan:  Low concern for pneumonia, will not start antibiotics at this time. Can reconsider if clinical picture changes      #COPD  ::Home fluticasone and montelukast   Plan:  Continue home meds      #CKD stage 3b  ::baseline Cr 1.7-2.0  ::1.97 on admission  Plan:  Monitor RFP, avoid nephrotoxic agents         #DM  ::Last A1c 7 from 1/24, 5/7 on admission   Plan:  - Discontinue metformin  - Start mild sliding scale insulin mild      #HTN  ::On home amlodipine, carvedilol, olmesartan  Plan:  Restart home amlodipine for increased Bps      #HLD  #PAD  Plan:  Continue home atorvastatin           F: PRN  E: PRN   N: clear liquid diet during day, NPO aftermidnight   A: PIV   Code status: Full code (confirmed on admission)   NOK: Lesvia Abrams (wife) [phone]         LORETTA ALONSO, MS4

## 2024-06-07 NOTE — PROGRESS NOTES
"[facility]  [facility]  CONSULT FOLLOW-UP     Reason For Consult  SAGE     SUBJECTIVE     No events over night. Patient has been able to successfully prep. He is anxious to get the endoscopies done.    EXAM     Last Recorded Vitals  Blood pressure 175/87, pulse 82, temperature 36.4 °C (97.5 °F), resp. rate 16, height 1.676 m (5' 6\"), weight 63.5 kg (140 lb), SpO2 97%.    No intake or output data in the 24 hours ending 06/07/24 1015    Physical Exam   GENERAL: In no acute distress.  NEUROLOGIC: A and O x 3. Cranial nerves 2 through 12 grossly intact with no gait disturbances. Intact motor and sensory systems.  HEENT: Pupils are equal, round, and reactive to light and accommodation. Extraocular motion intact. No scleral icterus.  CARDIAC: S1 + S2, RRR, no M/R/G.    LUNGS: CTA BL. No wheezes or coarse breath sounds. Symmetric respirations. No increased work of breathing.   ABDOMEN: Soft, non-tender to palpation. No rebound or guarding.  PSYCH: Appropriate mood and behavior.      OBJECTIVE                                                                              Medications             Current Facility-Administered Medications:     amLODIPine (Norvasc) tablet 10 mg, 10 mg, oral, Daily, Seferino Chowdhury MD, 10 mg at 06/07/24 0933    [Held by provider] aspirin EC tablet 81 mg, 81 mg, oral, Daily, Seferino Chowdhury MD    atorvastatin (Lipitor) tablet 80 mg, 80 mg, oral, Daily, Seferino Chowdhury MD, 80 mg at 06/06/24 6146    [Held by provider] carvedilol (Coreg) tablet 25 mg, 25 mg, oral, BID, Seferino Chowdhury MD    dextrose 50 % injection 12.5 g, 12.5 g, intravenous, q15 min PRN, Seferino Chowdhury MD    dextrose 50 % injection 25 g, 25 g, intravenous, q15 min PRN, Seferino Chowdhury MD    fluticasone furoate-vilanteroL (Breo Ellipta) 200-25 mcg/dose inhaler 1 puff, 1 puff, inhalation, Daily, Seferino Chowdhury MD, 1 puff at 06/07/24 0931    glucagon (Glucagen) injection 1 mg, 1 " mg, intramuscular, q15 min PRN, Seferino Chowdhury MD    glucagon (Glucagen) injection 1 mg, 1 mg, intramuscular, q15 min PRN, Seferino Chowdhury MD    insulin lispro (HumaLOG) injection 0-5 Units, 0-5 Units, subcutaneous, TID, Seferino Chowdhury MD, 3 Units at 06/06/24 1213    isosorbide mononitrate ER (Imdur) 24 hr tablet 30 mg, 30 mg, oral, Daily, Seferino Chowdhury MD, 30 mg at 06/07/24 0935    montelukast (Singulair) tablet 10 mg, 10 mg, oral, Nightly, Seferino Chowdhury MD, 10 mg at 06/06/24 2156    pantoprazole (ProtoNix) injection 40 mg, 40 mg, intravenous, BID, Seferino Chowdhury MD, 40 mg at 06/07/24 0933    perflutren lipid microspheres (Definity) injection 0.5-10 mL of dilution, 0.5-10 mL of dilution, intravenous, Once in imaging, Seferino Chowdhury MD    perflutren protein A microsphere (Optison) injection 0.5 mL, 0.5 mL, intravenous, Once in imaging, Seferino Chowdhury MD    polyethylene glycol (Glycolax, Miralax) packet 238 g, 238 g, oral, Once PRN, Seferino Chowdhury MD    polyethylene glycol-electrolytes (Nulytely) solution 2,000 mL, 2,000 mL, oral, Once PRN, Seferino Chowdhury MD    [Held by provider] spironolactone (Aldactone) tablet 12.5 mg, 12.5 mg, oral, BID, Seferino Chowdhury MD    sulfur hexafluoride microsphr (Lumason) injection 24.28 mg, 2 mL, intravenous, Once in imaging, Seferino Chowdhury MD    Current Outpatient Medications:     amLODIPine (Norvasc) 10 mg tablet, Take 1 tablet (10 mg) by mouth once daily., Disp: 90 tablet, Rfl: 1    ascorbic acid (Vitamin C) 500 mg tablet, Take by mouth., Disp: , Rfl:     aspirin 81 mg EC tablet, Take 1 tablet (81 mg) by mouth once daily., Disp: 30 tablet, Rfl: 11    atorvastatin (Lipitor) 80 mg tablet, Take 1 tablet (80 mg) by mouth once daily., Disp: 90 tablet, Rfl: 1    carvedilol (Coreg) 25 mg tablet, HALF PILL BID PLEASE NOTE CHANGE IN DOSE, Disp: 90 tablet, Rfl: 3    fluticasone (Flonase) 50 mcg/actuation nasal spray, Administer 1 spray into each nostril once daily.  Shake gently. Before first use, prime pump. After use, clean tip and replace cap., Disp: 48 g, Rfl: 1    fluticasone propion-salmeteroL (Wixela Inhub) 250-50 mcg/dose diskus inhaler, Inhale 1 puff 2 times a day. Rinse mouth with water after use to reduce aftertaste and incidence of candidiasis. Do not swallow., Disp: 3 each, Rfl: 1    FreeStyle glucose monitoring kit, Use to test blood sugar twice daily, Disp: , Rfl:     isosorbide mononitrate ER (Imdur) 30 mg 24 hr tablet, Take 1 tablet (30 mg) by mouth once daily. Do not crush or chew., Disp: 90 tablet, Rfl: 3    metFORMIN XR (Glucophage-XR) 500 mg 24 hr tablet, Take 1 tablet (500 mg) by mouth once daily in the evening. Take with meals., Disp: 90 tablet, Rfl: 3    montelukast (Singulair) 10 mg tablet, Take 1 tablet (10 mg) by mouth once daily at bedtime., Disp: 90 tablet, Rfl: 3    nitroglycerin (Nitrostat) 0.4 mg SL tablet, Place 1 tablet (0.4 mg) under the tongue every 5 minutes if needed for chest pain. place 1 tablet under tongue every 5 minutes for up to 3 doses as needed for chest pain. Call 911 if pain persists, Disp: , Rfl:     olmesartan (Benicar) 40 mg tablet, Take 1 tablet (40 mg) by mouth once daily., Disp: 90 tablet, Rfl: 3    omeprazole (PriLOSEC) 40 mg DR capsule, Take 1 capsule (40 mg) by mouth once daily., Disp: 90 capsule, Rfl: 3    spironolactone (Aldactone) 25 mg tablet, Take 0.5 tablets (12.5 mg) by mouth 2 times a day., Disp: , Rfl:                                                                             Labs     Results for orders placed or performed during the hospital encounter of 06/05/24 (from the past 24 hour(s))   Magnesium   Result Value Ref Range    Magnesium 2.14 1.60 - 2.40 mg/dL   Type And Screen   Result Value Ref Range    ABO TYPE O     Rh TYPE POS     ANTIBODY SCREEN NEG    Basic metabolic panel   Result Value Ref Range    Glucose 151 (H) 74 - 99 mg/dL    Sodium 136 136 - 145 mmol/L    Potassium 4.8 3.5 - 5.3 mmol/L     Chloride 104 98 - 107 mmol/L    Bicarbonate 21 21 - 32 mmol/L    Anion Gap 16 10 - 20 mmol/L    Urea Nitrogen 19 6 - 23 mg/dL    Creatinine 1.70 (H) 0.50 - 1.30 mg/dL    eGFR 40 (L) >60 mL/min/1.73m*2    Calcium 9.9 8.6 - 10.6 mg/dL   Hepatic Function Panel   Result Value Ref Range    Albumin 4.4 3.4 - 5.0 g/dL    Bilirubin, Total 0.7 0.0 - 1.2 mg/dL    Bilirubin, Direct 0.1 0.0 - 0.3 mg/dL    Alkaline Phosphatase 73 33 - 136 U/L    ALT 16 10 - 52 U/L    AST 25 9 - 39 U/L    Total Protein 7.6 6.4 - 8.2 g/dL   POCT GLUCOSE   Result Value Ref Range    POCT Glucose 260 (H) 74 - 99 mg/dL   POCT GLUCOSE   Result Value Ref Range    POCT Glucose 90 74 - 99 mg/dL   Renal function panel   Result Value Ref Range    Glucose 125 (H) 74 - 99 mg/dL    Sodium 133 (L) 136 - 145 mmol/L    Potassium 5.5 (H) 3.5 - 5.3 mmol/L    Chloride 101 98 - 107 mmol/L    Bicarbonate 21 21 - 32 mmol/L    Anion Gap 17 10 - 20 mmol/L    Urea Nitrogen 16 6 - 23 mg/dL    Creatinine 1.58 (H) 0.50 - 1.30 mg/dL    eGFR 44 (L) >60 mL/min/1.73m*2    Calcium 8.7 8.6 - 10.6 mg/dL    Phosphorus 3.6 2.5 - 4.9 mg/dL    Albumin 3.8 3.4 - 5.0 g/dL   Magnesium   Result Value Ref Range    Magnesium 1.95 1.60 - 2.40 mg/dL                                                                             Imaging           2/2/2024 CT A/P w/ IV contrast:  IMPRESSION:   1. Right-sided direct inguinal hernia containing ileal bowel loops, fat, and trace fluid. Although there is no evidence for bowel obstruction, incarceration cannot be excluded. Correlate with hernia reducibility and any pain at this site.   2.  Incidental note is made of bilateral renal artery stenosis. Consider follow-up Doppler renal ultrasound.   3.  Other nonacute incidental findings as above.                                                                           GI Procedures      6/7/2024 EGD:  Findings  5 cm hiatal hernia without Travis lesions present - GE junction 35 cm from the incisors,  diaphragmatic impression 40 cm from the incisors, confirmed by retroflexion. Hill grade IV hiatal hernia  The esophagus appeared normal. No evidence of esophagitis or Gold's esophagus.  Multiple sessile, semi-pedunculated polyps measuring from 2 mm up to 9 mm in the fundus of the stomach and body of the stomach; performed cold forceps biopsy. One of the larger semi-pedunculated polyps was erythematous with erosive changes on the surface. This polyp was biopsied as well.  Single 5 mm angioectasia in the duodenal bulb; no bleeding was identified; the lesion was ablated with argon plasma coagulation using a straight fire probe; placed MRI compatible clips; hemostasis achieved  The 2nd part of the duodenum appeared normal.    6/7/2024 colonoscopy:  Findings  Normal terminal ileum, ileocecal valve, and appendiceal orifice (photodocumented).  Multiple small, medium and large, extensive diverticula with no inflammation in the ascending colon, transverse colon, descending colon and sigmoid colon; no bleeding was identified  External medium hemorrhoids observed during digital rectal exam; no bleeding was identified  No polyps were visualized on this procedure.    7/2020 Colonoscopy:  - Diverticulosis in the sigmoid colon, in the descending colon, in the transverse colon and in the ascending colon.  - Internal hemorrhoids.  - The examined portion of the ileus was normal.  - No specimens collected.     ASSESSMENT / PLAN                  ASSESSMENT/PLAN:     Mann Abrams is a 83 y.o. male with a past medical history of severe SAGE, COPD, HTN, DLD, DMT2, and CKD 3b, admitted on 6/5/2024 with CP and dyspnea on exertion. Hgb 6 from 8.4 on the 5/14/24.  Of note he was admitted within the last month at [facility]. 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 No stents were placed.      GI is consulted for SAGE.    6/7/2024 S/p EGD, which showed Hill grade IV hiatal hernia, gastric polyps  (one of them w/ erosions on it); s/p biopsies, and a single angioectasia in the duodenal bulb s/p APC. S/p colonoscopy, which showed pan-colonic diverticulosis and external hemorrhoids. SAGE most likely due to occult GIB in the setting of angioectasias.      Recommendations:  - Follow up pathology.  - Advance diet as tolerated.  - Continue to trend Hgb (goal > 7).  - Ensure adequate IV access, ideally 2 large bore IVs.  - Additional supportive care per primary team.     The above recommendations were communicated to the [facility] team.     Patient was seen and discussed with Dr. Christian.     Gastroenterology will continue to follow.   During weekday hours of 7am-5pm please do not hesitate to contact me on [employer] Chat or page [phone], if there are any further questions between the weekday hours of 7am-5pm.   After hours, on weekends, and on holidays, please page [place] [phone]. Thank you.     Shasta Bolden MD  PGY-4 Gastroenterology and Hepatology Fellow  [facility]

## 2024-06-07 NOTE — PROGRESS NOTES
Mann Abrams is a 83 y.o. male on day 3 of ED admission    Assessment/Plan   Pt admitted for chest pain. SW consulted by provider for discharge planning. HH inquiring about potential needs.   Met with Pt and he confirmed he is independent in his ADLs. Pt said he drives to OP appointments and has no therapy needs at this time.    Report given to provider readiness to discharge from SW perspective with no needs.     JENNI Dawn

## 2024-06-08 VITALS
OXYGEN SATURATION: 93 % | TEMPERATURE: 99.3 F | WEIGHT: 140 LBS | DIASTOLIC BLOOD PRESSURE: 53 MMHG | BODY MASS INDEX: 22.5 KG/M2 | RESPIRATION RATE: 16 BRPM | HEIGHT: 66 IN | SYSTOLIC BLOOD PRESSURE: 105 MMHG | HEART RATE: 79 BPM

## 2024-06-08 LAB
ALBUMIN SERPL BCP-MCNC: 3.4 G/DL (ref 3.4–5)
ANION GAP SERPL CALC-SCNC: 12 MMOL/L (ref 10–20)
BASOPHILS # BLD AUTO: 0.01 X10*3/UL (ref 0–0.1)
BASOPHILS NFR BLD AUTO: 0.1 %
BUN SERPL-MCNC: 17 MG/DL (ref 6–23)
CALCIUM SERPL-MCNC: 8.5 MG/DL (ref 8.6–10.6)
CHLORIDE SERPL-SCNC: 103 MMOL/L (ref 98–107)
CO2 SERPL-SCNC: 24 MMOL/L (ref 21–32)
CREAT SERPL-MCNC: 2.23 MG/DL (ref 0.5–1.3)
EGFRCR SERPLBLD CKD-EPI 2021: 29 ML/MIN/1.73M*2
EOSINOPHIL # BLD AUTO: 0.03 X10*3/UL (ref 0–0.4)
EOSINOPHIL NFR BLD AUTO: 0.3 %
ERYTHROCYTE [DISTWIDTH] IN BLOOD BY AUTOMATED COUNT: 15.8 % (ref 11.5–14.5)
GLUCOSE BLD MANUAL STRIP-MCNC: 112 MG/DL (ref 74–99)
GLUCOSE BLD MANUAL STRIP-MCNC: 248 MG/DL (ref 74–99)
GLUCOSE SERPL-MCNC: 98 MG/DL (ref 74–99)
HCT VFR BLD AUTO: 24 % (ref 41–52)
HGB BLD-MCNC: 8 G/DL (ref 13.5–17.5)
IMM GRANULOCYTES # BLD AUTO: 0.12 X10*3/UL (ref 0–0.5)
IMM GRANULOCYTES NFR BLD AUTO: 1.2 % (ref 0–0.9)
LYMPHOCYTES # BLD AUTO: 1.03 X10*3/UL (ref 0.8–3)
LYMPHOCYTES NFR BLD AUTO: 10.2 %
MAGNESIUM SERPL-MCNC: 1.98 MG/DL (ref 1.6–2.4)
MCH RBC QN AUTO: 31.5 PG (ref 26–34)
MCHC RBC AUTO-ENTMCNC: 33.3 G/DL (ref 32–36)
MCV RBC AUTO: 95 FL (ref 80–100)
MONOCYTES # BLD AUTO: 1.25 X10*3/UL (ref 0.05–0.8)
MONOCYTES NFR BLD AUTO: 12.4 %
NEUTROPHILS # BLD AUTO: 7.68 X10*3/UL (ref 1.6–5.5)
NEUTROPHILS NFR BLD AUTO: 75.8 %
NRBC BLD-RTO: 0 /100 WBCS (ref 0–0)
PHOSPHATE SERPL-MCNC: 4.2 MG/DL (ref 2.5–4.9)
PLATELET # BLD AUTO: 279 X10*3/UL (ref 150–450)
POTASSIUM SERPL-SCNC: 4 MMOL/L (ref 3.5–5.3)
RBC # BLD AUTO: 2.54 X10*6/UL (ref 4.5–5.9)
SODIUM SERPL-SCNC: 135 MMOL/L (ref 136–145)
WBC # BLD AUTO: 10.1 X10*3/UL (ref 4.4–11.3)

## 2024-06-08 PROCEDURE — 2500000002 HC RX 250 W HCPCS SELF ADMINISTERED DRUGS (ALT 637 FOR MEDICARE OP, ALT 636 FOR OP/ED): Performed by: STUDENT IN AN ORGANIZED HEALTH CARE EDUCATION/TRAINING PROGRAM

## 2024-06-08 PROCEDURE — 36415 COLL VENOUS BLD VENIPUNCTURE: CPT | Performed by: STUDENT IN AN ORGANIZED HEALTH CARE EDUCATION/TRAINING PROGRAM

## 2024-06-08 PROCEDURE — 99239 HOSP IP/OBS DSCHRG MGMT >30: CPT | Performed by: INTERNAL MEDICINE

## 2024-06-08 PROCEDURE — 83735 ASSAY OF MAGNESIUM: CPT | Performed by: STUDENT IN AN ORGANIZED HEALTH CARE EDUCATION/TRAINING PROGRAM

## 2024-06-08 PROCEDURE — 2500000004 HC RX 250 GENERAL PHARMACY W/ HCPCS (ALT 636 FOR OP/ED): Performed by: STUDENT IN AN ORGANIZED HEALTH CARE EDUCATION/TRAINING PROGRAM

## 2024-06-08 PROCEDURE — 84100 ASSAY OF PHOSPHORUS: CPT | Performed by: STUDENT IN AN ORGANIZED HEALTH CARE EDUCATION/TRAINING PROGRAM

## 2024-06-08 PROCEDURE — 2500000001 HC RX 250 WO HCPCS SELF ADMINISTERED DRUGS (ALT 637 FOR MEDICARE OP): Performed by: STUDENT IN AN ORGANIZED HEALTH CARE EDUCATION/TRAINING PROGRAM

## 2024-06-08 PROCEDURE — 85025 COMPLETE CBC W/AUTO DIFF WBC: CPT | Performed by: STUDENT IN AN ORGANIZED HEALTH CARE EDUCATION/TRAINING PROGRAM

## 2024-06-08 PROCEDURE — 99231 SBSQ HOSP IP/OBS SF/LOW 25: CPT | Performed by: STUDENT IN AN ORGANIZED HEALTH CARE EDUCATION/TRAINING PROGRAM

## 2024-06-08 PROCEDURE — C9113 INJ PANTOPRAZOLE SODIUM, VIA: HCPCS | Performed by: STUDENT IN AN ORGANIZED HEALTH CARE EDUCATION/TRAINING PROGRAM

## 2024-06-08 PROCEDURE — 82947 ASSAY GLUCOSE BLOOD QUANT: CPT | Mod: 91

## 2024-06-08 RX ADMIN — AMLODIPINE BESYLATE 10 MG: 10 TABLET ORAL at 08:43

## 2024-06-08 RX ADMIN — ISOSORBIDE MONONITRATE 30 MG: 30 TABLET, EXTENDED RELEASE ORAL at 08:43

## 2024-06-08 RX ADMIN — ATORVASTATIN CALCIUM 80 MG: 80 TABLET, FILM COATED ORAL at 08:43

## 2024-06-08 RX ADMIN — PANTOPRAZOLE SODIUM 40 MG: 40 INJECTION, POWDER, FOR SOLUTION INTRAVENOUS at 08:43

## 2024-06-08 RX ADMIN — INSULIN LISPRO 2 UNITS: 100 INJECTION, SOLUTION INTRAVENOUS; SUBCUTANEOUS at 08:42

## 2024-06-08 RX ADMIN — CARVEDILOL 25 MG: 12.5 TABLET, FILM COATED ORAL at 08:43

## 2024-06-08 ASSESSMENT — COGNITIVE AND FUNCTIONAL STATUS - GENERAL
CLIMB 3 TO 5 STEPS WITH RAILING: A LITTLE
DAILY ACTIVITIY SCORE: 24
MOBILITY SCORE: 23

## 2024-06-08 ASSESSMENT — PAIN SCALES - GENERAL
PAINLEVEL_OUTOF10: 0 - NO PAIN
PAINLEVEL_OUTOF10: 0 - NO PAIN

## 2024-06-08 ASSESSMENT — PAIN - FUNCTIONAL ASSESSMENT
PAIN_FUNCTIONAL_ASSESSMENT: 0-10
PAIN_FUNCTIONAL_ASSESSMENT: 0-10

## 2024-06-08 NOTE — DISCHARGE SUMMARY
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 male with a PMH of CKD stage 3 (baseline Cr 1.7-2.0), DM (last A1c 7 1/24), PAD, HLD, CAD,  HTN and anemia who presents with chest pain/dyspnea on exertion. Most likely diagnosis is exertional angina with known CAD with 70-80% stenosis of LCX that is exacerbated by anemia. He was given 2 units of RBCs in the ED and brought to the floor on 6/5. His hgb increased to 9.2 and his exertional anginal symptoms went away. He got an upper and lower scope on 6/7. The lower scope showed diverticula throughout the colon without any signs of bleeding. The EGD showed multiple polyps in the stomach with 1 having erosion. Samples were taken. In the duodenum, there was an angiectasia that was cauterized and clipped. His hgb remained stable following the procedures and he was discharged with instructions to hold his aspirin unil seeing cardiology next week. Additionally, he was instructed to hold his PO iron and monitor for stool color changes to see if the dark stools were from the iron supplementation or bleeding. He will have his CBC checked next week for monitoring stability of his hgb.     Pertinent Physical Exam At Time of Discharge  Physical Exam  Constitutional:       General: He is not in acute distress.     Appearance: Normal appearance. He is not ill-appearing.   Cardiovascular:      Rate and Rhythm: Normal rate and regular rhythm.      Heart sounds: No murmur heard.     No friction rub. No gallop.   Pulmonary:      Effort:  Pulmonary effort is normal. No respiratory distress.      Breath sounds: No stridor. No wheezing.   Abdominal:      General: Abdomen is flat. There is no distension.      Palpations: Abdomen is soft. There is no mass.      Tenderness: There is no abdominal tenderness.   Skin:     General: Skin is warm and dry.      Capillary Refill: Capillary refill takes less than 2 seconds.      Coloration: Skin is not jaundiced.   Neurological:      Mental Status: He is alert.         Home Medications     Medication List      CONTINUE taking these medications     amLODIPine 10 mg tablet; Commonly known as: Norvasc; Take 1 tablet (10   mg) by mouth once daily.   ascorbic acid 500 mg tablet; Commonly known as: Vitamin C   atorvastatin 80 mg tablet; Commonly known as: Lipitor; Take 1 tablet (80   mg) by mouth once daily.   Blood glucose monitoring meter kit kit   carvedilol 25 mg tablet; Commonly known as: Coreg; HALF PILL BID PLEASE   NOTE CHANGE IN DOSE   fluticasone 50 mcg/actuation nasal spray; Commonly known as: Flonase;   Administer 1 spray into each nostril once daily. Shake gently. Before   first use, prime pump. After use, clean tip and replace cap.   fluticasone propion-salmeteroL 250-50 mcg/dose diskus inhaler; Commonly   known as: Wixela Inhub; Inhale 1 puff 2 times a day. Rinse mouth with   water after use to reduce aftertaste and incidence of candidiasis. Do not   swallow.   isosorbide mononitrate ER 30 mg 24 hr tablet; Commonly known as: Imdur;   Take 1 tablet (30 mg) by mouth once daily. Do not crush or chew.   metFORMIN  mg 24 hr tablet; Commonly known as: Glucophage-XR; Take   1 tablet (500 mg) by mouth once daily in the evening. Take with meals.   montelukast 10 mg tablet; Commonly known as: Singulair; Take 1 tablet   (10 mg) by mouth once daily at bedtime.   nitroglycerin 0.4 mg SL tablet; Commonly known as: Nitrostat   olmesartan 40 mg tablet; Commonly known as: Benicar; Take 1 tablet (40   mg) by mouth  once daily.   omeprazole 40 mg DR capsule; Commonly known as: PriLOSEC; Take 1 capsule   (40 mg) by mouth once daily.   spironolactone 25 mg tablet; Commonly known as: Aldactone     STOP taking these medications     aspirin 81 mg EC tablet     STOP taking iron pills   Outpatient Follow-Up  Future Appointments   Date Time Provider Department Center   6/10/2024  9:50 AM Nataliya Grant MD [id number] [facility]   6/14/2024  9:20 AM Brant Garcia DO [id number] [facility]   6/27/2024 11:30 AM Chelle Serna, APRN-CNP, DNP [id number] [facility]   8/26/2024  8:20 AM Jovana Hammer MD [id number] [facility]   2/18/2025  8:15 AM Yessica Amin MD [id number] [facility]       LORETTA ALONSO, MS4     Attending:  Patient doing well today  Denies any new complaints  Appetite is good.    PE:  No acute distress  Lungs - clear  Abdomen is soft, nontender nondistended  CV - S1, S2 - nl  Ext -no edema    Medical Decision Making:  EGD and colonoscopy reports reviewed.  Most significant finding was several polyps in the stomach which were not bleeding and a duodenal bulb angioectasia which was felt to be the culprit lesion and was cauterized and hemostatic clips were applied.  Patient's hemoglobin this morning is 8.0 which is an appropriate increment from his admission hemoglobin after 2 units of blood.    Patient is medically stable for discharge.  We have advised him to stay off aspirin for at least a week till he sees his cardiologist next Friday, 6/14/2024. In addition will hold patient's iron at this time so that he is able to tell if his stool color changes from any recurrent bleeding.      35 minutes were spent in discharge management that included evaluation of the patient during rounds, review of labs, radiology, discharge medications and follow-up advice.

## 2024-06-08 NOTE — CARE PLAN
The patient's goals for the shift include  Patient will remain free of falls. Pt Hemoglobin will be within normal limits. Py will have no bloody stool.     The clinical goals for the shift include HGB will be within normal limits by end of shift.      Patient Discharge - all goals achieved while on the floor.

## 2024-06-08 NOTE — DISCHARGE INSTRUCTIONS
Hi Mr. Abrams, thank you for allowing us to take care of you. You were admitted for your chest pain and shortness of breath. This was related to your narrowing of your heart blood vessels that was made worse by the blood loss from your stomach and intestines. During your stay, you received 2 units of blood to bring your hemoglobin up. You then had an upper and lower endoscopy to look for bleeding from the GI tract. 2 possible sources of bleeding were found in your upper GI tract and we tried to fix them. We also took some samples, you will be notified of the results.     Following your discharge, please stop taking your aspirin for 1 week. You will talk to your cardiologist about possibly restarting it. Additionally, hold your iron pills and monitor the color of your stools seeing if they change to a lighter color or not. Finally, please get your blood drawn next week so we can monitor your hemoglobin.  You should call your provider if you have chest pain or shortness of breath again.    Follow-up appointments:  -PCP- Dr. Grant- 6/10  - cardiology- Dr. Garcia- 6/14    Medication changes:  Stop:   - aspirin  - iron    Lab draws:  - CBC- anytime this upcoming week

## 2024-06-08 NOTE — CARE PLAN
The patient's goals for the shift include  patient will be free of falls, no bloody stools and hemoglobin will be within normal range.     The clinical goals for the shift include Pt will remain free of falls. Hemoglobin will be within range and no bloody stools.

## 2024-06-08 NOTE — NURSING NOTE
Discharge Note    Patient came to the floor for SOB. We managed to control the sob. Patient took all his belonging with him. Pt IV was taken out. Pt transported to LT main entrance . Going home with his friend.     Merary Wills RN   Pt is doing well. Plan to discharge tomorrow AM around 10:35 to TCU. Reports sternal chest pain, lidocaine patch in place. A & O x4.    Zoila Stanley RN on 5/7/2023 at 6:52 PM

## 2024-06-08 NOTE — PROGRESS NOTES
"[facility]  [facility] [place]  CONSULT FOLLOW-UP     Reason For Consult  SAGE     SUBJECTIVE     Hgb 8, as expected post transfusion 2 days ago  No over GI bleeding  Tolerated breakfast - had eggs    EXAM     Last Recorded Vitals  Blood pressure 127/61, pulse 89, temperature 37.4 °C (99.3 °F), resp. rate 16, height 1.676 m (5' 6\"), weight 63.5 kg (140 lb), SpO2 96%.    No intake or output data in the 24 hours ending 06/08/24 1045    Physical Exam   GENERAL: In no acute distress.  NEUROLOGIC: A and O x 3. Cranial nerves 2 through 12 grossly intact with no gait disturbances. Intact motor and sensory systems.  HEENT: Pupils are equal, round, and reactive to light and accommodation. Extraocular motion intact. No scleral icterus.  CARDIAC: S1 + S2, RRR, no M/R/G.    LUNGS: CTA BL. No wheezes or coarse breath sounds. Symmetric respirations. No increased work of breathing.   ABDOMEN: Soft, non-tender to palpation. No rebound or guarding.  PSYCH: Appropriate mood and behavior.      OBJECTIVE                                                                              Medications             Current Facility-Administered Medications:     amLODIPine (Norvasc) tablet 10 mg, 10 mg, oral, Daily, Seferino Chowdhury MD, 10 mg at 06/08/24 0843    [Held by provider] aspirin EC tablet 81 mg, 81 mg, oral, Daily, Seferino Chowdhury MD    atorvastatin (Lipitor) tablet 80 mg, 80 mg, oral, Daily, Seferino Chowdhury MD, 80 mg at 06/08/24 0843    carvedilol (Coreg) tablet 25 mg, 25 mg, oral, BID, Seferino Chowdhury MD, 25 mg at 06/08/24 0843    dextrose 50 % injection 12.5 g, 12.5 g, intravenous, q15 min PRN, Seferino Chowdhury MD    dextrose 50 % injection 25 g, 25 g, intravenous, q15 min PRN, Seferino Chowdhury MD    fluticasone furoate-vilanteroL (Breo Ellipta) 200-25 mcg/dose inhaler 1 puff, 1 puff, inhalation, Daily, Seferino Chowdhury MD, 1 puff at 06/07/24 0931    glucagon (Glucagen) injection 1 mg, 1 mg, " intramuscular, q15 min PRN, Seferino Chowdhury MD    glucagon (Glucagen) injection 1 mg, 1 mg, intramuscular, q15 min PRN, Seferino Chowdhury MD    insulin lispro (HumaLOG) injection 0-5 Units, 0-5 Units, subcutaneous, TID, Seferino Chowdhury MD, 2 Units at 06/08/24 0842    isosorbide mononitrate ER (Imdur) 24 hr tablet 30 mg, 30 mg, oral, Daily, Seferino Chowdhury MD, 30 mg at 06/08/24 0843    montelukast (Singulair) tablet 10 mg, 10 mg, oral, Nightly, Seferino Chowdhury MD, 10 mg at 06/07/24 2157    [Held by provider] olmesartan (BENIcar) tablet 40 mg, 40 mg, oral, Daily, Seferino Chowdhury MD    pantoprazole (ProtoNix) injection 40 mg, 40 mg, intravenous, BID, Seferino Chowdhury MD, 40 mg at 06/08/24 0843    perflutren lipid microspheres (Definity) injection 0.5-10 mL of dilution, 0.5-10 mL of dilution, intravenous, Once in imaging, Seferino Chowdhury MD    perflutren protein A microsphere (Optison) injection 0.5 mL, 0.5 mL, intravenous, Once in imaging, Seferino Chowdhury MD    polyethylene glycol (Glycolax, Miralax) packet 238 g, 238 g, oral, Once PRN, Seferino Chowdhury MD    polyethylene glycol-electrolytes (Nulytely) solution 2,000 mL, 2,000 mL, oral, Once PRN, Seferino Chowdhury MD    [Held by provider] spironolactone (Aldactone) tablet 12.5 mg, 12.5 mg, oral, BID, Seferino Chowdhury MD    sulfur hexafluoride microsphr (Lumason) injection 24.28 mg, 2 mL, intravenous, Once in imaging, Seferino Chowdhury MD                                                                            Labs     Results for orders placed or performed during the hospital encounter of 06/05/24 (from the past 24 hour(s))   POCT GLUCOSE   Result Value Ref Range    POCT Glucose 113 (H) 74 - 99 mg/dL   POCT GLUCOSE   Result Value Ref Range    POCT Glucose 114 (H) 74 - 99 mg/dL   POCT GLUCOSE   Result Value Ref Range    POCT Glucose 249 (H) 74 - 99 mg/dL   CBC and Auto Differential   Result Value Ref Range    WBC 10.1 4.4 - 11.3 x10*3/uL    nRBC 0.0 0.0 - 0.0 /100  WBCs    RBC 2.54 (L) 4.50 - 5.90 x10*6/uL    Hemoglobin 8.0 (L) 13.5 - 17.5 g/dL    Hematocrit 24.0 (L) 41.0 - 52.0 %    MCV 95 80 - 100 fL    MCH 31.5 26.0 - 34.0 pg    MCHC 33.3 32.0 - 36.0 g/dL    RDW 15.8 (H) 11.5 - 14.5 %    Platelets 279 150 - 450 x10*3/uL    Neutrophils % 75.8 40.0 - 80.0 %    Immature Granulocytes %, Automated 1.2 (H) 0.0 - 0.9 %    Lymphocytes % 10.2 13.0 - 44.0 %    Monocytes % 12.4 2.0 - 10.0 %    Eosinophils % 0.3 0.0 - 6.0 %    Basophils % 0.1 0.0 - 2.0 %    Neutrophils Absolute 7.68 (H) 1.60 - 5.50 x10*3/uL    Immature Granulocytes Absolute, Automated 0.12 0.00 - 0.50 x10*3/uL    Lymphocytes Absolute 1.03 0.80 - 3.00 x10*3/uL    Monocytes Absolute 1.25 (H) 0.05 - 0.80 x10*3/uL    Eosinophils Absolute 0.03 0.00 - 0.40 x10*3/uL    Basophils Absolute 0.01 0.00 - 0.10 x10*3/uL   Renal function panel   Result Value Ref Range    Glucose 98 74 - 99 mg/dL    Sodium 135 (L) 136 - 145 mmol/L    Potassium 4.0 3.5 - 5.3 mmol/L    Chloride 103 98 - 107 mmol/L    Bicarbonate 24 21 - 32 mmol/L    Anion Gap 12 10 - 20 mmol/L    Urea Nitrogen 17 6 - 23 mg/dL    Creatinine 2.23 (H) 0.50 - 1.30 mg/dL    eGFR 29 (L) >60 mL/min/1.73m*2    Calcium 8.5 (L) 8.6 - 10.6 mg/dL    Phosphorus 4.2 2.5 - 4.9 mg/dL    Albumin 3.4 3.4 - 5.0 g/dL   Magnesium   Result Value Ref Range    Magnesium 1.98 1.60 - 2.40 mg/dL   POCT GLUCOSE   Result Value Ref Range    POCT Glucose 248 (H) 74 - 99 mg/dL                                                                             Imaging           2/2/2024 CT A/P w/ IV contrast:  IMPRESSION:   1. Right-sided direct inguinal hernia containing ileal bowel loops, fat, and trace fluid. Although there is no evidence for bowel obstruction, incarceration cannot be excluded. Correlate with hernia reducibility and any pain at this site.   2.  Incidental note is made of bilateral renal artery stenosis. Consider follow-up Doppler renal ultrasound.   3.  Other nonacute incidental findings as  above.                                                                           GI Procedures      6/7/2024 EGD:  Findings  5 cm hiatal hernia without Travis lesions present - GE junction 35 cm from the incisors, diaphragmatic impression 40 cm from the incisors, confirmed by retroflexion. Hill grade IV hiatal hernia  The esophagus appeared normal. No evidence of esophagitis or Gold's esophagus.  Multiple sessile, semi-pedunculated polyps measuring from 2 mm up to 9 mm in the fundus of the stomach and body of the stomach; performed cold forceps biopsy. One of the larger semi-pedunculated polyps was erythematous with erosive changes on the surface. This polyp was biopsied as well.  Single 5 mm angioectasia in the duodenal bulb; no bleeding was identified; the lesion was ablated with argon plasma coagulation using a straight fire probe; placed MRI compatible clips; hemostasis achieved  The 2nd part of the duodenum appeared normal.    6/7/2024 colonoscopy:  Findings  Normal terminal ileum, ileocecal valve, and appendiceal orifice (photodocumented).  Multiple small, medium and large, extensive diverticula with no inflammation in the ascending colon, transverse colon, descending colon and sigmoid colon; no bleeding was identified  External medium hemorrhoids observed during digital rectal exam; no bleeding was identified  No polyps were visualized on this procedure.    7/2020 Colonoscopy:  - Diverticulosis in the sigmoid colon, in the descending colon, in the transverse colon and in the ascending colon.  - Internal hemorrhoids.  - The examined portion of the ileus was normal.  - No specimens collected.     ASSESSMENT / PLAN                  ASSESSMENT/PLAN:     Mann Abrams is a 83 y.o. male with a past medical history of severe SAGE, COPD, HTN, DLD, DMT2, and CKD 3b, admitted on 6/5/2024 with CP and dyspnea on exertion. Hgb 6 from 8.4 on the 5/14/24.  Of note he was admitted within the last month at [facility].  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 No stents were placed.      GI is consulted for SAGE.    6/7/2024 S/p EGD, which showed Hill grade IV hiatal hernia, gastric polyps (one of them w/ erosions on it); s/p biopsies, and a single angioectasia in the duodenal bulb s/p APC. S/p colonoscopy, which showed pan-colonic diverticulosis and external hemorrhoids. SAGE most likely due to occult GIB in the setting of angioectasias.      Recommendations:  - GI will Follow up pathology and call patient if any abnormalities  - Consider PO iron supplementation on discharge  - Okay to continue outpatient PPI for GERD symptoms  - Primary care physician to continue to follow CBC, if downtrending again, would refer to GI clinic  - GI will sign off at this time     The above recommendations were communicated to the [facility] team.     Patient was seen and discussed with Dr. Christian.     Gastroenterology will SIGN OFF  During weekday hours of 7am-5pm please do not hesitate to contact me on [facility] Chat or page [phone], if there are any further questions between the weekday hours of 7am-5pm.   After hours, on weekends, and on holidays, please page the [place] [phone]. Thank you.     Carol Ann Quezada MD  PGY4 Gastroenterology Fellow  [facility]

## 2024-06-10 ENCOUNTER — OFFICE VISIT (OUTPATIENT)
Dept: PRIMARY CARE | Facility: CLINIC | Age: 84
End: 2024-06-10
Payer: MEDICARE

## 2024-06-10 ENCOUNTER — DOCUMENTATION (OUTPATIENT)
Dept: PRIMARY CARE | Facility: CLINIC | Age: 84
End: 2024-06-10

## 2024-06-10 ENCOUNTER — LAB (OUTPATIENT)
Dept: LAB | Facility: LAB | Age: 84
End: 2024-06-10
Payer: MEDICARE

## 2024-06-10 VITALS
DIASTOLIC BLOOD PRESSURE: 69 MMHG | OXYGEN SATURATION: 95 % | BODY MASS INDEX: 23.73 KG/M2 | SYSTOLIC BLOOD PRESSURE: 140 MMHG | HEART RATE: 77 BPM | WEIGHT: 147 LBS

## 2024-06-10 DIAGNOSIS — N18.32 TYPE 2 DIABETES MELLITUS WITH STAGE 3B CHRONIC KIDNEY DISEASE, WITHOUT LONG-TERM CURRENT USE OF INSULIN (MULTI): ICD-10-CM

## 2024-06-10 DIAGNOSIS — D50.0 IRON DEFICIENCY ANEMIA DUE TO CHRONIC BLOOD LOSS: ICD-10-CM

## 2024-06-10 DIAGNOSIS — E78.2 MIXED HYPERLIPIDEMIA: ICD-10-CM

## 2024-06-10 DIAGNOSIS — N18.31 TYPE 2 DIABETES MELLITUS WITH STAGE 3A CHRONIC KIDNEY DISEASE, WITH LONG-TERM CURRENT USE OF INSULIN (MULTI): ICD-10-CM

## 2024-06-10 DIAGNOSIS — N18.31 CKD STAGE 3A, GFR 45-59 ML/MIN (MULTI): ICD-10-CM

## 2024-06-10 DIAGNOSIS — N18.32 STAGE 3B CHRONIC KIDNEY DISEASE (CKD) (MULTI): ICD-10-CM

## 2024-06-10 DIAGNOSIS — D50.0 IRON DEFICIENCY ANEMIA DUE TO CHRONIC BLOOD LOSS: Primary | ICD-10-CM

## 2024-06-10 DIAGNOSIS — I10 PRIMARY HYPERTENSION: Primary | ICD-10-CM

## 2024-06-10 DIAGNOSIS — R80.9 MICROALBUMINURIA: ICD-10-CM

## 2024-06-10 DIAGNOSIS — E11.22 TYPE 2 DIABETES MELLITUS WITH STAGE 3B CHRONIC KIDNEY DISEASE, WITHOUT LONG-TERM CURRENT USE OF INSULIN (MULTI): ICD-10-CM

## 2024-06-10 DIAGNOSIS — J43.8 OTHER EMPHYSEMA (MULTI): ICD-10-CM

## 2024-06-10 DIAGNOSIS — I65.21 OCCLUSION OF RIGHT CAROTID ARTERY: ICD-10-CM

## 2024-06-10 DIAGNOSIS — E11.22 TYPE 2 DIABETES MELLITUS WITH STAGE 3A CHRONIC KIDNEY DISEASE, WITH LONG-TERM CURRENT USE OF INSULIN (MULTI): ICD-10-CM

## 2024-06-10 DIAGNOSIS — N18.4 CHRONIC RENAL DISEASE, STAGE IV (MULTI): ICD-10-CM

## 2024-06-10 DIAGNOSIS — K44.9 HIATAL HERNIA: ICD-10-CM

## 2024-06-10 DIAGNOSIS — Z79.4 TYPE 2 DIABETES MELLITUS WITH STAGE 3A CHRONIC KIDNEY DISEASE, WITH LONG-TERM CURRENT USE OF INSULIN (MULTI): ICD-10-CM

## 2024-06-10 DIAGNOSIS — R09.89 BRUIT OF RIGHT CAROTID ARTERY: ICD-10-CM

## 2024-06-10 PROBLEM — I99.8 ANGIECTASIA: Status: ACTIVE | Noted: 2024-06-10

## 2024-06-10 LAB
ALBUMIN SERPL BCP-MCNC: 3.2 G/DL (ref 3.4–5)
ALP SERPL-CCNC: 66 U/L (ref 33–136)
ALT SERPL W P-5'-P-CCNC: 19 U/L (ref 10–52)
ANION GAP SERPL CALC-SCNC: 16 MMOL/L (ref 10–20)
AST SERPL W P-5'-P-CCNC: 26 U/L (ref 9–39)
BASOPHILS # BLD AUTO: 0.02 X10*3/UL (ref 0–0.1)
BASOPHILS NFR BLD AUTO: 0.4 %
BILIRUB SERPL-MCNC: 0.3 MG/DL (ref 0–1.2)
BUN SERPL-MCNC: 26 MG/DL (ref 6–23)
CALCIUM SERPL-MCNC: 8.2 MG/DL (ref 8.6–10.6)
CHLORIDE SERPL-SCNC: 102 MMOL/L (ref 98–107)
CO2 SERPL-SCNC: 20 MMOL/L (ref 21–32)
CREAT SERPL-MCNC: 2.5 MG/DL (ref 0.5–1.3)
EGFRCR SERPLBLD CKD-EPI 2021: 25 ML/MIN/1.73M*2
EOSINOPHIL # BLD AUTO: 0.11 X10*3/UL (ref 0–0.4)
EOSINOPHIL NFR BLD AUTO: 2 %
ERYTHROCYTE [DISTWIDTH] IN BLOOD BY AUTOMATED COUNT: 14.1 % (ref 11.5–14.5)
GLUCOSE SERPL-MCNC: 152 MG/DL (ref 74–99)
HCT VFR BLD AUTO: 23.5 % (ref 41–52)
HGB BLD-MCNC: 8.1 G/DL (ref 13.5–17.5)
IMM GRANULOCYTES # BLD AUTO: 0.1 X10*3/UL (ref 0–0.5)
IMM GRANULOCYTES NFR BLD AUTO: 1.8 % (ref 0–0.9)
LYMPHOCYTES # BLD AUTO: 0.83 X10*3/UL (ref 0.8–3)
LYMPHOCYTES NFR BLD AUTO: 15.1 %
MCH RBC QN AUTO: 31.9 PG (ref 26–34)
MCHC RBC AUTO-ENTMCNC: 34.5 G/DL (ref 32–36)
MCV RBC AUTO: 93 FL (ref 80–100)
MONOCYTES # BLD AUTO: 0.8 X10*3/UL (ref 0.05–0.8)
MONOCYTES NFR BLD AUTO: 14.6 %
NEUTROPHILS # BLD AUTO: 3.62 X10*3/UL (ref 1.6–5.5)
NEUTROPHILS NFR BLD AUTO: 66.1 %
NRBC BLD-RTO: 0 /100 WBCS (ref 0–0)
PHOSPHATE SERPL-MCNC: 4.1 MG/DL (ref 2.5–4.9)
PLATELET # BLD AUTO: 283 X10*3/UL (ref 150–450)
POTASSIUM SERPL-SCNC: 4 MMOL/L (ref 3.5–5.3)
PROT SERPL-MCNC: 5.1 G/DL (ref 6.4–8.2)
RBC # BLD AUTO: 2.54 X10*6/UL (ref 4.5–5.9)
SODIUM SERPL-SCNC: 134 MMOL/L (ref 136–145)
WBC # BLD AUTO: 5.5 X10*3/UL (ref 4.4–11.3)

## 2024-06-10 PROCEDURE — 1123F ACP DISCUSS/DSCN MKR DOCD: CPT | Performed by: INTERNAL MEDICINE

## 2024-06-10 PROCEDURE — 1158F ADVNC CARE PLAN TLK DOCD: CPT | Performed by: INTERNAL MEDICINE

## 2024-06-10 PROCEDURE — 36415 COLL VENOUS BLD VENIPUNCTURE: CPT

## 2024-06-10 PROCEDURE — 99215 OFFICE O/P EST HI 40 MIN: CPT | Performed by: INTERNAL MEDICINE

## 2024-06-10 PROCEDURE — 85025 COMPLETE CBC W/AUTO DIFF WBC: CPT

## 2024-06-10 PROCEDURE — 1159F MED LIST DOCD IN RCRD: CPT | Performed by: INTERNAL MEDICINE

## 2024-06-10 PROCEDURE — 1036F TOBACCO NON-USER: CPT | Performed by: INTERNAL MEDICINE

## 2024-06-10 PROCEDURE — 80053 COMPREHEN METABOLIC PANEL: CPT

## 2024-06-10 PROCEDURE — 84100 ASSAY OF PHOSPHORUS: CPT

## 2024-06-10 PROCEDURE — 3077F SYST BP >= 140 MM HG: CPT | Performed by: INTERNAL MEDICINE

## 2024-06-10 PROCEDURE — 3078F DIAST BP <80 MM HG: CPT | Performed by: INTERNAL MEDICINE

## 2024-06-10 PROCEDURE — 1160F RVW MEDS BY RX/DR IN RCRD: CPT | Performed by: INTERNAL MEDICINE

## 2024-06-10 PROCEDURE — 1111F DSCHRG MED/CURRENT MED MERGE: CPT | Performed by: INTERNAL MEDICINE

## 2024-06-10 RX ORDER — DAPAGLIFLOZIN 10 MG/1
10 TABLET, FILM COATED ORAL DAILY
Qty: 30 TABLET | Refills: 2 | Status: SHIPPED | OUTPATIENT
Start: 2024-06-10 | End: 2024-07-10

## 2024-06-10 ASSESSMENT — ENCOUNTER SYMPTOMS
LOSS OF SENSATION IN FEET: 0
OCCASIONAL FEELINGS OF UNSTEADINESS: 0
DEPRESSION: 0
CONSTITUTIONAL NEGATIVE: 1

## 2024-06-10 NOTE — PROGRESS NOTES
Patient ID: Mann Abrams is a 84 y.o. male who presents for Follow-up.    /69 (BP Location: Left arm, Patient Position: Sitting, BP Cuff Size: Adult)   Pulse 77   Wt 66.7 kg (147 lb)   SpO2 95%   BMI 23.73 kg/m²     HPI      HTN ON MEDICATIONS UNCONTROLLED   NO CHEST PAIN , NO SOB , NO PND , NO   ORTHOPNEA, NO LEG SWELLING       S/P CAD   LCX %       RT CAROTID BRUIT   70%       COPD STABLE       HX OF GI BLEED   TOREY LESION IN THE ESOPHAGUS     DUODENAL ANGIOECTASIA     IRON DEF ANEMIA   NEEDED 2 UNITS OF BLOOD TRANSFUSION     PRESENTLY ON ENTERIC COATED ASPIRIN 81MG     CKD STAGE 3aA3    Subjective     Review of Systems   Constitutional: Negative.    All other systems reviewed and are negative.      Objective     Physical Exam  Vitals and nursing note reviewed.   Neck:      Vascular: No carotid bruit.   Cardiovascular:      Rate and Rhythm: Normal rate and regular rhythm.      Pulses: Normal pulses.      Heart sounds: Normal heart sounds. No murmur heard.  Pulmonary:      Breath sounds: Examination of the right-upper field reveals decreased breath sounds. Examination of the left-upper field reveals decreased breath sounds. Examination of the right-middle field reveals decreased breath sounds. Examination of the left-middle field reveals decreased breath sounds. Examination of the right-lower field reveals decreased breath sounds. Examination of the left-lower field reveals decreased breath sounds. Decreased breath sounds present.   Abdominal:      General: Abdomen is flat. Bowel sounds are normal.      Palpations: Abdomen is soft.   Musculoskeletal:      Right lower leg: No edema.      Left lower leg: No edema.   Skin:     Capillary Refill: Capillary refill takes more than 3 seconds.   Neurological:      General: No focal deficit present.      Mental Status: He is oriented to person, place, and time. Mental status is at baseline.   Psychiatric:         Mood and Affect: Mood normal.          Behavior: Behavior normal.         Thought Content: Thought content normal.         Judgment: Judgment normal.         Lab Results   Component Value Date    WBC 10.1 06/08/2024    HGB 8.0 (L) 06/08/2024    HCT 24.0 (L) 06/08/2024    MCV 95 06/08/2024     06/08/2024           Problem List Items Addressed This Visit       Primary hypertension - Primary    Other emphysema (Multi)    Relevant Orders    Referral to Pulmonology    Type 2 diabetes mellitus with stage 3b chronic kidney disease, without long-term current use of insulin (Multi)    Relevant Medications    dapagliflozin propanediol (Farxiga) 10 mg    Stage 3b chronic kidney disease (CKD) (Multi)    Chronic kidney disease in type 2 diabetes mellitus (Multi)    Relevant Medications    dapagliflozin propanediol (Farxiga) 10 mg    Hyperlipidemia    Bruit of right carotid artery    Iron deficiency anemia due to chronic blood loss    Occlusion of right carotid artery    Hiatal hernia    Chronic renal disease, stage IV (Multi)     STABLE          Relevant Medications    dapagliflozin propanediol (Farxiga) 10 mg    Other Relevant Orders    Comprehensive metabolic panel     Other Visit Diagnoses       Microalbuminuria        Relevant Medications    dapagliflozin propanediol (Farxiga) 10 mg                 A/P       WILL START HIM ON FARXIGA 10MG 1 PILL EVERY DAY   DR CATES IS AGREED UPON   WILL DC METFORMIN AND SPIRONOLACTONE   PT GETTING CMP , CBC TODAY   AND RECHECK CMP IN 1 MONTH TIME   HE NEEDS TO FOLLOW UP WITH NEPHROLOGY ON  08/26/2024           Advance Care Planning Note     Discussion Date: 06/10/24   Discussion Participants: patient    The patient wishes to discuss Advance Care Planning today and the following is a brief summary of our discussion.     Patient has capacity to make their own medical decisions: Yes  Health Care Agent/Surrogate Decision Maker documented in chart: No    Documents on file and valid:  Advance Directive/Living Will: No    Health Care Power of : No  Other:     Communication of Medical Status/Prognosis:        Communication of Treatment Goals/Options:       Discussed with the patient end-of-life choices patient is presently full code, he does not have a living will or healthcare power of , he will think about getting it done when he can put his mind to that, and I told him when it is ready he needs to bring it on next office visit or he can mail it or fax it to me or he can bring it personally to the office so that it can be scanned into the chart for the purpose of documentation    Treatment Decisions  Goals of Care: survival is prioritized, if goals for quality or survival can reasonably be achieved       Follow Up Plan      Team Members      Time Statement: Total face to face time spent on advance care planning was 5 minutes with 5 minutes spent in counseling, including the explanation.    Nataliya Grant MD  6/10/2024 11:15 AM

## 2024-06-10 NOTE — PROGRESS NOTES
Discharge Facility: [facility]  Discharge Diagnosis: CHEST PAIN   Admission Date: 6-5-2024  Discharge Date: 6-8-2024    PCP Appointment Date: 6-  -CBC 6-    Issues Requiring Follow-Up  -Followup CBC for hemoglobin stability to be drawn next week   -Restart aspirin per cardiology  -Hold PO iron, monitor stool color changes   -Surgical pathology from EGD       Specialist Appointment Date:   CARDIOLOGY 6-  Hospital Encounter and Summary: Linked   2 DAY HOSP FU NO OUTREACH REQUIRED

## 2024-06-11 DIAGNOSIS — N18.32 CKD STAGE 3B, GFR 30-44 ML/MIN (MULTI): Primary | ICD-10-CM

## 2024-06-12 ENCOUNTER — PATIENT OUTREACH (OUTPATIENT)
Dept: PRIMARY CARE | Facility: CLINIC | Age: 84
End: 2024-06-12
Payer: MEDICARE

## 2024-06-12 NOTE — PROGRESS NOTES
Unable to reach patient for call back after patient's follow up appointment with PCP. (6-)  LVM with call back number for patient to call if needed   Patient returned my call. Reports doing well. Denies any questions related to post hospital visit with PCP. Confirms he is understands the fu plan and which medications he should be taking. Encouraged to call with any questions.

## 2024-06-14 ENCOUNTER — OFFICE VISIT (OUTPATIENT)
Dept: CARDIOLOGY | Facility: CLINIC | Age: 84
End: 2024-06-14
Payer: MEDICARE

## 2024-06-14 VITALS
WEIGHT: 145 LBS | OXYGEN SATURATION: 95 % | SYSTOLIC BLOOD PRESSURE: 159 MMHG | DIASTOLIC BLOOD PRESSURE: 69 MMHG | BODY MASS INDEX: 23.3 KG/M2 | HEIGHT: 66 IN | HEART RATE: 74 BPM

## 2024-06-14 DIAGNOSIS — R93.1 ELEVATED CORONARY ARTERY CALCIUM SCORE: Primary | ICD-10-CM

## 2024-06-14 DIAGNOSIS — E78.5 HYPERLIPIDEMIA, UNSPECIFIED HYPERLIPIDEMIA TYPE: ICD-10-CM

## 2024-06-14 DIAGNOSIS — R94.39 ABNORMAL STRESS TEST: ICD-10-CM

## 2024-06-14 DIAGNOSIS — E78.2 MIXED HYPERLIPIDEMIA: ICD-10-CM

## 2024-06-14 DIAGNOSIS — I10 HYPERTENSION, UNSPECIFIED TYPE: ICD-10-CM

## 2024-06-14 DIAGNOSIS — R07.9 CHEST PAIN, EXERTIONAL: ICD-10-CM

## 2024-06-14 DIAGNOSIS — I10 PRIMARY HYPERTENSION: ICD-10-CM

## 2024-06-14 DIAGNOSIS — Q24.5: ICD-10-CM

## 2024-06-14 LAB
ATRIAL RATE: 74 BPM
P AXIS: 54 DEGREES
P OFFSET: 201 MS
P ONSET: 149 MS
PR INTERVAL: 148 MS
Q ONSET: 223 MS
QRS COUNT: 12 BEATS
QRS DURATION: 66 MS
QT INTERVAL: 368 MS
QTC CALCULATION(BAZETT): 408 MS
QTC FREDERICIA: 394 MS
R AXIS: 49 DEGREES
T AXIS: 60 DEGREES
T OFFSET: 407 MS
VENTRICULAR RATE: 74 BPM

## 2024-06-14 PROCEDURE — 1123F ACP DISCUSS/DSCN MKR DOCD: CPT | Performed by: INTERNAL MEDICINE

## 2024-06-14 PROCEDURE — 1111F DSCHRG MED/CURRENT MED MERGE: CPT | Performed by: INTERNAL MEDICINE

## 2024-06-14 PROCEDURE — 1160F RVW MEDS BY RX/DR IN RCRD: CPT | Performed by: INTERNAL MEDICINE

## 2024-06-14 PROCEDURE — 3077F SYST BP >= 140 MM HG: CPT | Performed by: INTERNAL MEDICINE

## 2024-06-14 PROCEDURE — 1036F TOBACCO NON-USER: CPT | Performed by: INTERNAL MEDICINE

## 2024-06-14 PROCEDURE — G2211 COMPLEX E/M VISIT ADD ON: HCPCS | Performed by: INTERNAL MEDICINE

## 2024-06-14 PROCEDURE — 93005 ELECTROCARDIOGRAM TRACING: CPT | Performed by: INTERNAL MEDICINE

## 2024-06-14 PROCEDURE — 1159F MED LIST DOCD IN RCRD: CPT | Performed by: INTERNAL MEDICINE

## 2024-06-14 PROCEDURE — 1126F AMNT PAIN NOTED NONE PRSNT: CPT | Performed by: INTERNAL MEDICINE

## 2024-06-14 PROCEDURE — 99215 OFFICE O/P EST HI 40 MIN: CPT | Performed by: INTERNAL MEDICINE

## 2024-06-14 PROCEDURE — 3078F DIAST BP <80 MM HG: CPT | Performed by: INTERNAL MEDICINE

## 2024-06-14 RX ORDER — AMLODIPINE BESYLATE 10 MG/1
10 TABLET ORAL DAILY
Qty: 90 TABLET | Refills: 3 | Status: SHIPPED | OUTPATIENT
Start: 2024-06-14

## 2024-06-14 RX ORDER — ISOSORBIDE MONONITRATE 30 MG/1
30 TABLET, EXTENDED RELEASE ORAL DAILY
Qty: 90 TABLET | Refills: 3 | Status: SHIPPED | OUTPATIENT
Start: 2024-06-14 | End: 2025-06-14

## 2024-06-14 RX ORDER — CARVEDILOL 12.5 MG/1
12.5 TABLET ORAL
Qty: 180 TABLET | Refills: 3 | Status: SHIPPED | OUTPATIENT
Start: 2024-06-14 | End: 2025-06-14

## 2024-06-14 RX ORDER — OLMESARTAN MEDOXOMIL 40 MG/1
40 TABLET ORAL DAILY
Qty: 90 TABLET | Refills: 3 | Status: SHIPPED | OUTPATIENT
Start: 2024-06-14 | End: 2025-06-14

## 2024-06-14 RX ORDER — ATORVASTATIN CALCIUM 80 MG/1
80 TABLET, FILM COATED ORAL DAILY
Qty: 90 TABLET | Refills: 3 | Status: SHIPPED | OUTPATIENT
Start: 2024-06-14 | End: 2025-06-14

## 2024-06-14 RX ORDER — SPIRONOLACTONE 25 MG/1
12.5 TABLET ORAL 2 TIMES DAILY
Qty: 90 TABLET | Refills: 3 | Status: SHIPPED | OUTPATIENT
Start: 2024-06-14 | End: 2025-06-14

## 2024-06-14 ASSESSMENT — ENCOUNTER SYMPTOMS
DEPRESSION: 0
OCCASIONAL FEELINGS OF UNSTEADINESS: 0
LOSS OF SENSATION IN FEET: 0

## 2024-06-14 ASSESSMENT — PAIN SCALES - GENERAL: PAINLEVEL: 0-NO PAIN

## 2024-06-14 NOTE — PROGRESS NOTES
"Chief Complaint:   Hospital Follow-up (Pt denies CP, palpitations, and SOB)     History Of Present Illness:    Mann Abrams is a 84 y.o. male presenting with a pertinent medical history notable for chronic kidney disease stage III, diabetes mellitus, peripheral vascular disease, hypercholesterolemia who is seen today for follow up cardiovascular care.      Recall that Mr. Abrams is a very active individual who usually walks 2 miles every day in good weather or spends at least 30 minutes on a treadmill 6 days out of the week. He had one episode where he all of a sudden felt very tired while he was riding his lawnmower. He felt lightheaded so he laid down and rested briefly. After that he got up and finish mowing the lawn without any incident Following this he went and talked to his PCP who referred him on for cardiac stress testing. He underwent exercise echocardiography stress testing. He exercised for 9 minutes and was able to make 10.1 METS. On his EKG, he had ST depressions in the lateral leads. On his actual imaging he had a hyperdynamic precordium at baseline that then increased at maximal stress. Of note he had no symptoms during this time. Based off of this his stress test was recorded as abnormal and he was referred to cardiology.     Following that initial episode, he has not had any repeat occurrences of chest discomfort or the feeling that he had.  He has no personal history of coronary artery disease. He has reduced his carvedilol in the interim period, using it once daily instead of BID. States that he does this because he is more tired when taking it twice daily. Describe it as \"like all my energy is sucked out of me\"      Today ( 1/10/2023) He presents for follow up. He has been well, but in December had a \"cold\" and went to Urgent Care. Told them of cough, congestion and a \"Burning pain in his chest:\" He was referred to ER but \"they had over a hundred people in there and told me to get in line so I " "went home. I knew I wasn;t dying\" States that his pain occurred mostly when he was bending over to tie his shoes. No change in symptoms when he was walking, going about his daily activities. States that he thinks that \"lisinopril i making me cough and I see on TV that it causes Cancer.\" States that he has not had any recurrence of chest discomfort.         8/3/2023 -- He returns for follow up. He had seen his PCP who has been adjusting his medication. He had placed him on Doxazosin 4 mg Daily + Amlodipine 10 ( Had already been on this) and Losartan 50 mg ( Had been on this ) and told to decrease the Carvedilol. Probably doxazosin, but noted that his back \"felt like it was on fire\". He subsequently stopped it. Since these change, his blood pressure remains poorly controlled.      9/29/2023  -- He presents for follow up. He has followed up with Nephrology for CKD and HTN. He started spironolactone at 25 mg taken as 1/2 tablet twice daily. He notes improved blood pressure with this but also notes increased urination. He had tried to take it as a full dose once daily but noted heavy urination frequency and urgency that was difficult to manage. He notes that since starting the spironolactone that his SOB / PATEL has improved noticeably.     3/12/2024 -- He returns for follow up.  He was seen at [facility] For 4 days of Nausea .vomiting. A CT-CAP showed an inguianl hernia, diffuse atherosclerosis disease and stenosis of the renal arteries. He was provided with paperwork bhumi cantrell\" an abnormality was found an you should discuss this with your doctor\" however, no information regarding what was discovered was noted.  He notes that he continues to struggle with some shortness of breath.  He although it is better now that his blood pressure is better controlled, this still is occurring especially when going up and down stairs, or even taking his trash can out.  He notes that he to stop and rest and catch his breath.  A recent " "coronary artery calcium score does show increased calcifications specifically within the LAD territory.  -  6/14/2024 --he returns for follow-up.  Since her last appointment, he underwent left heart catheterization after presenting with Take here capital a chest discomfort.  This showed mildly obstructive coronary artery disease with a 70 to 80% proximal lesion within the circumflex.  It was noted that the circumflex had abnormal origin coming off the RCA.  Given findings, it was decided to be medically managed.  He then presented back to the hospital beginning June where he was noted to have acute on chronic anemia.  He was transfused blood, underwent EGD, and thereafter he was felt to be stabilized.  Since his return home, he has not had any further episodes.  He overall feels better.  He offers no complaints.     Patient denies chest pain and angina. Pt denies shortness of breath, dyspnea on exertion, orthopnea, and paroxysmal nocturnal dyspnea. Pt denies worsening lower extremity edema. Pt denies palpitations or syncope. No recent falls. No fever or chills. No cough. No change in bowel or bladder habits. No travel. No sick contacts. No recent travel     Past medical history: As above.  Medications: Reviewed.  Allergies: Reviewed.  Social history: Patient denies smoking, alcohol abuse, or illicit drug use.  Family history: No sudden cardiac death or premature coronary artery disease..     Last Recorded Vitals:  Vitals:    06/14/24 0913   BP: 159/69   Patient Position: Sitting   Pulse: 74   SpO2: 95%   Weight: 65.8 kg (145 lb)   Height: 1.676 m (5' 6\")         Past Medical History:  He has a past medical history of Chronic kidney disease, stage 3 unspecified (Multi) (08/22/2022), DM (diabetes mellitus) (Multi), Hypertension, Other secondary cataract, left eye (08/01/2016), and Person consulting for explanation of examination or test findings (01/07/2020).    Past Surgical History:  He has a past surgical history " "that includes Eye surgery (08/07/2014); Other surgical history (12/04/2019); Other surgical history (12/04/2019); and Cardiac catheterization (N/A, 5/30/2024).      Social History:  He reports that he quit smoking about 38 years ago. His smoking use included cigarettes. He has never used smokeless tobacco. He reports that he does not drink alcohol and does not use drugs.    Family History:  Family History   Problem Relation Name Age of Onset    Cancer Mother      Other (cardiac disorder) Father      Diabetes Sister          Allergies:  Doxazosin    Outpatient Medications:  Current Outpatient Medications   Medication Instructions    amLODIPine (NORVASC) 10 mg, oral, Daily    ascorbic acid (Vitamin C) 500 mg tablet oral    atorvastatin (LIPITOR) 80 mg, oral, Daily    carvedilol (Coreg) 25 mg tablet HALF PILL BID PLEASE NOTE CHANGE IN DOSE    dapagliflozin propanediol (FARXIGA) 10 mg, oral, Daily    fluticasone (Flonase) 50 mcg/actuation nasal spray 1 spray, Each Nostril, Daily, Shake gently. Before first use, prime pump. After use, clean tip and replace cap.    fluticasone propion-salmeteroL (Wixela Inhub) 250-50 mcg/dose diskus inhaler 1 puff, inhalation, 2 times daily RT, Rinse mouth with water after use to reduce aftertaste and incidence of candidiasis. Do not swallow.    isosorbide mononitrate ER (IMDUR) 30 mg, oral, Daily, Do not crush or chew.    montelukast (SINGULAIR) 10 mg, oral, Nightly    nitroglycerin (NITROSTAT) 0.4 mg, sublingual, Every 5 min PRN, place 1 tablet under tongue every 5 minutes for up to 3 doses as needed for chest pain. Call 911 if pain persists    olmesartan (BENICAR) 40 mg, oral, Daily    omeprazole (PRILOSEC) 40 mg, oral, Daily    spironolactone (Aldactone) 25 mg tablet 0.5 tablets, oral, 2 times daily       Physical Exam:  PHYSICAL EXAMINATION  /69 (Patient Position: Sitting)   Pulse 74   Ht 1.676 m (5' 6\")   Wt 65.8 kg (145 lb)   SpO2 95%   BMI 23.40 kg/m²   General:  " Patient is awake, alert, and oriented.  Patient is in no acute distress.  HEENT:  Pupils equal and reactive.  Normocephalic.  Moist mucosa.    Neck:  No thyromegaly.  Normal Jugular Venous Pressure.  Cardiovascular:  Regular rate and rhythm.  Normal S1 and S2.  1/6 BINH.  Pulmonary:  Clear to auscultation bilaterally.  Abdomen:  Soft. Non-tender.   Non-distended.  Positive bowel sounds.  Lower Extremities:  2+ pedal pulses. No LE edema.  Neurologic:  Cranial nerves intact.  No focal deficit.   Skin: Skin warm and dry, normal skin turgor.   Psychiatric: Normal affect.           Last Labs:  CBC -  Lab Results   Component Value Date    WBC 5.5 06/10/2024    HGB 8.1 (L) 06/10/2024    HCT 23.5 (L) 06/10/2024    MCV 93 06/10/2024     06/10/2024       CMP -  Lab Results   Component Value Date    CALCIUM 8.2 (L) 06/10/2024    PHOS 4.1 06/10/2024    PROT 5.1 (L) 06/10/2024    ALBUMIN 3.2 (L) 06/10/2024    AST 26 06/10/2024    ALT 19 06/10/2024    ALKPHOS 66 06/10/2024    BILITOT 0.3 06/10/2024       LIPID PANEL -   Lab Results   Component Value Date    CHOL 165 09/01/2023    TRIG 75 09/01/2023    HDL 68.8 09/01/2023    CHHDL 2.4 09/01/2023    LDLF 76 04/21/2022    VLDL 25 04/21/2022       RENAL FUNCTION PANEL -   Lab Results   Component Value Date    GLUCOSE 152 (H) 06/10/2024     (L) 06/10/2024    K 4.0 06/10/2024     06/10/2024    CO2 20 (L) 06/10/2024    ANIONGAP 16 06/10/2024    BUN 26 (H) 06/10/2024    CREATININE 2.50 (H) 06/10/2024    GFRMALE 45 (A) 09/01/2023    CALCIUM 8.2 (L) 06/10/2024    PHOS 4.1 06/10/2024    ALBUMIN 3.2 (L) 06/10/2024        Lab Results   Component Value Date    BNP 86 06/05/2024    HGBA1C 5.7 (H) 06/05/2024       Last Cardiology Tests:  ECG:      In Office EKG 3/12/2024 -- Sinus Rhythm at 82 BPM  -- NO Ischemic changes   In Office EKG 1/10/2023  -- Normal Sinus Rhythm @ 84 bpmNo results found for this or any previous visit from the past 1095 days.      Echo:  Echocardiogram  "09/2023  1. Left ventricular systolic function is normal with a 60-65% estimated ejection fraction.  2. Spectral Doppler shows an impaired relaxation pattern of left ventricular diastolic filling.  3. Moderately elevated right ventricular systolic pressure. ( RVSP ~ 52 mmHg)      Ejection Fractions:  No results found for: \"EF\"    Cath:  5/30/2024  Coronary Angiography Comments:  A 6F JL 3.5 guide catheter was used for LAD IVUS. LAD was wired using a Runthrough wire. IVUS was performed.IVUS: Reference area of proximal LAD is a 20 mmï¿½ and MLA was 14 mmï¿½]. Final angiogram with no complications.           Left Anterior Descending Coronary Artery Distribution:  The left anterior descending coronary artery is a normal caliber vessel. The LAD arises normally from the left main coronary artery. The proximal left anterior descending coronary artery showed 30% stenosis. This lesion was eccentric. The 1st diagonal branch showed no significant disease or stenosis greater than 30%. The 2nd diagonal branch demonstrated no significant disease or stenosis greater than 30%.     Circumflex Coronary Artery Distribution:  The circumflex coronary artery is a normal caliber vessel. 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 The proximal circumflex coronary artery showed 70-80%. The 1st obtuse marginal branch showed no significant disease or stenosis greater than 30%.     Right Coronary Artery Distribution:     The right coronary artery is a normal caliber vessel. The RCA arises normally from the right sinus of Valsalva. The mid right coronary artery showed 40% stenosis. The acute marginal branch showed no significant disease or stenosis greater than 30%.  The right posterolateral branch showed no significant disease or stenosis greater than 30%. The right posterior descending artery showed no significant disease or stenosis greater than 30%.     Coronary Lesion " Summary:  Vessel       Stenosis   Vessel Segment  LAD        30% stenosis    proximal  Circumflex    70-80%       proximal  RCA        40% stenosis      mid  Given the bathroom right now pressure to the    Stress Test:  Exercise echocardiogram stress testing 06/2020   1. Abnormal Stress Test.  2. ECG changes consistent with ischemia.  3. Hyperdynamic global left ventricular systolic function.  4. Patients baseline LV function was hyperdynamic with peak stress showing almost complete cavity obliteration . -- No regional wall motion changes noted at peak exercise.  5. Noted Increased in RVSP from baseline 31 mmHg to 54 mmHg at Peak Stress     Cardiac Imaging:  CT cardiac scoring wo IV contrast 01/04/2024  The score and distribution of calcium in the coronary arteries is as  follows:      .81,  .03,  LCx 0,  .93,      Total 1328.77      The visualized ascending thoracic aorta measures 3.5 cm in diameter.  Extensive atherosclerotic calcification at the level of the aortic  ductus. The heart is normal in size. No pericardial effusion is  present.      No gross evidence of mediastinal or hilar lymphadenopathy or masses  is identified. Scattered mediastinal lymph nodes are felt to be  reactive in nature. The visualized segments of the lungs demonstrates  peribronchial thickening with mild emphysematous changes. Subpleural  reticulation consistent with smoking-related interstitial changes..  Calcified granuloma within the lingula.      The main pulmonary artery, right and left pulmonary artery are normal  in size.      The visualized subdiaphragmatic structures appear intact.      Lab review: I have personally reviewed the laboratory result(s)   Diagnostic review: I have personally reviewed the result(s) of the EKG and Echocardiogram .   Imaging review: I have  personally reviewed the result(s) Stress Testing / OSH Imaging / CTA Coronary Artery Calcium Score     Assessment/Plan     Problem List Items  "Addressed This Visit       Abnormal stress test    Overview     Exercise echocardiogram stress testing 06/2020   1. Abnormal Stress Test.  2. ECG changes consistent with ischemia.  3. Hyperdynamic global left ventricular systolic function.  4. Patients baseline LV function was hyperdynamic with peak stress showing almost complete cavity obliteration . -- No regional wall motion changes noted at peak exercise.  5. 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    The proximal circumflex coronary artery showed 70-80%. The 1st obtuse marginal branch showed no significant disease or stenosis greater than 30%.         Current Assessment & Plan     Continue medical management         Elevated coronary artery calcium score - Primary    Overview      Coronary Artery Calcium Score 1/2024  .81,  .03,  LCx 0,  .93,      Total 1328.77         Hyperlipidemia    Overview     The ASCVD Risk score (Margareth DK, et al., 2019) failed to calculate for the following reasons:    The 2019 ASCVD risk score is only valid for ages 40 to 79  Lab Results   Component Value Date    CHOL 165 09/01/2023    CHOL 133 12/28/2022    CHOL 150 04/21/2022     Lab Results   Component Value Date    HDL 68.8 09/01/2023    HDL 47.3 12/28/2022    HDL 48.8 04/21/2022     No results found for: \"LDLCALC\"  Lab Results   Component Value Date    TRIG 75 09/01/2023    TRIG 127 04/21/2022    TRIG 93 06/10/2021     No components found for: \"CHOLHDL\"           Primary hypertension         Brant Garcia,   "

## 2024-06-14 NOTE — ASSESSMENT & PLAN NOTE
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  Medically managed

## 2024-06-14 NOTE — PATIENT INSTRUCTIONS
"It was a pleasure seeing you today. I would like to see you back in clinic after your heart catheterization you can call my office if questions arise between now and our next visit.     We talked about your heart catheterization. Although the \"70-80%\" seems high, in your case, it would cause more harm than good to fix this based on the location of the blockage and how the blood vessels come off the aorta to supply the heart. This is best managed medically at this time as we are doing.       Please continue  Olmesartan 40 mg Daily. This will lower your blood pressure better    Please continue Imdur 30 mg Daily. This is a long acting Nitrate to assist with your chest discomfort    Please continue to take 81 mg of Aspirin ( Either from the Drug Store or from [facility] [facility]) We need to make sure that you can tolerate this.     Please have a Blood Count performed Next week     We also talked about your symptoms of shortness of breath and you being somewhat tired and having to rest when you are doing daily things.  In light of your coronary artery calcium score, as well as your previous stress test, I think We need to proceed with a heart catheterization.  I have reached out to Dr. Jerez who does most of the heart catheterizations for me as he has the training to allow him to fix problems when he sees them. Continue on your other current medications.    Below are some Heart Health Tips that we provide to all of our patients. I hope you fing them useful.     - If you are having problems with medications, consider looking at the following websites.   --  \"GoodRx\"   --  \"[facility]\"      - We are happy to supply written prescriptions if needed to allow you to obtain your medications from different pharmacies. Additionally, if you are having issues with mail order delivery, please let us know. We can send a limited supply of your medications to your local pharmacy.     -  We recommend you follow a heart " healthy diet. Watch food labels and try not to eat more than 2,500 mg of sodium per day. Avoid foods high in salt like processed meats (lunch meats, saini, and sausage), processed foods (boxed dinners, canned soups), fried and fast foods. Monitor serving sizes and if the sodium per serving size is more than 200 mg, avoid those foods. If the sodium per serving size is between 100-200 mg, you can use those in limited quantities. Try to choose foods where the amount of sodium per serving size is less than 100 mg. Try to eat a diet rich in fruits and vegetables, whole grains, low fat dairy products, skinless poultry and fish, nuts, beans, non-tropical vegetable oils. Limit saturated fat, trans fat, sodium, red meats, and sugar-sweetened beverages.   Limit alcohol     -The combination of a reduced-calorie diet and increased physical activity is recommended. Adults should aim to get at least 150 minutes of moderate physical activity per week (30 minutes of moderate physical activities at least 5 days per week). Examples of moderate physical activities include brisk walking, swimming, aerobic dancing, heavy gardening, jumping rope, bicycling 10 MPH or faster, tennis, hiking uphill or with a heavy backpack. Please let us know if you would like to learn more about your nutrition and calories and additional options including weight loss programs to help you reach your goal.     -If you smoke, stop smoking. If you stop smoking you can help get rid of a major source of stress to your heart. Smoking makes your heart rate and blood pressure go up and increases your risk or developing cardiovascular diseases and worsen symptoms associated with heart failure.     -Obtain a BP monitor and monitor your BP daily. Check it around the same time each day; at least 1 hour after taking your medications. Record your BP in a log and bring your log with you to your doctors appointment.     -F/u with your PCP as recommended.

## 2024-06-17 LAB
LABORATORY COMMENT REPORT: NORMAL
PATH REPORT.FINAL DX SPEC: NORMAL
PATH REPORT.GROSS SPEC: NORMAL
PATH REPORT.TOTAL CANCER: NORMAL

## 2024-06-18 LAB
ATRIAL RATE: 74 BPM
ATRIAL RATE: 74 BPM
P AXIS: 54 DEGREES
P AXIS: 72 DEGREES
P OFFSET: 201 MS
P OFFSET: 201 MS
P ONSET: 149 MS
P ONSET: 150 MS
PR INTERVAL: 146 MS
PR INTERVAL: 148 MS
Q ONSET: 223 MS
Q ONSET: 223 MS
QRS COUNT: 12 BEATS
QRS COUNT: 13 BEATS
QRS DURATION: 66 MS
QRS DURATION: 70 MS
QT INTERVAL: 368 MS
QT INTERVAL: 370 MS
QTC CALCULATION(BAZETT): 408 MS
QTC CALCULATION(BAZETT): 410 MS
QTC FREDERICIA: 394 MS
QTC FREDERICIA: 397 MS
R AXIS: 49 DEGREES
R AXIS: 73 DEGREES
T AXIS: 60 DEGREES
T AXIS: 64 DEGREES
T OFFSET: 407 MS
T OFFSET: 408 MS
VENTRICULAR RATE: 74 BPM
VENTRICULAR RATE: 74 BPM

## 2024-06-25 ENCOUNTER — APPOINTMENT (OUTPATIENT)
Dept: CARDIOLOGY | Facility: CLINIC | Age: 84
End: 2024-06-25
Payer: MEDICARE

## 2024-06-27 ENCOUNTER — OFFICE VISIT (OUTPATIENT)
Dept: VASCULAR SURGERY | Facility: CLINIC | Age: 84
End: 2024-06-27
Payer: MEDICARE

## 2024-06-27 ENCOUNTER — HOSPITAL ENCOUNTER (OUTPATIENT)
Dept: VASCULAR MEDICINE | Facility: CLINIC | Age: 84
Discharge: HOME | End: 2024-06-27
Payer: MEDICARE

## 2024-06-27 VITALS
SYSTOLIC BLOOD PRESSURE: 154 MMHG | BODY MASS INDEX: 23.06 KG/M2 | WEIGHT: 143.5 LBS | DIASTOLIC BLOOD PRESSURE: 75 MMHG | HEIGHT: 66 IN | OXYGEN SATURATION: 96 % | HEART RATE: 72 BPM

## 2024-06-27 DIAGNOSIS — N18.31 TYPE 2 DIABETES MELLITUS WITH STAGE 3A CHRONIC KIDNEY DISEASE, WITH LONG-TERM CURRENT USE OF INSULIN (MULTI): ICD-10-CM

## 2024-06-27 DIAGNOSIS — Z79.4 TYPE 2 DIABETES MELLITUS WITH STAGE 3A CHRONIC KIDNEY DISEASE, WITH LONG-TERM CURRENT USE OF INSULIN (MULTI): ICD-10-CM

## 2024-06-27 DIAGNOSIS — E11.22 TYPE 2 DIABETES MELLITUS WITH STAGE 3A CHRONIC KIDNEY DISEASE, WITH LONG-TERM CURRENT USE OF INSULIN (MULTI): ICD-10-CM

## 2024-06-27 DIAGNOSIS — I10 PRIMARY HYPERTENSION: ICD-10-CM

## 2024-06-27 DIAGNOSIS — I65.23 CAROTID STENOSIS, ASYMPTOMATIC, BILATERAL: Primary | ICD-10-CM

## 2024-06-27 DIAGNOSIS — N18.4 CHRONIC RENAL DISEASE, STAGE IV (MULTI): ICD-10-CM

## 2024-06-27 DIAGNOSIS — I65.21 CAROTID STENOSIS, ASYMPTOMATIC, RIGHT: ICD-10-CM

## 2024-06-27 PROCEDURE — 3077F SYST BP >= 140 MM HG: CPT | Performed by: SURGERY

## 2024-06-27 PROCEDURE — 3078F DIAST BP <80 MM HG: CPT | Performed by: SURGERY

## 2024-06-27 PROCEDURE — 1123F ACP DISCUSS/DSCN MKR DOCD: CPT | Performed by: SURGERY

## 2024-06-27 PROCEDURE — 1126F AMNT PAIN NOTED NONE PRSNT: CPT | Performed by: SURGERY

## 2024-06-27 PROCEDURE — 93880 EXTRACRANIAL BILAT STUDY: CPT

## 2024-06-27 PROCEDURE — 1159F MED LIST DOCD IN RCRD: CPT | Performed by: SURGERY

## 2024-06-27 PROCEDURE — 93880 EXTRACRANIAL BILAT STUDY: CPT | Performed by: SURGERY

## 2024-06-27 PROCEDURE — 99214 OFFICE O/P EST MOD 30 MIN: CPT | Performed by: SURGERY

## 2024-06-27 PROCEDURE — 1111F DSCHRG MED/CURRENT MED MERGE: CPT | Performed by: SURGERY

## 2024-06-27 RX ORDER — LOSARTAN POTASSIUM 50 MG/1
50 TABLET ORAL DAILY
COMMUNITY
Start: 2024-06-06

## 2024-06-27 ASSESSMENT — PAIN SCALES - GENERAL: PAINLEVEL: 0-NO PAIN

## 2024-06-27 ASSESSMENT — ENCOUNTER SYMPTOMS
DEPRESSION: 0
LOSS OF SENSATION IN FEET: 0
OCCASIONAL FEELINGS OF UNSTEADINESS: 0

## 2024-06-27 ASSESSMENT — LIFESTYLE VARIABLES: HOW OFTEN DO YOU HAVE A DRINK CONTAINING ALCOHOL: NEVER

## 2024-06-27 ASSESSMENT — PATIENT HEALTH QUESTIONNAIRE - PHQ9
1. LITTLE INTEREST OR PLEASURE IN DOING THINGS: NOT AT ALL
SUM OF ALL RESPONSES TO PHQ9 QUESTIONS 1 AND 2: 0
2. FEELING DOWN, DEPRESSED OR HOPELESS: NOT AT ALL

## 2024-06-27 NOTE — PATIENT INSTRUCTIONS
It was a pleasure taking care of you today and appreciate your seeing us at our  [facility].     Today's plan is as follows:  1) we discussed the results of your ultrasound today and the high velocities on the left side - we also discussed your kidney function and how we need to address this prior to any further contrast or imaging to avoid worsening renal failure  2) I will be reaching out to your doctors including Dr. Hammer to see how we can improve on this renal function and then we will decide on next steps for your carotid  3) if you have any concerning symptoms consistent with stroke or mini-stroke - please call 911 for urgent evaluation.       Please call the office with any questions at [phone].   You can speak to our secretaries or our clinical nurses for specific questions.   For [facility] specific questions, you can also call [phone] or email at [email or web address]  If you need coordinating your appointments and testing you can do these at the  or by calling my office shortly after your visit.

## 2024-06-27 NOTE — PROGRESS NOTES
F/U REASON: asymptomatic carotid stenosis.    CURRENT ENCOUNTER:  Mann Abrams is 84 y.o. male here for follow up of  asymptomatic carotid stenosis. Right hand dominant.  Hx: DM, CKD3, HLD, HTN, CAD, NICHOLAS, COPD, GERD, SAGE, PVD, CAD (med management)    Has resumed ASA-81. Was held after admission earlier this month for anemia and restarted a week after discharge.   Denies neurologic symptoms; no weakness, loss of sensation, LOC, dizziness, confusion.    Meds:   Current Outpatient Medications:     ascorbic acid (Vitamin C) 500 mg tablet, Take by mouth., Disp: , Rfl:     atorvastatin (Lipitor) 80 mg tablet, Take 1 tablet (80 mg) by mouth once daily., Disp: 90 tablet, Rfl: 3    carvedilol (Coreg) 12.5 mg tablet, Take 1 tablet (12.5 mg) by mouth 2 times daily (morning and late afternoon)., Disp: 180 tablet, Rfl: 3    dapagliflozin propanediol (Farxiga) 10 mg, Take 1 tablet (10 mg) by mouth once daily., Disp: 30 tablet, Rfl: 2    fluticasone (Flonase) 50 mcg/actuation nasal spray, Administer 1 spray into each nostril once daily. Shake gently. Before first use, prime pump. After use, clean tip and replace cap., Disp: 48 g, Rfl: 1    fluticasone propion-salmeteroL (Wixela Inhub) 250-50 mcg/dose diskus inhaler, Inhale 1 puff 2 times a day. Rinse mouth with water after use to reduce aftertaste and incidence of candidiasis. Do not swallow., Disp: 3 each, Rfl: 1    isosorbide mononitrate ER (Imdur) 30 mg 24 hr tablet, Take 1 tablet (30 mg) by mouth once daily. Do not crush or chew., Disp: 90 tablet, Rfl: 3    losartan (Cozaar) 50 mg tablet, Take 1 tablet (50 mg) by mouth once daily., Disp: , Rfl:     montelukast (Singulair) 10 mg tablet, Take 1 tablet (10 mg) by mouth once daily at bedtime., Disp: 90 tablet, Rfl: 3    nitroglycerin (Nitrostat) 0.4 mg SL tablet, Place 1 tablet (0.4 mg) under the tongue every 5 minutes if needed for chest pain. place 1 tablet under tongue every 5 minutes for up to 3 doses as needed for chest  pain. Call 911 if pain persists, Disp: , Rfl:     olmesartan (Benicar) 40 mg tablet, Take 1 tablet (40 mg) by mouth once daily., Disp: 90 tablet, Rfl: 3    omeprazole (PriLOSEC) 40 mg DR capsule, Take 1 capsule (40 mg) by mouth once daily., Disp: 90 capsule, Rfl: 3    spironolactone (Aldactone) 25 mg tablet, Take 0.5 tablets (12.5 mg) by mouth 2 times a day., Disp: 90 tablet, Rfl: 3    amLODIPine (Norvasc) 10 mg tablet, Take 1 tablet (10 mg) by mouth once daily., Disp: 90 tablet, Rfl: 3    Allergies:   Allergies   Allergen Reactions    Doxazosin Itching       ROS:  Review of Systems  otherwise unremarkable    Objective:  Vitals:  Vitals:    06/27/24 1100   BP: 154/75   Pulse: 72   SpO2:         Exam:  General: NAD, AAOx3  Neuro: no gross deficits, speech WNL  HEENT: NC/AT  CV: RRR  Pulm: normal respiratory effort on RA  Abd: soft, NTND  Extr: FROM, no deformities, no swelling  Skin: no lesions or rashes    Labs:  Lab Results   Component Value Date    WBC 5.5 06/10/2024    WBC 10.1 06/08/2024    WBC 8.0 06/06/2024    HGB 8.1 (L) 06/10/2024    HGB 8.0 (L) 06/08/2024    HGB 9.2 (L) 06/06/2024    HCT 23.5 (L) 06/10/2024    HCT 24.0 (L) 06/08/2024    HCT 25.9 (L) 06/06/2024    MCV 93 06/10/2024    MCV 95 06/08/2024    MCV 87 06/06/2024     06/10/2024     Lab Results   Component Value Date    CREATININE 2.50 (H) 06/10/2024    CREATININE 2.23 (H) 06/08/2024    CREATININE 1.58 (H) 06/07/2024    BUN 26 (H) 06/10/2024    BUN 17 06/08/2024    BUN 16 06/07/2024     (L) 06/10/2024     (L) 06/08/2024     (L) 06/07/2024    K 4.0 06/10/2024    K 4.0 06/08/2024    K 5.5 (H) 06/07/2024     06/10/2024     06/08/2024     06/07/2024    CO2 20 (L) 06/10/2024    CO2 24 06/08/2024    CO2 21 06/07/2024         Imaging:  Carotid duplex performed today: >400 cm/ sec PSVs in L ICA, reflective of >80% stenosis    Assessment & Plan:  Mann Abrams is 84 y.o. male with history of asymptomatic carotid  stenosis who is presents with routine follow up. Right hand dominant.  Remains asymptomatic.  Carotid velocities elevated today above 400 cm/sec. At a level where repair would be indicated to prevent future stroke.   Will need CTA neck to better define anatomy and determine repair options. To get there, will need improvement of renal function (Cr most recently 2.5, increasing over time).  Needs sooner follow up with nephrology to treat renal function, then CTA, then return to clinic with vascular in 1 month to review treatment options.   Multivessel CAD noted, with Lcx 70-80% stenosis on heart cath from 1 month ago.    Lucio Morrow MD  Vascular Surgery Fellow    Patient seen and examined with above fellow.  Patient restarted aspirin after last visit and appears to be tolerating it  Duplex today after visit with elevated velocities and will plan for CTA  Recent MICHELLE after discharge - will reach out to cards/renal to eval   Options include consideration for CEA vs carotid stenting - cormorbidities and anatomy to consider.   Will see back and engage primaries in interval    Edel Sequeira MD, MHS, RPVI  , [facility] [facility] [employer]  Director, [facility], [facility] [place]  Co-Director, Vascular Laboratories, [facility] [place]  Division of Vascular Surgery and Endovascular Therapy  [facility]

## 2024-07-03 DIAGNOSIS — R80.9 MICROALBUMINURIA: ICD-10-CM

## 2024-07-03 DIAGNOSIS — Z79.4 TYPE 2 DIABETES MELLITUS WITH STAGE 3A CHRONIC KIDNEY DISEASE, WITH LONG-TERM CURRENT USE OF INSULIN (MULTI): ICD-10-CM

## 2024-07-03 DIAGNOSIS — E11.22 TYPE 2 DIABETES MELLITUS WITH STAGE 3A CHRONIC KIDNEY DISEASE, WITH LONG-TERM CURRENT USE OF INSULIN (MULTI): ICD-10-CM

## 2024-07-03 DIAGNOSIS — N18.31 TYPE 2 DIABETES MELLITUS WITH STAGE 3A CHRONIC KIDNEY DISEASE, WITH LONG-TERM CURRENT USE OF INSULIN (MULTI): ICD-10-CM

## 2024-07-03 DIAGNOSIS — N18.4 CHRONIC RENAL DISEASE, STAGE IV (MULTI): ICD-10-CM

## 2024-07-03 DIAGNOSIS — N18.32 TYPE 2 DIABETES MELLITUS WITH STAGE 3B CHRONIC KIDNEY DISEASE, WITHOUT LONG-TERM CURRENT USE OF INSULIN (MULTI): ICD-10-CM

## 2024-07-03 DIAGNOSIS — E11.22 TYPE 2 DIABETES MELLITUS WITH STAGE 3B CHRONIC KIDNEY DISEASE, WITHOUT LONG-TERM CURRENT USE OF INSULIN (MULTI): ICD-10-CM

## 2024-07-03 RX ORDER — DAPAGLIFLOZIN 10 MG/1
10 TABLET, FILM COATED ORAL DAILY
Qty: 30 TABLET | Refills: 2 | Status: SHIPPED | OUTPATIENT
Start: 2024-07-03 | End: 2024-08-02

## 2024-07-05 ENCOUNTER — APPOINTMENT (OUTPATIENT)
Dept: CARDIOLOGY | Facility: CLINIC | Age: 84
End: 2024-07-05
Payer: MEDICARE

## 2024-07-09 ENCOUNTER — LAB (OUTPATIENT)
Dept: LAB | Facility: LAB | Age: 84
End: 2024-07-09
Payer: MEDICARE

## 2024-07-09 DIAGNOSIS — N18.4 CHRONIC RENAL DISEASE, STAGE IV (MULTI): ICD-10-CM

## 2024-07-09 LAB
ALBUMIN SERPL BCP-MCNC: 3.8 G/DL (ref 3.4–5)
ALP SERPL-CCNC: 92 U/L (ref 33–136)
ALT SERPL W P-5'-P-CCNC: 19 U/L (ref 10–52)
ANION GAP SERPL CALC-SCNC: 15 MMOL/L (ref 10–20)
AST SERPL W P-5'-P-CCNC: 18 U/L (ref 9–39)
BILIRUB SERPL-MCNC: 0.3 MG/DL (ref 0–1.2)
BUN SERPL-MCNC: 22 MG/DL (ref 6–23)
CALCIUM SERPL-MCNC: 8.9 MG/DL (ref 8.6–10.6)
CHLORIDE SERPL-SCNC: 106 MMOL/L (ref 98–107)
CO2 SERPL-SCNC: 21 MMOL/L (ref 21–32)
CREAT SERPL-MCNC: 2.09 MG/DL (ref 0.5–1.3)
EGFRCR SERPLBLD CKD-EPI 2021: 31 ML/MIN/1.73M*2
GLUCOSE SERPL-MCNC: 178 MG/DL (ref 74–99)
POTASSIUM SERPL-SCNC: 5 MMOL/L (ref 3.5–5.3)
PROT SERPL-MCNC: 6.6 G/DL (ref 6.4–8.2)
SODIUM SERPL-SCNC: 137 MMOL/L (ref 136–145)

## 2024-07-09 PROCEDURE — 36415 COLL VENOUS BLD VENIPUNCTURE: CPT

## 2024-07-09 PROCEDURE — 80053 COMPREHEN METABOLIC PANEL: CPT

## 2024-07-10 ENCOUNTER — LAB (OUTPATIENT)
Dept: LAB | Facility: LAB | Age: 84
End: 2024-07-10
Payer: MEDICARE

## 2024-07-10 ENCOUNTER — PATIENT OUTREACH (OUTPATIENT)
Dept: PRIMARY CARE | Facility: CLINIC | Age: 84
End: 2024-07-10

## 2024-07-10 DIAGNOSIS — N18.32 CKD STAGE 3B, GFR 30-44 ML/MIN (MULTI): ICD-10-CM

## 2024-07-10 LAB
ANION GAP SERPL CALC-SCNC: 13 MMOL/L (ref 10–20)
BUN SERPL-MCNC: 21 MG/DL (ref 6–23)
CALCIUM SERPL-MCNC: 9.1 MG/DL (ref 8.6–10.6)
CHLORIDE SERPL-SCNC: 106 MMOL/L (ref 98–107)
CO2 SERPL-SCNC: 24 MMOL/L (ref 21–32)
CREAT SERPL-MCNC: 2.18 MG/DL (ref 0.5–1.3)
EGFRCR SERPLBLD CKD-EPI 2021: 29 ML/MIN/1.73M*2
GLUCOSE SERPL-MCNC: 113 MG/DL (ref 74–99)
POTASSIUM SERPL-SCNC: 4.8 MMOL/L (ref 3.5–5.3)
SODIUM SERPL-SCNC: 138 MMOL/L (ref 136–145)

## 2024-07-10 PROCEDURE — 80048 BASIC METABOLIC PNL TOTAL CA: CPT

## 2024-07-10 PROCEDURE — 36415 COLL VENOUS BLD VENIPUNCTURE: CPT

## 2024-07-10 NOTE — PROGRESS NOTES
Successful outreach to patient regarding hospitalization as patient continues TCM program.   At time of outreach call the patient feels as if their condition has improved since initial visit with PCP or specialist.  Questions or concerns addressed at this time with patient.   Provided contact information to patient if any further non-emergent needs arise. Denies need for Case Management at this time.

## 2024-07-25 ENCOUNTER — OFFICE VISIT (OUTPATIENT)
Dept: VASCULAR SURGERY | Facility: CLINIC | Age: 84
End: 2024-07-25
Payer: MEDICARE

## 2024-07-25 VITALS
DIASTOLIC BLOOD PRESSURE: 71 MMHG | HEIGHT: 66 IN | HEART RATE: 74 BPM | BODY MASS INDEX: 23.58 KG/M2 | OXYGEN SATURATION: 93 % | WEIGHT: 146.7 LBS | SYSTOLIC BLOOD PRESSURE: 145 MMHG

## 2024-07-25 DIAGNOSIS — D50.0 IRON DEFICIENCY ANEMIA DUE TO CHRONIC BLOOD LOSS: ICD-10-CM

## 2024-07-25 DIAGNOSIS — N18.4 CHRONIC RENAL DISEASE, STAGE IV (MULTI): Primary | ICD-10-CM

## 2024-07-25 PROCEDURE — 1123F ACP DISCUSS/DSCN MKR DOCD: CPT | Performed by: NURSE PRACTITIONER

## 2024-07-25 PROCEDURE — 1159F MED LIST DOCD IN RCRD: CPT | Performed by: NURSE PRACTITIONER

## 2024-07-25 PROCEDURE — 1126F AMNT PAIN NOTED NONE PRSNT: CPT | Performed by: NURSE PRACTITIONER

## 2024-07-25 PROCEDURE — 3078F DIAST BP <80 MM HG: CPT | Performed by: NURSE PRACTITIONER

## 2024-07-25 PROCEDURE — 99214 OFFICE O/P EST MOD 30 MIN: CPT | Performed by: NURSE PRACTITIONER

## 2024-07-25 PROCEDURE — 3075F SYST BP GE 130 - 139MM HG: CPT | Performed by: NURSE PRACTITIONER

## 2024-07-25 PROCEDURE — 1036F TOBACCO NON-USER: CPT | Performed by: NURSE PRACTITIONER

## 2024-07-25 ASSESSMENT — ENCOUNTER SYMPTOMS
OCCASIONAL FEELINGS OF UNSTEADINESS: 0
SHORTNESS OF BREATH: 0
NEUROLOGICAL NEGATIVE: 1
WEAKNESS: 0
GASTROINTESTINAL NEGATIVE: 1
MUSCULOSKELETAL NEGATIVE: 1
LIGHT-HEADEDNESS: 0
DIZZINESS: 0
HEMATOLOGIC/LYMPHATIC NEGATIVE: 1
CHEST TIGHTNESS: 0
ALLERGIC/IMMUNOLOGIC NEGATIVE: 1
NUMBNESS: 0
PSYCHIATRIC NEGATIVE: 1
SEIZURES: 0
HEADACHES: 0
DEPRESSION: 0
FACIAL ASYMMETRY: 0
EYES NEGATIVE: 1
APPETITE CHANGE: 0
UNEXPECTED WEIGHT CHANGE: 0
ACTIVITY CHANGE: 0
LOSS OF SENSATION IN FEET: 0
PALPITATIONS: 0
ENDOCRINE NEGATIVE: 1
TREMORS: 0
SPEECH DIFFICULTY: 0

## 2024-07-25 ASSESSMENT — PAIN SCALES - GENERAL: PAINLEVEL: 0-NO PAIN

## 2024-07-25 NOTE — PROGRESS NOTES
F/U REASON: asymptomatic carotid stenosis     CURRENT ENCOUNTER:  Mann Abrams is 84 y.o. male here for follow up of asymptomatic carotid stenosis.     He is taking his 81 mg aspirin daily. He is not on plavix at this time due to the blood loss anemia. Patient notes he stopped taking his iron because he was worried he wouldn't be able to tell if he had black stool due to the iron or bleeding. He currently denies black/bloody stool.    Meds:     Current Outpatient Medications:     amLODIPine (Norvasc) 10 mg tablet, Take 1 tablet (10 mg) by mouth once daily., Disp: 90 tablet, Rfl: 3    ascorbic acid (Vitamin C) 500 mg tablet, Take by mouth., Disp: , Rfl:     atorvastatin (Lipitor) 80 mg tablet, Take 1 tablet (80 mg) by mouth once daily., Disp: 90 tablet, Rfl: 3    carvedilol (Coreg) 12.5 mg tablet, Take 1 tablet (12.5 mg) by mouth 2 times daily (morning and late afternoon)., Disp: 180 tablet, Rfl: 3    dapagliflozin propanediol (Farxiga) 10 mg, Take 1 tablet (10 mg) by mouth once daily., Disp: 30 tablet, Rfl: 2    fluticasone (Flonase) 50 mcg/actuation nasal spray, Administer 1 spray into each nostril once daily. Shake gently. Before first use, prime pump. After use, clean tip and replace cap., Disp: 48 g, Rfl: 1    fluticasone propion-salmeteroL (Wixela Inhub) 250-50 mcg/dose diskus inhaler, Inhale 1 puff 2 times a day. Rinse mouth with water after use to reduce aftertaste and incidence of candidiasis. Do not swallow., Disp: 3 each, Rfl: 1    isosorbide mononitrate ER (Imdur) 30 mg 24 hr tablet, Take 1 tablet (30 mg) by mouth once daily. Do not crush or chew., Disp: 90 tablet, Rfl: 3    montelukast (Singulair) 10 mg tablet, Take 1 tablet (10 mg) by mouth once daily at bedtime., Disp: 90 tablet, Rfl: 3    olmesartan (Benicar) 40 mg tablet, Take 1 tablet (40 mg) by mouth once daily., Disp: 90 tablet, Rfl: 3    omeprazole (PriLOSEC) 40 mg DR capsule, Take 1 capsule (40 mg) by mouth once daily., Disp: 90 capsule, Rfl:  "3    losartan (Cozaar) 50 mg tablet, Take 1 tablet (50 mg) by mouth once daily., Disp: , Rfl:     nitroglycerin (Nitrostat) 0.4 mg SL tablet, Place 1 tablet (0.4 mg) under the tongue every 5 minutes if needed for chest pain. place 1 tablet under tongue every 5 minutes for up to 3 doses as needed for chest pain. Call 911 if pain persists, Disp: , Rfl:     spironolactone (Aldactone) 25 mg tablet, Take 0.5 tablets (12.5 mg) by mouth 2 times a day. (Patient not taking: Reported on 7/25/2024), Disp: 90 tablet, Rfl: 3    Allergies:   Allergies   Allergen Reactions    Doxazosin Itching     ROS:  Review of Systems   Constitutional:  Negative for activity change, appetite change and unexpected weight change.   HENT: Negative.     Eyes: Negative.  Negative for visual disturbance.   Respiratory:  Negative for chest tightness and shortness of breath.    Cardiovascular:  Negative for chest pain and palpitations.   Gastrointestinal: Negative.    Endocrine: Negative.    Genitourinary: Negative.    Musculoskeletal: Negative.    Skin: Negative.    Allergic/Immunologic: Negative.    Neurological: Negative.  Negative for dizziness, tremors, seizures, syncope, facial asymmetry, speech difficulty, weakness, light-headedness, numbness and headaches.   Hematological: Negative.    Psychiatric/Behavioral: Negative.       otherwise unremarkable    Objective:  Vitals:  Vitals:    07/25/24 1031   BP: 145/71   Pulse:    SpO2:       /71 (BP Location: Left arm, Patient Position: Sitting, BP Cuff Size: Adult)   Pulse 74   Ht 1.676 m (5' 6\")   Wt 66.5 kg (146 lb 11.2 oz)   SpO2 93%   BMI 23.68 kg/m²     Physical Exam  HENT:      Head: Normocephalic and atraumatic.      Nose: Nose normal.      Mouth/Throat:      Mouth: Mucous membranes are moist.   Eyes:      Conjunctiva/sclera: Conjunctivae normal.   Cardiovascular:      Rate and Rhythm: Normal rate.      Pulses: Normal pulses.   Pulmonary:      Effort: Pulmonary effort is normal. "   Musculoskeletal:         General: Normal range of motion.      Cervical back: Normal range of motion.   Skin:     General: Skin is warm and dry.      Capillary Refill: Capillary refill takes less than 2 seconds.   Neurological:      General: No focal deficit present.      Mental Status: He is alert and oriented to person, place, and time.   Psychiatric:         Mood and Affect: Mood normal.         Behavior: Behavior normal.         Thought Content: Thought content normal.         Judgment: Judgment normal.     Labs:  Lab Results   Component Value Date    WBC 5.5 06/10/2024    WBC 10.1 06/08/2024    WBC 8.0 06/06/2024    HGB 8.1 (L) 06/10/2024    HGB 8.0 (L) 06/08/2024    HGB 9.2 (L) 06/06/2024    HCT 23.5 (L) 06/10/2024    HCT 24.0 (L) 06/08/2024    HCT 25.9 (L) 06/06/2024    MCV 93 06/10/2024    MCV 95 06/08/2024    MCV 87 06/06/2024     06/10/2024     Lab Results   Component Value Date    CREATININE 2.18 (H) 07/10/2024    CREATININE 2.09 (H) 07/09/2024    CREATININE 2.50 (H) 06/10/2024    BUN 21 07/10/2024    BUN 22 07/09/2024    BUN 26 (H) 06/10/2024     07/10/2024     07/09/2024     (L) 06/10/2024    K 4.8 07/10/2024    K 5.0 07/09/2024    K 4.0 06/10/2024     07/10/2024     07/09/2024     06/10/2024    CO2 24 07/10/2024    CO2 21 07/09/2024    CO2 20 (L) 06/10/2024         Imaging:      Assessment & Plan:  Mann Abrams is 84 y.o. male with history of Right hand dominant. Hx: DM, CKD3, HLD, HTN, CAD, NICHOLAS, COPD, GERD, SAGE, PVD, CAD (med management) who is presents with asymptomatic carotid stenosis.  He was to follow up with his nephrologist prior to this appt because he recently had a MICHELLE after hospital discharge. Our team will need a CTA prior to surgery planning- CEA vs carotid stenting    Today's plan is as follows:  1) Recheck your renal panel and CBC labs   2) If your anemia worsens we will need you to follow up with GI  3) I see you have a appt with renal  next month, please keep that appt and have your labs drawn prior to office appt  4) Follow up in 1 month after renal visit, this may be virtual    Patient seen and plan of care discussed with Dr. Fabby Serna, DNP, APRN-CNP, AGPCNP-C, FNP-C, AGACNP-BC  Senior Nurse Practitioner, Vascular Surgery  [facility], [facility] [place] [facility] [place]  [address], Office [phone]          toileting/standing/walking

## 2024-07-25 NOTE — PATIENT INSTRUCTIONS
It was a pleasure taking care of you today and appreciate your seeing us at our [facility] [place] [facility].     Today's plan is as follows:  1) Recheck your renal panel and CBC labs   2) If your anemia worsens we will need you to follow up with GI  3) I see you have a appt with renal next month, please keep that appt and have your labs drawn prior to office appt  4) Follow up in 1 month after renal visit, this may be virtual    Please call the office with any questions at [phone].   You can speak to our secretaries or our clinical nurses for specific questions.   For Vein Center specific questions, you can also call [phone] or email at [email or web address]  If you need coordinating your appointments and testing you can do these at the  or by calling my office shortly after your visit.

## 2024-07-29 DIAGNOSIS — N18.32 TYPE 2 DIABETES MELLITUS WITH STAGE 3B CHRONIC KIDNEY DISEASE, WITHOUT LONG-TERM CURRENT USE OF INSULIN (MULTI): ICD-10-CM

## 2024-07-29 DIAGNOSIS — R80.9 MICROALBUMINURIA: ICD-10-CM

## 2024-07-29 DIAGNOSIS — J43.8 OTHER EMPHYSEMA (MULTI): ICD-10-CM

## 2024-07-29 DIAGNOSIS — E11.22 TYPE 2 DIABETES MELLITUS WITH STAGE 3A CHRONIC KIDNEY DISEASE, WITH LONG-TERM CURRENT USE OF INSULIN (MULTI): ICD-10-CM

## 2024-07-29 DIAGNOSIS — N18.31 TYPE 2 DIABETES MELLITUS WITH STAGE 3A CHRONIC KIDNEY DISEASE, WITH LONG-TERM CURRENT USE OF INSULIN (MULTI): ICD-10-CM

## 2024-07-29 DIAGNOSIS — N18.4 CHRONIC RENAL DISEASE, STAGE IV (MULTI): ICD-10-CM

## 2024-07-29 DIAGNOSIS — Z79.4 TYPE 2 DIABETES MELLITUS WITH STAGE 3A CHRONIC KIDNEY DISEASE, WITH LONG-TERM CURRENT USE OF INSULIN (MULTI): ICD-10-CM

## 2024-07-29 DIAGNOSIS — E11.22 TYPE 2 DIABETES MELLITUS WITH STAGE 3B CHRONIC KIDNEY DISEASE, WITHOUT LONG-TERM CURRENT USE OF INSULIN (MULTI): ICD-10-CM

## 2024-07-29 RX ORDER — DAPAGLIFLOZIN 10 MG/1
10 TABLET, FILM COATED ORAL DAILY
Qty: 30 TABLET | Refills: 2 | Status: SHIPPED | OUTPATIENT
Start: 2024-07-29 | End: 2024-08-28

## 2024-07-29 RX ORDER — FLUTICASONE PROPIONATE AND SALMETEROL 250; 50 UG/1; UG/1
1 POWDER RESPIRATORY (INHALATION)
Qty: 3 EACH | Refills: 1 | Status: SHIPPED | OUTPATIENT
Start: 2024-07-29

## 2024-08-08 ENCOUNTER — LAB (OUTPATIENT)
Dept: LAB | Facility: LAB | Age: 84
End: 2024-08-08
Payer: MEDICARE

## 2024-08-08 DIAGNOSIS — D50.0 IRON DEFICIENCY ANEMIA DUE TO CHRONIC BLOOD LOSS: ICD-10-CM

## 2024-08-08 DIAGNOSIS — N18.4 CHRONIC RENAL DISEASE, STAGE IV (MULTI): ICD-10-CM

## 2024-08-08 LAB
ALBUMIN SERPL BCP-MCNC: 3.9 G/DL (ref 3.4–5)
ANION GAP SERPL CALC-SCNC: 15 MMOL/L (ref 10–20)
BUN SERPL-MCNC: 31 MG/DL (ref 6–23)
CALCIUM SERPL-MCNC: 9.4 MG/DL (ref 8.6–10.6)
CHLORIDE SERPL-SCNC: 105 MMOL/L (ref 98–107)
CO2 SERPL-SCNC: 22 MMOL/L (ref 21–32)
CREAT SERPL-MCNC: 2.14 MG/DL (ref 0.5–1.3)
EGFRCR SERPLBLD CKD-EPI 2021: 30 ML/MIN/1.73M*2
ERYTHROCYTE [DISTWIDTH] IN BLOOD BY AUTOMATED COUNT: 14.3 % (ref 11.5–14.5)
GLUCOSE SERPL-MCNC: 168 MG/DL (ref 74–99)
HCT VFR BLD AUTO: 35.2 % (ref 41–52)
HGB BLD-MCNC: 11.1 G/DL (ref 13.5–17.5)
MCH RBC QN AUTO: 30 PG (ref 26–34)
MCHC RBC AUTO-ENTMCNC: 31.5 G/DL (ref 32–36)
MCV RBC AUTO: 95 FL (ref 80–100)
NRBC BLD-RTO: 0 /100 WBCS (ref 0–0)
PHOSPHATE SERPL-MCNC: 4.5 MG/DL (ref 2.5–4.9)
PLATELET # BLD AUTO: 246 X10*3/UL (ref 150–450)
POTASSIUM SERPL-SCNC: 5.3 MMOL/L (ref 3.5–5.3)
RBC # BLD AUTO: 3.7 X10*6/UL (ref 4.5–5.9)
SODIUM SERPL-SCNC: 137 MMOL/L (ref 136–145)
WBC # BLD AUTO: 5.9 X10*3/UL (ref 4.4–11.3)

## 2024-08-08 PROCEDURE — 85027 COMPLETE CBC AUTOMATED: CPT

## 2024-08-08 PROCEDURE — 36415 COLL VENOUS BLD VENIPUNCTURE: CPT

## 2024-08-08 PROCEDURE — 80069 RENAL FUNCTION PANEL: CPT

## 2024-08-21 DIAGNOSIS — R12 HEARTBURN: ICD-10-CM

## 2024-08-21 DIAGNOSIS — J43.8 OTHER EMPHYSEMA (MULTI): ICD-10-CM

## 2024-08-21 RX ORDER — OMEPRAZOLE 40 MG/1
40 CAPSULE, DELAYED RELEASE ORAL DAILY
Qty: 90 CAPSULE | Refills: 2 | Status: SHIPPED | OUTPATIENT
Start: 2024-08-21

## 2024-08-21 RX ORDER — MONTELUKAST SODIUM 10 MG/1
10 TABLET ORAL NIGHTLY
Qty: 90 TABLET | Refills: 2 | Status: SHIPPED | OUTPATIENT
Start: 2024-08-21

## 2024-08-26 ENCOUNTER — APPOINTMENT (OUTPATIENT)
Dept: NEPHROLOGY | Facility: CLINIC | Age: 84
End: 2024-08-26
Payer: MEDICARE

## 2024-08-26 VITALS
BODY MASS INDEX: 23.63 KG/M2 | SYSTOLIC BLOOD PRESSURE: 142 MMHG | HEIGHT: 66 IN | WEIGHT: 147 LBS | DIASTOLIC BLOOD PRESSURE: 71 MMHG | TEMPERATURE: 97.7 F | OXYGEN SATURATION: 92 % | HEART RATE: 81 BPM

## 2024-08-26 DIAGNOSIS — N18.32 STAGE 3B CHRONIC KIDNEY DISEASE (CKD) (MULTI): Primary | ICD-10-CM

## 2024-08-26 PROCEDURE — 99215 OFFICE O/P EST HI 40 MIN: CPT | Performed by: INTERNAL MEDICINE

## 2024-08-26 PROCEDURE — 1159F MED LIST DOCD IN RCRD: CPT | Performed by: INTERNAL MEDICINE

## 2024-08-26 PROCEDURE — 3078F DIAST BP <80 MM HG: CPT | Performed by: INTERNAL MEDICINE

## 2024-08-26 PROCEDURE — 1036F TOBACCO NON-USER: CPT | Performed by: INTERNAL MEDICINE

## 2024-08-26 PROCEDURE — 3077F SYST BP >= 140 MM HG: CPT | Performed by: INTERNAL MEDICINE

## 2024-08-26 PROCEDURE — 1123F ACP DISCUSS/DSCN MKR DOCD: CPT | Performed by: INTERNAL MEDICINE

## 2024-08-26 NOTE — PATIENT INSTRUCTIONS
Make sure that you are not taking both olmesartan and losartan. Don not take iron. You are at high risk of contrast induced kidney injury following contrast but this needs to be balanced against the risk posed by carotid stenosis and coronary plaques. No absolute contraindication to contrast as long as high risk of worsened kidney function is understood. Recommend elsi procedure IVF and minimal contrast usage.  See me in 3-4 mo

## 2024-08-26 NOTE — PROGRESS NOTES
For follow up, doing well.  No complaints  Was admitted in June with severe anemia, received 2 U PRBC tx, had upper and lower GI endoscopy to control bleeding small intestinal AVM. Since then, fatigue and Sob have improved but not completely.  Had heart cath in May, no stent placed -per intervention cards note, rec med mx due to lack of anginal symptoms  Was told to hold PO iron as it causes confusion due to dark stools but he had been taking it till 3 days back as his wife advised him to take it. Stopped 3 days back, says stool color starting to normalize.  BP at home 127/79, no more than 134/77  Denies orthostatic symptoms  Confused about meds -not sure if he is taking amlodipine, does not remember losartan or olmesartan or both    RoS negative for all other systems except as noted above.    Vitals:    08/26/24 0820   BP: 142/71   Pulse: 81   Temp: 36.5 °C (97.7 °F)   SpO2: 92%     No distress  HEENT:  moist, no pallor  No edema jyotsna LE  Breath sounds jyotsna equal, clear  S1 S2 regular, normal, no rub or murmur  Abdomen soft  AAO x3, non focal    Lab Results   Component Value Date     08/08/2024     07/10/2024     07/09/2024    K 5.3 08/08/2024    K 4.8 07/10/2024    K 5.0 07/09/2024     08/08/2024     07/10/2024     07/09/2024    CO2 22 08/08/2024    CO2 24 07/10/2024    CO2 21 07/09/2024    BUN 31 (H) 08/08/2024    BUN 21 07/10/2024    BUN 22 07/09/2024    CREATININE 2.14 (H) 08/08/2024    CREATININE 2.18 (H) 07/10/2024    CREATININE 2.09 (H) 07/09/2024    CALCIUM 9.4 08/08/2024    CALCIUM 9.1 07/10/2024    CALCIUM 8.9 07/09/2024    PHOS 4.5 08/08/2024    PHOS 4.1 06/10/2024    PHOS 4.2 06/08/2024    VITD25 94 04/11/2024    VITD25 49 08/28/2023    VITD25 80 06/05/2020    MICROALBCREA 105.2 (H) 04/11/2024    MICROALBCREA 282.7 (H) 01/03/2024    MICROALBCREA 422.8 (H) 12/18/2023    HGB 11.1 (L) 08/08/2024    HGB 8.1 (L) 06/10/2024    HGB 8.0 (L) 06/08/2024      84 year old with DM,  htn and CKD       1. CKD G3bA3 : Likely due to DM and htn. Suffered MICHELLE during admission, peak creatinine 2.5 mg/dl on 6/10, improved to 2.1 on 8/8/24.  He was concerned re need for contrast for carotid/ coronary angiography- while he is at higher risk of Jessica due to baseline CKD 3b, this has to be balanced against risk of stroke/MI. Recommend elsi-procedural IVF, minimal contrast volume and avoiding repeated contrast administration to minimize risk. Avoid NSAIDs    2. Anemia: 2/2 GI blood loss, HgB improved to 11.1 g/dl, advised him to hold PO iron, increase dietary intake of iron    3. Htn; Goal /80, not at goal, he is not sure whether he is taking amlodipine, not sure if he's on losartan/olmesartan/both- advised him to check bottles and inform me. 2.5 g/day sodium restricted diet.    4. Ongoing poor effort tolerance, fatigue: ? Angina equivalent, advised him to reach out to his PCP and cardiologist.    RTC: 4 mo

## 2024-08-26 NOTE — LETTER
August 26, 2024     Nataliya Grant MD  [address]  [address]  [facility] [address]    Patient: Mann Abrams   YOB: 1940   Date of Visit: 8/26/2024       Dear Dr. Nataliya Grant MD:    Thank you for referring Mann Abrams to me for evaluation. Below are my notes for this consultation.  If you have questions, please do not hesitate to call me. I look forward to following your patient along with you.       Sincerely,     Jovana Hammer MD      CC: No Recipients  ______________________________________________________________________________________    For follow up, doing well.  No complaints  Was admitted in June with severe anemia, received 2 U PRBC tx, had upper and lower GI endoscopy to control bleeding small intestinal AVM. Since then, fatigue and Sob have improved but not completely.  Had heart cath in May, no stent placed -per intervention cards note, rec med mx due to lack of anginal symptoms  Was told to hold PO iron as it causes confusion due to dark stools but he had been taking it till 3 days back as his wife advised him to take it. Stopped 3 days back, says stool color starting to normalize.  BP at home 127/79, no more than 134/77  Denies orthostatic symptoms  Confused about meds -not sure if he is taking amlodipine, does not remember losartan or olmesartan or both    RoS negative for all other systems except as noted above.    Vitals:    08/26/24 0820   BP: 142/71   Pulse: 81   Temp: 36.5 °C (97.7 °F)   SpO2: 92%     No distress  HEENT:  moist, no pallor  No edema jyotsna LE  Breath sounds jyotsna equal, clear  S1 S2 regular, normal, no rub or murmur  Abdomen soft  AAO x3, non focal    Lab Results   Component Value Date     08/08/2024     07/10/2024     07/09/2024    K 5.3 08/08/2024    K 4.8 07/10/2024    K 5.0 07/09/2024     08/08/2024     07/10/2024     07/09/2024    CO2 22 08/08/2024    CO2 24 07/10/2024    CO2 21 07/09/2024    BUN 31  (H) 08/08/2024    BUN 21 07/10/2024    BUN 22 07/09/2024    CREATININE 2.14 (H) 08/08/2024    CREATININE 2.18 (H) 07/10/2024    CREATININE 2.09 (H) 07/09/2024    CALCIUM 9.4 08/08/2024    CALCIUM 9.1 07/10/2024    CALCIUM 8.9 07/09/2024    PHOS 4.5 08/08/2024    PHOS 4.1 06/10/2024    PHOS 4.2 06/08/2024    VITD25 94 04/11/2024    VITD25 49 08/28/2023    VITD25 80 06/05/2020    MICROALBCREA 105.2 (H) 04/11/2024    MICROALBCREA 282.7 (H) 01/03/2024    MICROALBCREA 422.8 (H) 12/18/2023    HGB 11.1 (L) 08/08/2024    HGB 8.1 (L) 06/10/2024    HGB 8.0 (L) 06/08/2024      84 year old with DM, htn and CKD       1. CKD G3bA3 : Likely due to DM and htn. Suffered MICHELLE during admission, peak creatinine 2.5 mg/dl on 6/10, improved to 2.1 on 8/8/24.  He was concerned re need for contrast for carotid/ coronary angiography- while he is at higher risk of Jessica due to baseline CKD 3b, this has to be balanced against risk of stroke/MI. Recommend elsi-procedural IVF, minimal contrast volume and avoiding repeated contrast administration to minimize risk. Avoid NSAIDs    2. Anemia: 2/2 GI blood loss, HgB improved to 11.1 g/dl, advised him to hold PO iron, increase dietary intake of iron    3. Htn; Goal /80, not at goal, he is not sure whether he is taking amlodipine, not sure if he's on losartan/olmesartan/both- advised him to check bottles and inform me. 2.5 g/day sodium restricted diet.    4. Ongoing poor effort tolerance, fatigue: ? Angina equivalent, advised him to reach out to his PCP and cardiologist.    RTC: 4 mo

## 2024-08-29 ENCOUNTER — PATIENT OUTREACH (OUTPATIENT)
Dept: PRIMARY CARE | Facility: CLINIC | Age: 84
End: 2024-08-29
Payer: MEDICARE

## 2024-09-12 ENCOUNTER — TELEMEDICINE (OUTPATIENT)
Dept: VASCULAR SURGERY | Facility: CLINIC | Age: 84
End: 2024-09-12
Payer: MEDICARE

## 2024-09-12 DIAGNOSIS — I65.21 CAROTID OCCLUSION, RIGHT: Primary | ICD-10-CM

## 2024-09-12 PROCEDURE — 1123F ACP DISCUSS/DSCN MKR DOCD: CPT | Performed by: NURSE PRACTITIONER

## 2024-09-12 PROCEDURE — 99443 PR PHYS/QHP TELEPHONE EVALUATION 21-30 MIN: CPT | Performed by: NURSE PRACTITIONER

## 2024-09-12 ASSESSMENT — ENCOUNTER SYMPTOMS
PSYCHIATRIC NEGATIVE: 1
NUMBNESS: 0
ENDOCRINE NEGATIVE: 1
ACTIVITY CHANGE: 0
SHORTNESS OF BREATH: 0
DIZZINESS: 0
ALLERGIC/IMMUNOLOGIC NEGATIVE: 1
NEUROLOGICAL NEGATIVE: 1
HEMATOLOGIC/LYMPHATIC NEGATIVE: 1
CHEST TIGHTNESS: 0
WEAKNESS: 0
PALPITATIONS: 0
TREMORS: 0
LIGHT-HEADEDNESS: 0
APPETITE CHANGE: 0
HEADACHES: 0
EYES NEGATIVE: 1
SPEECH DIFFICULTY: 0
MUSCULOSKELETAL NEGATIVE: 1
SEIZURES: 0
GASTROINTESTINAL NEGATIVE: 1
UNEXPECTED WEIGHT CHANGE: 0
FACIAL ASYMMETRY: 0

## 2024-09-12 NOTE — PATIENT INSTRUCTIONS
It was a pleasure taking care of you today and appreciate your seeing us at our [place] [facility] [place] [facility].     Today's plan is as follows:  1) I am referring you to Dr. Chuy Stahl interventional cardiologist for the carotid and coronary disease in the setting of chronic kidney disease    Please call the office with any questions at [phone].   You can speak to our secretaries or our clinical nurses for specific questions.   For Vein Center specific questions, you can also call [phone] or email at [email or web address]  If you need coordinating your appointments and testing you can do these at the  or by calling my office shortly after your visit.

## 2024-09-12 NOTE — PROGRESS NOTES
F/U REASON: asymptomatic carotid stenosis    CURRENT ENCOUNTER:  Mann Abrams is 84 y.o. male here for follow up of asymptomatic carotid stenosis.    No complaints or concerns    Meds:     Current Outpatient Medications:     amLODIPine (Norvasc) 10 mg tablet, Take 1 tablet (10 mg) by mouth once daily. (Patient not taking: Reported on 8/26/2024), Disp: 90 tablet, Rfl: 3    ascorbic acid (Vitamin C) 500 mg tablet, Take by mouth., Disp: , Rfl:     atorvastatin (Lipitor) 80 mg tablet, Take 1 tablet (80 mg) by mouth once daily., Disp: 90 tablet, Rfl: 3    carvedilol (Coreg) 12.5 mg tablet, Take 1 tablet (12.5 mg) by mouth 2 times daily (morning and late afternoon)., Disp: 180 tablet, Rfl: 3    dapagliflozin propanediol (Farxiga) 10 mg, Take 1 tablet (10 mg) by mouth once daily., Disp: 30 tablet, Rfl: 2    fluticasone (Flonase) 50 mcg/actuation nasal spray, Administer 1 spray into each nostril once daily. Shake gently. Before first use, prime pump. After use, clean tip and replace cap., Disp: 48 g, Rfl: 1    fluticasone propion-salmeteroL (Wixela Inhub) 250-50 mcg/dose diskus inhaler, Inhale 1 puff 2 times a day. Rinse mouth with water after use to reduce aftertaste and incidence of candidiasis. Do not swallow., Disp: 3 each, Rfl: 1    isosorbide mononitrate ER (Imdur) 30 mg 24 hr tablet, Take 1 tablet (30 mg) by mouth once daily. Do not crush or chew., Disp: 90 tablet, Rfl: 3    losartan (Cozaar) 50 mg tablet, Take 1 tablet (50 mg) by mouth once daily., Disp: , Rfl:     montelukast (Singulair) 10 mg tablet, Take 1 tablet (10 mg) by mouth once daily at bedtime., Disp: 90 tablet, Rfl: 2    nitroglycerin (Nitrostat) 0.4 mg SL tablet, Place 1 tablet (0.4 mg) under the tongue every 5 minutes if needed for chest pain. place 1 tablet under tongue every 5 minutes for up to 3 doses as needed for chest pain. Call 911 if pain persists, Disp: , Rfl:     olmesartan (Benicar) 40 mg tablet, Take 1 tablet (40 mg) by mouth once  daily., Disp: 90 tablet, Rfl: 3    omeprazole (PriLOSEC) 40 mg DR capsule, Take 1 capsule (40 mg) by mouth once daily., Disp: 90 capsule, Rfl: 2    spironolactone (Aldactone) 25 mg tablet, Take 0.5 tablets (12.5 mg) by mouth 2 times a day., Disp: 90 tablet, Rfl: 3    Allergies:   Allergies   Allergen Reactions    Doxazosin Itching     ROS:  Review of Systems   Constitutional:  Negative for activity change, appetite change and unexpected weight change.   HENT: Negative.     Eyes: Negative.  Negative for visual disturbance.   Respiratory:  Negative for chest tightness and shortness of breath.    Cardiovascular:  Negative for chest pain, palpitations and leg swelling.   Gastrointestinal: Negative.    Endocrine: Negative.    Genitourinary: Negative.    Musculoskeletal: Negative.    Skin: Negative.    Allergic/Immunologic: Negative.    Neurological: Negative.  Negative for dizziness, tremors, seizures, syncope, facial asymmetry, speech difficulty, weakness, light-headedness, numbness and headaches.   Hematological: Negative.    Psychiatric/Behavioral: Negative.       otherwise unremarkable    Labs:  Lab Results   Component Value Date    WBC 5.9 08/08/2024    WBC 5.5 06/10/2024    WBC 10.1 06/08/2024    HGB 11.1 (L) 08/08/2024    HGB 8.1 (L) 06/10/2024    HGB 8.0 (L) 06/08/2024    HCT 35.2 (L) 08/08/2024    HCT 23.5 (L) 06/10/2024    HCT 24.0 (L) 06/08/2024    MCV 95 08/08/2024    MCV 93 06/10/2024    MCV 95 06/08/2024     08/08/2024     Lab Results   Component Value Date    CREATININE 2.14 (H) 08/08/2024    CREATININE 2.18 (H) 07/10/2024    CREATININE 2.09 (H) 07/09/2024    BUN 31 (H) 08/08/2024    BUN 21 07/10/2024    BUN 22 07/09/2024     08/08/2024     07/10/2024     07/09/2024    K 5.3 08/08/2024    K 4.8 07/10/2024    K 5.0 07/09/2024     08/08/2024     07/10/2024     07/09/2024    CO2 22 08/08/2024    CO2 24 07/10/2024    CO2 21 07/09/2024         Imaging:      Assessment  & Plan:  Mann Abrams is 84 y.o. male with history of Right hand dominant. Hx: DM, CKD3, HLD, HTN, CAD, NICHOLAS, COPD, GERD, SAGE, PVD, CAD who is presents with asymptomatic carotid stenosis.     Today's plan is as follows:  1) I am referring you to Dr. Chuy Stahl interventional cardiologist for the carotid and coronary disease in the setting of chronic kidney disease    Pt imaging and plan of care discussed with Dr. Fabby Serna, DNP, APRN-CNP, AGPCNP-C, FNP-C, AGACNP-BC  Senior Nurse Practitioner, Vascular Surgery  [facility], [facility] [place]  [address], Office [phone]

## 2024-09-18 DIAGNOSIS — I10 PRIMARY HYPERTENSION: ICD-10-CM

## 2024-09-18 RX ORDER — CARVEDILOL 25 MG/1
12.5 TABLET ORAL 2 TIMES DAILY
Qty: 90 TABLET | Refills: 0 | Status: SHIPPED | OUTPATIENT
Start: 2024-09-18

## 2024-09-23 NOTE — PROGRESS NOTES
Virtual or Telephone Consent    A telephone visit (audio only) between the patient (at the originating site) and the provider (at the distant site) was utilized to provide this telehealth service.   Verbal consent was requested and obtained from Mann Abrams on this date, 9/24/2024 for a telehealth visit.     Referred by: Vascular Surgery -Chelle Serna CNP; for asymptomatic carotid artery stenosis disease evaluation for consideration of stenting.        History of Present Illness  Mann Abrams is a 84 y.o. year old male patient with h/o Right hand dominant. Hx: DM, CKD3, HLD, HTN, NICHOLAS, COPD, GERD, SAGE, PVD, Multivessel CAD with Lcx 70-80% stenosis on heart cath from May, 2024. He is followed by Vascular Surgery with a plan for CEA vs carotid stenting. Needs a CTA neck to better define anatomy and determine repair options prior to any interventions from Vascular Surgery standpoint.  Pending improvement of renal function first. Patient is following with Nephrology due to worsened renal function post hospital discharge.  Patient is being referred to us for carotid artery stenting for asymptomatic carotid stenosis in settings of chronic kidney disease. Today he was contacted on the phone as he was not able to get on the video call since he did not have a smart phone or a computer. Patient with no neurologic symptoms; no weakness, loss of sensation, LOC, dizziness, confusion. Denied chest pain or SOB. Currently not on antiplatelet therapy.       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    Posterior capsular opacification visually significant of left eye    Person consulting for explanation of examination or test findings 01/07/2020    Encounter to discuss test results       Past  Surgical History  Past Surgical History:   Procedure Laterality Date    CARDIAC CATHETERIZATION N/A 2024    Procedure: Left Heart Cath with Coronary Angiography and LV;  Surgeon: Denny Riley MD;  Location: [facility] Cardiac Cath Lab;  Service: Cardiovascular;  Laterality: N/A;    EYE SURGERY  2014    Eye Surgery    OTHER SURGICAL HISTORY  2019    Foot surgery    OTHER SURGICAL HISTORY  2019    Back surgery       Social History  Social History     Tobacco Use    Smoking status: Former     Current packs/day: 0.00     Types: Cigarettes     Quit date:      Years since quittin.7    Smokeless tobacco: Never   Substance Use Topics    Alcohol use: Never    Drug use: Never       Family History     Family History   Problem Relation Name Age of Onset    Cancer Mother      Other (cardiac disorder) Father      Diabetes Sister         Review of Systems  As per HPI, all other systems reviewed and negative.    Allergies:  Allergies   Allergen Reactions    Doxazosin Itching        Outpatient Medications:  Current Outpatient Medications   Medication Instructions    amLODIPine (NORVASC) 10 mg, oral, Daily    ascorbic acid (Vitamin C) 500 mg tablet oral    aspirin 81 mg, oral, Daily    atorvastatin (LIPITOR) 80 mg, oral, Daily    carvedilol (COREG) 12.5 mg, oral, 2 times daily (morning and late afternoon)    dapagliflozin propanediol (FARXIGA) 10 mg, oral, Daily    fluticasone (Flonase) 50 mcg/actuation nasal spray 1 spray, Each Nostril, Daily, Shake gently. Before first use, prime pump. After use, clean tip and replace cap.    fluticasone propion-salmeteroL (Wixela Inhub) 250-50 mcg/dose diskus inhaler 1 puff, inhalation, 2 times daily RT, Rinse mouth with water after use to reduce aftertaste and incidence of candidiasis. Do not swallow.    isosorbide mononitrate ER (IMDUR) 30 mg, oral, Daily, Do not crush or chew.    montelukast (SINGULAIR) 10 mg, oral, Nightly    nitroglycerin (NITROSTAT) 0.4 mg,  sublingual, Every 5 min PRN, place 1 tablet under tongue every 5 minutes for up to 3 doses as needed for chest pain. Call 911 if pain persists    olmesartan (BENICAR) 40 mg, oral, Daily    omeprazole (PRILOSEC) 40 mg, oral, Daily    spironolactone (ALDACTONE) 12.5 mg, oral, 2 times daily         Vitals:  There were no vitals filed for this visit.  phone visit    Physical Exam:  Physical Exam   phone visit     Reviewed Study(s):    Vascular Studies   Vascular US Carotid Artery Duplex Bilateral               [facility]  [address] [place] [address]    Tel [phone] and Fax [phone]       Vascular Lab Report  VASC US CAROTID ARTERY DUPLEX BILATERAL       Patient Name:      PROSPER BISHOP     Reading Physician:  [id number] Dominic David DO  Study Date:        6/27/2024           Ordering Physician: [id number] PATT PRASAD  MRN/PID:           [id number]            Technologist:       Ned Still T  Accession#:        [id number]        Technologist 2:  Date of Birth/Age: 1940 / 84 years Encounter#:         [id number]  Gender:            M  Admission Status:  Outpatient          Location Performed: [facility]       Diagnosis/ICD: Occlusion and stenosis of right carotid artery-I65.21  CPT Codes:     96855 Cerebrovascular Carotid Duplex scan complete       **CRITICAL RESULT**  Critical Result: Greater than 70% stenosis of the bilateral ICAs.  Notification called to PATT PRASAD MD on 6/27/2024 at 12:34:30 PM by Ned Still T.     CONCLUSIONS:  Right Carotid: Findings are consistent with greater than 70% stenosis of the right proximal internal carotid artery. Turbulent flow seen by color Doppler. Right external carotid artery appears patent with no evidence of stenosis. The right vertebral artery demonstrates no flow. No evidence of hemodynamically significant stenosis in the right subclavian artery.  Left Carotid: Findings are consistent with greater than 70% stenosis of the left  proximal internal carotid artery. Turbulent flow seen by color Doppler. There are elevated velocities in the left ECA that are suggestive of disease. The left vertebral artery is patent with antegrade flow. No evidence of hemodynamically significant stenosis in the left subclavian artery.     Imaging & Doppler Findings:  Right Plaque Morph: The proximal right internal carotid artery demonstrates heterogenous, complex and calcified plaque. The proximal right external carotid artery demonstrates heterogenous and smooth plaque. The right carotid bulb demonstrates heterogenous, complex and calcified plaque.  Left Plaque Morph: The proximal left internal carotid artery demonstrates heterogenous, complex and calcified plaque. The proximal left external carotid artery demonstrates heterogenous and calcified plaque. The mid left common carotid artery demonstrates heterogenous, smooth and calcified plaque. The left carotid bulb demonstrates heterogenous, complex and calcified plaque.      Right                        Left    PSV      EDV                PSV      EDV  74 cm/s            CCA P    39 cm/s  64 cm/s            CCA D    34 cm/s  274 cm/s 66 cm/s   ICA P    437 cm/s 131 cm/s  90 cm/s  31 cm/s   ICA M    40 cm/s  13 cm/s  68 cm/s  26 cm/s   ICA D    18 cm/s   9 cm/s  208 cm/s            ECA     220 cm/s                   Vertebral  44 cm/s  14 cm/s  106 cm/s         Subclavian 145 cm/s                  Right Left  ICA/CCA Ratio  4.3  12.9          [id number] Dominic David DO  Electronically signed by [id number] Dominic David DO on 6/27/2024 at 9:26:47 PM       ** Final **       Assessment  Mann Abrams is a 84 y.o. year old male patient with h/o Right hand dominant. Hx: DM, CKD3, HLD, HTN, CAD, NICHOLAS, COPD, GERD, SAGE, PVD, CAD (med management). Multivessel with Lcx 70-80% stenosis on heart cath from May, 2024.  He is followed by Vascular Surgery with a plan for CEA but determined to be not a good candidate due to declining renal  function. Patient is being referred to us for asymptomatic carotid and coronary disease consideration for stenting.    B/L OPAL L > R   Right & Left ICA stenosis > 70% Rt 274/66 cm/s Lt  Prox /131 cm/s      Plan     Reviewed the results the carotid Us with Pt, he has > 70% blockage bilaterally, Left > Right  Discussed risk of stroke and 3  options for management:  1. Medical Therapy- no intervention.   2. Open surgery of the artery. This will need to be done under general anesthesia.   3. Stent done endovascular, this is done under conscious sedation. Minimally invasive.  All 3 have risks. The number 1 risk is stroke, which is 3%, if we only go with medical management,   The  two invasive options also can cause the stroke, but the risk during endovascular procedure is much less than open surgery, and the recovery is much shorter after the stent procedure.   - I discussed the plan for carotid angiography with the possibility of endovascular therapy for treatment of arterial occlusive disease. The risks of arterial access, including vessel or nerve injury, bleeding, infection, limb ischemia, embolization, reaction to medication, or need for operation, were discussed. The risks of contrast administration, including renal failure or allergic type reaction, were discussed. We also discussed the additional risks of intervention, including vessel injury, infection, residual or recurrent ischemia, need for operation or additional intervention, failure to resolve ischemia, limb loss, or other serious complications. The patient indicated understanding of the procedure, plan, alternatives, and risks, and voiced consent to proceed.  - Continue current medical therapy including ASA and statin.  - We will schedule for carotid angiogram on 11/6/2024. Case order and labs ordered.  - Pre-procedure instructions provided and answered all Pt questions.       Chuy Stahl DO

## 2024-09-24 ENCOUNTER — APPOINTMENT (OUTPATIENT)
Dept: CARDIOLOGY | Facility: CLINIC | Age: 84
End: 2024-09-24
Payer: MEDICARE

## 2024-09-24 ENCOUNTER — PATIENT MESSAGE (OUTPATIENT)
Dept: PRIMARY CARE | Facility: CLINIC | Age: 84
End: 2024-09-24

## 2024-09-24 DIAGNOSIS — N18.32 CKD STAGE 3B, GFR 30-44 ML/MIN (MULTI): Primary | ICD-10-CM

## 2024-09-24 DIAGNOSIS — Z01.818 PREOP TESTING: ICD-10-CM

## 2024-09-24 DIAGNOSIS — I65.29 CAROTID ARTERY STENOSIS WITHOUT CEREBRAL INFARCTION: Primary | ICD-10-CM

## 2024-09-24 DIAGNOSIS — E11.69 TYPE 2 DIABETES MELLITUS WITH OTHER SPECIFIED COMPLICATION, WITHOUT LONG-TERM CURRENT USE OF INSULIN: ICD-10-CM

## 2024-09-24 PROCEDURE — 1160F RVW MEDS BY RX/DR IN RCRD: CPT | Performed by: INTERNAL MEDICINE

## 2024-09-24 PROCEDURE — 99205 OFFICE O/P NEW HI 60 MIN: CPT | Performed by: INTERNAL MEDICINE

## 2024-09-24 PROCEDURE — 1159F MED LIST DOCD IN RCRD: CPT | Performed by: INTERNAL MEDICINE

## 2024-09-24 PROCEDURE — 1123F ACP DISCUSS/DSCN MKR DOCD: CPT | Performed by: INTERNAL MEDICINE

## 2024-09-26 RX ORDER — ASPIRIN 81 MG/1
81 TABLET ORAL DAILY
COMMUNITY

## 2024-09-27 ENCOUNTER — LAB (OUTPATIENT)
Dept: LAB | Facility: LAB | Age: 84
End: 2024-09-27
Payer: MEDICARE

## 2024-09-27 DIAGNOSIS — E11.69 TYPE 2 DIABETES MELLITUS WITH OTHER SPECIFIED COMPLICATION, WITHOUT LONG-TERM CURRENT USE OF INSULIN: ICD-10-CM

## 2024-09-27 DIAGNOSIS — N18.32 CKD STAGE 3B, GFR 30-44 ML/MIN (MULTI): ICD-10-CM

## 2024-09-27 LAB
ANION GAP SERPL CALC-SCNC: 15 MMOL/L (ref 10–20)
BUN SERPL-MCNC: 40 MG/DL (ref 6–23)
CALCIUM SERPL-MCNC: 9.2 MG/DL (ref 8.6–10.6)
CHLORIDE SERPL-SCNC: 106 MMOL/L (ref 98–107)
CO2 SERPL-SCNC: 21 MMOL/L (ref 21–32)
CREAT SERPL-MCNC: 2.47 MG/DL (ref 0.5–1.3)
EGFRCR SERPLBLD CKD-EPI 2021: 25 ML/MIN/1.73M*2
EST. AVERAGE GLUCOSE BLD GHB EST-MCNC: 180 MG/DL
GLUCOSE SERPL-MCNC: 118 MG/DL (ref 74–99)
HBA1C MFR BLD: 7.9 %
POTASSIUM SERPL-SCNC: 4.9 MMOL/L (ref 3.5–5.3)
SODIUM SERPL-SCNC: 137 MMOL/L (ref 136–145)

## 2024-09-27 PROCEDURE — 80048 BASIC METABOLIC PNL TOTAL CA: CPT

## 2024-09-27 PROCEDURE — 83036 HEMOGLOBIN GLYCOSYLATED A1C: CPT

## 2024-09-27 PROCEDURE — 36415 COLL VENOUS BLD VENIPUNCTURE: CPT

## 2024-09-30 PROBLEM — I65.29 CAROTID ARTERY STENOSIS WITHOUT CEREBRAL INFARCTION: Status: ACTIVE | Noted: 2024-09-24

## 2024-09-30 RX ORDER — SODIUM CHLORIDE 9 MG/ML
100 INJECTION, SOLUTION INTRAVENOUS CONTINUOUS
OUTPATIENT
Start: 2024-09-30

## 2024-10-29 ENCOUNTER — APPOINTMENT (OUTPATIENT)
Dept: RADIOLOGY | Facility: HOSPITAL | Age: 84
End: 2024-10-29
Payer: MEDICARE

## 2024-10-29 ENCOUNTER — HOSPITAL ENCOUNTER (OUTPATIENT)
Facility: HOSPITAL | Age: 84
Setting detail: OBSERVATION
Discharge: HOME | End: 2024-10-30
Attending: EMERGENCY MEDICINE | Admitting: STUDENT IN AN ORGANIZED HEALTH CARE EDUCATION/TRAINING PROGRAM
Payer: MEDICARE

## 2024-10-29 ENCOUNTER — APPOINTMENT (OUTPATIENT)
Dept: PRIMARY CARE | Facility: CLINIC | Age: 84
End: 2024-10-29
Payer: MEDICARE

## 2024-10-29 VITALS
WEIGHT: 145.8 LBS | HEART RATE: 74 BPM | DIASTOLIC BLOOD PRESSURE: 72 MMHG | HEIGHT: 66 IN | SYSTOLIC BLOOD PRESSURE: 122 MMHG | BODY MASS INDEX: 23.43 KG/M2

## 2024-10-29 DIAGNOSIS — D50.0 IRON DEFICIENCY ANEMIA DUE TO CHRONIC BLOOD LOSS: ICD-10-CM

## 2024-10-29 DIAGNOSIS — Z86.73 STATUS POST STROKE: ICD-10-CM

## 2024-10-29 DIAGNOSIS — E13.51: ICD-10-CM

## 2024-10-29 DIAGNOSIS — I65.23 ASYMPTOMATIC BILATERAL CAROTID ARTERY STENOSIS: ICD-10-CM

## 2024-10-29 DIAGNOSIS — I10 PRIMARY HYPERTENSION: Chronic | ICD-10-CM

## 2024-10-29 DIAGNOSIS — E11.22 TYPE 2 DIABETES MELLITUS WITH STAGE 3B CHRONIC KIDNEY DISEASE, WITHOUT LONG-TERM CURRENT USE OF INSULIN (MULTI): ICD-10-CM

## 2024-10-29 DIAGNOSIS — E78.00 PURE HYPERCHOLESTEROLEMIA: ICD-10-CM

## 2024-10-29 DIAGNOSIS — Z01.89 ENCOUNTER FOR OTHER SPECIFIED SPECIAL EXAMINATIONS: ICD-10-CM

## 2024-10-29 DIAGNOSIS — I63.9 CEREBROVASCULAR ACCIDENT (CVA), UNSPECIFIED MECHANISM (MULTI): Primary | ICD-10-CM

## 2024-10-29 DIAGNOSIS — E11.21 DIABETES MELLITUS WITH NEPHROPATHY (MULTI): ICD-10-CM

## 2024-10-29 DIAGNOSIS — J43.8 OTHER EMPHYSEMA (MULTI): ICD-10-CM

## 2024-10-29 DIAGNOSIS — R47.1 DYSARTHRIA: Primary | Chronic | ICD-10-CM

## 2024-10-29 DIAGNOSIS — N18.32 TYPE 2 DIABETES MELLITUS WITH STAGE 3B CHRONIC KIDNEY DISEASE, WITHOUT LONG-TERM CURRENT USE OF INSULIN (MULTI): ICD-10-CM

## 2024-10-29 DIAGNOSIS — J30.9 ALLERGIC RHINITIS, UNSPECIFIED SEASONALITY, UNSPECIFIED TRIGGER: ICD-10-CM

## 2024-10-29 PROBLEM — N18.9 CKD (CHRONIC KIDNEY DISEASE): Status: ACTIVE | Noted: 2024-10-29

## 2024-10-29 PROBLEM — E11.9 TYPE 2 DIABETES MELLITUS: Status: ACTIVE | Noted: 2024-10-29

## 2024-10-29 PROBLEM — J44.9 COPD (CHRONIC OBSTRUCTIVE PULMONARY DISEASE) (MULTI): Status: ACTIVE | Noted: 2024-10-29

## 2024-10-29 LAB
ABO GROUP (TYPE) IN BLOOD: NORMAL
ALBUMIN SERPL BCP-MCNC: 3.9 G/DL (ref 3.4–5)
ALP SERPL-CCNC: 86 U/L (ref 33–136)
ALT SERPL W P-5'-P-CCNC: 21 U/L (ref 10–52)
ANION GAP SERPL CALC-SCNC: 11 MMOL/L (ref 10–20)
ANTIBODY SCREEN: NORMAL
AST SERPL W P-5'-P-CCNC: 20 U/L (ref 9–39)
BASOPHILS # BLD AUTO: 0.03 X10*3/UL (ref 0–0.1)
BASOPHILS NFR BLD AUTO: 0.5 %
BILIRUB SERPL-MCNC: 0.4 MG/DL (ref 0–1.2)
BNP SERPL-MCNC: 21 PG/ML (ref 0–99)
BUN SERPL-MCNC: 39 MG/DL (ref 6–23)
CALCIUM SERPL-MCNC: 8.9 MG/DL (ref 8.6–10.3)
CARDIAC TROPONIN I PNL SERPL HS: 8 NG/L (ref 0–20)
CHLORIDE SERPL-SCNC: 110 MMOL/L (ref 98–107)
CHOLEST SERPL-MCNC: 140 MG/DL (ref 0–199)
CHOLESTEROL/HDL RATIO: 2.7
CO2 SERPL-SCNC: 23 MMOL/L (ref 21–32)
CREAT SERPL-MCNC: 2.31 MG/DL (ref 0.5–1.3)
EGFRCR SERPLBLD CKD-EPI 2021: 28 ML/MIN/1.73M*2
EOSINOPHIL # BLD AUTO: 0.12 X10*3/UL (ref 0–0.4)
EOSINOPHIL NFR BLD AUTO: 2 %
ERYTHROCYTE [DISTWIDTH] IN BLOOD BY AUTOMATED COUNT: 14.3 % (ref 11.5–14.5)
GLUCOSE BLD MANUAL STRIP-MCNC: 109 MG/DL (ref 74–99)
GLUCOSE BLD MANUAL STRIP-MCNC: 200 MG/DL (ref 74–99)
GLUCOSE SERPL-MCNC: 117 MG/DL (ref 74–99)
HCT VFR BLD AUTO: 36 % (ref 41–52)
HDLC SERPL-MCNC: 52.2 MG/DL
HGB BLD-MCNC: 11.5 G/DL (ref 13.5–17.5)
IMM GRANULOCYTES # BLD AUTO: 0.18 X10*3/UL (ref 0–0.5)
IMM GRANULOCYTES NFR BLD AUTO: 3 % (ref 0–0.9)
LDLC SERPL CALC-MCNC: 67 MG/DL
LYMPHOCYTES # BLD AUTO: 1.34 X10*3/UL (ref 0.8–3)
LYMPHOCYTES NFR BLD AUTO: 22.3 %
MCH RBC QN AUTO: 30.5 PG (ref 26–34)
MCHC RBC AUTO-ENTMCNC: 31.9 G/DL (ref 32–36)
MCV RBC AUTO: 96 FL (ref 80–100)
MONOCYTES # BLD AUTO: 0.64 X10*3/UL (ref 0.05–0.8)
MONOCYTES NFR BLD AUTO: 10.7 %
NEUTROPHILS # BLD AUTO: 3.69 X10*3/UL (ref 1.6–5.5)
NEUTROPHILS NFR BLD AUTO: 61.5 %
NON HDL CHOLESTEROL: 88 MG/DL (ref 0–149)
NRBC BLD-RTO: 0 /100 WBCS (ref 0–0)
PLATELET # BLD AUTO: 201 X10*3/UL (ref 150–450)
POTASSIUM SERPL-SCNC: 5.1 MMOL/L (ref 3.5–5.3)
PROT SERPL-MCNC: 6.6 G/DL (ref 6.4–8.2)
RBC # BLD AUTO: 3.77 X10*6/UL (ref 4.5–5.9)
RH FACTOR (ANTIGEN D): NORMAL
SODIUM SERPL-SCNC: 139 MMOL/L (ref 136–145)
TRIGL SERPL-MCNC: 103 MG/DL (ref 0–149)
VLDL: 21 MG/DL (ref 0–40)
WBC # BLD AUTO: 6 X10*3/UL (ref 4.4–11.3)

## 2024-10-29 PROCEDURE — 1036F TOBACCO NON-USER: CPT | Performed by: INTERNAL MEDICINE

## 2024-10-29 PROCEDURE — 99214 OFFICE O/P EST MOD 30 MIN: CPT | Performed by: INTERNAL MEDICINE

## 2024-10-29 PROCEDURE — 70450 CT HEAD/BRAIN W/O DYE: CPT

## 2024-10-29 PROCEDURE — 80053 COMPREHEN METABOLIC PANEL: CPT | Performed by: EMERGENCY MEDICINE

## 2024-10-29 PROCEDURE — 36415 COLL VENOUS BLD VENIPUNCTURE: CPT | Performed by: EMERGENCY MEDICINE

## 2024-10-29 PROCEDURE — G0378 HOSPITAL OBSERVATION PER HR: HCPCS

## 2024-10-29 PROCEDURE — 3078F DIAST BP <80 MM HG: CPT | Performed by: INTERNAL MEDICINE

## 2024-10-29 PROCEDURE — 2500000004 HC RX 250 GENERAL PHARMACY W/ HCPCS (ALT 636 FOR OP/ED): Performed by: NURSE PRACTITIONER

## 2024-10-29 PROCEDURE — 83880 ASSAY OF NATRIURETIC PEPTIDE: CPT | Performed by: NURSE PRACTITIONER

## 2024-10-29 PROCEDURE — 80061 LIPID PANEL: CPT | Performed by: NURSE PRACTITIONER

## 2024-10-29 PROCEDURE — 84484 ASSAY OF TROPONIN QUANT: CPT | Performed by: EMERGENCY MEDICINE

## 2024-10-29 PROCEDURE — 2500000002 HC RX 250 W HCPCS SELF ADMINISTERED DRUGS (ALT 637 FOR MEDICARE OP, ALT 636 FOR OP/ED): Performed by: NURSE PRACTITIONER

## 2024-10-29 PROCEDURE — 94664 DEMO&/EVAL PT USE INHALER: CPT

## 2024-10-29 PROCEDURE — 99285 EMERGENCY DEPT VISIT HI MDM: CPT | Mod: 25

## 2024-10-29 PROCEDURE — 99223 1ST HOSP IP/OBS HIGH 75: CPT | Performed by: PSYCHIATRY & NEUROLOGY

## 2024-10-29 PROCEDURE — 70544 MR ANGIOGRAPHY HEAD W/O DYE: CPT | Mod: 59

## 2024-10-29 PROCEDURE — 70450 CT HEAD/BRAIN W/O DYE: CPT | Performed by: RADIOLOGY

## 2024-10-29 PROCEDURE — 82947 ASSAY GLUCOSE BLOOD QUANT: CPT

## 2024-10-29 PROCEDURE — 1123F ACP DISCUSS/DSCN MKR DOCD: CPT | Performed by: INTERNAL MEDICINE

## 2024-10-29 PROCEDURE — 3074F SYST BP LT 130 MM HG: CPT | Performed by: INTERNAL MEDICINE

## 2024-10-29 PROCEDURE — 1160F RVW MEDS BY RX/DR IN RCRD: CPT | Performed by: INTERNAL MEDICINE

## 2024-10-29 PROCEDURE — 85025 COMPLETE CBC W/AUTO DIFF WBC: CPT | Performed by: EMERGENCY MEDICINE

## 2024-10-29 PROCEDURE — 70551 MRI BRAIN STEM W/O DYE: CPT

## 2024-10-29 PROCEDURE — 70547 MR ANGIOGRAPHY NECK W/O DYE: CPT

## 2024-10-29 PROCEDURE — G2211 COMPLEX E/M VISIT ADD ON: HCPCS | Performed by: INTERNAL MEDICINE

## 2024-10-29 PROCEDURE — 2500000001 HC RX 250 WO HCPCS SELF ADMINISTERED DRUGS (ALT 637 FOR MEDICARE OP): Performed by: NURSE PRACTITIONER

## 2024-10-29 PROCEDURE — 96372 THER/PROPH/DIAG INJ SC/IM: CPT | Performed by: NURSE PRACTITIONER

## 2024-10-29 PROCEDURE — 82947 ASSAY GLUCOSE BLOOD QUANT: CPT | Mod: 59

## 2024-10-29 PROCEDURE — 83036 HEMOGLOBIN GLYCOSYLATED A1C: CPT | Mod: AHULAB | Performed by: NURSE PRACTITIONER

## 2024-10-29 PROCEDURE — 86900 BLOOD TYPING SEROLOGIC ABO: CPT | Performed by: EMERGENCY MEDICINE

## 2024-10-29 PROCEDURE — 1159F MED LIST DOCD IN RCRD: CPT | Performed by: INTERNAL MEDICINE

## 2024-10-29 PROCEDURE — 70551 MRI BRAIN STEM W/O DYE: CPT | Performed by: RADIOLOGY

## 2024-10-29 PROCEDURE — 70547 MR ANGIOGRAPHY NECK W/O DYE: CPT | Performed by: RADIOLOGY

## 2024-10-29 RX ORDER — ATORVASTATIN CALCIUM 80 MG/1
80 TABLET, FILM COATED ORAL NIGHTLY
Status: DISCONTINUED | OUTPATIENT
Start: 2024-10-30 | End: 2024-10-30 | Stop reason: HOSPADM

## 2024-10-29 RX ORDER — FLUTICASONE FUROATE AND VILANTEROL 200; 25 UG/1; UG/1
1 POWDER RESPIRATORY (INHALATION)
Status: DISCONTINUED | OUTPATIENT
Start: 2024-10-30 | End: 2024-10-29

## 2024-10-29 RX ORDER — CLOPIDOGREL BISULFATE 75 MG/1
300 TABLET ORAL ONCE
Status: COMPLETED | OUTPATIENT
Start: 2024-10-29 | End: 2024-10-29

## 2024-10-29 RX ORDER — HEPARIN SODIUM 5000 [USP'U]/ML
5000 INJECTION, SOLUTION INTRAVENOUS; SUBCUTANEOUS EVERY 8 HOURS
Status: DISCONTINUED | OUTPATIENT
Start: 2024-10-29 | End: 2024-10-30 | Stop reason: HOSPADM

## 2024-10-29 RX ORDER — SPIRONOLACTONE 25 MG/1
12.5 TABLET ORAL 2 TIMES DAILY
Status: DISCONTINUED | OUTPATIENT
Start: 2024-10-29 | End: 2024-10-30 | Stop reason: HOSPADM

## 2024-10-29 RX ORDER — FORMOTEROL FUMARATE DIHYDRATE 20 UG/2ML
20 SOLUTION RESPIRATORY (INHALATION)
Status: DISCONTINUED | OUTPATIENT
Start: 2024-10-29 | End: 2024-10-30 | Stop reason: HOSPADM

## 2024-10-29 RX ORDER — ASPIRIN 81 MG/1
81 TABLET ORAL DAILY
Status: DISCONTINUED | OUTPATIENT
Start: 2024-10-30 | End: 2024-10-29

## 2024-10-29 RX ORDER — ISOSORBIDE MONONITRATE 30 MG/1
30 TABLET, EXTENDED RELEASE ORAL DAILY
Status: DISCONTINUED | OUTPATIENT
Start: 2024-10-30 | End: 2024-10-30 | Stop reason: HOSPADM

## 2024-10-29 RX ORDER — INSULIN LISPRO 100 [IU]/ML
0-10 INJECTION, SOLUTION INTRAVENOUS; SUBCUTANEOUS
Status: DISCONTINUED | OUTPATIENT
Start: 2024-10-30 | End: 2024-10-30 | Stop reason: HOSPADM

## 2024-10-29 RX ORDER — ATORVASTATIN CALCIUM 80 MG/1
80 TABLET, FILM COATED ORAL NIGHTLY
Status: DISCONTINUED | OUTPATIENT
Start: 2024-10-29 | End: 2024-10-29

## 2024-10-29 RX ORDER — BUDESONIDE 0.5 MG/2ML
0.5 INHALANT ORAL
Status: DISCONTINUED | OUTPATIENT
Start: 2024-10-29 | End: 2024-10-30 | Stop reason: HOSPADM

## 2024-10-29 RX ORDER — ALBUTEROL SULFATE 0.83 MG/ML
2.5 SOLUTION RESPIRATORY (INHALATION) EVERY 2 HOUR PRN
Status: DISCONTINUED | OUTPATIENT
Start: 2024-10-29 | End: 2024-10-30 | Stop reason: HOSPADM

## 2024-10-29 RX ORDER — DEXTROSE 50 % IN WATER (D50W) INTRAVENOUS SYRINGE
25
Status: DISCONTINUED | OUTPATIENT
Start: 2024-10-29 | End: 2024-10-30 | Stop reason: HOSPADM

## 2024-10-29 RX ORDER — HYDRALAZINE HYDROCHLORIDE 25 MG/1
25 TABLET, FILM COATED ORAL EVERY 6 HOURS PRN
Status: DISCONTINUED | OUTPATIENT
Start: 2024-10-31 | End: 2024-10-30 | Stop reason: HOSPADM

## 2024-10-29 RX ORDER — CARVEDILOL 12.5 MG/1
12.5 TABLET ORAL
Status: DISCONTINUED | OUTPATIENT
Start: 2024-10-29 | End: 2024-10-30 | Stop reason: HOSPADM

## 2024-10-29 RX ORDER — ALBUTEROL SULFATE 90 UG/1
2 INHALANT RESPIRATORY (INHALATION) EVERY 6 HOURS PRN
Status: DISCONTINUED | OUTPATIENT
Start: 2024-10-29 | End: 2024-10-29

## 2024-10-29 RX ORDER — HYDRALAZINE HYDROCHLORIDE 20 MG/ML
10 INJECTION INTRAMUSCULAR; INTRAVENOUS
Status: DISCONTINUED | OUTPATIENT
Start: 2024-10-29 | End: 2024-10-30 | Stop reason: HOSPADM

## 2024-10-29 RX ORDER — ASPIRIN 81 MG/1
81 TABLET ORAL DAILY
Status: DISCONTINUED | OUTPATIENT
Start: 2024-10-30 | End: 2024-10-30 | Stop reason: HOSPADM

## 2024-10-29 RX ORDER — ALBUTEROL SULFATE 0.83 MG/ML
2.5 SOLUTION RESPIRATORY (INHALATION) EVERY 4 HOURS PRN
Status: DISCONTINUED | OUTPATIENT
Start: 2024-10-29 | End: 2024-10-29

## 2024-10-29 RX ORDER — CLOPIDOGREL BISULFATE 75 MG/1
75 TABLET ORAL DAILY
Status: DISCONTINUED | OUTPATIENT
Start: 2024-10-30 | End: 2024-10-30 | Stop reason: HOSPADM

## 2024-10-29 RX ORDER — PANTOPRAZOLE SODIUM 40 MG/1
40 TABLET, DELAYED RELEASE ORAL
Status: DISCONTINUED | OUTPATIENT
Start: 2024-10-30 | End: 2024-10-30 | Stop reason: HOSPADM

## 2024-10-29 RX ORDER — POLYETHYLENE GLYCOL 3350 17 G/17G
17 POWDER, FOR SOLUTION ORAL DAILY PRN
Status: DISCONTINUED | OUTPATIENT
Start: 2024-10-29 | End: 2024-10-30 | Stop reason: HOSPADM

## 2024-10-29 RX ORDER — AMLODIPINE BESYLATE 10 MG/1
10 TABLET ORAL DAILY
Status: DISCONTINUED | OUTPATIENT
Start: 2024-10-30 | End: 2024-10-30 | Stop reason: HOSPADM

## 2024-10-29 RX ORDER — DEXTROSE 50 % IN WATER (D50W) INTRAVENOUS SYRINGE
12.5
Status: DISCONTINUED | OUTPATIENT
Start: 2024-10-29 | End: 2024-10-30 | Stop reason: HOSPADM

## 2024-10-29 RX ORDER — LABETALOL HYDROCHLORIDE 5 MG/ML
10 INJECTION, SOLUTION INTRAVENOUS EVERY 10 MIN PRN
Status: DISCONTINUED | OUTPATIENT
Start: 2024-10-29 | End: 2024-10-29

## 2024-10-29 SDOH — ECONOMIC STABILITY: FOOD INSECURITY: WITHIN THE PAST 12 MONTHS, THE FOOD YOU BOUGHT JUST DIDN'T LAST AND YOU DIDN'T HAVE MONEY TO GET MORE.: NEVER TRUE

## 2024-10-29 SDOH — HEALTH STABILITY: MENTAL HEALTH: HOW OFTEN DO YOU HAVE SIX OR MORE DRINKS ON ONE OCCASION?: NEVER

## 2024-10-29 SDOH — HEALTH STABILITY: MENTAL HEALTH: HOW MANY DRINKS CONTAINING ALCOHOL DO YOU HAVE ON A TYPICAL DAY WHEN YOU ARE DRINKING?: PATIENT DOES NOT DRINK

## 2024-10-29 SDOH — HEALTH STABILITY: MENTAL HEALTH: HOW OFTEN DO YOU HAVE A DRINK CONTAINING ALCOHOL?: NEVER

## 2024-10-29 SDOH — SOCIAL STABILITY: SOCIAL INSECURITY: WERE YOU ABLE TO COMPLETE ALL THE BEHAVIORAL HEALTH SCREENINGS?: YES

## 2024-10-29 SDOH — ECONOMIC STABILITY: INCOME INSECURITY: IN THE PAST 12 MONTHS HAS THE ELECTRIC, GAS, OIL, OR WATER COMPANY THREATENED TO SHUT OFF SERVICES IN YOUR HOME?: NO

## 2024-10-29 SDOH — SOCIAL STABILITY: SOCIAL INSECURITY: HAVE YOU HAD THOUGHTS OF HARMING ANYONE ELSE?: NO

## 2024-10-29 SDOH — ECONOMIC STABILITY: HOUSING INSECURITY: IN THE LAST 12 MONTHS, WAS THERE A TIME WHEN YOU WERE NOT ABLE TO PAY THE MORTGAGE OR RENT ON TIME?: NO

## 2024-10-29 SDOH — ECONOMIC STABILITY: HOUSING INSECURITY: IN THE PAST 12 MONTHS, HOW MANY TIMES HAVE YOU MOVED WHERE YOU WERE LIVING?: 0

## 2024-10-29 SDOH — SOCIAL STABILITY: SOCIAL INSECURITY
WITHIN THE LAST YEAR, HAVE YOU BEEN KICKED, HIT, SLAPPED, OR OTHERWISE PHYSICALLY HURT BY YOUR PARTNER OR EX-PARTNER?: NO

## 2024-10-29 SDOH — SOCIAL STABILITY: SOCIAL INSECURITY: WITHIN THE LAST YEAR, HAVE YOU BEEN AFRAID OF YOUR PARTNER OR EX-PARTNER?: NO

## 2024-10-29 SDOH — ECONOMIC STABILITY: HOUSING INSECURITY: AT ANY TIME IN THE PAST 12 MONTHS, WERE YOU HOMELESS OR LIVING IN A SHELTER (INCLUDING NOW)?: NO

## 2024-10-29 SDOH — SOCIAL STABILITY: SOCIAL INSECURITY: DOES ANYONE TRY TO KEEP YOU FROM HAVING/CONTACTING OTHER FRIENDS OR DOING THINGS OUTSIDE YOUR HOME?: NO

## 2024-10-29 SDOH — SOCIAL STABILITY: SOCIAL INSECURITY: ABUSE: ADULT

## 2024-10-29 SDOH — SOCIAL STABILITY: SOCIAL INSECURITY: HAS ANYONE EVER THREATENED TO HURT YOUR FAMILY OR YOUR PETS?: NO

## 2024-10-29 SDOH — ECONOMIC STABILITY: FOOD INSECURITY: WITHIN THE PAST 12 MONTHS, YOU WORRIED THAT YOUR FOOD WOULD RUN OUT BEFORE YOU GOT THE MONEY TO BUY MORE.: NEVER TRUE

## 2024-10-29 SDOH — ECONOMIC STABILITY: FOOD INSECURITY: HOW HARD IS IT FOR YOU TO PAY FOR THE VERY BASICS LIKE FOOD, HOUSING, MEDICAL CARE, AND HEATING?: NOT VERY HARD

## 2024-10-29 SDOH — SOCIAL STABILITY: SOCIAL INSECURITY: ARE THERE ANY APPARENT SIGNS OF INJURIES/BEHAVIORS THAT COULD BE RELATED TO ABUSE/NEGLECT?: NO

## 2024-10-29 SDOH — SOCIAL STABILITY: SOCIAL INSECURITY
WITHIN THE LAST YEAR, HAVE YOU BEEN RAPED OR FORCED TO HAVE ANY KIND OF SEXUAL ACTIVITY BY YOUR PARTNER OR EX-PARTNER?: NO

## 2024-10-29 SDOH — SOCIAL STABILITY: SOCIAL INSECURITY: WITHIN THE LAST YEAR, HAVE YOU BEEN HUMILIATED OR EMOTIONALLY ABUSED IN OTHER WAYS BY YOUR PARTNER OR EX-PARTNER?: NO

## 2024-10-29 SDOH — SOCIAL STABILITY: SOCIAL INSECURITY: DO YOU FEEL ANYONE HAS EXPLOITED OR TAKEN ADVANTAGE OF YOU FINANCIALLY OR OF YOUR PERSONAL PROPERTY?: NO

## 2024-10-29 SDOH — ECONOMIC STABILITY: TRANSPORTATION INSECURITY: IN THE PAST 12 MONTHS, HAS LACK OF TRANSPORTATION KEPT YOU FROM MEDICAL APPOINTMENTS OR FROM GETTING MEDICATIONS?: NO

## 2024-10-29 SDOH — SOCIAL STABILITY: SOCIAL INSECURITY: HAVE YOU HAD ANY THOUGHTS OF HARMING ANYONE ELSE?: NO

## 2024-10-29 SDOH — SOCIAL STABILITY: SOCIAL INSECURITY: DO YOU FEEL UNSAFE GOING BACK TO THE PLACE WHERE YOU ARE LIVING?: NO

## 2024-10-29 SDOH — SOCIAL STABILITY: SOCIAL INSECURITY: ARE YOU OR HAVE YOU BEEN THREATENED OR ABUSED PHYSICALLY, EMOTIONALLY, OR SEXUALLY BY ANYONE?: NO

## 2024-10-29 ASSESSMENT — ACTIVITIES OF DAILY LIVING (ADL)
LACK_OF_TRANSPORTATION: NO
DRESSING YOURSELF: INDEPENDENT
PATIENT'S MEMORY ADEQUATE TO SAFELY COMPLETE DAILY ACTIVITIES?: YES
TOILETING: INDEPENDENT
HEARING - LEFT EAR: FUNCTIONAL
HEARING - RIGHT EAR: FUNCTIONAL
JUDGMENT_ADEQUATE_SAFELY_COMPLETE_DAILY_ACTIVITIES: YES
ADEQUATE_TO_COMPLETE_ADL: YES
BATHING: INDEPENDENT
GROOMING: INDEPENDENT
WALKS IN HOME: INDEPENDENT
FEEDING YOURSELF: INDEPENDENT

## 2024-10-29 ASSESSMENT — COGNITIVE AND FUNCTIONAL STATUS - GENERAL
DAILY ACTIVITIY SCORE: 24
MOBILITY SCORE: 23
DAILY ACTIVITIY SCORE: 24
MOBILITY SCORE: 24
PATIENT BASELINE BEDBOUND: NO
CLIMB 3 TO 5 STEPS WITH RAILING: A LITTLE

## 2024-10-29 ASSESSMENT — LIFESTYLE VARIABLES
SKIP TO QUESTIONS 9-10: 1
HOW MANY STANDARD DRINKS CONTAINING ALCOHOL DO YOU HAVE ON A TYPICAL DAY: PATIENT DOES NOT DRINK
HOW OFTEN DO YOU HAVE 6 OR MORE DRINKS ON ONE OCCASION: NEVER
SKIP TO QUESTIONS 9-10: 1
AUDIT-C TOTAL SCORE: 0
HOW OFTEN DO YOU HAVE A DRINK CONTAINING ALCOHOL: NEVER

## 2024-10-29 ASSESSMENT — COLUMBIA-SUICIDE SEVERITY RATING SCALE - C-SSRS
1. IN THE PAST MONTH, HAVE YOU WISHED YOU WERE DEAD OR WISHED YOU COULD GO TO SLEEP AND NOT WAKE UP?: NO
6. HAVE YOU EVER DONE ANYTHING, STARTED TO DO ANYTHING, OR PREPARED TO DO ANYTHING TO END YOUR LIFE?: NO
2. HAVE YOU ACTUALLY HAD ANY THOUGHTS OF KILLING YOURSELF?: NO

## 2024-10-29 ASSESSMENT — PATIENT HEALTH QUESTIONNAIRE - PHQ9
1. LITTLE INTEREST OR PLEASURE IN DOING THINGS: NOT AT ALL
SUM OF ALL RESPONSES TO PHQ9 QUESTIONS 1 & 2: 0
2. FEELING DOWN, DEPRESSED OR HOPELESS: NOT AT ALL

## 2024-10-29 ASSESSMENT — PAIN SCALES - GENERAL
PAINLEVEL_OUTOF10: 0 - NO PAIN

## 2024-10-29 ASSESSMENT — ENCOUNTER SYMPTOMS
CONSTITUTIONAL NEGATIVE: 1
SPEECH DIFFICULTY: 1

## 2024-10-29 ASSESSMENT — PAIN - FUNCTIONAL ASSESSMENT: PAIN_FUNCTIONAL_ASSESSMENT: 0-10

## 2024-10-30 ENCOUNTER — APPOINTMENT (OUTPATIENT)
Dept: CARDIOLOGY | Facility: HOSPITAL | Age: 84
End: 2024-10-30
Payer: MEDICARE

## 2024-10-30 ENCOUNTER — PHARMACY VISIT (OUTPATIENT)
Dept: PHARMACY | Facility: CLINIC | Age: 84
End: 2024-10-30
Payer: MEDICARE

## 2024-10-30 VITALS
TEMPERATURE: 97.3 F | HEIGHT: 66 IN | OXYGEN SATURATION: 94 % | RESPIRATION RATE: 17 BRPM | DIASTOLIC BLOOD PRESSURE: 74 MMHG | BODY MASS INDEX: 22.82 KG/M2 | SYSTOLIC BLOOD PRESSURE: 144 MMHG | WEIGHT: 142 LBS | HEART RATE: 75 BPM

## 2024-10-30 LAB
ANION GAP SERPL CALC-SCNC: 10 MMOL/L (ref 10–20)
BASOPHILS # BLD AUTO: 0.02 X10*3/UL (ref 0–0.1)
BASOPHILS NFR BLD AUTO: 0.4 %
BUN SERPL-MCNC: 37 MG/DL (ref 6–23)
CALCIUM SERPL-MCNC: 8.6 MG/DL (ref 8.6–10.3)
CHLORIDE SERPL-SCNC: 111 MMOL/L (ref 98–107)
CO2 SERPL-SCNC: 23 MMOL/L (ref 21–32)
CREAT SERPL-MCNC: 1.95 MG/DL (ref 0.5–1.3)
EGFRCR SERPLBLD CKD-EPI 2021: 34 ML/MIN/1.73M*2
EJECTION FRACTION APICAL 4 CHAMBER: 62.8
EJECTION FRACTION: 65 %
EOSINOPHIL # BLD AUTO: 0.1 X10*3/UL (ref 0–0.4)
EOSINOPHIL NFR BLD AUTO: 2 %
ERYTHROCYTE [DISTWIDTH] IN BLOOD BY AUTOMATED COUNT: 14.1 % (ref 11.5–14.5)
EST. AVERAGE GLUCOSE BLD GHB EST-MCNC: 177 MG/DL
GLUCOSE BLD MANUAL STRIP-MCNC: 141 MG/DL (ref 74–99)
GLUCOSE BLD MANUAL STRIP-MCNC: 94 MG/DL (ref 74–99)
GLUCOSE SERPL-MCNC: 84 MG/DL (ref 74–99)
HBA1C MFR BLD: 7.8 %
HCT VFR BLD AUTO: 34.3 % (ref 41–52)
HGB BLD-MCNC: 11 G/DL (ref 13.5–17.5)
IMM GRANULOCYTES # BLD AUTO: 0.13 X10*3/UL (ref 0–0.5)
IMM GRANULOCYTES NFR BLD AUTO: 2.6 % (ref 0–0.9)
LEFT VENTRICLE INTERNAL DIMENSION DIASTOLE: 4.02 CM (ref 3.5–6)
LYMPHOCYTES # BLD AUTO: 1.45 X10*3/UL (ref 0.8–3)
LYMPHOCYTES NFR BLD AUTO: 29.5 %
MCH RBC QN AUTO: 30.6 PG (ref 26–34)
MCHC RBC AUTO-ENTMCNC: 32.1 G/DL (ref 32–36)
MCV RBC AUTO: 95 FL (ref 80–100)
MITRAL VALVE E/A RATIO: 0.47
MONOCYTES # BLD AUTO: 0.58 X10*3/UL (ref 0.05–0.8)
MONOCYTES NFR BLD AUTO: 11.8 %
NEUTROPHILS # BLD AUTO: 2.63 X10*3/UL (ref 1.6–5.5)
NEUTROPHILS NFR BLD AUTO: 53.7 %
NRBC BLD-RTO: 0 /100 WBCS (ref 0–0)
PLATELET # BLD AUTO: 184 X10*3/UL (ref 150–450)
POTASSIUM SERPL-SCNC: 5.1 MMOL/L (ref 3.5–5.3)
RBC # BLD AUTO: 3.6 X10*6/UL (ref 4.5–5.9)
RIGHT VENTRICLE PEAK SYSTOLIC PRESSURE: 56.1 MMHG
SODIUM SERPL-SCNC: 139 MMOL/L (ref 136–145)
WBC # BLD AUTO: 4.9 X10*3/UL (ref 4.4–11.3)

## 2024-10-30 PROCEDURE — 93005 ELECTROCARDIOGRAM TRACING: CPT

## 2024-10-30 PROCEDURE — 82947 ASSAY GLUCOSE BLOOD QUANT: CPT | Mod: 59

## 2024-10-30 PROCEDURE — 2500000001 HC RX 250 WO HCPCS SELF ADMINISTERED DRUGS (ALT 637 FOR MEDICARE OP): Performed by: NURSE PRACTITIONER

## 2024-10-30 PROCEDURE — 92610 EVALUATE SWALLOWING FUNCTION: CPT | Mod: GN

## 2024-10-30 PROCEDURE — 36415 COLL VENOUS BLD VENIPUNCTURE: CPT | Performed by: NURSE PRACTITIONER

## 2024-10-30 PROCEDURE — 97161 PT EVAL LOW COMPLEX 20 MIN: CPT | Mod: GP

## 2024-10-30 PROCEDURE — 93321 DOPPLER ECHO F-UP/LMTD STD: CPT | Performed by: INTERNAL MEDICINE

## 2024-10-30 PROCEDURE — 85025 COMPLETE CBC W/AUTO DIFF WBC: CPT | Performed by: NURSE PRACTITIONER

## 2024-10-30 PROCEDURE — G0378 HOSPITAL OBSERVATION PER HR: HCPCS

## 2024-10-30 PROCEDURE — 93005 ELECTROCARDIOGRAM TRACING: CPT | Mod: 59

## 2024-10-30 PROCEDURE — RXMED WILLOW AMBULATORY MEDICATION CHARGE

## 2024-10-30 PROCEDURE — 93308 TTE F-UP OR LMTD: CPT | Performed by: INTERNAL MEDICINE

## 2024-10-30 PROCEDURE — 94640 AIRWAY INHALATION TREATMENT: CPT

## 2024-10-30 PROCEDURE — 80048 BASIC METABOLIC PNL TOTAL CA: CPT | Performed by: NURSE PRACTITIONER

## 2024-10-30 PROCEDURE — 2500000002 HC RX 250 W HCPCS SELF ADMINISTERED DRUGS (ALT 637 FOR MEDICARE OP, ALT 636 FOR OP/ED): Performed by: NURSE PRACTITIONER

## 2024-10-30 PROCEDURE — 93321 DOPPLER ECHO F-UP/LMTD STD: CPT

## 2024-10-30 PROCEDURE — 93325 DOPPLER ECHO COLOR FLOW MAPG: CPT | Performed by: INTERNAL MEDICINE

## 2024-10-30 PROCEDURE — 99239 HOSP IP/OBS DSCHRG MGMT >30: CPT | Performed by: NURSE PRACTITIONER

## 2024-10-30 RX ORDER — CLOPIDOGREL BISULFATE 75 MG/1
75 TABLET ORAL DAILY
Qty: 30 TABLET | Refills: 0 | Status: SHIPPED | OUTPATIENT
Start: 2024-10-30 | End: 2024-11-29

## 2024-10-30 RX ORDER — FLUTICASONE PROPIONATE 50 MCG
1 SPRAY, SUSPENSION (ML) NASAL DAILY PRN
Start: 2024-10-30

## 2024-10-30 SDOH — HEALTH STABILITY: MENTAL HEALTH: HOW OFTEN DO YOU HAVE SIX OR MORE DRINKS ON ONE OCCASION?: NEVER

## 2024-10-30 SDOH — HEALTH STABILITY: MENTAL HEALTH: HOW OFTEN DO YOU HAVE A DRINK CONTAINING ALCOHOL?: NEVER

## 2024-10-30 SDOH — HEALTH STABILITY: MENTAL HEALTH: HOW MANY DRINKS CONTAINING ALCOHOL DO YOU HAVE ON A TYPICAL DAY WHEN YOU ARE DRINKING?: PATIENT DOES NOT DRINK

## 2024-10-30 ASSESSMENT — COGNITIVE AND FUNCTIONAL STATUS - GENERAL
DAILY ACTIVITIY SCORE: 24
MOBILITY SCORE: 24
MOBILITY SCORE: 24

## 2024-10-30 ASSESSMENT — PAIN - FUNCTIONAL ASSESSMENT
PAIN_FUNCTIONAL_ASSESSMENT: 0-10
PAIN_FUNCTIONAL_ASSESSMENT: 0-10

## 2024-10-30 ASSESSMENT — ACTIVITIES OF DAILY LIVING (ADL)
ADL_ASSISTANCE: INDEPENDENT
LACK_OF_TRANSPORTATION: NO

## 2024-10-30 ASSESSMENT — LIFESTYLE VARIABLES
SKIP TO QUESTIONS 9-10: 1
AUDIT-C TOTAL SCORE: 0

## 2024-10-30 ASSESSMENT — PAIN SCALES - GENERAL
PAINLEVEL_OUTOF10: 0 - NO PAIN

## 2024-10-31 ENCOUNTER — PATIENT OUTREACH (OUTPATIENT)
Dept: PRIMARY CARE | Facility: CLINIC | Age: 84
End: 2024-10-31
Payer: MEDICARE

## 2024-10-31 DIAGNOSIS — R80.9 MICROALBUMINURIA: ICD-10-CM

## 2024-10-31 DIAGNOSIS — N18.4 CHRONIC RENAL DISEASE, STAGE IV (MULTI): ICD-10-CM

## 2024-10-31 DIAGNOSIS — N18.31 TYPE 2 DIABETES MELLITUS WITH STAGE 3A CHRONIC KIDNEY DISEASE, WITH LONG-TERM CURRENT USE OF INSULIN (MULTI): ICD-10-CM

## 2024-10-31 DIAGNOSIS — Z79.4 TYPE 2 DIABETES MELLITUS WITH STAGE 3A CHRONIC KIDNEY DISEASE, WITH LONG-TERM CURRENT USE OF INSULIN (MULTI): ICD-10-CM

## 2024-10-31 DIAGNOSIS — E11.22 TYPE 2 DIABETES MELLITUS WITH STAGE 3B CHRONIC KIDNEY DISEASE, WITHOUT LONG-TERM CURRENT USE OF INSULIN (MULTI): ICD-10-CM

## 2024-10-31 DIAGNOSIS — N18.32 TYPE 2 DIABETES MELLITUS WITH STAGE 3B CHRONIC KIDNEY DISEASE, WITHOUT LONG-TERM CURRENT USE OF INSULIN (MULTI): ICD-10-CM

## 2024-10-31 DIAGNOSIS — E11.22 TYPE 2 DIABETES MELLITUS WITH STAGE 3A CHRONIC KIDNEY DISEASE, WITH LONG-TERM CURRENT USE OF INSULIN (MULTI): ICD-10-CM

## 2024-10-31 RX ORDER — DAPAGLIFLOZIN 10 MG/1
10 TABLET, FILM COATED ORAL DAILY
Qty: 90 TABLET | Refills: 1 | Status: SHIPPED | OUTPATIENT
Start: 2024-10-31

## 2024-11-01 LAB
ATRIAL RATE: 75 BPM
P AXIS: 57 DEGREES
P OFFSET: 202 MS
P ONSET: 143 MS
PR INTERVAL: 164 MS
Q ONSET: 225 MS
QRS COUNT: 12 BEATS
QRS DURATION: 66 MS
QT INTERVAL: 370 MS
QTC CALCULATION(BAZETT): 413 MS
QTC FREDERICIA: 398 MS
R AXIS: 56 DEGREES
T AXIS: 63 DEGREES
T OFFSET: 410 MS
VENTRICULAR RATE: 75 BPM

## 2024-11-06 ENCOUNTER — HOSPITAL ENCOUNTER (OUTPATIENT)
Facility: HOSPITAL | Age: 84
Setting detail: OUTPATIENT SURGERY
Discharge: HOME | End: 2024-11-06
Attending: INTERNAL MEDICINE | Admitting: INTERNAL MEDICINE
Payer: MEDICARE

## 2024-11-06 VITALS
WEIGHT: 142 LBS | DIASTOLIC BLOOD PRESSURE: 89 MMHG | SYSTOLIC BLOOD PRESSURE: 138 MMHG | OXYGEN SATURATION: 98 % | HEART RATE: 72 BPM | BODY MASS INDEX: 22.82 KG/M2 | HEIGHT: 66 IN | RESPIRATION RATE: 14 BRPM

## 2024-11-06 DIAGNOSIS — I65.23 BILATERAL CAROTID ARTERY STENOSIS: ICD-10-CM

## 2024-11-06 DIAGNOSIS — I65.29 CAROTID ARTERY STENOSIS WITHOUT CEREBRAL INFARCTION: Primary | ICD-10-CM

## 2024-11-06 LAB — ACT BLD: 250 SEC (ref 83–199)

## 2024-11-06 PROCEDURE — 7100000010 HC PHASE TWO TIME - EACH INCREMENTAL 1 MINUTE: Performed by: INTERNAL MEDICINE

## 2024-11-06 PROCEDURE — 75605 CONTRAST EXAM THORACIC AORTA: CPT | Mod: 59 | Performed by: INTERNAL MEDICINE

## 2024-11-06 PROCEDURE — 2500000004 HC RX 250 GENERAL PHARMACY W/ HCPCS (ALT 636 FOR OP/ED): Performed by: INTERNAL MEDICINE

## 2024-11-06 PROCEDURE — 2720000007 HC OR 272 NO HCPCS: Performed by: INTERNAL MEDICINE

## 2024-11-06 PROCEDURE — C1760 CLOSURE DEV, VASC: HCPCS | Performed by: INTERNAL MEDICINE

## 2024-11-06 PROCEDURE — 2550000001 HC RX 255 CONTRASTS: Performed by: INTERNAL MEDICINE

## 2024-11-06 PROCEDURE — 36223 PLACE CATH CAROTID/INOM ART: CPT | Performed by: INTERNAL MEDICINE

## 2024-11-06 PROCEDURE — 75605 CONTRAST EXAM THORACIC AORTA: CPT | Performed by: INTERNAL MEDICINE

## 2024-11-06 PROCEDURE — 2500000004 HC RX 250 GENERAL PHARMACY W/ HCPCS (ALT 636 FOR OP/ED): Performed by: NURSE PRACTITIONER

## 2024-11-06 PROCEDURE — C1894 INTRO/SHEATH, NON-LASER: HCPCS | Performed by: INTERNAL MEDICINE

## 2024-11-06 PROCEDURE — 85347 COAGULATION TIME ACTIVATED: CPT | Performed by: INTERNAL MEDICINE

## 2024-11-06 PROCEDURE — 36224 PLACE CATH CAROTD ART: CPT

## 2024-11-06 PROCEDURE — 85347 COAGULATION TIME ACTIVATED: CPT

## 2024-11-06 PROCEDURE — 36227 PLACE CATH XTRNL CAROTID: CPT

## 2024-11-06 PROCEDURE — G0269 OCCLUSIVE DEVICE IN VEIN ART: HCPCS | Mod: 59 | Performed by: INTERNAL MEDICINE

## 2024-11-06 PROCEDURE — 7100000009 HC PHASE TWO TIME - INITIAL BASE CHARGE: Performed by: INTERNAL MEDICINE

## 2024-11-06 PROCEDURE — 36226 PLACE CATH VERTEBRAL ART: CPT

## 2024-11-06 PROCEDURE — C1769 GUIDE WIRE: HCPCS | Performed by: INTERNAL MEDICINE

## 2024-11-06 PROCEDURE — 2780000003 HC OR 278 NO HCPCS: Performed by: INTERNAL MEDICINE

## 2024-11-06 RX ORDER — SODIUM CHLORIDE 9 MG/ML
100 INJECTION, SOLUTION INTRAVENOUS CONTINUOUS
Status: DISCONTINUED | OUTPATIENT
Start: 2024-11-06 | End: 2024-11-06 | Stop reason: HOSPADM

## 2024-11-06 RX ORDER — HEPARIN SODIUM 1000 [USP'U]/ML
INJECTION, SOLUTION INTRAVENOUS; SUBCUTANEOUS AS NEEDED
Status: DISCONTINUED | OUTPATIENT
Start: 2024-11-06 | End: 2024-11-06 | Stop reason: HOSPADM

## 2024-11-06 RX ORDER — ACETAMINOPHEN 325 MG/1
650 TABLET ORAL EVERY 6 HOURS PRN
Status: DISCONTINUED | OUTPATIENT
Start: 2024-11-06 | End: 2024-11-06 | Stop reason: HOSPADM

## 2024-11-06 RX ORDER — LIDOCAINE HYDROCHLORIDE 20 MG/ML
INJECTION, SOLUTION INFILTRATION; PERINEURAL AS NEEDED
Status: DISCONTINUED | OUTPATIENT
Start: 2024-11-06 | End: 2024-11-06 | Stop reason: HOSPADM

## 2024-11-06 ASSESSMENT — COLUMBIA-SUICIDE SEVERITY RATING SCALE - C-SSRS
6. HAVE YOU EVER DONE ANYTHING, STARTED TO DO ANYTHING, OR PREPARED TO DO ANYTHING TO END YOUR LIFE?: NO
1. IN THE PAST MONTH, HAVE YOU WISHED YOU WERE DEAD OR WISHED YOU COULD GO TO SLEEP AND NOT WAKE UP?: NO
2. HAVE YOU ACTUALLY HAD ANY THOUGHTS OF KILLING YOURSELF?: NO

## 2024-11-06 NOTE — POST-PROCEDURE NOTE
Physician Transition of Care Summary  Invasive Cardiovascular Lab    Procedure Date: 11/6/2024  Attending:    * Chuy Stahl - Primary  Resident/Fellow/Other Assistant: Surgeons and Role:     * Emma Metcalf MD - Fellow     * Hector Whitley MD - Fellow    Indications:   Pre-op Diagnosis      * Carotid artery stenosis without cerebral infarction [I65.29]    Post-procedure diagnosis:   Post-op Diagnosis     * Carotid artery stenosis without cerebral infarction [I65.29]    Procedure(s):   Carotid Angiogram  [id number] CAROTID/INNOM ART ANGIO XTRCRANL ART        Procedure Findings:   S/p b/l carotid angiogram severe calcification at the ICA origin.       Description of the Procedure:   Right CFA 5Fr --> 6Fr angioseal.     Complications:   None    Stents/Implants:   Implants       No implant documentation for this case.            Anticoagulation/Antiplatelet Plan:   No changes    Estimated Blood Loss:   20 mL    Anesthesia: Moderate Sedation Anesthesia Staff: No anesthesia staff entered.    Any Specimen(s) Removed:   No specimens collected during this procedure.    Disposition:   Home later after recovery       Electronically signed by: Hector Whitley MD, 11/6/2024 11:10 AM

## 2024-11-06 NOTE — DISCHARGE INSTRUCTIONS
Thank you for coming to see us in the [facility] [place] today.    Today you underwent a diagnostic procedure called carotid angiogram to assess the degree of blockage in your carotid artery. At this time your blockage can not be treated with placing a stent, therefore we are referring you for surgical option to vascular surgery.   We have placed a referral to Dr. Sim Castanead a vascular surgeon to evaluate you for a procedure called carotid artery endarterectomy. His office will reach out to you, however if you don't hear from them please give them call or our office to get you scheduled.  Please continue your current medications including Aspirin, Plavix and Atorvastatin.      Leah Aviles, APRN-CNP

## 2024-11-06 NOTE — PROGRESS NOTES
Pharmacy Medication History Review    Mann Abrams is a 84 y.o. male admitted for Carotid artery stenosis without cerebral infarction. Pharmacy reviewed the patient's [id number] medications and allergies for accuracy.    Medications ADDED:  NONE  Medications CHANGED:  NONE   Medications REMOVED:   NONE     The list below reflects the updated PTA list.   Prior to Admission Medications   Prescriptions Last Dose Informant   albuterol 108 (90 Base) MCG/ACT inhaler 11/5/2024 Self   Sig: Inhale 2 puffs every 6 hours if needed for wheezing.   amLODIPine (Norvasc) 10 mg tablet 11/6/2024 Morning Self   Sig: Take 1 tablet (10 mg) by mouth once daily.   ascorbic acid (Vitamin C) 500 mg tablet 11/5/2024 Self   Sig: Take 1 tablet (500 mg) by mouth once daily.   aspirin 81 mg EC tablet 11/6/2024 Morning Self   Sig: Take 1 tablet (81 mg) by mouth once daily.   atorvastatin (Lipitor) 80 mg tablet 11/5/2024 Self   Sig: Take 1 tablet (80 mg) by mouth once daily.   carvedilol (Coreg) 12.5 mg tablet 11/6/2024 Morning Self   Sig: Take 1 tablet (12.5 mg) by mouth 2 times daily (morning and late afternoon).   clopidogrel (Plavix) 75 mg tablet 11/6/2024 Morning Self   Sig: Take 1 tablet (75 mg) by mouth once daily.   dapagliflozin propanediol (Farxiga) 10 mg 11/5/2024 Self   Sig: Take 1 tablet (10 mg) by mouth once daily.   fluticasone (Flonase) 50 mcg/actuation nasal spray 11/5/2024 Self   Sig: Administer 1 spray into each nostril once daily as needed for allergies. Shake gently. Before first use, prime pump. After use, clean tip and replace cap.   fluticasone propion-salmeteroL (Wixela Inhub) 250-50 mcg/dose diskus inhaler 11/5/2024 Self   Sig: Inhale 1 puff 2 times a day. Rinse mouth with water after use to reduce aftertaste and incidence of candidiasis. Do not swallow.   isosorbide mononitrate ER (Imdur) 30 mg 24 hr tablet 11/6/2024 Morning Self   Sig: Take 1 tablet (30 mg) by mouth once daily. Do not crush or chew.  "  montelukast (Singulair) 10 mg tablet 11/5/2024 Self   Sig: Take 1 tablet (10 mg) by mouth once daily at bedtime.   nitroglycerin (Nitrostat) 0.4 mg SL tablet  Self   Sig: Place 1 tablet (0.4 mg) under the tongue every 5 minutes if needed for chest pain. place 1 tablet under tongue every 5 minutes for up to 3 doses as needed for chest pain. Call 911 if pain persists   olmesartan (Benicar) 40 mg tablet 11/6/2024 Morning Self   Sig: Take 1 tablet (40 mg) by mouth once daily.   omeprazole (PriLOSEC) 40 mg DR capsule 11/5/2024 Self   Sig: Take 1 capsule (40 mg) by mouth once daily.   spironolactone (Aldactone) 25 mg tablet 11/6/2024 Morning Self   Sig: Take 0.5 tablets (12.5 mg) by mouth 2 times a day.      Facility-Administered Medications: None        The list below reflects the updated allergy list. Please review each documented allergy for additional clarification and justification.  Allergies  Reviewed by Leslie Corbin RN on 10/29/2024        Severity Reactions Comments    Doxazosin Not Specified Itching             Patient declines M2B at discharge.     Sources:   [facility]  Pharmacy dispense history  Patient interview Good historian  Chart Review  Care Everywhere    Additional Comments:  NONE      Julio Magaña [facility]  Transitions of Care Pharmacist  11/06/24     Secure Chat preferred   If no response call [phone] or Vocera \"Med Rec\"    "

## 2024-11-07 DIAGNOSIS — I65.29 CAROTID ARTERY STENOSIS WITHOUT CEREBRAL INFARCTION: Primary | ICD-10-CM

## 2024-11-08 ENCOUNTER — APPOINTMENT (OUTPATIENT)
Dept: PRIMARY CARE | Facility: CLINIC | Age: 84
End: 2024-11-08
Payer: MEDICARE

## 2024-11-08 DIAGNOSIS — R47.1 DYSARTHRIA: Chronic | ICD-10-CM

## 2024-11-08 DIAGNOSIS — J44.89 ASTHMA WITH COPD (MULTI): ICD-10-CM

## 2024-11-08 DIAGNOSIS — I63.9 CEREBROVASCULAR ACCIDENT (CVA), UNSPECIFIED MECHANISM (MULTI): ICD-10-CM

## 2024-11-08 DIAGNOSIS — N18.32 TYPE 2 DIABETES MELLITUS WITH STAGE 3B CHRONIC KIDNEY DISEASE, WITHOUT LONG-TERM CURRENT USE OF INSULIN (MULTI): Primary | ICD-10-CM

## 2024-11-08 DIAGNOSIS — I10 PRIMARY HYPERTENSION: ICD-10-CM

## 2024-11-08 DIAGNOSIS — Z86.73 HISTORY OF STROKE: Primary | Chronic | ICD-10-CM

## 2024-11-08 DIAGNOSIS — E13.51: ICD-10-CM

## 2024-11-08 DIAGNOSIS — I65.23 BILATERAL CAROTID ARTERY STENOSIS: ICD-10-CM

## 2024-11-08 DIAGNOSIS — E11.22 TYPE 2 DIABETES MELLITUS WITH STAGE 3B CHRONIC KIDNEY DISEASE, WITHOUT LONG-TERM CURRENT USE OF INSULIN (MULTI): Primary | ICD-10-CM

## 2024-11-08 DIAGNOSIS — E11.22 TYPE 2 DIABETES MELLITUS WITH STAGE 3B CHRONIC KIDNEY DISEASE, WITHOUT LONG-TERM CURRENT USE OF INSULIN (MULTI): ICD-10-CM

## 2024-11-08 DIAGNOSIS — E11.21 DIABETES MELLITUS WITH NEPHROPATHY (MULTI): ICD-10-CM

## 2024-11-08 DIAGNOSIS — N18.32 TYPE 2 DIABETES MELLITUS WITH STAGE 3B CHRONIC KIDNEY DISEASE, WITHOUT LONG-TERM CURRENT USE OF INSULIN (MULTI): ICD-10-CM

## 2024-11-08 DIAGNOSIS — J43.8 OTHER EMPHYSEMA (MULTI): ICD-10-CM

## 2024-11-08 DIAGNOSIS — N18.32 STAGE 3B CHRONIC KIDNEY DISEASE (CKD) (MULTI): ICD-10-CM

## 2024-11-08 PROCEDURE — 1159F MED LIST DOCD IN RCRD: CPT | Performed by: INTERNAL MEDICINE

## 2024-11-08 PROCEDURE — 1160F RVW MEDS BY RX/DR IN RCRD: CPT | Performed by: INTERNAL MEDICINE

## 2024-11-08 PROCEDURE — 99215 OFFICE O/P EST HI 40 MIN: CPT | Performed by: INTERNAL MEDICINE

## 2024-11-08 PROCEDURE — 3077F SYST BP >= 140 MM HG: CPT | Performed by: INTERNAL MEDICINE

## 2024-11-08 PROCEDURE — 1036F TOBACCO NON-USER: CPT | Performed by: INTERNAL MEDICINE

## 2024-11-08 PROCEDURE — G2211 COMPLEX E/M VISIT ADD ON: HCPCS | Performed by: INTERNAL MEDICINE

## 2024-11-08 PROCEDURE — 3078F DIAST BP <80 MM HG: CPT | Performed by: INTERNAL MEDICINE

## 2024-11-08 PROCEDURE — 1123F ACP DISCUSS/DSCN MKR DOCD: CPT | Performed by: INTERNAL MEDICINE

## 2024-11-08 RX ORDER — ALBUTEROL SULFATE 90 UG/1
2 INHALANT RESPIRATORY (INHALATION) EVERY 4 HOURS PRN
Qty: 8.5 G | Refills: 6 | Status: SHIPPED | OUTPATIENT
Start: 2024-11-08 | End: 2025-11-08

## 2024-11-08 NOTE — PROGRESS NOTES
"Patient ID: Mann Abrams is a 84 y.o. male who presents for Follow-up (Hospital follow up ).    /74   Pulse 80   Ht 1.676 m (5' 6\")   Wt 67 kg (147 lb 12.8 oz)   BMI 23.86 kg/m²     HPI    I have reviewed all his hospital records  While he was in the hospital  And also reviewed all the imaging studies  And consultation note from the specialist      Patient is here for hospital follow-up    He recently had an episode of dysarthria  MRI of the brain without IV contrast shows  He had an acute infarct within the posterior frontal lobe on the left side  MRA of the neck shows moderate narrowing of the right carotid bifurcation and moderate narrowing at the left carotid bifurcation      Ultrasound carotid bilateral with duplex  He has greater than 70% stenosis of the right proximal internal carotid artery similarly he also had more than 70% stenosis of the left proximal internal carotid artery    Bilateral carotid angiogram shows severe calcification of the origin of the internal carotid carotid arteries    Any carotid intervention was not possible  Because of the extensive calcification of both internal carotid arteries      He has hypertension so on medications controlled  No chest pain, no shortness of breath, no PND, no orthopnea, no leg swelling, no palpitation,      He has severe COPD  Using Trelegy inhaler  Doing better no shortness of breath  No cough, no wheezing      He has diabetes with nephropathy  Seeing nephrology, on Farxiga 10 mg  Olmesartan 40 mg, spironolactone 25 mg    Diabetes with peripheral vascular disease  Stable      Diabetes with CKD stage IIIb stable          Subjective     Review of Systems   Constitutional: Negative.    All other systems reviewed and are negative.      Objective     Physical Exam  Vitals and nursing note reviewed.   Neck:      Vascular: Carotid bruit present.   Cardiovascular:      Rate and Rhythm: Normal rate and regular rhythm.      Pulses: Normal pulses.      " Heart sounds: Normal heart sounds. No murmur heard.  Pulmonary:      Breath sounds: Examination of the right-upper field reveals decreased breath sounds. Examination of the left-upper field reveals decreased breath sounds. Examination of the right-middle field reveals decreased breath sounds. Examination of the left-middle field reveals decreased breath sounds. Examination of the right-lower field reveals decreased breath sounds. Examination of the left-lower field reveals decreased breath sounds. Decreased breath sounds present.   Abdominal:      Palpations: There is no mass.      Hernia: No hernia is present.   Musculoskeletal:      Right lower leg: No edema.      Left lower leg: No edema.   Skin:     Capillary Refill: Capillary refill takes more than 3 seconds.   Neurological:      General: No focal deficit present.      Mental Status: He is oriented to person, place, and time. Mental status is at baseline.   Psychiatric:         Mood and Affect: Mood normal.         Behavior: Behavior normal.         Thought Content: Thought content normal.         Judgment: Judgment normal.         Lab Results   Component Value Date    WBC 4.9 10/30/2024    HGB 11.0 (L) 10/30/2024    HCT 34.3 (L) 10/30/2024    MCV 95 10/30/2024     10/30/2024           Problem List Items Addressed This Visit       Primary hypertension    Type 2 diabetes mellitus with stage 3b chronic kidney disease, without long-term current use of insulin (Multi)    DM (diabetes mellitus), secondary, w/peripheral vascular complications    Stage 3b chronic kidney disease (CKD) (Multi)    Diabetes mellitus with nephropathy (Multi)    Dysarthria    Bilateral carotid artery stenosis    Relevant Orders    Referral to Neurology     Other Visit Diagnoses       History of stroke  (Chronic)   -  Primary    Asthma with COPD (Multi)        Relevant Medications    albuterol (ProAir HFA) 90 mcg/actuation inhaler               A/P       I placed referral to have had  him seen by vascular neurology  Dr. LEIGH AUGUSTE   I told him to continue the aspirin and the Plavix  Continue the rest of the medications  Discussed the importance of keeping the blood pressure under control, and discussed the importance of keeping diabetes under control  And follow-up with me in 4 months time

## 2024-11-11 ASSESSMENT — ENCOUNTER SYMPTOMS: CONSTITUTIONAL NEGATIVE: 1

## 2024-11-12 VITALS
SYSTOLIC BLOOD PRESSURE: 140 MMHG | DIASTOLIC BLOOD PRESSURE: 74 MMHG | HEART RATE: 80 BPM | HEIGHT: 66 IN | BODY MASS INDEX: 23.75 KG/M2 | WEIGHT: 147.8 LBS

## 2024-11-20 ENCOUNTER — PATIENT OUTREACH (OUTPATIENT)
Dept: PRIMARY CARE | Facility: CLINIC | Age: 84
End: 2024-11-20
Payer: MEDICARE

## 2024-11-20 DIAGNOSIS — I63.9 CEREBROVASCULAR ACCIDENT (CVA), UNSPECIFIED MECHANISM (MULTI): ICD-10-CM

## 2024-11-20 DIAGNOSIS — I10 HYPERTENSION, UNSPECIFIED TYPE: ICD-10-CM

## 2024-11-20 RX ORDER — CLOPIDOGREL BISULFATE 75 MG/1
75 TABLET ORAL DAILY
Qty: 90 TABLET | Refills: 1 | Status: SHIPPED | OUTPATIENT
Start: 2024-11-20

## 2024-11-20 NOTE — PROGRESS NOTES
Call regarding appt. with PCP on (11/8/24) after hospitalization.  At time of outreach call the patient feels as if their condition has (improved) since last visit.  Reviewed the PCP appointment with the pt and addressed any questions or concerns.

## 2024-11-29 ENCOUNTER — OFFICE VISIT (OUTPATIENT)
Dept: NEUROLOGY | Facility: CLINIC | Age: 84
End: 2024-11-29
Payer: MEDICARE

## 2024-11-29 VITALS
WEIGHT: 147 LBS | BODY MASS INDEX: 23.73 KG/M2 | HEART RATE: 86 BPM | SYSTOLIC BLOOD PRESSURE: 133 MMHG | RESPIRATION RATE: 18 BRPM | DIASTOLIC BLOOD PRESSURE: 69 MMHG

## 2024-11-29 DIAGNOSIS — I65.23 BILATERAL CAROTID ARTERY STENOSIS: ICD-10-CM

## 2024-11-29 DIAGNOSIS — Z86.73 HISTORY OF ISCHEMIC STROKE: Primary | ICD-10-CM

## 2024-11-29 DIAGNOSIS — E78.2 MIXED HYPERLIPIDEMIA: ICD-10-CM

## 2024-11-29 DIAGNOSIS — I10 HYPERTENSION, UNCONTROLLED: ICD-10-CM

## 2024-11-29 DIAGNOSIS — E13.51: ICD-10-CM

## 2024-11-29 DIAGNOSIS — Q21.12 PFO (PATENT FORAMEN OVALE) (HHS-HCC): ICD-10-CM

## 2024-11-29 PROCEDURE — 99215 OFFICE O/P EST HI 40 MIN: CPT | Mod: GC | Performed by: PSYCHIATRY & NEUROLOGY

## 2024-11-29 ASSESSMENT — ACTIVITIES OF DAILY LIVING (ADL)
MOBILITY_LEVEL_SURFACES: INDEPENDENT (BUT MAY USE ANY AID FOR EXAMPLE, STICK) > 50 YARDS
BLADDER: CONTINENT
BED_TO_CHAIR_AND_BACK: INDEPENDENT
TOTAL_SCORE: 100
STAIRS: INDEPENDENT
GROOMING: INDEPENDENT FACE/HAIR/TEETH.SHAVING (IMPLEMENTS PROVIDED)
DRESSING: INDEPENDENT (INCLUDING BUTTONS, ZIPS, LACES,ETC.)
FEEDING: INDEPENDENT
BOWELS: INDEPENDENT (INCLUDING BUTTONS, ZIPS, LACES, ETC.)
BATHING: INDEPENDENT (OR IN SHOWER)
TOILET_USE: INDEPENDENT (ON AND OFF, DRESSING, WIPING)

## 2024-11-29 ASSESSMENT — PAIN SCALES - GENERAL: PAINLEVEL_OUTOF10: 0-NO PAIN

## 2024-11-29 NOTE — PROGRESS NOTES
[facility]   Mann Abrams is a 84 y.o. year old male with PMH of HTN, DM2, with presenting for neurologic evaluation.   Referred by: Nataliya Grant MD  PCP: Nataliya Grant MD      11/29/24- Consult for bilateral carotid stenosis.     Past evaluation:   Known ICA stenosis, had seen Vascular Surgery in June for bilateral asymptomatic stenosis, Car US 5/2024- LICA > 80% , , ratio 12.9 with KEV just meeting >70% with , EDV 66, ratio 4.3.     Presented to [facility] last month with slurred speech confirmed on exam. CT microvascular changes without acute findings. MRI brain- acute L frontal infarct, microangiopathic white matter changes. MRA head- no LVO but reduced flow in L intracranial ICA. MRA neck- carotid stenosis bilaterally. TTE EF 65% with LVH, large PFO on bubble study.   Discharged on DAPT with plan for 90 days.   After DC, had carotid Angio- stenosis LCC- LICA and RCC-KEV with minimal intracranial stenosis with calcification not amenable to stenting.   Vascular risk factors- HTN with CKD, HPL- LDL 67, DM- A1c 7.8%, CAD, PVD, very remote smoking.   Today:   Patient reports 24-hours of slurred speech in October which prompted the ER visit. Says his speech has returned to 99% back to his formal self. No weakness, numbness, no gait changes, no vision problems.   No history of stroke in the past or no history of similar symptoms.     Past Medical History:   Diagnosis Date    Carotid stenosis     Chronic kidney disease     Chronic kidney disease, stage 3 unspecified (Multi) 08/22/2022    CKD (chronic kidney disease), stage III    COPD (chronic obstructive pulmonary disease) (Multi)     DM (diabetes mellitus) (Multi)     Hyperlipidemia     Hypertension     Other secondary cataract, left eye 08/01/2016    Posterior capsular opacification visually significant of left eye    Person consulting for explanation of examination or test findings  01/07/2020    Encounter to discuss test results    PVD (peripheral vascular disease) ([facility])       Extensive review of notes in EMR, labs, tests, Interpretation of neuroimaging  Reviewed angio 11/6/24- significant stenosis at carotid bifurcation bilaterally, difficult t0 calculate NASCET %   CT head wo IV contrast    Result Date: 10/29/2024  No evidence of acute cortical infarct or intracranial hemorrhage.       MACRO: None     Signed by: Brant Carmichael 10/29/2024 11:09 AM Dictation workstation:   [id number]   MR brain wo IV contrast    Result Date: 10/29/2024  There is an acute infarct within the posterior frontal lobe on the left. There is associated T2 and FLAIR signal abnormality. No associated mass effect or hemorrhage.   Nonspecific white matter signal compatible with microangiopathy.   MACRO: None   Signed by: Ana Luisa Stein 10/29/2024 4:50 PM Dictation workstation:   [id number]   Encounter Date: 10/29/24   ECG 12 Lead   Result Value    Ventricular Rate 75    Atrial Rate 75    WY Interval 164    QRS Duration 66    QT Interval 370    QTC Calculation(Bazett) 413    P Axis 57    R Axis 56    T Axis 63    QRS Count 12    Q Onset 225    P Onset 143    P Offset 202    T Offset 410    QTC Fredericia 398     Visit Vitals  /69 (BP Location: Left arm, Patient Position: Sitting, BP Cuff Size: Adult)   Pulse 86   Resp 18   Wt 66.7 kg (147 lb)   BMI 23.73 kg/m²   Smoking Status Former   BSA 1.76 m²         Lab Results   Component Value Date    CHOL 140 10/29/2024    TRIG 103 10/29/2024    HDL 52.2 10/29/2024    CHHDL 2.7 10/29/2024    LDLF 76 04/21/2022    VLDL 21 10/29/2024    NHDL 88 10/29/2024     Lab Results   Component Value Date    BNP 21 10/29/2024    HGBA1C 7.8 (H) 10/29/2024     Lab Results   Component Value Date    GLUCOSE 84 10/30/2024     10/30/2024    K 5.1 10/30/2024     (H) 10/30/2024    CO2 23 10/30/2024    ANIONGAP 10 10/30/2024    BUN 37 (H) 10/30/2024    CREATININE 1.95 (H) 10/30/2024  "   GFRMALE 45 (A) 09/01/2023    CALCIUM 8.6 10/30/2024    PHOS 4.5 08/08/2024    ALBUMIN 3.9 10/29/2024     Lab Results   Component Value Date    CALCIUM 8.6 10/30/2024    PHOS 4.5 08/08/2024    PROT 6.6 10/29/2024    ALBUMIN 3.9 10/29/2024    AST 20 10/29/2024    ALT 21 10/29/2024    ALKPHOS 86 10/29/2024    BILITOT 0.4 10/29/2024     Lab Results   Component Value Date    WBC 4.9 10/30/2024    HGB 11.0 (L) 10/30/2024    HCT 34.3 (L) 10/30/2024    MCV 95 10/30/2024     10/30/2024     INR   Date Value Ref Range Status   06/05/2024 1.0 0.9 - 1.1 Final   No results found for: \"TSH\"    Relevant ROS, Problem list, Past Medical/ Surgical/ Family/ Social history- reviewed and pertinent details noted in history.     Objective     Visit Vitals  /69 (BP Location: Left arm, Patient Position: Sitting, BP Cuff Size: Adult)   Pulse 86   Resp 18   Wt 66.7 kg (147 lb)   BMI 23.73 kg/m²   Smoking Status Former   BSA 1.76 m²     25ppd smoking history, 38 years since he quit.     Neuro Scores/ Scales:   Modified Clifton (mRS) Modified Cisco Score: No symptoms.   Barthel Index  Feeding: independent  Bathing: independent (or in shower)  Grooming: independent face/hair/teeth.shaving (implements provided)  Dressing : independent (including buttons, zips, laces,etc.)  Bowels: independent (including buttons, zips, laces, etc.)  Bladder: continent  Toilet Use : independent (on and off, dressing, wiping)  Transfers (Bed to chair and back) : independent  Mobility (On level surfaces): independent (but may use any aid for example, stick) > 50 yards  Stairs: independent  Total (0-100): 100   NIHSS: NIH Stroke Scale 1A. Level of Consciousness: Alert, Keenly Responsive, 1B. Ask Month and Age: Both Questions Right, 1C. Blink Eyes & Squeeze Hands: Performs Both Tasks, 2. Best Gaze: Normal, 3. Visual: No Visual Loss, 4. Facial Palsy: Normal Symmetrical Movements, 5A. Motor - Left Arm: No Drift, 5B. Motor - Right Arm: No Drift, 6A. Motor - " Left Leg: No Drift, 6B. Motor - Right Leg: No Drift, 7. Limb Ataxia: Absent, 8. Sensory Loss: Normal, 9. Best Language: No Aphasia, 10. Dysarthria: Normal, 11. Extinction and Inattention: No Abnormality, NIH Stroke Scale: 0   0  MMSE:    Neurological Exam:  MENTAL STATUS:  General appearance: No distress, alert, interactive and cooperative.    Orientation:x3  Language: Expression,and  comprehension intact - was able to read long sentences accurately.   Follows complex commands across midline  Thought processes: Logical, organized  Concentration: Intact - days of the week backwards  Fund of knowledge: Appropriate  Judgment: Intact  Insight: Intact    CRANIAL NERVES:  - II:  Visual fields intact to confrontation bilaterally tested individually and together  - III, IV, VI: PERRL, EOMI to pursuit without nystagmus  - V: V1-V3 sensation intact bilaterally  - VII: Face muscles symmetric with smile and eye closure  - VIII: Intact to finger rub bilaterally  - IX, X: Palate elevated symmetrically bilaterally, no hoarseness  - XI: 5/5 strength on shoulder shrugging bilaterally  - XII: Tongue midline without atrophy or fasciculation    MOTOR: Tone and bulk normal in all extremities  No fasciculations, tremor or other abnormal movements were present.     STRENGTH: R L  Deltoid  5 5  Biceps  5 5  Triceps  5 5    5 5    Hip flexion 5 5  Quadriceps 5 5  Hamstrings 5 5      REFLEXES:  R  L  Biceps   2  2  Patellar   2  2      COORDINATION: Intact on finger to nose bl,     SENSORY: Intact to light touch, in bl UE and L    GAIT: Station was stable with a normal base.    Assessment/Plan   1. History of ischemic stroke    2. DM (diabetes mellitus), secondary, w/peripheral vascular complications    3. Hypertension, uncontrolled    4. Mixed hyperlipidemia    5. Bilateral carotid artery stenosis    6. PFO (patent foramen ovale) ([facility])      Mann Abrams is a 84 y.o. year old male with PMH of HTN, DM2, with presenting for  "neurologic evaluation for symptomatic L. ICA >80% stenosis.  Neurological exam significant for normal findings. Brain and vessel imaging reviewed and significant for L frontal infarct, embolic appearing, more likely due to the significant LICA atherostenosis than paradoxical embolism from PFO through the carotid stenosis. Not a candidate for stenting due to severe calcification, might be eligible for endarterectomy.     PLAN   - Agree with continuing plavix and aspirin   -Continue Atorvastatin 80mg  - No interventions for PFO indicated  -Agree with vascular surgery eval for endarterectomy consideration - DR. Castaneda on 1/3      Goals for STROKE PREVENTION- recommendations to reduce the risk of vascular events and promote brain health include monitoring and management of vascular risk factors and lifestyle choices to goal values.     Blood pressure- Normal BP is <120/80 mmHg.  Blood Pressure : Goal is less than 130/80 mmHg  Cholesterol- Ideal lipid profile is an LDL-cholesterol <70 mg/dl, total cholesterol <200 mg/dl, fasting triglycerides < 150 mg/dl and HDL-cholesterol >55 mg/dl.   Blood sugar- Blood Sugar : Goal is fasting sugar  mg/dl, after eating less than 180 mg/dl; HbA1c less than 7%  Healthy physical activity- Goal is a moderate level of exercise at least 30 minutes/day, most days of the week.   Healthy weight- Goal is an ideal weight with a waistline <40\" for men or <35\" for women, and BMI of 18.5-25.   Healthy diet- is rich in vegetables, fruits, whole grains, legumes and fish, low in salt, and avoids red meats and processed/ refined foods.   Healthy sleep- is restorative, ~7 hours/night, with identification and treatment of obstructive/ central sleep apnea that increases the risk of stroke and heart disease.   Stroke Warning Signs- know the symptoms of stroke and the importance of calling 911/EMS to access the quickest treatment.     No orders of the defined types were placed in this " encounter.      Ness Rae MD  NeurologyPGY-3    Patient seen and evaluated by Dr. White    Consult for stroke:  I saw and evaluated the patient. I personally obtained the key and critical portions of the history and physical exam or was physically present for key and critical portions performed by the resident/fellow. I reviewed the resident/fellow's documentation and discussed the patient with the resident/fellow. I agree with the resident/fellow's medical decision making as documented in the note with the exception/addition of the following:    Extensive review of notes in EMR, labs, tests, Interpretation of neuroimaging  As outlined- MRI, MRA, angio, US.     Assessment/Plan   1. History of ischemic stroke    2. DM (diabetes mellitus), secondary, w/peripheral vascular complications    3. Hypertension, uncontrolled    4. Mixed hyperlipidemia    5. Bilateral carotid artery stenosis    6. PFO (patent foramen ovale) ([facility])      PLAN   Discussed stroke prevention options with symptomatic severe LICA stenosis and recommendation for carotid revascularization.  Stay on aspirin with plavix until you see the Vascular surgeon- will message about today's visit.  Reviewed TIA symptoms and importance of 911- ED emergent evaluation.   Reviewed risk reduction strategies as outlined.       Christy White MD

## 2024-11-29 NOTE — PATIENT INSTRUCTIONS
Hi Mr. Abrams,     Thank you for your visit today.   We think the stroke in your l eft side of the brain is because of a clot flicking up from your Left neck vessels.   - Agree with continuing plavix and aspirin till surgery appointment  -Continue Atorvastatin 80mg  - No interventions for PFO(hole in your heart) indicated  -Agree with vascular surgery eval for endarterectomy consideration - DR. Castaneda on 1/3    Stroke prevention:  Eat a healthy diet with plenty of fresh fruits and vegetables.  Avoid salt and fried foods.  Lose weight and exercise on a regular basis.  Do not smoke or use drugs.  Be sure to take all medications.  Call 911 for signs of stroke (numbness or weakness on one side, trouble seeing on one side, trouble speaking (either because your speech is slurred or you can't find the words), sudden double vision, spinning sensation or loss of coordination).    Thank you for your visit,    Neurology

## 2024-12-06 ENCOUNTER — OFFICE VISIT (OUTPATIENT)
Dept: VASCULAR SURGERY | Facility: HOSPITAL | Age: 84
End: 2024-12-06
Payer: MEDICARE

## 2024-12-06 VITALS
DIASTOLIC BLOOD PRESSURE: 66 MMHG | WEIGHT: 146.6 LBS | SYSTOLIC BLOOD PRESSURE: 157 MMHG | RESPIRATION RATE: 22 BRPM | BODY MASS INDEX: 23.56 KG/M2 | HEIGHT: 66 IN | HEART RATE: 94 BPM | OXYGEN SATURATION: 94 %

## 2024-12-06 DIAGNOSIS — I65.22 SYMPTOMATIC STENOSIS OF LEFT CAROTID ARTERY: Primary | ICD-10-CM

## 2024-12-06 PROCEDURE — 3075F SYST BP GE 130 - 139MM HG: CPT | Performed by: SURGERY

## 2024-12-06 PROCEDURE — 99215 OFFICE O/P EST HI 40 MIN: CPT | Performed by: SURGERY

## 2024-12-06 PROCEDURE — 1159F MED LIST DOCD IN RCRD: CPT | Performed by: SURGERY

## 2024-12-06 PROCEDURE — 1123F ACP DISCUSS/DSCN MKR DOCD: CPT | Performed by: SURGERY

## 2024-12-06 PROCEDURE — 3078F DIAST BP <80 MM HG: CPT | Performed by: SURGERY

## 2024-12-06 ASSESSMENT — PATIENT HEALTH QUESTIONNAIRE - PHQ9
2. FEELING DOWN, DEPRESSED OR HOPELESS: NOT AT ALL
1. LITTLE INTEREST OR PLEASURE IN DOING THINGS: NOT AT ALL
SUM OF ALL RESPONSES TO PHQ9 QUESTIONS 1 AND 2: 0

## 2024-12-06 ASSESSMENT — ENCOUNTER SYMPTOMS
LOSS OF SENSATION IN FEET: 0
OCCASIONAL FEELINGS OF UNSTEADINESS: 0
DEPRESSION: 0

## 2024-12-10 ENCOUNTER — PREP FOR PROCEDURE (OUTPATIENT)
Dept: VASCULAR SURGERY | Facility: HOSPITAL | Age: 84
End: 2024-12-10
Payer: MEDICARE

## 2024-12-10 DIAGNOSIS — I65.22 SYMPTOMATIC STENOSIS OF LEFT CAROTID ARTERY: Primary | ICD-10-CM

## 2024-12-13 NOTE — PROGRESS NOTES
Vascular Surgery Clinic Note    CC: NPV carotid stenosis     History Of Present Illness:   Mann Abrams is a 84 y.o. RHD male here for initial evaluation. In October 2024, he developed garbled speech that lasted 12 hours in duration. He presented to the ED two days later and MRI demonstrated an acute left frontal lobe infarct. He underwent carotid angiogram with Dr. Stahl in November, but his vessels were too calcified to be stented. He denies any current amaurosis fugax or TIA symptoms. He has no history of neck surgery or radiation to the neck.     He denies any known family history of aneurysmal disease. He denies claudication symptoms but does not walk a distance. He denies any history of malignancy or thyroid disease. He denies chest pain or shortness of breath. He does get fatigued easily. He is able to climb 1 flight of stairs. He had a coronary cath done in May 2024 that showed multivessel coronary disease that is being medically managed. His weight has been stable.     PMH: CAD, NIDDM, COPD (daily inhalers), CKD, HTN, HLD, carotid artery stenosis, CVA    PSH: lumbar surgery  SH: lives with wife, former smoker (quit 38 years ago), 3 kids     Medical History:  Patient Active Problem List   Diagnosis    Primary hypertension    Other emphysema (Multi)    Type 2 diabetes mellitus with stage 3b chronic kidney disease, without long-term current use of insulin (Multi)    DM (diabetes mellitus), secondary, w/peripheral vascular complications    Chronic cough    Hypertension, uncontrolled    Stage 3b chronic kidney disease (CKD) (Multi)    Abnormal stress test    Age-related nuclear cataract of right eye    Cataract    Cavus deformity of foot    Chest pain, exertional    Chronic kidney disease in type 2 diabetes mellitus    Diabetes mellitus (Multi)    GERD (gastroesophageal reflux disease)    Hammertoe    Hyperlipidemia    Ingrowing nail    Low oxygen saturation    Dyspnea on minimal exertion    Mild anemia     Myopia with astigmatism and presbyopia    Osteoarthritis    Persistent cough    SOB (shortness of breath) on exertion    Pseudophakia of left eye    Pure hypercholesterolemia    Intervertebral cervical disc disorder with myelopathy, cervical region    Non-recurrent unilateral inguinal hernia without obstruction or gangrene    Elevated coronary artery calcium score    Bruit of right carotid artery    Renal artery stenosis, native, bilateral (CMS-HCC)    Iron deficiency anemia due to chronic blood loss    Allergic rhinitis    Heartburn    Injury of left knee    Arthralgia of hip    Knee pain    Occlusion of right carotid artery    Wheezing    Angiectasia    Hiatal hernia    Chronic renal disease, stage IV (Multi)    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)    Carotid artery stenosis without cerebral infarction    Asymptomatic bilateral carotid artery stenosis    Diabetes mellitus with nephropathy (Multi)    Dysarthria    Cerebrovascular accident (CVA), unspecified mechanism (Multi)    Type 2 diabetes mellitus    Essential hypertension    Bilateral carotid artery stenosis    PFO (patent foramen ovale) ([facility]-HCC)    History of ischemic stroke    Symptomatic stenosis of left carotid artery        SH:    Social Drivers of Health     Tobacco Use: Medium Risk (12/6/2024)    Patient History     Smoking Tobacco Use: Former     Smokeless Tobacco Use: Never     Passive Exposure: Not on file   Alcohol Use: Not At Risk (10/30/2024)    AUDIT-C     Frequency of Alcohol Consumption: Never     Average Number of Drinks: Patient does not drink     Frequency of Binge Drinking: Never   Financial Resource Strain: Low Risk  (10/30/2024)    Overall Financial Resource Strain (CARDIA)     Difficulty of Paying Living Expenses: Not hard at all   Food Insecurity: No Food Insecurity (10/29/2024)    Hunger Vital Sign     Worried About Running Out of Food in the Last Year: Never true     Ran  "Out of Food in the Last Year: Never true   Transportation Needs: No Transportation Needs (10/30/2024)    PRAPARE - Transportation     Lack of Transportation (Medical): No     Lack of Transportation (Non-Medical): No   Physical Activity: Not on file   Stress: Not on file   Social Connections: Not on file   Intimate Partner Violence: Not At Risk (10/29/2024)    Humiliation, Afraid, Rape, and Kick questionnaire     Fear of Current or Ex-Partner: No     Emotionally Abused: No     Physically Abused: No     Sexually Abused: No   Depression: Not at risk (12/6/2024)    PHQ-2     PHQ-2 Score: 0   Housing Stability: Low Risk  (10/30/2024)    Housing Stability Vital Sign     Unable to Pay for Housing in the Last Year: No     Number of Times Moved in the Last Year: 0     Homeless in the Last Year: No   Utilities: Not At Risk (10/29/2024)    [facility] Utilities     Threatened with loss of utilities: No   Digital Equity: Not on file   Health Literacy: Not on file        FH:  Family History   Problem Relation Name Age of Onset    Cancer Mother      Other (cardiac disorder) Father      Diabetes Sister          Allergies:   Allergies   Allergen Reactions    Doxazosin Itching       ROS:  All systems were reviewed and noted to be negative, other than described above.     Objective:  Last Recorded Vitals  Blood pressure 157/66, pulse 94, resp. rate 22, height 1.676 m (5' 6\"), weight 66.5 kg (146 lb 9.6 oz), SpO2 94%.    Meds:   Current Outpatient Medications   Medication Instructions    albuterol (ProAir HFA) 90 mcg/actuation inhaler 2 puffs, inhalation, Every 4 hours PRN    albuterol 108 (90 Base) MCG/ACT inhaler 2 puffs, Every 6 hours PRN    amLODIPine (NORVASC) 10 mg, oral, Daily    ascorbic acid (VITAMIN C) 500 mg, Daily    aspirin 81 mg, Daily    atorvastatin (LIPITOR) 80 mg, oral, Daily    carvedilol (COREG) 12.5 mg, oral, 2 times daily (morning and late afternoon)    clopidogrel (PLAVIX) 75 mg, oral, Daily    dapagliflozin propanediol " (FARXIGA) 10 mg, oral, Daily    fluticasone (Flonase) 50 mcg/actuation nasal spray 1 spray, Each Nostril, Daily PRN, Shake gently. Before first use, prime pump. After use, clean tip and replace cap.    fluticasone propion-salmeteroL (Wixela Inhub) 250-50 mcg/dose diskus inhaler 1 puff, inhalation, 2 times daily RT, Rinse mouth with water after use to reduce aftertaste and incidence of candidiasis. Do not swallow.    isosorbide mononitrate ER (IMDUR) 30 mg, oral, Daily, Do not crush or chew.    montelukast (SINGULAIR) 10 mg, oral, Nightly    nitroglycerin (NITROSTAT) 0.4 mg, Every 5 min PRN    olmesartan (BENICAR) 40 mg, oral, Daily    omeprazole (PRILOSEC) 40 mg, oral, Daily    spironolactone (ALDACTONE) 12.5 mg, oral, 2 times daily       Exam:  Constitutional: Well appearing, NAD   PSYCH: Appropriate mood and affect  Eyes: Sclera clear   Neck: Supple   CV: No tachycardia   RESP: Unlabored breathing   GI: Soft, nontender, non-distended. Non-aneurysmal abdominal aorta.    SKIN: No lesions  NEURO: No focal deficits noted   EXTREMITIES: Warm & well perfused. No leg edema. No evidence of arterial ischemia. No evidence of venous insufficiency.     Imaging Reviewed:  Vascular US Carotid Artery Duplex Bilateral 06/27/2024    [place] [id number]  Tel [phone] and Fax [phone]      Vascular Lab Report  [facility] US CAROTID ARTERY DUPLEX BILATERAL      Patient Name:      PROSPER BISHOP     Reading Physician:  [id number] Dominic David DO  Study Date:        6/27/2024           Ordering Physician: [id number] PATT PRASAD  MRN/PID:           [id number]            Technologist:       Ned Still RVT  Accession#:        [id number]        Technologist 2:  Date of Birth/Age: 1940 / 84 years Encounter#:         [id number]  Gender:            M  Admission Status:  Outpatient          Location Performed: [facility]      Diagnosis/ICD: Occlusion and stenosis of right carotid  artery-I65.21  CPT Codes:     58390 Cerebrovascular Carotid Duplex scan complete      **CRITICAL RESULT**  Critical Result: Greater than 70% stenosis of the bilateral ICAs.  Notification called to PATT PRASAD MD on 6/27/2024 at 12:34:30 PM by Ned Still RVCRISTY.    CONCLUSIONS:  Right Carotid: Findings are consistent with greater than 70% stenosis of the right proximal internal carotid artery. Turbulent flow seen by color Doppler. Right external carotid artery appears patent with no evidence of stenosis. The right vertebral artery demonstrates no flow. No evidence of hemodynamically significant stenosis in the right subclavian artery.  Left Carotid: Findings are consistent with greater than 70% stenosis of the left proximal internal carotid artery. Turbulent flow seen by color Doppler. There are elevated velocities in the left ECA that are suggestive of disease. The left vertebral artery is patent with antegrade flow. No evidence of hemodynamically significant stenosis in the left subclavian artery.    Imaging & Doppler Findings:  Right Plaque Morph: The proximal right internal carotid artery demonstrates heterogenous, complex and calcified plaque. The proximal right external carotid artery demonstrates heterogenous and smooth plaque. The right carotid bulb demonstrates heterogenous, complex and calcified plaque.  Left Plaque Morph: The proximal left internal carotid artery demonstrates heterogenous, complex and calcified plaque. The proximal left external carotid artery demonstrates heterogenous and calcified plaque. The mid left common carotid artery demonstrates heterogenous, smooth and calcified plaque. The left carotid bulb demonstrates heterogenous, complex and calcified plaque.    Right                        Left  PSV      EDV                PSV      EDV  74 cm/s            CCA P    39 cm/s  64 cm/s            CCA D    34 cm/s  274 cm/s 66 cm/s   ICA P    437 cm/s 131 cm/s  90 cm/s  31 cm/s   ICA M    40 cm/s   13 cm/s  68 cm/s  26 cm/s   ICA D    18 cm/s   9 cm/s  208 cm/s            ECA     220 cm/s  Vertebral  44 cm/s  14 cm/s  106 cm/s         Subclavian 145 cm/s    Right Left  ICA/CCA Ratio  4.3  12.9        [id number] Dominic David DO  Electronically signed by [id number] Dominic David DO on 6/27/2024 at 9:26:47 PM        ** Final **  Carotid Angiogram 11/06/2024    [facility] [place] [facility] [address] [place] [address]    Cardiovascular Catheterization Report    Patient Name:      PROSPER BISHOP     Performing Physician:  [id number] Chuy tSahl DO  Study Date:        11/6/2024           Verifying Physician:   [id number] Stahl DO  MRN/PID:           [id number]            Cardiologist/Co-scrub:  Accession#:        [id number]        Ordering Physician:    [id number] KAM  Date of Birth/Age: 1940 / 84 years Fellow:                [id number] Hector Whitley MD  Gender:            M                   Fellow:                [id number] Emma Metcalf MD  Encounter#:        [id number]      Study: Carotid Angiogram      Indications:  PROSPER BISHOP is a 84 year old male who presents with Symptomatic Carotid artery stenosis- Recent CVA.    Procedure Description:  After infiltration with 2% Lidocaine utilizing two-dimensional ultrasound and fluoroscopic guidance, the right femoral artery was cannulated with a Micro-Access Kit using a modified Seldinger technique. Subsequently a 5 Congolese sheath was placed retrograde in the right femoral artery. The arterial sheath was sized up to 6 Congolese. After completion of the procedure, an Angio-Seal Evolution 6F ([employer]) vascular closure device was placed per protocol. Following routine access, we administered heparin and placed a 5F pigtail into the aortic arch for an arch angiogram. A 5F JR4 catheter was used to selectively engage brachiocephalic artery and VTK for L CCA.    Views obtained:  Brachiocephalic vascular family views:  Right common  carotid artery, cervical views.  Right internal carotid artery, intracranial views.  Right external carotid artery, cranial views.  Right subclavian artery, chest and cervical views.  Right vertebral artery, cervical views.    Left carotid vascular family views:  Left common carotid artery, cervical views.  Left internal carotid artery, intracranial views.  Left external carotid artery, cranial views.    Left vertebral vascular family views:  Left subclavian artery, chest and cervical views.  Left vertebral artery, cervical views.    Aortic arch: DSA images of the aortic arch demonstrate a normal configuration of the arch and great vessels. The arch is class arch type II. There is no significant atherosclerotic involvement of the aortic arch. The origins of the major daughter branches are patent.    DSA images of the R anterior cervical circulation demonstrate normal course and caliber of the distal common carotid artery. The bifurcation is at C4. There is severe calcification and stenosis at R CCA bifurcation into ICA.    DSA images of the right anterior intracranial circulation demonstrate normal course and caliber of the cervical, petrous, cavernous and supraclinoid segments of the internal carotid artery with minimal atherosclerosis.    DSA images of the left anterior cervical circulation demonstrate normal course and caliber of the distal common carotid artery. The bifurcation is at C4. There is severe calcification and stenosis at L CCA bifurcation into L ICA. The cervical ICA otherwise has normal course and caliber. There is no evidence of dissection, aneurysms, or abnormal arteriovenous shunting.    DSA images of the left anterior intracranial circulation demonstrate normal course and caliber of the cervical, petrous, cavernous and supraclinoid segments of the internal carotid artery with minimal atherosclerosis.    Hemo Personnel:  +--------------------+---------+  Name                Duty        +--------------------+---------+  Chuy StahlMADAI MD 1  +--------------------+---------+      Hemodynamic Pressures:    +----+---------------------+----------+-------------+--------------+---------+  Site      Date Time      Phase NameSystolic mmHgDiastolic mmHgMean mmHg  +----+---------------------+----------+-------------+--------------+---------+    AO11/6/2024 10:29:01 AM      Rest          138            58       87  +----+---------------------+----------+-------------+--------------+---------+    AO11/6/2024 10:29:01 AM      Rest            0             0        1  +----+---------------------+----------+-------------+--------------+---------+      Complications:  No in-lab complications observed.    Cardiac Cath Post Procedure Notes:  Post Procedure Diagnosis: - Severe Calcification of the R CCA just before  bifurcation extending into R ICA.  - Severe Calcification of the L CCA just before  bifurcation extending into L ICA.  Blood Loss:               Estimated blood loss during the procedure was 10 mls.  Specimens Removed:        Number of specimen(s) removed: none.      Recommendations:  Maximize medical therapy.  Agressive risk factor modification efforts.  - Patient to be seen by primary vascular surgery to explore surgical options  given severe calcification of the diseased CC/ICA bilaterally.    ____________________________________________________________________________________  CONCLUSIONS:  1. - Severe Calcification of the R CCA just before bifurcation extending into R ICA.  2. - Severe Calcification of the L CCA just before bifurcation extending into L ICA.    ICD 10 Codes:  Occlusion and stenosis of bilateral carotid arteries-I65.23    CPT Codes:  Aortography, Thoracic, S&I (PER)-23606; [facility] Carotid common Intracranial Unilateral (PER)-06359; [facility] Carotid common Intracranial Bilateral (PER)-08334.50    [id number] Chuyzenia Stahl    Performing Physician  Electronically signed by [id number] Chuy Stahl DO on 11/12/2024 at 1:10:12 PM          ** Final **        Assessment & Plan:  1. Symptomatic stenosis of left carotid artery          Symptomatic left carotid artery stenosis.  Continue aspirin, plavix & statin   Plan for L CEA    Seen and evaluated with Dr. Sim Castaneda.     Sandi Calles, APRN-CNP

## 2024-12-13 NOTE — H&P (VIEW-ONLY)
Vascular Surgery Clinic Note    CC: NPV carotid stenosis     History Of Present Illness:   Mann Abrams is a 84 y.o. RHD male here for initial evaluation. In October 2024, he developed garbled speech that lasted 12 hours in duration. He presented to the ED two days later and MRI demonstrated an acute left frontal lobe infarct. He underwent carotid angiogram with Dr. Stahl in November, but his vessels were too calcified to be stented. He denies any current amaurosis fugax or TIA symptoms. He has no history of neck surgery or radiation to the neck.     He denies any known family history of aneurysmal disease. He denies claudication symptoms but does not walk a distance. He denies any history of malignancy or thyroid disease. He denies chest pain or shortness of breath. He does get fatigued easily. He is able to climb 1 flight of stairs. He had a coronary cath done in May 2024 that showed multivessel coronary disease that is being medically managed. His weight has been stable.     PMH: CAD, NIDDM, COPD (daily inhalers), CKD, HTN, HLD, carotid artery stenosis, CVA    PSH: lumbar surgery  SH: lives with wife, former smoker (quit 38 years ago), 3 kids     Medical History:  Patient Active Problem List   Diagnosis    Primary hypertension    Other emphysema (Multi)    Type 2 diabetes mellitus with stage 3b chronic kidney disease, without long-term current use of insulin (Multi)    DM (diabetes mellitus), secondary, w/peripheral vascular complications    Chronic cough    Hypertension, uncontrolled    Stage 3b chronic kidney disease (CKD) (Multi)    Abnormal stress test    Age-related nuclear cataract of right eye    Cataract    Cavus deformity of foot    Chest pain, exertional    Chronic kidney disease in type 2 diabetes mellitus    Diabetes mellitus (Multi)    GERD (gastroesophageal reflux disease)    Hammertoe    Hyperlipidemia    Ingrowing nail    Low oxygen saturation    Dyspnea on minimal exertion    Mild anemia     Myopia with astigmatism and presbyopia    Osteoarthritis    Persistent cough    SOB (shortness of breath) on exertion    Pseudophakia of left eye    Pure hypercholesterolemia    Intervertebral cervical disc disorder with myelopathy, cervical region    Non-recurrent unilateral inguinal hernia without obstruction or gangrene    Elevated coronary artery calcium score    Bruit of right carotid artery    Renal artery stenosis, native, bilateral (CMS-HCC)    Iron deficiency anemia due to chronic blood loss    Allergic rhinitis    Heartburn    Injury of left knee    Arthralgia of hip    Knee pain    Occlusion of right carotid artery    Wheezing    Angiectasia    Hiatal hernia    Chronic renal disease, stage IV (Multi)    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)    Carotid artery stenosis without cerebral infarction    Asymptomatic bilateral carotid artery stenosis    Diabetes mellitus with nephropathy (Multi)    Dysarthria    Cerebrovascular accident (CVA), unspecified mechanism (Multi)    Type 2 diabetes mellitus    Essential hypertension    Bilateral carotid artery stenosis    PFO (patent foramen ovale) ([facility]-HCC)    History of ischemic stroke    Symptomatic stenosis of left carotid artery        SH:    Social Drivers of Health     Tobacco Use: Medium Risk (12/6/2024)    Patient History     Smoking Tobacco Use: Former     Smokeless Tobacco Use: Never     Passive Exposure: Not on file   Alcohol Use: Not At Risk (10/30/2024)    AUDIT-C     Frequency of Alcohol Consumption: Never     Average Number of Drinks: Patient does not drink     Frequency of Binge Drinking: Never   Financial Resource Strain: Low Risk  (10/30/2024)    Overall Financial Resource Strain (CARDIA)     Difficulty of Paying Living Expenses: Not hard at all   Food Insecurity: No Food Insecurity (10/29/2024)    Hunger Vital Sign     Worried About Running Out of Food in the Last Year: Never true     Ran  "Out of Food in the Last Year: Never true   Transportation Needs: No Transportation Needs (10/30/2024)    PRAPARE - Transportation     Lack of Transportation (Medical): No     Lack of Transportation (Non-Medical): No   Physical Activity: Not on file   Stress: Not on file   Social Connections: Not on file   Intimate Partner Violence: Not At Risk (10/29/2024)    Humiliation, Afraid, Rape, and Kick questionnaire     Fear of Current or Ex-Partner: No     Emotionally Abused: No     Physically Abused: No     Sexually Abused: No   Depression: Not at risk (12/6/2024)    PHQ-2     PHQ-2 Score: 0   Housing Stability: Low Risk  (10/30/2024)    Housing Stability Vital Sign     Unable to Pay for Housing in the Last Year: No     Number of Times Moved in the Last Year: 0     Homeless in the Last Year: No   Utilities: Not At Risk (10/29/2024)    [facility] Utilities     Threatened with loss of utilities: No   Digital Equity: Not on file   Health Literacy: Not on file        FH:  Family History   Problem Relation Name Age of Onset    Cancer Mother      Other (cardiac disorder) Father      Diabetes Sister          Allergies:   Allergies   Allergen Reactions    Doxazosin Itching       ROS:  All systems were reviewed and noted to be negative, other than described above.     Objective:  Last Recorded Vitals  Blood pressure 157/66, pulse 94, resp. rate 22, height 1.676 m (5' 6\"), weight 66.5 kg (146 lb 9.6 oz), SpO2 94%.    Meds:   Current Outpatient Medications   Medication Instructions    albuterol (ProAir HFA) 90 mcg/actuation inhaler 2 puffs, inhalation, Every 4 hours PRN    albuterol 108 (90 Base) MCG/ACT inhaler 2 puffs, Every 6 hours PRN    amLODIPine (NORVASC) 10 mg, oral, Daily    ascorbic acid (VITAMIN C) 500 mg, Daily    aspirin 81 mg, Daily    atorvastatin (LIPITOR) 80 mg, oral, Daily    carvedilol (COREG) 12.5 mg, oral, 2 times daily (morning and late afternoon)    clopidogrel (PLAVIX) 75 mg, oral, Daily    dapagliflozin propanediol " (FARXIGA) 10 mg, oral, Daily    fluticasone (Flonase) 50 mcg/actuation nasal spray 1 spray, Each Nostril, Daily PRN, Shake gently. Before first use, prime pump. After use, clean tip and replace cap.    fluticasone propion-salmeteroL (Wixela Inhub) 250-50 mcg/dose diskus inhaler 1 puff, inhalation, 2 times daily RT, Rinse mouth with water after use to reduce aftertaste and incidence of candidiasis. Do not swallow.    isosorbide mononitrate ER (IMDUR) 30 mg, oral, Daily, Do not crush or chew.    montelukast (SINGULAIR) 10 mg, oral, Nightly    nitroglycerin (NITROSTAT) 0.4 mg, Every 5 min PRN    olmesartan (BENICAR) 40 mg, oral, Daily    omeprazole (PRILOSEC) 40 mg, oral, Daily    spironolactone (ALDACTONE) 12.5 mg, oral, 2 times daily       Exam:  Constitutional: Well appearing, NAD   PSYCH: Appropriate mood and affect  Eyes: Sclera clear   Neck: Supple   CV: No tachycardia   RESP: Unlabored breathing   GI: Soft, nontender, non-distended. Non-aneurysmal abdominal aorta.    SKIN: No lesions  NEURO: No focal deficits noted   EXTREMITIES: Warm & well perfused. No leg edema. No evidence of arterial ischemia. No evidence of venous insufficiency.     Imaging Reviewed:  Vascular US Carotid Artery Duplex Bilateral 06/27/2024    [place] [id number]  Tel [phone] and Fax [phone]      Vascular Lab Report  [facility] US CAROTID ARTERY DUPLEX BILATERAL      Patient Name:      PROSPER BISHOP     Reading Physician:  [id number] Dominic David DO  Study Date:        6/27/2024           Ordering Physician: [id number] PATT PRASAD  MRN/PID:           [id number]            Technologist:       Ned Still RVT  Accession#:        [id number]        Technologist 2:  Date of Birth/Age: 1940 / 84 years Encounter#:         [id number]  Gender:            M  Admission Status:  Outpatient          Location Performed: [facility]      Diagnosis/ICD: Occlusion and stenosis of right carotid  artery-I65.21  CPT Codes:     94059 Cerebrovascular Carotid Duplex scan complete      **CRITICAL RESULT**  Critical Result: Greater than 70% stenosis of the bilateral ICAs.  Notification called to PATT PRASAD MD on 6/27/2024 at 12:34:30 PM by Ned Still RVCRISTY.    CONCLUSIONS:  Right Carotid: Findings are consistent with greater than 70% stenosis of the right proximal internal carotid artery. Turbulent flow seen by color Doppler. Right external carotid artery appears patent with no evidence of stenosis. The right vertebral artery demonstrates no flow. No evidence of hemodynamically significant stenosis in the right subclavian artery.  Left Carotid: Findings are consistent with greater than 70% stenosis of the left proximal internal carotid artery. Turbulent flow seen by color Doppler. There are elevated velocities in the left ECA that are suggestive of disease. The left vertebral artery is patent with antegrade flow. No evidence of hemodynamically significant stenosis in the left subclavian artery.    Imaging & Doppler Findings:  Right Plaque Morph: The proximal right internal carotid artery demonstrates heterogenous, complex and calcified plaque. The proximal right external carotid artery demonstrates heterogenous and smooth plaque. The right carotid bulb demonstrates heterogenous, complex and calcified plaque.  Left Plaque Morph: The proximal left internal carotid artery demonstrates heterogenous, complex and calcified plaque. The proximal left external carotid artery demonstrates heterogenous and calcified plaque. The mid left common carotid artery demonstrates heterogenous, smooth and calcified plaque. The left carotid bulb demonstrates heterogenous, complex and calcified plaque.    Right                        Left  PSV      EDV                PSV      EDV  74 cm/s            CCA P    39 cm/s  64 cm/s            CCA D    34 cm/s  274 cm/s 66 cm/s   ICA P    437 cm/s 131 cm/s  90 cm/s  31 cm/s   ICA M    40 cm/s   13 cm/s  68 cm/s  26 cm/s   ICA D    18 cm/s   9 cm/s  208 cm/s            ECA     220 cm/s  Vertebral  44 cm/s  14 cm/s  106 cm/s         Subclavian 145 cm/s    Right Left  ICA/CCA Ratio  4.3  12.9        [id number] Dominic David DO  Electronically signed by [id number] Dominic David DO on 6/27/2024 at 9:26:47 PM        ** Final **  Carotid Angiogram 11/06/2024    [facility], Cath Lab, [address] [place] [address]    Cardiovascular Catheterization Report    Patient Name:      PROSPER BISHOP     Performing Physician:  [id number] Chuy Stahl DO  Study Date:        11/6/2024           Verifying Physician:   [id number] Stahl DO  MRN/PID:           [id number]            Cardiologist/Co-scrub:  Accession#:        [id number]        Ordering Physician:    [id number] KAM  Date of Birth/Age: 1940 / 84 years Fellow:                [id number] Hector Whitley MD  Gender:            M                   Fellow:                [id number] Emma Metcalf MD  Encounter#:        [id number]      Study: Carotid Angiogram      Indications:  PROSPER BISHOP is a 84 year old male who presents with Symptomatic Carotid artery stenosis- Recent CVA.    Procedure Description:  After infiltration with 2% Lidocaine utilizing two-dimensional ultrasound and fluoroscopic guidance, the right femoral artery was cannulated with a Micro-Access Kit using a modified Seldinger technique. Subsequently a 5 Cambodian sheath was placed retrograde in the right femoral artery. The arterial sheath was sized up to 6 Cambodian. After completion of the procedure, an Angio-Seal Evolution 6F ([employer]) vascular closure device was placed per protocol. Following routine access, we administered heparin and placed a 5F pigtail into the aortic arch for an arch angiogram. A 5F JR4 catheter was used to selectively engage brachiocephalic artery and VTK for L CCA.    Views obtained:  Brachiocephalic vascular family views:  Right common  carotid artery, cervical views.  Right internal carotid artery, intracranial views.  Right external carotid artery, cranial views.  Right subclavian artery, chest and cervical views.  Right vertebral artery, cervical views.    Left carotid vascular family views:  Left common carotid artery, cervical views.  Left internal carotid artery, intracranial views.  Left external carotid artery, cranial views.    Left vertebral vascular family views:  Left subclavian artery, chest and cervical views.  Left vertebral artery, cervical views.    Aortic arch: DSA images of the aortic arch demonstrate a normal configuration of the arch and great vessels. The arch is class arch type II. There is no significant atherosclerotic involvement of the aortic arch. The origins of the major daughter branches are patent.    DSA images of the R anterior cervical circulation demonstrate normal course and caliber of the distal common carotid artery. The bifurcation is at C4. There is severe calcification and stenosis at R CCA bifurcation into ICA.    DSA images of the right anterior intracranial circulation demonstrate normal course and caliber of the cervical, petrous, cavernous and supraclinoid segments of the internal carotid artery with minimal atherosclerosis.    DSA images of the left anterior cervical circulation demonstrate normal course and caliber of the distal common carotid artery. The bifurcation is at C4. There is severe calcification and stenosis at L CCA bifurcation into L ICA. The cervical ICA otherwise has normal course and caliber. There is no evidence of dissection, aneurysms, or abnormal arteriovenous shunting.    DSA images of the left anterior intracranial circulation demonstrate normal course and caliber of the cervical, petrous, cavernous and supraclinoid segments of the internal carotid artery with minimal atherosclerosis.    Hemo Personnel:  +--------------------+---------+  Name                Duty        +--------------------+---------+  Chuy StahlMADAI MD 1  +--------------------+---------+      Hemodynamic Pressures:    +----+---------------------+----------+-------------+--------------+---------+  Site      Date Time      Phase NameSystolic mmHgDiastolic mmHgMean mmHg  +----+---------------------+----------+-------------+--------------+---------+    AO11/6/2024 10:29:01 AM      Rest          138            58       87  +----+---------------------+----------+-------------+--------------+---------+    AO11/6/2024 10:29:01 AM      Rest            0             0        1  +----+---------------------+----------+-------------+--------------+---------+      Complications:  No in-lab complications observed.    Cardiac Cath Post Procedure Notes:  Post Procedure Diagnosis: - Severe Calcification of the R CCA just before  bifurcation extending into R ICA.  - Severe Calcification of the L CCA just before  bifurcation extending into L ICA.  Blood Loss:               Estimated blood loss during the procedure was 10 mls.  Specimens Removed:        Number of specimen(s) removed: none.      Recommendations:  Maximize medical therapy.  Agressive risk factor modification efforts.  - Patient to be seen by primary vascular surgery to explore surgical options  given severe calcification of the diseased CC/ICA bilaterally.    ____________________________________________________________________________________  CONCLUSIONS:  1. - Severe Calcification of the R CCA just before bifurcation extending into R ICA.  2. - Severe Calcification of the L CCA just before bifurcation extending into L ICA.    ICD 10 Codes:  Occlusion and stenosis of bilateral carotid arteries-I65.23    CPT Codes:  Aortography, Thoracic, S&I (PER)-15193; [facility] Carotid common Intracranial Unilateral (PER)-11058; [facility] Carotid common Intracranial Bilateral (PER)-06048.50    [id number] Chuyzenia Stahl    Performing Physician  Electronically signed by [id number] Chuy Stahl DO on 11/12/2024 at 1:10:12 PM          ** Final **        Assessment & Plan:  1. Symptomatic stenosis of left carotid artery          Symptomatic left carotid artery stenosis.  Continue aspirin, plavix & statin   Plan for L CEA    Seen and evaluated with Dr. Sim Castaneda.     Sandi Calles, APRN-CNP

## 2024-12-16 ENCOUNTER — CLINICAL SUPPORT (OUTPATIENT)
Dept: PREADMISSION TESTING | Facility: HOSPITAL | Age: 84
End: 2024-12-16
Payer: MEDICARE

## 2024-12-16 NOTE — CPM/PAT H&P
CPM/PAT Evaluation       Name: Mann Abrams (Mann Abrams)  /Age: 1940/84 y.o.     { PAT Visit Type:81877}      Chief Complaint: ***    HPI  Mann Abrams is scheduled for endarterectomy carotid artery-left with Dr. Castaneda on 24.  Past Medical History:   Diagnosis Date    Anemia     Carotid stenosis     CKD (chronic kidney disease)     stage 3b    COPD (chronic obstructive pulmonary disease) (Multi)     DM (diabetes mellitus) (Multi)     GERD (gastroesophageal reflux disease)     History of blood transfusion     Hyperlipidemia     Hypertension     Other secondary cataract, left eye 2016    Posterior capsular opacification visually significant of left eye    Person consulting for explanation of examination or test findings 2020    Encounter to discuss test results    PFO (patent foramen ovale) ([facility])     PVD (peripheral vascular disease) (CMS-HCC)     Stroke (Multi)     Type 2 diabetes mellitus        Past Surgical History:   Procedure Laterality Date    CARDIAC CATHETERIZATION N/A 2024    Procedure: Left Heart Cath with Coronary Angiography and LV;  Surgeon: Denny Riley MD;  Location: [facility] Cardiac Cath Lab;  Service: Cardiovascular;  Laterality: N/A;    EYE SURGERY  2014    Eye Surgery    INVASIVE VASCULAR PROCEDURE Left 2024    Procedure: Carotid Angiogram;  Surgeon: Chuy Stahl DO;  Location: [place] [address] Cardiac Cath Lab;  Service: Cardiovascular;  Laterality: Left;  Pre-hydration needed    OTHER SURGICAL HISTORY  2019    Foot surgery    OTHER SURGICAL HISTORY  2019    Back surgery       Patient Sexual activity questions deferred to the physician.    Family History   Problem Relation Name Age of Onset    Cancer Mother      Other (cardiac disorder) Father      Diabetes Sister         Allergies   Allergen Reactions    Doxazosin Itching       Prior to Admission medications    Medication Sig Start Date End Date Taking? Authorizing Provider    albuterol (ProAir HFA) 90 mcg/actuation inhaler Inhale 2 puffs every 4 hours if needed for wheezing or shortness of breath. 11/8/24 11/8/25  Nataliya Grant MD   albuterol 108 (90 Base) MCG/ACT inhaler Inhale 2 puffs every 6 hours if needed for wheezing.    Historical Provider, MD   amLODIPine (Norvasc) 10 mg tablet Take 1 tablet (10 mg) by mouth once daily. 6/14/24   Brant Garcia DO   ascorbic acid (Vitamin C) 500 mg tablet Take 1 tablet (500 mg) by mouth once daily.    Historical Provider, MD   aspirin 81 mg EC tablet Take 1 tablet (81 mg) by mouth once daily.    Historical Provider, MD   atorvastatin (Lipitor) 80 mg tablet Take 1 tablet (80 mg) by mouth once daily. 6/14/24 6/14/25  Brant Garcia DO   carvedilol (Coreg) 12.5 mg tablet Take 1 tablet (12.5 mg) by mouth 2 times daily (morning and late afternoon).  Patient not taking: Reported on 12/6/2024 6/14/24 6/14/25  Brant Garcia DO   clopidogrel (Plavix) 75 mg tablet Take 1 tablet (75 mg) by mouth once daily. 11/20/24   Nataliya Grant MD   dapagliflozin propanediol (Farxiga) 10 mg Take 1 tablet (10 mg) by mouth once daily. 10/31/24   Nataliya Grant MD   fluticasone (Flonase) 50 mcg/actuation nasal spray Administer 1 spray into each nostril once daily as needed for allergies. Shake gently. Before first use, prime pump. After use, clean tip and replace cap. 10/30/24   Orsolya Mckinney, APRN-CNP   fluticasone propion-salmeteroL (Wixela Inhub) 250-50 mcg/dose diskus inhaler Inhale 1 puff 2 times a day. Rinse mouth with water after use to reduce aftertaste and incidence of candidiasis. Do not swallow. 7/29/24   Nataliya Grant MD   isosorbide mononitrate ER (Imdur) 30 mg 24 hr tablet Take 1 tablet (30 mg) by mouth once daily. Do not crush or chew. 6/14/24 6/14/25  Brant Garcia DO   montelukast (Singulair) 10 mg tablet Take 1 tablet (10 mg) by mouth once daily at bedtime. 8/21/24   Nataliya Grant MD   nitroglycerin (Nitrostat) 0.4  mg SL tablet Place 1 tablet (0.4 mg) under the tongue every 5 minutes if needed for chest pain. place 1 tablet under tongue every 5 minutes for up to 3 doses as needed for chest pain. Call 911 if pain persists 8/11/22   Historical Provider, MD   olmesartan (Benicar) 40 mg tablet Take 1 tablet (40 mg) by mouth once daily. 6/14/24 6/14/25  Brant Garcia DO   omeprazole (PriLOSEC) 40 mg DR capsule Take 1 capsule (40 mg) by mouth once daily. 8/21/24   Nataliya Grant MD   spironolactone (Aldactone) 25 mg tablet Take 0.5 tablets (12.5 mg) by mouth 2 times a day. 6/14/24 6/14/25  Brant Garcia DO        PAT ROS     PAT Physical Exam     Airway    Testing/Diagnostic:   MR ANGIO NECK WO IV CONTRAST; 10/29/2024   IMPRESSION:  The examination is moderately degraded by patient motion artifact.  Within this limitation there appears to be moderate narrowing at the  carotid bifurcation bilaterally.     MR BRAIN WO IV CONTRAST; 10/29/2024   IMPRESSION:  There is an acute infarct within the posterior frontal lobe on the  left. There is associated T2 and FLAIR signal abnormality. No  associated mass effect or hemorrhage.    [facility] US CAROTID ARTERY DUPLEX BILATERAL; 6/27/24  CRITICAL RESULT  Critical Result: Greater than 70% stenosis of the bilateral ICAs.  Notification called to PATT PRASAD MD on 6/27/2024 at 12:34:30 PM by Ned Still CRISTY.  CONCLUSIONS:  Right Carotid: Findings are consistent with greater than 70% stenosis of the right proximal internal carotid artery. Turbulent flow seen by color Doppler. Right external carotid artery appears patent with no evidence of stenosis. The right vertebral artery demonstrates no flow. No evidence of hemodynamically significant stenosis in the right subclavian artery.  Left Carotid: Findings are consistent with greater than 70% stenosis of the left proximal internal carotid artery. Turbulent flow seen by color Doppler. There are elevated velocities in the left ECA that are  suggestive of disease. The left vertebral artery is patent with antegrade flow. No evidence of hemodynamically significant stenosis in the left subclavian artery.    TRANSTHORACIC ECHO; 10/30/24  CONCLUSIONS:   1. Left ventricular ejection fraction is normal, calculated by Kimble's biplane at 65%.   2. There is normal right ventricular global systolic function.   3. Moderately elevated right ventricular systolic pressure.   4. There is mild to moderate pulmonic valve regurgitation.   5. A bubble study using agitated saline was performed. Bubble study is positive. A large PFO (> 20 bubbles) was demonstrated.    ECG; 10/30/24   Normal sinus rhythm  Normal ECG  When compared with ECG of 29-OCT-2024 09:34, (unconfirmed)    Cardiac Cath; 5/30/24  CONCLUSIONS:   1. 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   2. Medical management of proximal LCx disease given the absence of angina or high risk stress test findings.   3. Further management as per primary cardiology Dr. Brant Garcia.  Exercise Stress Echo; 6/22/2020  Summary:   1. Abnormal Stress Test.   2. ECG changes consistent with ischemia.   3. Hyperdynamic global left ventricular systolic function.   4. Patients baseline LV function was hyperdynamic with peak stress showing almost complete cavity obliteration . No regional wall motion changes noted at peak exercise.   5. Noted Increased in RVSP from baseline 31 mmHg to 54 mmHg at Peak Stress.    Patient Specialist/PCP:   PCP: Nataliya Grant MD LOV 11/8/24  Vascular Surgery: Sandi Calles, APRN-CNP LOV 12/6/24 initial evaluation for carotid stenosis. Symptomatic left carotid artery stenosis.  Continue aspirin, plavix & statin   Plan for L CEA     Neurology: Christy White MD LOV 11/29/24 Consult for bilateral carotid stenosis. Not a candidate for  stenting due to severe calcification, might be eligible for endarterectomy.   PLAN   - Agree with continuing plavix and aspirin   -Continue Atorvastatin 80mg  - No interventions for PFO indicated  -Agree with vascular surgery eval for endarterectomy consideration - DR. Castaneda on 1/3    Cardiology- Endovascular and Limb Salvage: Chuy Stahl, DO LOV 9/24/24  Nephrology: Jovana Hammer MD LOV 8/26/24 seen for CKD.  Cardiology: Brant Garcia, DO LOV 6/14/24  Visit Vitals  Smoking Status Former       DASI Risk Score    No data to display       Caprini DVT Assessment      Flowsheet Row ED to Hosp-Admission (Discharged) from 10/29/2024 in [facility] Bldg A 1 with Nieves Mccarthy MD, Wally Su MD and Kerri Varner MD ED to Hosp-Admission (Discharged) from 6/5/2024 in [facility] [place] [address] with Valdemar Freeman MD and Art Hennessy MD MPH   DVT Score 10 filed at 10/29/2024 1800 6 filed at 06/05/2024 1216   Medical Factors Stroke <1 month, COPD filed at 10/29/2024 1800 --   BMI 30 or less filed at 10/29/2024 1800 30 or less filed at 06/05/2024 1216   RETIRED: History -- Congestive heart failure, Pneumonia filed at 06/05/2024 1216   RETIRED: Age -- Over 75 years filed at 06/05/2024 1216          Modified Frailty Index    No data to display       CHADS2 Stroke Risk  Current as of today        N/A 3 to 100%: High Risk   2 to < 3%: Medium Risk   0 to < 2%: Low Risk     Last Change: N/A          This score determines the patient's risk of having a stroke if the patient has atrial fibrillation.        This score is not applicable to this patient. Components are not calculated.          Revised Cardiac Risk Index    No data to display       Apfel Simplified Score    No data to display       Risk Analysis Index Results This Encounter    No data found in the last 10 encounters.       Prodigy: High Risk  Total Score: 24              Prodigy Age Score       Prodigy Gender Score          ARISCAT Score for Postoperative Pulmonary Complications    No data to display       Ellis Perioperative Risk for Myocardial Infarction or Cardiac Arrest (FERNANDEZ)    No data to display         Assessment and Plan:   Risk Stratification faxed to cardiology office on 12/16/24.   ONLY    Shanelle Watson RN  Pre-Admission Testing   {[facility] EMBEDDED ASSESSMENT AND PLAN:53051}

## 2024-12-18 ENCOUNTER — PATIENT OUTREACH (OUTPATIENT)
Dept: PRIMARY CARE | Facility: CLINIC | Age: 84
End: 2024-12-18
Payer: MEDICARE

## 2024-12-18 DIAGNOSIS — I10 PRIMARY HYPERTENSION: Primary | ICD-10-CM

## 2024-12-18 RX ORDER — HYDRALAZINE HYDROCHLORIDE 10 MG/1
10 TABLET, FILM COATED ORAL 3 TIMES DAILY
Qty: 90 TABLET | Refills: 2 | Status: SHIPPED | OUTPATIENT
Start: 2024-12-18 | End: 2025-01-17

## 2024-12-18 NOTE — PROGRESS NOTES
Successful outreach to patient regarding hospitalization as patient continues TCM program.   At time of outreach call the patient feels as if their condition has (improved) since initial visit with PCP or specialist.  Questions or concerns addressed at this time with patient.     Patient reports doing well, planned for vascular surgery procedure in January.  Patient is asking for recommendations on medication. He states he was told by a pharmacist not to take his carvedilol due to an interaction with his fluticasone inhaler. He states he has not been taking for about two weeks but his blood pressures are getting high. Message sent to PCP/Office.

## 2024-12-20 ASSESSMENT — ENCOUNTER SYMPTOMS
MUSCULOSKELETAL NEGATIVE: 1
CONSTITUTIONAL NEGATIVE: 1
RESPIRATORY NEGATIVE: 1
NECK NEGATIVE: 1
GASTROINTESTINAL NEGATIVE: 1
EYES NEGATIVE: 1

## 2024-12-20 NOTE — CPM/PAT H&P
CPM/PAT Evaluation       Name: Mann Abrams (Mann Abrams)  /Age: 1940/84 y.o.     In-Person       Chief Complaint: Hx of stroke    HPI  This is a very pleasant 85 yo with PmHx of CKD, COPD DM, GERD, HTN, HLD, and left carotid a stenosis.  Pt reports he had a stroke at the end of October.  He was having garbled speech.  He currently has no residual speech issues.  He denies recent fever or chills.    Mann Abrams is scheduled for endarterectomy carotid artery-left with Dr. Castaneda on 24.    Past Medical History:   Diagnosis Date    Anemia     Carotid stenosis     CKD (chronic kidney disease)     stage 3b    COPD (chronic obstructive pulmonary disease) (Multi)     DM (diabetes mellitus) (Multi)     GERD (gastroesophageal reflux disease)     History of blood transfusion     Hyperlipidemia     Hypertension     Other secondary cataract, left eye 2016    Posterior capsular opacification visually significant of left eye    Person consulting for explanation of examination or test findings 2020    Encounter to discuss test results    PFO (patent foramen ovale) ([facility])     PVD (peripheral vascular disease) (CMS-HCC)     Stroke (Multi)     Type 2 diabetes mellitus        Past Surgical History:   Procedure Laterality Date    CARDIAC CATHETERIZATION N/A 2024    Procedure: Left Heart Cath with Coronary Angiography and LV;  Surgeon: Denny Riley MD;  Location: [facility] Cardiac Cath Lab;  Service: Cardiovascular;  Laterality: N/A;    EYE SURGERY  2014    Eye Surgery    INVASIVE VASCULAR PROCEDURE Left 2024    Procedure: Carotid Angiogram;  Surgeon: Chuy Stahl DO;  Location: [place] [address] Cardiac Cath Lab;  Service: Cardiovascular;  Laterality: Left;  Pre-hydration needed    OTHER SURGICAL HISTORY  2019    Foot surgery    OTHER SURGICAL HISTORY  2019    Back surgery       Patient Sexual activity questions deferred to the physician.    Family History   Problem Relation  Name Age of Onset    Cancer Mother      Other (cardiac disorder) Father      Diabetes Sister         Allergies   Allergen Reactions    Doxazosin Itching       Prior to Admission medications    Medication Sig Start Date End Date Taking? Authorizing Provider   albuterol (ProAir HFA) 90 mcg/actuation inhaler Inhale 2 puffs every 4 hours if needed for wheezing or shortness of breath. 11/8/24 11/8/25  Nataliya Grant MD   albuterol 108 (90 Base) MCG/ACT inhaler Inhale 2 puffs every 6 hours if needed for wheezing.    Historical Provider, MD   amLODIPine (Norvasc) 10 mg tablet Take 1 tablet (10 mg) by mouth once daily. 6/14/24   Brant Garcia DO   ascorbic acid (Vitamin C) 500 mg tablet Take 1 tablet (500 mg) by mouth once daily.    Historical Provider, MD   aspirin 81 mg EC tablet Take 1 tablet (81 mg) by mouth once daily.    Historical Provider, MD   atorvastatin (Lipitor) 80 mg tablet Take 1 tablet (80 mg) by mouth once daily. 6/14/24 6/14/25  Brant Garcia DO   carvedilol (Coreg) 12.5 mg tablet Take 1 tablet (12.5 mg) by mouth 2 times daily (morning and late afternoon).  Patient not taking: Reported on 12/6/2024 6/14/24 6/14/25  Brant Garcia DO   clopidogrel (Plavix) 75 mg tablet Take 1 tablet (75 mg) by mouth once daily. 11/20/24   Nataliya Grant MD   dapagliflozin propanediol (Farxiga) 10 mg Take 1 tablet (10 mg) by mouth once daily. 10/31/24   Nataliya Grant MD   fluticasone (Flonase) 50 mcg/actuation nasal spray Administer 1 spray into each nostril once daily as needed for allergies. Shake gently. Before first use, prime pump. After use, clean tip and replace cap. 10/30/24   LUMA Venegas-CNP   fluticasone propion-salmeteroL (Wixela Inhub) 250-50 mcg/dose diskus inhaler Inhale 1 puff 2 times a day. Rinse mouth with water after use to reduce aftertaste and incidence of candidiasis. Do not swallow. 7/29/24   Nataliya Grant MD   isosorbide mononitrate ER (Imdur) 30 mg 24 hr tablet Take  1 tablet (30 mg) by mouth once daily. Do not crush or chew. 6/14/24 6/14/25  Brant Garcia DO   montelukast (Singulair) 10 mg tablet Take 1 tablet (10 mg) by mouth once daily at bedtime. 8/21/24   Nataliya Grant MD   nitroglycerin (Nitrostat) 0.4 mg SL tablet Place 1 tablet (0.4 mg) under the tongue every 5 minutes if needed for chest pain. place 1 tablet under tongue every 5 minutes for up to 3 doses as needed for chest pain. Call 911 if pain persists 8/11/22   Historical Provider, MD   olmesartan (Benicar) 40 mg tablet Take 1 tablet (40 mg) by mouth once daily. 6/14/24 6/14/25  Brant Garcia DO   omeprazole (PriLOSEC) 40 mg DR capsule Take 1 capsule (40 mg) by mouth once daily. 8/21/24   Nataliya Grant MD   spironolactone (Aldactone) 25 mg tablet Take 0.5 tablets (12.5 mg) by mouth 2 times a day. 6/14/24 6/14/25  Brant Garcia DO        PAT ROS:   Constitutional:   neg    Neuro/Psych:    Stroke - no residual speech symptoms  Eyes:   neg    Ears:   neg    Nose:   neg    Mouth:   neg    Throat:   neg    Neck:    No carotid bruits auscultated  neg    Cardio:    See HPI  Respiratory:   neg    Endocrine:    DM  GI:   neg    :   neg    Musculoskeletal:   neg    Hematologic:   neg    Skin:  neg        Physical Exam  Constitutional:       Appearance: Normal appearance.   HENT:      Head: Normocephalic and atraumatic.      Nose: Nose normal.      Mouth/Throat:      Mouth: Mucous membranes are moist.   Eyes:      Pupils: Pupils are equal, round, and reactive to light.   Neck:      Comments: No carotid bruits auscultated  Cardiovascular:      Rate and Rhythm: Normal rate and regular rhythm.   Pulmonary:      Effort: Pulmonary effort is normal.      Comments: Decrease air exchange  No rales no wheeze  Abdominal:      General: Bowel sounds are normal.      Palpations: Abdomen is soft.   Musculoskeletal:      Cervical back: Normal range of motion.      Comments: No LE edema   Skin:     General: Skin is  warm and dry.   Neurological:      General: No focal deficit present.      Mental Status: He is alert and oriented to person, place, and time.   Psychiatric:         Mood and Affect: Mood normal.         Behavior: Behavior normal.        Airway: nl neck ROM  Anesthesia:  Patient denies any anesthesia complications.   Teeth:  dentures    Testing/Diagnostic:     Lab Results   Component Value Date    HGBA1C 7.8 (H) 10/29/2024       MR ANGIO NECK WO IV CONTRAST; 10/29/2024   IMPRESSION:  The examination is moderately degraded by patient motion artifact.  Within this limitation there appears to be moderate narrowing at the  carotid bifurcation bilaterally.     MR BRAIN WO IV CONTRAST; 10/29/2024   IMPRESSION:  There is an acute infarct within the posterior frontal lobe on the  left. There is associated T2 and FLAIR signal abnormality. No  associated mass effect or hemorrhage.    VASC US CAROTID ARTERY DUPLEX BILATERAL; 6/27/24  CRITICAL RESULT  Critical Result: Greater than 70% stenosis of the bilateral ICAs.  Notification called to PATT PRASAD MD on 6/27/2024 at 12:34:30 PM by Ned Still T.  CONCLUSIONS:  Right Carotid: Findings are consistent with greater than 70% stenosis of the right proximal internal carotid artery. Turbulent flow seen by color Doppler. Right external carotid artery appears patent with no evidence of stenosis. The right vertebral artery demonstrates no flow. No evidence of hemodynamically significant stenosis in the right subclavian artery.  Left Carotid: Findings are consistent with greater than 70% stenosis of the left proximal internal carotid artery. Turbulent flow seen by color Doppler. There are elevated velocities in the left ECA that are suggestive of disease. The left vertebral artery is patent with antegrade flow. No evidence of hemodynamically significant stenosis in the left subclavian artery.    TRANSTHORACIC ECHO; 10/30/24  CONCLUSIONS:   1. Left ventricular ejection fraction is  normal, calculated by Kimble's biplane at 65%.   2. There is normal right ventricular global systolic function.   3. Moderately elevated right ventricular systolic pressure.   4. There is mild to moderate pulmonic valve regurgitation.   5. A bubble study using agitated saline was performed. Bubble study is positive. A large PFO (> 20 bubbles) was demonstrated.    ECG; 10/30/24   Normal sinus rhythm  Normal ECG  When compared with ECG of 29-OCT-2024 09:34, (unconfirmed)    Cardiac Cath; 5/30/24  CONCLUSIONS:   1. 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   2. Medical management of proximal LCx disease given the absence of angina or high risk stress test findings.   3. Further management as per primary cardiology Dr. Brant Garcia.  Exercise Stress Echo; 6/22/2020  Summary:   1. Abnormal Stress Test.   2. ECG changes consistent with ischemia.   3. Hyperdynamic global left ventricular systolic function.   4. Patients baseline LV function was hyperdynamic with peak stress showing almost complete cavity obliteration . No regional wall motion changes noted at peak exercise.   5. Noted Increased in RVSP from baseline 31 mmHg to 54 mmHg at Peak Stress.    Patient Specialist/PCP:   PCP: Nataliya Grant MD LOV 11/8/24  Vascular Surgery: LUMA Shaver-CNP LOV 12/6/24 initial evaluation for carotid stenosis. Symptomatic left carotid artery stenosis.  Continue aspirin, plavix & statin   Plan for L CEA     Neurology: Christy White MD LOV 11/29/24 Consult for bilateral carotid stenosis. Not a candidate for stenting due to severe calcification, might be eligible for endarterectomy.   PLAN   - Agree with continuing plavix and aspirin   -Continue Atorvastatin 80mg  - No interventions for PFO indicated  -Agree with vascular surgery eval for endarterectomy  "consideration - DR. Castaneda on 1/3    Cardiology- Endovascular and Limb Salvage: Chuy Stahl,  LOV 9/24/24  Nephrology: Jovana Hammer MD LOV 8/26/24 seen for CKD.  Cardiology: Brant Garcia DO LOV 6/14/24      Visit Vitals  /65   Pulse 67   Temp 36.5 °C (97.7 °F) (Oral)   Ht 1.676 m (5' 6\")   Wt 65.4 kg (144 lb 1.6 oz)   SpO2 95%   BMI 23.26 kg/m²   Smoking Status Former   BSA 1.74 m²       DASI Risk Score      Flowsheet Row Pre-Admission Testing from 12/23/20[facility] in [facility]   Can you take care of yourself (eat, dress, bathe, or use toilet)?  2.75 filed at 12/23/2024 0908   Can you walk indoors, such as around your house? 1.75 filed at 12/23/2024 0908   Can you walk a block or two on level ground?  2.75 filed at 12/23/2024 0908   Can you climb a flight of stairs or walk up a hill? 5.5 filed at 12/23/2024 0908   Can you run a short distance? 8 filed at 12/23/2024 0908   Can you do light work around the house like dusting or washing dishes? 2.7 filed at 12/23/2024 0908   Can you do moderate work around the house like vacuuming, sweeping floors or carrying groceries? 3.5 filed at 12/23/2024 0908   Can you do heavy work around the house like scrubbing floors or lifting and moving heavy furniture?  8 filed at 12/23/2024 0908   Can you do yard work like raking leaves, weeding or pushing a mower? 4.5 filed at 12/23/2024 0908   Can you have sexual relations? 5.25 filed at 12/23/2024 0908   Can you participate in moderate recreational activities like golf, bowling, dancing, doubles tennis or throwing a baseball or football? 6 filed at 12/23/2024 0908   Can you participate in strenous sports like swimming, singles tennis, football, basketball, or skiing? 7.5 filed at 12/23/2024 0908   DASI SCORE 58.2 filed at 12/23/2024 0908   METS Score (Will be calculated only when all the questions are answered) 9.9 filed at 12/23/2024 0908          Caprini DVT Assessment      Flowsheet Row ED to " Hosp-Admission (Discharged) from 10/29/2024 in [facility] Bldg A 1 with Nieves Mccarthy MD, Wally Su MD and Kerri Varner MD ED to Hosp-Admission (Discharged) from 6/5/2024 in [facility] with Valdemar Freeman MD and Art Hennessy MD MPH   DVT Score 10 filed at 10/29/2024 1800 6 filed at 06/05/2024 1216   Medical Factors Stroke <1 month, COPD filed at 10/29/2024 1800 --   BMI 30 or less filed at 10/29/2024 1800 30 or less filed at 06/05/2024 1216   RETIRED: History -- Congestive heart failure, Pneumonia filed at 06/05/2024 1216   RETIRED: Age -- Over 75 years filed at 06/05/2024 1216          Modified Frailty Index    No data to display       CHADS2 Stroke Risk  Current as of about an hour ago        N/A 3 to 100%: High Risk   2 to < 3%: Medium Risk   0 to < 2%: Low Risk     Last Change: N/A          This score determines the patient's risk of having a stroke if the patient has atrial fibrillation.        This score is not applicable to this patient. Components are not calculated.          Revised Cardiac Risk Index    No data to display       Apfel Simplified Score    No data to display       Risk Analysis Index Results This Encounter    No data found in the last 10 encounters.       Stop Bang Score      Flowsheet Row Pre-Admission Testing from 12/23/2024 in [facility]   Do you snore loudly? 0 filed at 12/23/2024 0908   Do you often feel tired or fatigued after your sleep? 0 filed at 12/23/2024 0908   Has anyone ever observed you stop breathing in your sleep? 0 filed at 12/23/2024 0908   Do you have or are you being treated for high blood pressure? 1 filed at 12/23/2024 0908   Recent BMI (Calculated) 23.3 filed at 12/23/2024 0908   Is BMI greater than 35 kg/m2? 0=No filed at 12/23/2024 0908   Age older than 50 years old? 1=Yes filed at 12/23/2024 0908   Is your neck circumference greater than 17 inches (Male) or 16 inches  (Female)? 0 filed at 12/23/2024 0908   Gender - Male 1=Yes filed at 12/23/2024 0908   STOP-BANG Total Score 3 filed at 12/23/2024 0908          Prodigy: High Risk  Total Score: 24              Prodigy Age Score      Prodigy Gender Score          ARISCAT Score for Postoperative Pulmonary Complications    No data to display       Ellis Perioperative Risk for Myocardial Infarction or Cardiac Arrest (FERNANDEZ)    No data to display         Assessment and Plan:   Risk Stratification faxed to cardiology office on 12/16/24.   ONLY  Shanelle Watson RN  Pre-Admission Testing     Plan for OR on 12/30 for   Endarterectomy Carotid Artery [78731 (CPT®)] Left General   Due to Symptomatic stenosis of left carotid artery   Updating T & S, BMP, INR  Todays labs Increasing BUN/Cr routed to PCP  Recheck prior to surgery    HEENT/Airway:  no significant findings on chart review or clinical presentation    Cardiovascular:  no significant findings on chart review or clinical presentation  DASI  Duke Activity Status Index (DASI)  DASI Score: 58.2   MET Score: 9.9  RCRI: 1 point, 6.0% risk for postoperative MACE   FERNANDEZ: .44  % risk for postoperative MACE      Pulmonary:    Hx of COPD    Preoperative deep breathing educational handout provided to patient.  ARISCAT:  51    points which is a high (42.1%) risk of in-hospital post-op pulmonary complications     PRODIGY: 24   points which is a high risk of post op opioid induced respiratory depression episodes    STOP BANG:   3  points which is a intermediate risk for moderate to severe ADELSO    Renal: Hx of CKD follows with nephrology updating BMP today    Endocrine:  DM last A1C 7.8  Hematologic:     Anemia - no significant change last 5 months  Preoperative DVT educational handout provided to patient.  Caprini Score: 8   points which is a high risk of perioperative VTE    Gastrointestinal:   no significant findings on chart review or clinical presentation  Apfel: 2 points 39% risk for post  operative N/V    Infectious disease:  no significant findings on chart review or clinical presentation     Musculoskeletal:  no acute active issues    Neuro:    Hx of stroke carotid a disease   See surgical assessment and plan   the patient is at an increased risk for post operative delirium secondary to age >/= 65 and renal insuff..  Preoperative brain exercise educational handout provided to patient.    The patient is at an increased risk for perioperative stroke secondary to previous CVA/TIA, HTN, HLD, DM , and general anesthesia.

## 2024-12-23 ENCOUNTER — PRE-ADMISSION TESTING (OUTPATIENT)
Dept: PREADMISSION TESTING | Facility: HOSPITAL | Age: 84
End: 2024-12-23
Payer: MEDICARE

## 2024-12-23 VITALS
TEMPERATURE: 97.7 F | WEIGHT: 144.1 LBS | BODY MASS INDEX: 23.16 KG/M2 | DIASTOLIC BLOOD PRESSURE: 65 MMHG | OXYGEN SATURATION: 95 % | HEIGHT: 66 IN | SYSTOLIC BLOOD PRESSURE: 133 MMHG | HEART RATE: 67 BPM

## 2024-12-23 DIAGNOSIS — I65.22 SYMPTOMATIC STENOSIS OF LEFT CAROTID ARTERY: ICD-10-CM

## 2024-12-23 DIAGNOSIS — Z01.818 PREOP TESTING: ICD-10-CM

## 2024-12-23 LAB
ABO GROUP (TYPE) IN BLOOD: NORMAL
ANION GAP SERPL CALC-SCNC: 13 MMOL/L (ref 10–20)
ANTIBODY SCREEN: NORMAL
BUN SERPL-MCNC: 45 MG/DL (ref 6–23)
CALCIUM SERPL-MCNC: 8.9 MG/DL (ref 8.6–10.6)
CHLORIDE SERPL-SCNC: 114 MMOL/L (ref 98–107)
CO2 SERPL-SCNC: 19 MMOL/L (ref 21–32)
CREAT SERPL-MCNC: 2.71 MG/DL (ref 0.5–1.3)
EGFRCR SERPLBLD CKD-EPI 2021: 23 ML/MIN/1.73M*2
ERYTHROCYTE [DISTWIDTH] IN BLOOD BY AUTOMATED COUNT: 13.3 % (ref 11.5–14.5)
GLUCOSE SERPL-MCNC: 141 MG/DL (ref 74–99)
HCT VFR BLD AUTO: 33.4 % (ref 41–52)
HGB BLD-MCNC: 10.6 G/DL (ref 13.5–17.5)
INR PPP: 0.9 (ref 0.9–1.1)
MCH RBC QN AUTO: 31.8 PG (ref 26–34)
MCHC RBC AUTO-ENTMCNC: 31.7 G/DL (ref 32–36)
MCV RBC AUTO: 100 FL (ref 80–100)
NRBC BLD-RTO: 0 /100 WBCS (ref 0–0)
PLATELET # BLD AUTO: 217 X10*3/UL (ref 150–450)
POTASSIUM SERPL-SCNC: 5.6 MMOL/L (ref 3.5–5.3)
PROTHROMBIN TIME: 10.3 SECONDS (ref 9.8–12.8)
RBC # BLD AUTO: 3.33 X10*6/UL (ref 4.5–5.9)
RH FACTOR (ANTIGEN D): NORMAL
SODIUM SERPL-SCNC: 140 MMOL/L (ref 136–145)
WBC # BLD AUTO: 4.7 X10*3/UL (ref 4.4–11.3)

## 2024-12-23 PROCEDURE — 80048 BASIC METABOLIC PNL TOTAL CA: CPT

## 2024-12-23 PROCEDURE — 86901 BLOOD TYPING SEROLOGIC RH(D): CPT

## 2024-12-23 PROCEDURE — 36415 COLL VENOUS BLD VENIPUNCTURE: CPT

## 2024-12-23 PROCEDURE — 85610 PROTHROMBIN TIME: CPT

## 2024-12-23 PROCEDURE — 85027 COMPLETE CBC AUTOMATED: CPT

## 2024-12-23 PROCEDURE — 99202 OFFICE O/P NEW SF 15 MIN: CPT | Performed by: NURSE PRACTITIONER

## 2024-12-23 ASSESSMENT — DUKE ACTIVITY SCORE INDEX (DASI)
CAN YOU TAKE CARE OF YOURSELF (EAT, DRESS, BATHE, OR USE TOILET): YES
CAN YOU CLIMB A FLIGHT OF STAIRS OR WALK UP A HILL: YES
CAN YOU RUN A SHORT DISTANCE: YES
CAN YOU DO LIGHT WORK AROUND THE HOUSE LIKE DUSTING OR WASHING DISHES: YES
CAN YOU WALK INDOORS, SUCH AS AROUND YOUR HOUSE: YES
CAN YOU WALK A BLOCK OR TWO ON LEVEL GROUND: YES
CAN YOU PARTICIPATE IN STRENOUS SPORTS LIKE SWIMMING, SINGLES TENNIS, FOOTBALL, BASKETBALL, OR SKIING: YES
CAN YOU DO MODERATE WORK AROUND THE HOUSE LIKE VACUUMING, SWEEPING FLOORS OR CARRYING GROCERIES: YES
CAN YOU DO YARD WORK LIKE RAKING LEAVES, WEEDING OR PUSHING A MOWER: YES
CAN YOU HAVE SEXUAL RELATIONS: YES
DASI METS SCORE: 9.9
TOTAL_SCORE: 58.2
CAN YOU PARTICIPATE IN MODERATE RECREATIONAL ACTIVITIES LIKE GOLF, BOWLING, DANCING, DOUBLES TENNIS OR THROWING A BASEBALL OR FOOTBALL: YES
CAN YOU DO HEAVY WORK AROUND THE HOUSE LIKE SCRUBBING FLOORS OR LIFTING AND MOVING HEAVY FURNITURE: YES

## 2024-12-23 ASSESSMENT — PAIN - FUNCTIONAL ASSESSMENT: PAIN_FUNCTIONAL_ASSESSMENT: 0-10

## 2024-12-23 ASSESSMENT — LIFESTYLE VARIABLES: SMOKING_STATUS: NONSMOKER

## 2024-12-23 ASSESSMENT — PAIN SCALES - GENERAL: PAINLEVEL_OUTOF10: 0 - NO PAIN

## 2024-12-23 ASSESSMENT — ENCOUNTER SYMPTOMS: ENDOCRINE COMMENTS: DM

## 2024-12-23 NOTE — PREPROCEDURE INSTRUCTIONS
Thank you for visiting [place]read[place] [facility] [place] [phone]. If you have any changes to your health condition, please call the SURGEON's office to alert them and give them details of your symptoms.        Preoperative Brain Exercises    What are brain exercises?  A brain exercise is any activity that engages your thinking (cognitive) skills.    What types of activities are considered brain exercises?  Jigsaw puzzles, crossword puzzles, word jumble, memory games, word search, and many more.  Many can be found free online or on your phone via a mobile bobo.    Why should I do brain exercises before my surgery?  More recent research has shown brain exercise before surgery can lower the risk of postoperative delirium (confusion) which can be especially important for older adults.  Patients who did brain exercises for 5 to 10 hours the days before surgery, cut their risk of postoperative delirium in half up to 1 week after surgery.      Preoperative Deep Breathing Exercises    Why it is important to do deep breathing exercises before my surgery?  Deep breathing exercises strengthen your breathing muscles.  This helps you to recover after your surgery and decreases the chance of breathing complications.    How are the deep breathing exercises done?  Sit straight with your back supported.  Breathe in deeply and slowly through your nose. Your lower rib cage should expand and your abdomen may move forward.  Hold that breath for 3 to 5 seconds.  Breathe out through pursed lips, slowly and completely.  Rest and repeat 10 times every hour while awake.  Rest longer if you become dizzy or lightheaded.      Patient and Family Education   Ways You Can Help Prevent Blood Clots     This handout explains some simple things you can do to help prevent blood clots.      Blood clots are blockages that can form in the body's veins. When a blood clot forms in your deep veins, it may be called a deep vein thrombosis, or DVT for  short. Blood clots can happen in any part of the body where blood flows, but they are most common in the arms and legs. If a piece of a blood clot breaks free and travels to the lungs, it is called a pulmonary embolus (PE). A PE can be a very serious problem.      Being in the hospital or having surgery can raise your chances of getting a blood clot because you may not be well enough to move around as much as you normally do.      Ways you can help prevent blood clots in the hospital         Wearing SCDs. SCDs stands for Sequential Compression Devices.   SCDs are special sleeves that wrap around your legs  They attach to a pump that fills them with air to gently squeeze your legs every few minutes.   This helps return the blood in your legs to your heart.   SCDs should only be taken off when walking or bathing.   SCDs may not be comfortable, but they can help save your life.               Wearing compression stockings - if your doctor orders them. These special snug fitting stockings gently squeeze your legs to help blood flow.       Walking. Walking helps move the blood in your legs.   If your doctor says it is ok, try walking the halls at least   5 times a day. Ask us to help you get up, so you don't fall.      Taking any blood thinning medicines your doctor orders.          ©[facility]; 3/23        Ways you can help prevent blood clots at home       Wearing compression stockings - if your doctor orders them. ? Walking - to help move the blood in your legs.       Taking any blood thinning medicines your doctor orders.      Signs of a blood clot or PE      Tell your doctor or nurse know right away if you have of the problems listed below.    If you are at home, seek medical care right away. Call 911 for chest pain or problems breathing.          Signs of a blood clot (DVT) - such as pain,  swelling, redness or warmth in your arm or leg      Signs of a pulmonary embolism (PE) - such as chest     pain or  feeling short of breath         Walking. Walking helps move the blood in your legs.   You will receive notification one business day prior to your procedure to confirm your arrival time. It is important that you answer your phone and/or check your messages during this time. If you do not hear from the surgery center by 5 pm. the day before your procedure, please call [phone]    Please enter the building through the Outpatient entrance and take the elevator off the lobby to the 2nd floor then check in at the Outpatient Surgery desk on the 2nd floor.    INSTRUCTIONS:  Talk to your surgeon for instructions if you should stop your aspirin, blood thinner, or diabetes medicines.  DO NOT take any multivitamins or over the counter supplements for 7-10 days before surgery.  If not being admitted, you must have an adult immediately available to drive you home after surgery. We also highly recommend you have someone stay with you for the entire day and night of your surgery.  Wear comfortable, loose fitting clothing.  All jewelry and piercings must be removed. If you are unable to remove an item or have a dermal piercing, please be sure to tell the nurse when you arrive for surgery.  Nail polish and make-up must be removed.  Avoid smoking or consuming alcohol for 24 hours before surgery.  To help prevent infection, please take a shower/bath and wash your hair the night before and/or morning of surgery (or follow other specific bathing instructions provided).    Preoperative Fasting Guidelines    Why must I stop eating and drinking near surgery time?  With sedation, food or liquid in your stomach can enter your lungs causing serious complications  Increases nausea and vomiting    When do I need to stop eating and drinking before my surgery?  Do not eat any solid food after midnight the night before your surgery/procedure unless otherwise instructed by your surgeon.   You may have up to 13.5 ounces of clear liquid until  TWO hours before your instructed arrival time to the hospital.  This includes water, black tea/coffee, (no milk or cream) apple juice, and electrolyte drinks (Gatorade).   You may chew gum until TWO hours before your surgery/procedure      If applicable, notify your surgeons office immediately of any new skin changes that occur to the surgical limb.      If you have any questions or concerns, please call Pre-Admission Testing at [phone]

## 2024-12-24 ENCOUNTER — LAB (OUTPATIENT)
Dept: LAB | Facility: LAB | Age: 84
End: 2024-12-24
Payer: MEDICARE

## 2024-12-24 DIAGNOSIS — Z01.818 PREOP TESTING: ICD-10-CM

## 2024-12-24 LAB
ANION GAP SERPL CALC-SCNC: 14 MMOL/L (ref 10–20)
BUN SERPL-MCNC: 39 MG/DL (ref 6–23)
CALCIUM SERPL-MCNC: 8.8 MG/DL (ref 8.6–10.6)
CHLORIDE SERPL-SCNC: 111 MMOL/L (ref 98–107)
CO2 SERPL-SCNC: 19 MMOL/L (ref 21–32)
CREAT SERPL-MCNC: 2.42 MG/DL (ref 0.5–1.3)
EGFRCR SERPLBLD CKD-EPI 2021: 26 ML/MIN/1.73M*2
GLUCOSE SERPL-MCNC: 111 MG/DL (ref 74–99)
POTASSIUM SERPL-SCNC: 5.1 MMOL/L (ref 3.5–5.3)
SODIUM SERPL-SCNC: 139 MMOL/L (ref 136–145)

## 2024-12-24 PROCEDURE — 80048 BASIC METABOLIC PNL TOTAL CA: CPT

## 2024-12-25 DIAGNOSIS — J43.8 OTHER EMPHYSEMA (MULTI): ICD-10-CM

## 2024-12-26 RX ORDER — FLUTICASONE PROPIONATE AND SALMETEROL 250; 50 UG/1; UG/1
POWDER RESPIRATORY (INHALATION)
Qty: 60 EACH | Refills: 3 | Status: SHIPPED | OUTPATIENT
Start: 2024-12-26

## 2024-12-27 NOTE — PROGRESS NOTES
Pharmacy Medication History Review    Mann Abrams is a 84 y.o. male who is planned to be admitted for Symptomatic stenosis of left carotid artery. Pharmacy called the patient prior to their scheduled procedure and reviewed the patient's [id number] medications for accuracy.    Medications ADDED:  none  Medications CHANGED:  none  Medications REMOVED:   none    Please review updated prior to admission medication list and comments regarding how patient may be taking medications differently by going to Admission tab --> Admission Orders --> Admit Orders / Review prior to admission medications.     Preferred pharmacy, last doses of medications, and allergies to be confirmed with patient by nursing the day of procedure.     Sources used to complete the med history include:  [facility]  Pharmacy dispense history  Patient interview  Chart Review  Care Everywhere     Below are additional concerns with the patient's PTA list.  Patient states they only use albuterol inhaler and flonase DOTTY Garrett    Meds Ambulatory and Retail Services  Please reach out via Secure Chat for questions

## 2024-12-29 ENCOUNTER — ANESTHESIA EVENT (OUTPATIENT)
Dept: OPERATING ROOM | Facility: HOSPITAL | Age: 84
End: 2024-12-29
Payer: MEDICARE

## 2024-12-30 ENCOUNTER — HOSPITAL ENCOUNTER (OUTPATIENT)
Dept: NEUROLOGY | Facility: HOSPITAL | Age: 84
Setting detail: SURGERY ADMIT
Discharge: HOME | End: 2024-12-30
Payer: MEDICARE

## 2024-12-30 ENCOUNTER — ANESTHESIA (OUTPATIENT)
Dept: OPERATING ROOM | Facility: HOSPITAL | Age: 84
End: 2024-12-30
Payer: MEDICARE

## 2024-12-30 ENCOUNTER — HOSPITAL ENCOUNTER (INPATIENT)
Facility: HOSPITAL | Age: 84
LOS: 1 days | Discharge: HOME | DRG: 038 | End: 2024-12-31
Attending: SURGERY | Admitting: SURGERY
Payer: MEDICARE

## 2024-12-30 DIAGNOSIS — I65.22 SYMPTOMATIC STENOSIS OF LEFT CAROTID ARTERY: Primary | ICD-10-CM

## 2024-12-30 DIAGNOSIS — N18.32 STAGE 3B CHRONIC KIDNEY DISEASE (CKD) (MULTI): ICD-10-CM

## 2024-12-30 DIAGNOSIS — Z98.890 S/P CAROTID ENDARTERECTOMY: ICD-10-CM

## 2024-12-30 LAB
ACT BLD: 131 SEC (ref 83–199)
ACT BLD: 134 SEC (ref 83–199)
ACT BLD: 227 SEC (ref 83–199)
GLUCOSE BLD MANUAL STRIP-MCNC: 129 MG/DL (ref 74–99)
GLUCOSE BLD MANUAL STRIP-MCNC: 75 MG/DL (ref 74–99)
GLUCOSE BLD MANUAL STRIP-MCNC: 82 MG/DL (ref 74–99)
GLUCOSE BLD MANUAL STRIP-MCNC: 96 MG/DL (ref 74–99)

## 2024-12-30 PROCEDURE — 2500000005 HC RX 250 GENERAL PHARMACY W/O HCPCS

## 2024-12-30 PROCEDURE — 2500000001 HC RX 250 WO HCPCS SELF ADMINISTERED DRUGS (ALT 637 FOR MEDICARE OP)

## 2024-12-30 PROCEDURE — 3600000009 HC OR TIME - EACH INCREMENTAL 1 MINUTE - PROCEDURE LEVEL FOUR: Performed by: SURGERY

## 2024-12-30 PROCEDURE — 35301 RECHANNELING OF ARTERY: CPT | Performed by: SURGERY

## 2024-12-30 PROCEDURE — 3700000001 HC GENERAL ANESTHESIA TIME - INITIAL BASE CHARGE: Performed by: SURGERY

## 2024-12-30 PROCEDURE — 88311 DECALCIFY TISSUE: CPT | Mod: TC,SUR | Performed by: NURSE PRACTITIONER

## 2024-12-30 PROCEDURE — 2500000004 HC RX 250 GENERAL PHARMACY W/ HCPCS (ALT 636 FOR OP/ED): Performed by: STUDENT IN AN ORGANIZED HEALTH CARE EDUCATION/TRAINING PROGRAM

## 2024-12-30 PROCEDURE — 7100000001 HC RECOVERY ROOM TIME - INITIAL BASE CHARGE: Performed by: SURGERY

## 2024-12-30 PROCEDURE — 82947 ASSAY GLUCOSE BLOOD QUANT: CPT

## 2024-12-30 PROCEDURE — 2060000001 HC INTERMEDIATE ICU ROOM DAILY

## 2024-12-30 PROCEDURE — 36620 INSERTION CATHETER ARTERY: CPT | Performed by: STUDENT IN AN ORGANIZED HEALTH CARE EDUCATION/TRAINING PROGRAM

## 2024-12-30 PROCEDURE — 03CL0ZZ EXTIRPATION OF MATTER FROM LEFT INTERNAL CAROTID ARTERY, OPEN APPROACH: ICD-10-PCS | Performed by: SURGERY

## 2024-12-30 PROCEDURE — 2500000005 HC RX 250 GENERAL PHARMACY W/O HCPCS: Performed by: SURGERY

## 2024-12-30 PROCEDURE — 3600000004 HC OR TIME - INITIAL BASE CHARGE - PROCEDURE LEVEL FOUR: Performed by: SURGERY

## 2024-12-30 PROCEDURE — 85347 COAGULATION TIME ACTIVATED: CPT

## 2024-12-30 PROCEDURE — 7100000002 HC RECOVERY ROOM TIME - EACH INCREMENTAL 1 MINUTE: Performed by: SURGERY

## 2024-12-30 PROCEDURE — A35301 PR THROMBOENDARTECTMY NECK,NECK INCIS: Performed by: STUDENT IN AN ORGANIZED HEALTH CARE EDUCATION/TRAINING PROGRAM

## 2024-12-30 PROCEDURE — 2500000004 HC RX 250 GENERAL PHARMACY W/ HCPCS (ALT 636 FOR OP/ED)

## 2024-12-30 PROCEDURE — 2500000004 HC RX 250 GENERAL PHARMACY W/ HCPCS (ALT 636 FOR OP/ED): Performed by: ANESTHESIOLOGIST ASSISTANT

## 2024-12-30 PROCEDURE — 2500000004 HC RX 250 GENERAL PHARMACY W/ HCPCS (ALT 636 FOR OP/ED): Performed by: SURGERY

## 2024-12-30 PROCEDURE — 99100 ANES PT EXTEME AGE<1 YR&>70: CPT | Performed by: STUDENT IN AN ORGANIZED HEALTH CARE EDUCATION/TRAINING PROGRAM

## 2024-12-30 PROCEDURE — 2780000003 HC OR 278 NO HCPCS: Performed by: SURGERY

## 2024-12-30 PROCEDURE — 2720000007 HC OR 272 NO HCPCS: Performed by: SURGERY

## 2024-12-30 PROCEDURE — 03CN0ZZ EXTIRPATION OF MATTER FROM LEFT EXTERNAL CAROTID ARTERY, OPEN APPROACH: ICD-10-PCS | Performed by: SURGERY

## 2024-12-30 PROCEDURE — 3700000002 HC GENERAL ANESTHESIA TIME - EACH INCREMENTAL 1 MINUTE: Performed by: SURGERY

## 2024-12-30 PROCEDURE — 95940 IONM IN OPERATNG ROOM 15 MIN: CPT | Mod: TC

## 2024-12-30 RX ORDER — ONDANSETRON HYDROCHLORIDE 2 MG/ML
INJECTION, SOLUTION INTRAVENOUS AS NEEDED
Status: DISCONTINUED | OUTPATIENT
Start: 2024-12-30 | End: 2024-12-30

## 2024-12-30 RX ORDER — PROPOFOL 10 MG/ML
INJECTION, EMULSION INTRAVENOUS CONTINUOUS PRN
Status: DISCONTINUED | OUTPATIENT
Start: 2024-12-30 | End: 2024-12-30

## 2024-12-30 RX ORDER — LIDOCAINE HYDROCHLORIDE 10 MG/ML
INJECTION, SOLUTION INFILTRATION; PERINEURAL AS NEEDED
Status: DISCONTINUED | OUTPATIENT
Start: 2024-12-30 | End: 2024-12-30

## 2024-12-30 RX ORDER — ONDANSETRON HYDROCHLORIDE 2 MG/ML
4 INJECTION, SOLUTION INTRAVENOUS ONCE AS NEEDED
Status: DISCONTINUED | OUTPATIENT
Start: 2024-12-30 | End: 2024-12-30 | Stop reason: HOSPADM

## 2024-12-30 RX ORDER — ATORVASTATIN CALCIUM 80 MG/1
80 TABLET, FILM COATED ORAL NIGHTLY
Status: DISCONTINUED | OUTPATIENT
Start: 2024-12-30 | End: 2024-12-31 | Stop reason: HOSPADM

## 2024-12-30 RX ORDER — LABETALOL HYDROCHLORIDE 5 MG/ML
5 INJECTION, SOLUTION INTRAVENOUS ONCE AS NEEDED
Status: DISCONTINUED | OUTPATIENT
Start: 2024-12-30 | End: 2024-12-30 | Stop reason: HOSPADM

## 2024-12-30 RX ORDER — PROPOFOL 10 MG/ML
INJECTION, EMULSION INTRAVENOUS AS NEEDED
Status: DISCONTINUED | OUTPATIENT
Start: 2024-12-30 | End: 2024-12-30

## 2024-12-30 RX ORDER — SODIUM CHLORIDE 0.9 G/100ML
IRRIGANT IRRIGATION AS NEEDED
Status: DISCONTINUED | OUTPATIENT
Start: 2024-12-30 | End: 2024-12-30 | Stop reason: HOSPADM

## 2024-12-30 RX ORDER — CLOPIDOGREL BISULFATE 75 MG/1
75 TABLET ORAL DAILY
Status: DISCONTINUED | OUTPATIENT
Start: 2024-12-31 | End: 2024-12-31 | Stop reason: HOSPADM

## 2024-12-30 RX ORDER — ALBUTEROL SULFATE 90 UG/1
2 INHALANT RESPIRATORY (INHALATION) EVERY 4 HOURS PRN
Status: DISCONTINUED | OUTPATIENT
Start: 2024-12-30 | End: 2024-12-31 | Stop reason: HOSPADM

## 2024-12-30 RX ORDER — POLYMYXIN B 500000 [USP'U]/1
INJECTION, POWDER, LYOPHILIZED, FOR SOLUTION INTRAMUSCULAR; INTRATHECAL; INTRAVENOUS; OPHTHALMIC AS NEEDED
Status: DISCONTINUED | OUTPATIENT
Start: 2024-12-30 | End: 2024-12-30 | Stop reason: HOSPADM

## 2024-12-30 RX ORDER — OXYCODONE HYDROCHLORIDE 5 MG/1
5 TABLET ORAL EVERY 4 HOURS PRN
Status: DISCONTINUED | OUTPATIENT
Start: 2024-12-30 | End: 2024-12-30 | Stop reason: HOSPADM

## 2024-12-30 RX ORDER — HEPARIN SODIUM 1000 [USP'U]/ML
INJECTION, SOLUTION INTRAVENOUS; SUBCUTANEOUS AS NEEDED
Status: DISCONTINUED | OUTPATIENT
Start: 2024-12-30 | End: 2024-12-30

## 2024-12-30 RX ORDER — AMLODIPINE BESYLATE 10 MG/1
10 TABLET ORAL DAILY
Status: DISCONTINUED | OUTPATIENT
Start: 2024-12-31 | End: 2024-12-31 | Stop reason: HOSPADM

## 2024-12-30 RX ORDER — PANTOPRAZOLE SODIUM 40 MG/1
40 TABLET, DELAYED RELEASE ORAL
Status: DISCONTINUED | OUTPATIENT
Start: 2024-12-31 | End: 2024-12-31 | Stop reason: HOSPADM

## 2024-12-30 RX ORDER — HYDRALAZINE HYDROCHLORIDE 20 MG/ML
5 INJECTION INTRAMUSCULAR; INTRAVENOUS EVERY 30 MIN PRN
Status: COMPLETED | OUTPATIENT
Start: 2024-12-30 | End: 2024-12-30

## 2024-12-30 RX ORDER — LABETALOL HYDROCHLORIDE 5 MG/ML
5 INJECTION, SOLUTION INTRAVENOUS ONCE AS NEEDED
Status: COMPLETED | OUTPATIENT
Start: 2024-12-30 | End: 2024-12-30

## 2024-12-30 RX ORDER — HEPARIN SODIUM 5000 [USP'U]/ML
5000 INJECTION, SOLUTION INTRAVENOUS; SUBCUTANEOUS EVERY 8 HOURS
Status: DISCONTINUED | OUTPATIENT
Start: 2024-12-30 | End: 2024-12-31 | Stop reason: HOSPADM

## 2024-12-30 RX ORDER — HYDRALAZINE HYDROCHLORIDE 10 MG/1
10 TABLET, FILM COATED ORAL 3 TIMES DAILY
Status: DISCONTINUED | OUTPATIENT
Start: 2024-12-30 | End: 2024-12-31 | Stop reason: HOSPADM

## 2024-12-30 RX ORDER — CEFAZOLIN SODIUM 2 G/100ML
2 INJECTION, SOLUTION INTRAVENOUS EVERY 8 HOURS
Status: CANCELLED | OUTPATIENT
Start: 2024-12-30 | End: 2024-12-31

## 2024-12-30 RX ORDER — GLYCOPYRROLATE 0.2 MG/ML
INJECTION INTRAMUSCULAR; INTRAVENOUS AS NEEDED
Status: DISCONTINUED | OUTPATIENT
Start: 2024-12-30 | End: 2024-12-30

## 2024-12-30 RX ORDER — NORETHINDRONE AND ETHINYL ESTRADIOL 0.5-0.035
KIT ORAL CONTINUOUS PRN
Status: DISCONTINUED | OUTPATIENT
Start: 2024-12-30 | End: 2024-12-30

## 2024-12-30 RX ORDER — ROCURONIUM BROMIDE 10 MG/ML
INJECTION, SOLUTION INTRAVENOUS AS NEEDED
Status: DISCONTINUED | OUTPATIENT
Start: 2024-12-30 | End: 2024-12-30

## 2024-12-30 RX ORDER — PROTAMINE SULFATE 10 MG/ML
INJECTION, SOLUTION INTRAVENOUS AS NEEDED
Status: DISCONTINUED | OUTPATIENT
Start: 2024-12-30 | End: 2024-12-30

## 2024-12-30 RX ORDER — FENTANYL CITRATE 50 UG/ML
50 INJECTION, SOLUTION INTRAMUSCULAR; INTRAVENOUS EVERY 5 MIN PRN
Status: DISCONTINUED | OUTPATIENT
Start: 2024-12-30 | End: 2024-12-30 | Stop reason: HOSPADM

## 2024-12-30 RX ORDER — LIDOCAINE HYDROCHLORIDE 10 MG/ML
0.1 INJECTION, SOLUTION INFILTRATION; PERINEURAL ONCE
Status: DISCONTINUED | OUTPATIENT
Start: 2024-12-30 | End: 2024-12-30 | Stop reason: HOSPADM

## 2024-12-30 RX ORDER — ALBUTEROL SULFATE 0.83 MG/ML
2.5 SOLUTION RESPIRATORY (INHALATION) ONCE AS NEEDED
Status: DISCONTINUED | OUTPATIENT
Start: 2024-12-30 | End: 2024-12-30 | Stop reason: HOSPADM

## 2024-12-30 RX ORDER — INSULIN LISPRO 100 [IU]/ML
0-5 INJECTION, SOLUTION INTRAVENOUS; SUBCUTANEOUS
Status: DISCONTINUED | OUTPATIENT
Start: 2024-12-30 | End: 2024-12-31 | Stop reason: HOSPADM

## 2024-12-30 RX ORDER — CEFAZOLIN 1 G/1
INJECTION, POWDER, FOR SOLUTION INTRAVENOUS AS NEEDED
Status: DISCONTINUED | OUTPATIENT
Start: 2024-12-30 | End: 2024-12-30

## 2024-12-30 RX ORDER — FENTANYL CITRATE 50 UG/ML
INJECTION, SOLUTION INTRAMUSCULAR; INTRAVENOUS AS NEEDED
Status: DISCONTINUED | OUTPATIENT
Start: 2024-12-30 | End: 2024-12-30

## 2024-12-30 RX ORDER — PHENYLEPHRINE 10 MG/250 ML(40 MCG/ML)IN 0.9 % SOD.CHLORIDE INTRAVENOUS
CONTINUOUS PRN
Status: DISCONTINUED | OUTPATIENT
Start: 2024-12-30 | End: 2024-12-30

## 2024-12-30 RX ORDER — ASPIRIN 81 MG/1
81 TABLET ORAL DAILY
Status: DISCONTINUED | OUTPATIENT
Start: 2024-12-31 | End: 2024-12-31 | Stop reason: HOSPADM

## 2024-12-30 RX ORDER — PHENYLEPHRINE HYDROCHLORIDE 10 MG/ML
INJECTION INTRAVENOUS AS NEEDED
Status: DISCONTINUED | OUTPATIENT
Start: 2024-12-30 | End: 2024-12-30

## 2024-12-30 RX ORDER — LABETALOL HYDROCHLORIDE 5 MG/ML
10 INJECTION, SOLUTION INTRAVENOUS EVERY 2 HOUR PRN
Status: DISCONTINUED | OUTPATIENT
Start: 2024-12-30 | End: 2024-12-31 | Stop reason: HOSPADM

## 2024-12-30 RX ADMIN — PHENYLEPHRINE HYDROCHLORIDE 120 MCG: 10 INJECTION INTRAVENOUS at 14:17

## 2024-12-30 RX ADMIN — CEFAZOLIN 2 G: 1 INJECTION, POWDER, FOR SOLUTION INTRAMUSCULAR; INTRAVENOUS at 14:51

## 2024-12-30 RX ADMIN — HEPARIN SODIUM 7000 UNITS: 1000 INJECTION, SOLUTION INTRAVENOUS; SUBCUTANEOUS at 15:15

## 2024-12-30 RX ADMIN — LABETALOL HYDROCHLORIDE 5 MG: 5 INJECTION, SOLUTION INTRAVENOUS at 17:28

## 2024-12-30 RX ADMIN — ATORVASTATIN CALCIUM 80 MG: 80 TABLET, FILM COATED ORAL at 21:57

## 2024-12-30 RX ADMIN — EPHEDRINE SULFATE 10 MG: 50 INJECTION, SOLUTION INTRAVENOUS at 15:13

## 2024-12-30 RX ADMIN — LIDOCAINE HYDROCHLORIDE 100 ML: 10 INJECTION, SOLUTION INFILTRATION; PERINEURAL at 14:17

## 2024-12-30 RX ADMIN — HYDRALAZINE HYDROCHLORIDE 10 MG: 10 TABLET ORAL at 21:57

## 2024-12-30 RX ADMIN — ROCURONIUM 10 MG: 50 INJECTION, SOLUTION INTRAVENOUS at 15:26

## 2024-12-30 RX ADMIN — ROCURONIUM 10 MG: 50 INJECTION, SOLUTION INTRAVENOUS at 15:45

## 2024-12-30 RX ADMIN — PHENYLEPHRINE-NACL IV SOLUTION 10 MG/250ML-0.9% 0.2 MCG/KG/MIN: 10-0.9/25 SOLUTION at 14:54

## 2024-12-30 RX ADMIN — HEPARIN SODIUM 5000 UNITS: 5000 INJECTION, SOLUTION INTRAVENOUS; SUBCUTANEOUS at 21:57

## 2024-12-30 RX ADMIN — PHENYLEPHRINE HYDROCHLORIDE 120 MCG: 10 INJECTION INTRAVENOUS at 14:28

## 2024-12-30 RX ADMIN — HYDRALAZINE HYDROCHLORIDE 5 MG: 20 INJECTION INTRAMUSCULAR; INTRAVENOUS at 19:49

## 2024-12-30 RX ADMIN — SODIUM CHLORIDE, SODIUM LACTATE, POTASSIUM CHLORIDE, AND CALCIUM CHLORIDE: 600; 310; 30; 20 INJECTION, SOLUTION INTRAVENOUS at 14:16

## 2024-12-30 RX ADMIN — PROTAMINE SULFATE 40 MG: 10 INJECTION, SOLUTION INTRAVENOUS at 16:02

## 2024-12-30 RX ADMIN — PROPOFOL 140 MG: 10 INJECTION, EMULSION INTRAVENOUS at 14:17

## 2024-12-30 RX ADMIN — Medication 6 L/MIN: at 17:15

## 2024-12-30 RX ADMIN — REMIFENTANIL HYDROCHLORIDE 0.05 MCG/KG/MIN: 1 INJECTION, POWDER, LYOPHILIZED, FOR SOLUTION INTRAVENOUS at 16:21

## 2024-12-30 RX ADMIN — FENTANYL CITRATE 50 MCG: 50 INJECTION, SOLUTION INTRAMUSCULAR; INTRAVENOUS at 14:17

## 2024-12-30 RX ADMIN — SUGAMMADEX 200 MG: 100 INJECTION, SOLUTION INTRAVENOUS at 16:44

## 2024-12-30 RX ADMIN — ROCURONIUM 70 MG: 50 INJECTION, SOLUTION INTRAVENOUS at 14:18

## 2024-12-30 RX ADMIN — HYDRALAZINE HYDROCHLORIDE 5 MG: 20 INJECTION INTRAMUSCULAR; INTRAVENOUS at 18:05

## 2024-12-30 RX ADMIN — FENTANYL CITRATE 50 MCG: 50 INJECTION, SOLUTION INTRAMUSCULAR; INTRAVENOUS at 14:46

## 2024-12-30 RX ADMIN — ONDANSETRON 4 MG: 2 INJECTION, SOLUTION INTRAMUSCULAR; INTRAVENOUS at 16:27

## 2024-12-30 RX ADMIN — GLYCOPYRROLATE 0.2 MG: 0.2 INJECTION INTRAMUSCULAR; INTRAVENOUS at 15:04

## 2024-12-30 RX ADMIN — PROPOFOL 30 MCG/KG/MIN: 10 INJECTION, EMULSION INTRAVENOUS at 14:45

## 2024-12-30 SDOH — HEALTH STABILITY: PHYSICAL HEALTH: ON AVERAGE, HOW MANY DAYS PER WEEK DO YOU ENGAGE IN MODERATE TO STRENUOUS EXERCISE (LIKE A BRISK WALK)?: 0 DAYS

## 2024-12-30 SDOH — SOCIAL STABILITY: SOCIAL INSECURITY: HAVE YOU HAD THOUGHTS OF HARMING ANYONE ELSE?: NO

## 2024-12-30 SDOH — ECONOMIC STABILITY: INCOME INSECURITY: IN THE PAST 12 MONTHS HAS THE ELECTRIC, GAS, OIL, OR WATER COMPANY THREATENED TO SHUT OFF SERVICES IN YOUR HOME?: NO

## 2024-12-30 SDOH — SOCIAL STABILITY: SOCIAL INSECURITY: DO YOU FEEL UNSAFE GOING BACK TO THE PLACE WHERE YOU ARE LIVING?: NO

## 2024-12-30 SDOH — HEALTH STABILITY: MENTAL HEALTH: HOW OFTEN DO YOU HAVE SIX OR MORE DRINKS ON ONE OCCASION?: NEVER

## 2024-12-30 SDOH — HEALTH STABILITY: MENTAL HEALTH
DO YOU FEEL STRESS - TENSE, RESTLESS, NERVOUS, OR ANXIOUS, OR UNABLE TO SLEEP AT NIGHT BECAUSE YOUR MIND IS TROUBLED ALL THE TIME - THESE DAYS?: NOT AT ALL

## 2024-12-30 SDOH — SOCIAL STABILITY: SOCIAL INSECURITY: WITHIN THE LAST YEAR, HAVE YOU BEEN AFRAID OF YOUR PARTNER OR EX-PARTNER?: NO

## 2024-12-30 SDOH — ECONOMIC STABILITY: TRANSPORTATION INSECURITY: IN THE PAST 12 MONTHS, HAS LACK OF TRANSPORTATION KEPT YOU FROM MEDICAL APPOINTMENTS OR FROM GETTING MEDICATIONS?: NO

## 2024-12-30 SDOH — SOCIAL STABILITY: SOCIAL INSECURITY: HAS ANYONE EVER THREATENED TO HURT YOUR FAMILY OR YOUR PETS?: NO

## 2024-12-30 SDOH — HEALTH STABILITY: MENTAL HEALTH: HOW OFTEN DO YOU HAVE A DRINK CONTAINING ALCOHOL?: NEVER

## 2024-12-30 SDOH — ECONOMIC STABILITY: HOUSING INSECURITY: IN THE PAST 12 MONTHS, HOW MANY TIMES HAVE YOU MOVED WHERE YOU WERE LIVING?: 1

## 2024-12-30 SDOH — SOCIAL STABILITY: SOCIAL INSECURITY: ARE YOU OR HAVE YOU BEEN THREATENED OR ABUSED PHYSICALLY, EMOTIONALLY, OR SEXUALLY BY ANYONE?: NO

## 2024-12-30 SDOH — ECONOMIC STABILITY: FOOD INSECURITY: WITHIN THE PAST 12 MONTHS, YOU WORRIED THAT YOUR FOOD WOULD RUN OUT BEFORE YOU GOT THE MONEY TO BUY MORE.: NEVER TRUE

## 2024-12-30 SDOH — SOCIAL STABILITY: SOCIAL NETWORK: IN A TYPICAL WEEK, HOW MANY TIMES DO YOU TALK ON THE PHONE WITH FAMILY, FRIENDS, OR NEIGHBORS?: ONCE A WEEK

## 2024-12-30 SDOH — SOCIAL STABILITY: SOCIAL INSECURITY: DOES ANYONE TRY TO KEEP YOU FROM HAVING/CONTACTING OTHER FRIENDS OR DOING THINGS OUTSIDE YOUR HOME?: NO

## 2024-12-30 SDOH — ECONOMIC STABILITY: FOOD INSECURITY: WITHIN THE PAST 12 MONTHS, THE FOOD YOU BOUGHT JUST DIDN'T LAST AND YOU DIDN'T HAVE MONEY TO GET MORE.: NEVER TRUE

## 2024-12-30 SDOH — HEALTH STABILITY: PHYSICAL HEALTH
HOW OFTEN DO YOU NEED TO HAVE SOMEONE HELP YOU WHEN YOU READ INSTRUCTIONS, PAMPHLETS, OR OTHER WRITTEN MATERIAL FROM YOUR DOCTOR OR PHARMACY?: SOMETIMES

## 2024-12-30 SDOH — SOCIAL STABILITY: SOCIAL INSECURITY: DO YOU FEEL ANYONE HAS EXPLOITED OR TAKEN ADVANTAGE OF YOU FINANCIALLY OR OF YOUR PERSONAL PROPERTY?: NO

## 2024-12-30 SDOH — ECONOMIC STABILITY: FOOD INSECURITY: HOW HARD IS IT FOR YOU TO PAY FOR THE VERY BASICS LIKE FOOD, HOUSING, MEDICAL CARE, AND HEATING?: NOT VERY HARD

## 2024-12-30 SDOH — SOCIAL STABILITY: SOCIAL INSECURITY: WITHIN THE LAST YEAR, HAVE YOU BEEN HUMILIATED OR EMOTIONALLY ABUSED IN OTHER WAYS BY YOUR PARTNER OR EX-PARTNER?: NO

## 2024-12-30 SDOH — SOCIAL STABILITY: SOCIAL INSECURITY: ARE THERE ANY APPARENT SIGNS OF INJURIES/BEHAVIORS THAT COULD BE RELATED TO ABUSE/NEGLECT?: NO

## 2024-12-30 SDOH — HEALTH STABILITY: MENTAL HEALTH: HOW MANY DRINKS CONTAINING ALCOHOL DO YOU HAVE ON A TYPICAL DAY WHEN YOU ARE DRINKING?: PATIENT DOES NOT DRINK

## 2024-12-30 SDOH — ECONOMIC STABILITY: HOUSING INSECURITY: IN THE LAST 12 MONTHS, WAS THERE A TIME WHEN YOU WERE NOT ABLE TO PAY THE MORTGAGE OR RENT ON TIME?: NO

## 2024-12-30 SDOH — SOCIAL STABILITY: SOCIAL INSECURITY: HAVE YOU HAD ANY THOUGHTS OF HARMING ANYONE ELSE?: NO

## 2024-12-30 SDOH — SOCIAL STABILITY: SOCIAL NETWORK: HOW OFTEN DO YOU GET TOGETHER WITH FRIENDS OR RELATIVES?: ONCE A WEEK

## 2024-12-30 SDOH — HEALTH STABILITY: PHYSICAL HEALTH: ON AVERAGE, HOW MANY MINUTES DO YOU ENGAGE IN EXERCISE AT THIS LEVEL?: 0 MIN

## 2024-12-30 SDOH — SOCIAL STABILITY: SOCIAL INSECURITY: WERE YOU ABLE TO COMPLETE ALL THE BEHAVIORAL HEALTH SCREENINGS?: YES

## 2024-12-30 SDOH — ECONOMIC STABILITY: HOUSING INSECURITY: AT ANY TIME IN THE PAST 12 MONTHS, WERE YOU HOMELESS OR LIVING IN A SHELTER (INCLUDING NOW)?: NO

## 2024-12-30 SDOH — SOCIAL STABILITY: SOCIAL INSECURITY: ABUSE: ADULT

## 2024-12-30 ASSESSMENT — ACTIVITIES OF DAILY LIVING (ADL)
HEARING - RIGHT EAR: FUNCTIONAL
DRESSING YOURSELF: INDEPENDENT
FEEDING YOURSELF: INDEPENDENT
WALKS IN HOME: INDEPENDENT
BATHING: INDEPENDENT
GROOMING: INDEPENDENT
TOILETING: INDEPENDENT
ADEQUATE_TO_COMPLETE_ADL: YES
LACK_OF_TRANSPORTATION: NO
JUDGMENT_ADEQUATE_SAFELY_COMPLETE_DAILY_ACTIVITIES: YES
PATIENT'S MEMORY ADEQUATE TO SAFELY COMPLETE DAILY ACTIVITIES?: YES
HEARING - LEFT EAR: FUNCTIONAL

## 2024-12-30 ASSESSMENT — PAIN SCALES - GENERAL

## 2024-12-30 ASSESSMENT — LIFESTYLE VARIABLES
SUBSTANCE_ABUSE_PAST_12_MONTHS: NO
SKIP TO QUESTIONS 9-10: 1
PRESCIPTION_ABUSE_PAST_12_MONTHS: NO
AUDIT-C TOTAL SCORE: 0

## 2024-12-30 ASSESSMENT — PAIN - FUNCTIONAL ASSESSMENT
PAIN_FUNCTIONAL_ASSESSMENT: 0-10
PAIN_FUNCTIONAL_ASSESSMENT: UNABLE TO SELF-REPORT
PAIN_FUNCTIONAL_ASSESSMENT: 0-10
PAIN_FUNCTIONAL_ASSESSMENT: UNABLE TO SELF-REPORT
PAIN_FUNCTIONAL_ASSESSMENT: UNABLE TO SELF-REPORT

## 2024-12-30 NOTE — ANESTHESIA PROCEDURE NOTES
Peripheral IV  Date/Time: 12/30/2024 2:20 PM  Inserted by: Marilee Levine MD    Placement  Needle size: 18 G  Laterality: left  Location: hand  Site prep: chlorhexidine  Technique: anatomical landmarks  Attempts: 1

## 2024-12-30 NOTE — ANESTHESIA PROCEDURE NOTES
Airway  Date/Time: 12/30/2024 2:22 PM  Urgency: elective    Airway not difficult    Staffing  Performed: MINA   Authorized by: Marilee Levine MD    Performed by: CHRISTOPHER Trisatn  Patient location during procedure: OR    Indications and Patient Condition  Indications for airway management: anesthesia  Spontaneous Ventilation: absent  Sedation level: deep  Preoxygenated: yes  Patient position: sniffing  Mask difficulty assessment: 1 - vent by mask    Final Airway Details  Final airway type: endotracheal airway      Successful airway: ETT  Cuffed: yes   Successful intubation technique: direct laryngoscopy  Facilitating devices/methods: intubating stylet and cricoid pressure  Endotracheal tube insertion site: oral  Blade: Josh  Blade size: #4  ETT size (mm): 7.5  Cormack-Lehane Classification: grade I - full view of glottis  Placement verified by: chest auscultation and capnometry   Measured from: gums  ETT to gums (cm): 22  Number of attempts at approach: 1  Number of other approaches attempted: 0

## 2024-12-30 NOTE — ANESTHESIA POSTPROCEDURE EVALUATION
Patient: Mann Abrams    Procedure Summary       Date: 12/30/24 Room / Location: [facility] OR 16 / Virtual [facility] OR    Anesthesia Start: 1407 Anesthesia Stop: 1720    Procedure: Endarterectomy Carotid Artery (Left) Diagnosis:       Symptomatic stenosis of left carotid artery      (Symptomatic stenosis of left carotid artery [I65.22])    Surgeons: Sim Castaneda MD Responsible Provider: Fred Ybarra MD    Anesthesia Type: general ASA Status: 3            Anesthesia Type: general    Vitals Value Taken Time   /78 12/30/24 1716   Temp 36.0 12/30/24 1721   Pulse 77 12/30/24 1718   Resp 10 12/30/24 1718   SpO2 100 % 12/30/24 1718   Vitals shown include unfiled device data.    Anesthesia Post Evaluation    Patient location during evaluation: PACU  Patient participation: complete - patient participated  Level of consciousness: awake  Pain management: adequate  Airway patency: patent  Cardiovascular status: acceptable  Respiratory status: acceptable and face mask  Hydration status: acceptable  Postoperative Nausea and Vomiting: none        No notable events documented.

## 2024-12-30 NOTE — SIGNIFICANT EVENT
POST OP PLAN OF CARE    S/p Left carotid endarterectomy with eversion for symptomatic carotid stenosis    Exam  Left neck incision with dermabond. No drain, no hematoma.   Neuro intact, following commands in x4 extremities.     Plan  To stepdown floor 7 [address]  2 hours PACU stay for NV checks followed by q2h NV checks  pain control  Strict SBP goal <140. Restart home hydralazine TID  PRN labetolol 10mg q2h  Telemetry  Incentive spirometer  Aspirin and plavix starting tomorrow  Clear diet as tolerated after 4 hours if passes bedside swallow with nursing  remove nogueira at midnight  24 hours Ancef for periop antibiotics  SQH for DVT Ppx  Wound: left neck incision with dermabond    Discussed with Dr. Castaneda.     Antonella Ley MD  PGY-2 Vascular Surgery Resident  [id number]

## 2024-12-30 NOTE — ANESTHESIA PREPROCEDURE EVALUATION
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[facility])   (+) Chest pain, exertional   (+) DM (diabetes mellitus), secondary, w/peripheral vascular complications   (+) Essential hypertension   (+) Hyperlipidemia   (+) Hypertension, uncontrolled   (+) PFO (patent foramen ovale) ([facility])   (+) Primary hypertension   (+) Pure hypercholesterolemia   (+) Renal artery stenosis, native, bilateral ([facility]-H[facility])      Pulmonary   (+) Dyspnea on minimal exertion   (+) Other emphysema (Multi)   (+) SOB (shortness of breath) on exertion      Neuro   (+) Asymptomatic bilateral carotid artery stenosis   (+) Bilateral carotid artery stenosis   (+) Carotid artery stenosis without cerebral infarction   (+) Cerebrovascular accident (CVA), unspecified mechanism (Multi)   (+) History of ischemic stroke   (+) Occlusion of right carotid artery   (+) Symptomatic stenosis of left carotid artery      GI   (+) GERD (gastroesophageal reflux disease)   (+) Hiatal hernia      Endocrine   (+) Chronic kidney disease in type 2 diabetes mellitus   (+) Diabetes mellitus (Multi)   (+) Type 2 diabetes mellitus   (+) Type 2 diabetes mellitus with stage 3b chronic kidney disease, without long-term current use of insulin (Multi)      Hematology   (+) Iron deficiency anemia due to chronic blood loss   (+) Mild anemia      Musculoskeletal   (+) Osteoarthritis      Nervous   (+) Dysarthria   (+) Intervertebral cervical disc disorder with myelopathy, cervical region      Respiratory   (+) Wheezing      Circulatory   (+) Angiectasia      Digestive   (+) Non-recurrent unilateral inguinal hernia without obstruction or gangrene       Genitourinary   (+) Chronic renal disease, stage IV (Multi)   (+) Stage 3b chronic kidney disease (CKD) (Multi)       Clinical information reviewed:     Meds               NPO Detail:  No data recorded     Physical Exam    Airway  Mallampati: II  TM distance: >3 FB  Neck ROM: full     Cardiovascular   Rhythm: regular  Rate: normal     Dental   (+) upper dentures, lower dentures     Pulmonary   Breath sounds clear to auscultation     Abdominal   Abdomen: soft       Other findings: Edentulous.          Anesthesia Plan    History of general anesthesia?: yes  History of complications of general anesthesia?: no    ASA 3     general     intravenous induction   Postoperative administration of opioids is intended.  Anesthetic plan and risks discussed with patient.  Use of blood products discussed with patient who consented to blood products.    Plan discussed with CAA, CRNA and attending.

## 2024-12-30 NOTE — ANESTHESIA PROCEDURE NOTES
Arterial Line:    Date/Time: 12/30/2024 2:42 PM    Staffing  Performed: CAA   Authorized by: Marilee Levine MD    Performed by: Katt Ge MD    An arterial line was placed. in the OR for the following indication(s): continuous blood pressure monitoring and blood sampling needed.    A 20 gauge (size), 3/4 inch (length), Arrow (type) catheter was placed into the Right radial artery, secured by Tegaderm,   Seldinger technique used.  Events:  patient tolerated procedure well with no complications.

## 2024-12-30 NOTE — BRIEF OP NOTE
Date: 2024  OR Location: [facility] OR    Name: Mann Abrams, : 1940, Age: 84 y.o., MRN: [id number], Sex: male    Diagnosis  Pre-op Diagnosis      * Symptomatic stenosis of left carotid artery [I65.22] Post-op Diagnosis     * Symptomatic stenosis of left carotid artery [I65.22]     Procedures  Endarterectomy Carotid Artery  [id number] - MD TEAEC W/PATCH GRF CAROTID VERTB SUBCLAV NECK INC  Left carotid endarterectomy with eversion, primary closure    Surgeons      * Sim Castaneda - Primary    Resident/Fellow/Other Assistant:  Surgeons and Role:  * No surgeons found with a matching role *    Staff:   Circulator: Pat  Scrub Person: Maddy Chingub Person: Mayela Banda Scrub: Marko Banda Scrub: Cristobal Banda Circulator: Gwendolyn Banda Scrub: Apple    Anesthesia Staff: Anesthesiologist: Marilee Levine MD; Fred Ybarra MD  C-AA: CHRISTOPHER Tristan  Anesthesia Resident: Katt Ge MD    Procedure Summary  Anesthesia: General  ASA: III  Estimated Blood Loss: 20mL  Intra-op Medications:   Administrations occurring from 1330 to 1755 on 24:   Medication Name Total Dose   polymyxin B injection 500,000 Units   thrombin (bovine) 5000 Unit vial + gelatin absorbable 100 sponge topical mixture Cannot be calculated   sodium chloride 0.9 % irrigation solution 1,000 mL   heparin (porcine) 5,000 Units in sodium chloride 0.9% 500 mL irrigation 5,000 Units   ceFAZolin (Ancef) vial 1 g 2 g   fentaNYL (Sublimaze) injection 50 mcg/mL 100 mcg   glycopyrrolate (Robinul) injection 0.2 mg   heparin 1,000 units/mL 7,000 Units   LR bolus Cannot be calculated   lidocaine (Xylocaine) injection 1 % 100 mL   ondansetron (Zofran) 2 mg/mL injection 4 mg   phenylephrine (Ephraim-Synephrine) 10 mg/250 mL NS (40 mcg/mL) infusion 2.84 mg   phenylephrine (Ephraim-Synephrine) injection 240 mcg   propofol (Diprivan) infusion 10 mg/mL 868.54 mg   propofol (Diprivan) injection 10 mg/mL 140 mg   protamine 10 mg/mL 40 mg   remifentanil  (Ultiva) 1,000 mcg in sodium chloride 0.9% 50 mL (20 mcg/mL) infusion 0.04 mg   rocuronium (ZeMuron) 50 mg/5 mL injection 90 mg              Anesthesia Record               Intraprocedure I/O Totals          Intake    Remifentanil Drip 0.00 mL    The total shown is the total volume documented since Anesthesia Start was filed.    Propofol Drip 0.00 mL    The total shown is the total volume documented since Anesthesia Start was filed.    Phenylephrine Drip 0.00 mL    The total shown is the total volume documented since Anesthesia Start was filed.    Total Intake 0 mL       Output    Urine 70 mL    Total Output 70 mL       Net    Net Volume -70 mL          Specimen:   ID Type Source Tests Collected by Time   1 : Marek Chorotid Plaque Tissue [facility] SURGICAL PATHOLOGY EXAM Sim Castaneda MD 12/30/2024 8676          Findings: mixed soft/hard atherosclerotic plaque at common carotid and distal internal carotid. Performed eversion with good endpoint. No evidence of flap.  Appropriate signals with doppler. Hemostasis achieved at end of case with multiple agents.     Complications:  None; patient tolerated the procedure well.     Disposition: PACU - hemodynamically stable.  Condition: stable  Specimens Collected:   ID Type Source Tests Collected by Time   1 : Marek Chorotid Plaque Tissue PLAQUE SURGICAL PATHOLOGY EXAM Sim Castaneda MD 12/30/2024 8280     Attending Attestation:     Sim Castaneda  Phone Number: [phone]

## 2024-12-31 VITALS
HEIGHT: 66 IN | OXYGEN SATURATION: 95 % | RESPIRATION RATE: 16 BRPM | SYSTOLIC BLOOD PRESSURE: 108 MMHG | HEART RATE: 91 BPM | BODY MASS INDEX: 23.46 KG/M2 | WEIGHT: 145.94 LBS | DIASTOLIC BLOOD PRESSURE: 68 MMHG | TEMPERATURE: 97.9 F

## 2024-12-31 DIAGNOSIS — I65.22 CAROTID ARTERY STENOSIS, SYMPTOMATIC, LEFT: Primary | ICD-10-CM

## 2024-12-31 LAB
ANION GAP SERPL CALC-SCNC: 15 MMOL/L (ref 10–20)
BUN SERPL-MCNC: 30 MG/DL (ref 6–23)
CALCIUM SERPL-MCNC: 8.6 MG/DL (ref 8.6–10.6)
CHLORIDE SERPL-SCNC: 108 MMOL/L (ref 98–107)
CO2 SERPL-SCNC: 18 MMOL/L (ref 21–32)
CREAT SERPL-MCNC: 2.25 MG/DL (ref 0.5–1.3)
EGFRCR SERPLBLD CKD-EPI 2021: 28 ML/MIN/1.73M*2
ERYTHROCYTE [DISTWIDTH] IN BLOOD BY AUTOMATED COUNT: 13.4 % (ref 11.5–14.5)
GLUCOSE BLD MANUAL STRIP-MCNC: 84 MG/DL (ref 74–99)
GLUCOSE SERPL-MCNC: 80 MG/DL (ref 74–99)
HCT VFR BLD AUTO: 32.4 % (ref 41–52)
HGB BLD-MCNC: 10.5 G/DL (ref 13.5–17.5)
MAGNESIUM SERPL-MCNC: 2.23 MG/DL (ref 1.6–2.4)
MCH RBC QN AUTO: 31.5 PG (ref 26–34)
MCHC RBC AUTO-ENTMCNC: 32.4 G/DL (ref 32–36)
MCV RBC AUTO: 97 FL (ref 80–100)
NRBC BLD-RTO: 0 /100 WBCS (ref 0–0)
PHOSPHATE SERPL-MCNC: 3.8 MG/DL (ref 2.5–4.9)
PLATELET # BLD AUTO: 216 X10*3/UL (ref 150–450)
POTASSIUM SERPL-SCNC: 5.4 MMOL/L (ref 3.5–5.3)
RBC # BLD AUTO: 3.33 X10*6/UL (ref 4.5–5.9)
SODIUM SERPL-SCNC: 136 MMOL/L (ref 136–145)
WBC # BLD AUTO: 8.1 X10*3/UL (ref 4.4–11.3)

## 2024-12-31 PROCEDURE — 2500000001 HC RX 250 WO HCPCS SELF ADMINISTERED DRUGS (ALT 637 FOR MEDICARE OP)

## 2024-12-31 PROCEDURE — 82947 ASSAY GLUCOSE BLOOD QUANT: CPT

## 2024-12-31 PROCEDURE — 83735 ASSAY OF MAGNESIUM: CPT

## 2024-12-31 PROCEDURE — 80048 BASIC METABOLIC PNL TOTAL CA: CPT

## 2024-12-31 PROCEDURE — 99239 HOSP IP/OBS DSCHRG MGMT >30: CPT | Performed by: NURSE PRACTITIONER

## 2024-12-31 PROCEDURE — 85027 COMPLETE CBC AUTOMATED: CPT

## 2024-12-31 PROCEDURE — 2500000004 HC RX 250 GENERAL PHARMACY W/ HCPCS (ALT 636 FOR OP/ED)

## 2024-12-31 PROCEDURE — 2500000002 HC RX 250 W HCPCS SELF ADMINISTERED DRUGS (ALT 637 FOR MEDICARE OP, ALT 636 FOR OP/ED): Performed by: NURSE PRACTITIONER

## 2024-12-31 PROCEDURE — 84100 ASSAY OF PHOSPHORUS: CPT

## 2024-12-31 PROCEDURE — 36415 COLL VENOUS BLD VENIPUNCTURE: CPT

## 2024-12-31 RX ORDER — OXYCODONE HYDROCHLORIDE 5 MG/1
5 TABLET ORAL EVERY 6 HOURS PRN
Qty: 12 TABLET | Refills: 0 | Status: SHIPPED | OUTPATIENT
Start: 2024-12-31 | End: 2025-01-03

## 2024-12-31 RX ORDER — TAMSULOSIN HYDROCHLORIDE 0.4 MG/1
0.4 CAPSULE ORAL DAILY
Status: DISCONTINUED | OUTPATIENT
Start: 2024-12-31 | End: 2024-12-31 | Stop reason: HOSPADM

## 2024-12-31 RX ORDER — ACETAMINOPHEN 325 MG/1
650 TABLET ORAL EVERY 6 HOURS PRN
Start: 2024-12-31

## 2024-12-31 RX ORDER — ACETAMINOPHEN 325 MG/1
650 TABLET ORAL EVERY 6 HOURS
Status: DISCONTINUED | OUTPATIENT
Start: 2024-12-31 | End: 2024-12-31 | Stop reason: HOSPADM

## 2024-12-31 RX ORDER — TAMSULOSIN HYDROCHLORIDE 0.4 MG/1
0.4 CAPSULE ORAL DAILY
Qty: 90 CAPSULE | Refills: 3 | Status: SHIPPED | OUTPATIENT
Start: 2025-01-01

## 2024-12-31 RX ADMIN — HEPARIN SODIUM 5000 UNITS: 5000 INJECTION, SOLUTION INTRAVENOUS; SUBCUTANEOUS at 06:18

## 2024-12-31 RX ADMIN — ASPIRIN 81 MG: 81 TABLET, COATED ORAL at 09:04

## 2024-12-31 RX ADMIN — AMLODIPINE BESYLATE 10 MG: 10 TABLET ORAL at 09:04

## 2024-12-31 RX ADMIN — CLOPIDOGREL BISULFATE 75 MG: 75 TABLET ORAL at 09:04

## 2024-12-31 RX ADMIN — HYDRALAZINE HYDROCHLORIDE 10 MG: 10 TABLET ORAL at 09:04

## 2024-12-31 RX ADMIN — PANTOPRAZOLE SODIUM 40 MG: 40 TABLET, DELAYED RELEASE ORAL at 06:18

## 2024-12-31 RX ADMIN — TAMSULOSIN HYDROCHLORIDE 0.4 MG: 0.4 CAPSULE ORAL at 10:50

## 2024-12-31 RX ADMIN — ACETAMINOPHEN 650 MG: 325 TABLET ORAL at 09:04

## 2024-12-31 RX ADMIN — ACETAMINOPHEN 650 MG: 325 TABLET ORAL at 01:46

## 2024-12-31 ASSESSMENT — COGNITIVE AND FUNCTIONAL STATUS - GENERAL
MOBILITY SCORE: 23
DAILY ACTIVITIY SCORE: 24
CLIMB 3 TO 5 STEPS WITH RAILING: A LITTLE

## 2024-12-31 ASSESSMENT — PAIN SCALES - GENERAL
PAINLEVEL_OUTOF10: 2
PAINLEVEL_OUTOF10: 0 - NO PAIN
PAINLEVEL_OUTOF10: 0 - NO PAIN

## 2024-12-31 ASSESSMENT — PAIN - FUNCTIONAL ASSESSMENT
PAIN_FUNCTIONAL_ASSESSMENT: 0-10
PAIN_FUNCTIONAL_ASSESSMENT: 0-10

## 2024-12-31 NOTE — SIGNIFICANT EVENT
Postoperative Check Note    Subjective  Mann Abrams is a 84 y.o. male who is now POD0 s/p Left carotid endarterectomy with eversion for symptomatic carotid stenosis. Postoperatively, patient feels well, and denies fevers/chills, n/v, chest pain, shortness of breath. Feels his pain is well-controlled and appropriate for this time. Has no other concerns. Denies headache/dizziness.     Objective  Vitals:  Visit Vitals  /69 (BP Location: Left leg, Patient Position: Lying)   Pulse 82   Temp 35.6 °C (96.1 °F) (Tympanic)   Resp 16       Physical Exam:  GEN: No acute distress. Alert, awake and conversive.  HEENT: Sclera anicteric. Moist mucous membranes. Left neck incision with dermabond. No hematoma.   RESP: Breathing non-labored, equal chest rise. On 2L NC.  CV: Regular rate, normotensive  GI: Abdomen soft, nondistended, nontender.   : Nogueira in place with yellow urine.  MSK: No gross deformities. Moves all extremities spontaneously.  NEURO: Alert and oriented x3. No focal deficits.  PSYCH: Appropriate mood and affect.  SKIN: No rashes or lesions.  Pulses:   Palpable B/L Radial and Ulnar pulses   CN's intact   M/S intact in all extremities       Most recent labs:            10.6     4.7>-----<217              33.4     139  111  39                  ----------------<111     5.1  19  2.42            Mg 1.98         Assessment  Mann Abrams is a 84 y.o. male who is now POD0 s/p Left carotid endarterectomy with eversion for symptomatic carotid stenosis.  Patient is in stable condition, appropriate for postoperative course. The plan is as follows:    Strict SBP goal <140.   Cont home hydralazine TID  PRN labetolol 10mg q2h  Telemetry  Incentive spirometer  Aspirin and plavix starting tomorrow  Clear diet as tolerated after 4 hours (bedside swallow passed)  remove nogueira at midnight  24 hours Ancef for periop antibiotics  SQH for DVT Ppx    Hussain Liang MD  PGY-1 General Surgery  Vascular Surgery [id number]

## 2024-12-31 NOTE — NURSING NOTE
Pt verbalized understanding of discharge information per printed AVS summary. Pt was provided with educational information pertaining to his procedure, including carotid endarectomy pamphletrs and stroke booklet. Pt left with all belongings via wheelchair transport for home.

## 2024-12-31 NOTE — CARE PLAN
The patient's goals for the shift include      The clinical goals for the shift include pt will remain HDS throughout the shift    Over the shift, the patient did  make progress toward the following goals.     Problem: Pain - Adult  Goal: Verbalizes/displays adequate comfort level or baseline comfort level  Outcome: Progressing     Problem: Safety - Adult  Goal: Free from fall injury  Outcome: Progressing     Problem: Discharge Planning  Goal: Discharge to home or other facility with appropriate resources  Outcome: Progressing     Problem: Chronic Conditions and Co-morbidities  Goal: Patient's chronic conditions and co-morbidity symptoms are monitored and maintained or improved  Outcome: Progressing     Problem: Fall/Injury  Goal: Not fall by end of shift  Outcome: Progressing  Goal: Be free from injury by end of the shift  Outcome: Progressing  Goal: Verbalize understanding of personal risk factors for fall in the hospital  Outcome: Progressing  Goal: Verbalize understanding of risk factor reduction measures to prevent injury from fall in the home  Outcome: Progressing  Goal: Use assistive devices by end of the shift  Outcome: Progressing  Goal: Pace activities to prevent fatigue by end of the shift  Outcome: Progressing     Problem: Diabetes  Goal: Achieve decreasing blood glucose levels by end of shift  Outcome: Progressing  Goal: Increase stability of blood glucose readings by end of shift  Outcome: Progressing  Goal: Decrease in ketones present in urine by end of shift  Outcome: Progressing  Goal: Maintain electrolyte levels within acceptable range throughout shift  Outcome: Progressing  Goal: Maintain glucose levels >70mg/dl to <250mg/dl throughout shift  Outcome: Progressing  Goal: No changes in neurological exam by end of shift  Outcome: Progressing  Goal: Learn about and adhere to nutrition recommendations by end of shift  Outcome: Progressing  Goal: Vital signs within normal range for age by end of  shift  Outcome: Progressing  Goal: Increase self care and/or family involovement by end of shift  Outcome: Progressing  Goal: Receive DSME education by end of shift  Outcome: Progressing

## 2024-12-31 NOTE — DISCHARGE SUMMARY
Discharge Diagnosis  Symptomatic stenosis of left carotid artery      Test Results Pending At Discharge  Pending Labs       Order Current Status    Basic Metabolic Panel Collected (12/31/24 0830)    CBC Collected (12/31/24 0830)    Magnesium Collected (12/31/24 0830)    Phosphorus Collected (12/31/24 0830)    Surgical Pathology Exam In process            Hospital Course   Mann Abrams is a 84 y.o. male with hx of CAD, NIDDM, COPD (daily inhalers), CKD, HTN, HLD, bilateral carotid artery stenosis, CVA In October 2024, he developed dysarthria that lasted 12 hours in duration. MRI demonstrated an acute left frontal lobe infarct. He underwent carotid angiogram with Dr. Stahl in November 2024, but his vessels were too calcified to be stented. He is now S/P left carotid endarterectomy by Dr. Castaneda on 12/30/24. In the post op period he remained neurologically intact, stable VS, pain controlled. On POD 1 he was mobilizing, tolerating diet. Flomax prescribed for brief urine retention post op, he reports chronic prostate enlargement.  He was discharged with plan to resume aspirin and Plavix, and brief Rx for oxycodone as needed. He will return to clinic in 4 weeks with repeat carotid duplex US.     Pertinent Physical Exam At Time of Discharge  Physical Exam  Constitutional: No acute distress, resting comfortably  Neuro:  AOx3, grossly intact, EOMI, face symmetric, speech clear  ENMT: moist mucous membranes, left neck incision dry and well approximated with skin glue covering, neck supple no hematoma  CV: no tachycardia  Pulm: non-labored on RA  GI: soft, non-tender, non-distended  Skin: warm and dry  Musculoskeletal: moving all extremities  Extremities: palpable radial pulses    Home Medications     Medication List      START taking these medications     acetaminophen 325 mg tablet; Commonly known as: Tylenol; Take 2 tablets   (650 mg) by mouth every 6 hours if needed for mild pain (1 - 3) or   moderate pain (4 - 6).    oxyCODONE 5 mg immediate release tablet; Commonly known as: Roxicodone;   Take 1 tablet (5 mg) by mouth every 6 hours if needed for severe pain (7 -   10) for up to 3 days.     CONTINUE taking these medications     albuterol 90 mcg/actuation inhaler; Commonly known as: ProAir HFA;   Inhale 2 puffs every 4 hours if needed for wheezing or shortness of   breath.   amLODIPine 10 mg tablet; Commonly known as: Norvasc; Take 1 tablet (10   mg) by mouth once daily.   ascorbic acid 500 mg tablet; Commonly known as: Vitamin C   aspirin 81 mg EC tablet   atorvastatin 80 mg tablet; Commonly known as: Lipitor; Take 1 tablet (80   mg) by mouth once daily.   clopidogrel 75 mg tablet; Commonly known as: Plavix; Take 1 tablet (75   mg) by mouth once daily.   dapagliflozin propanediol 10 mg; Commonly known as: Farxiga; Take 1   tablet (10 mg) by mouth once daily.   fluticasone 50 mcg/actuation nasal spray; Commonly known as: Flonase;   Administer 1 spray into each nostril once daily as needed for allergies.   Shake gently. Before first use, prime pump. After use, clean tip and   replace cap.   hydrALAZINE 10 mg tablet; Commonly known as: Apresoline; Take 1 tablet   (10 mg) by mouth 3 times a day. Advised to dc carvedilol to start   hydralazine   isosorbide mononitrate ER 30 mg 24 hr tablet; Commonly known as: Imdur;   Take 1 tablet (30 mg) by mouth once daily. Do not crush or chew.   montelukast 10 mg tablet; Commonly known as: Singulair; Take 1 tablet   (10 mg) by mouth once daily at bedtime.   nitroglycerin 0.4 mg SL tablet; Commonly known as: Nitrostat   olmesartan 40 mg tablet; Commonly known as: Benicar; Take 1 tablet (40   mg) by mouth once daily.   omeprazole 40 mg DR capsule; Commonly known as: PriLOSEC; Take 1 capsule   (40 mg) by mouth once daily.   Wixela Inhub 250-50 mcg/dose diskus inhaler; Generic drug: fluticasone   propion-salmeteroL; INHALE ONE PUFF BY MOUTH TWICE A DAY. RINSE MOUTH WITH   WATER AFTER USE TO REDUCE  AFTERTASTE AND INCIDENCE OF CANDIDIASIS. DO NOT   SWALLOW.     STOP taking these medications     spironolactone 25 mg tablet; Commonly known as: Aldactone       Outpatient Follow-Up  Future Appointments   Date Time Provider Department Center   1/13/2025  8:20 AM Jovana Hammer MD [id number] [facility]   1/29/2025  2:00 PM [facility] VASC 2 [id number] [facility]   1/29/2025  3:00 PM Sandi Calles APRN-CNP [id number] [facility]   2/14/2025  8:40 AM Brant Garcia DO [id number] [facility]   2/18/2025  8:15 AM Yessica Amin MD [id number] [facility]       Priscila Patrick APRN-CNP

## 2024-12-31 NOTE — OP NOTE
OPERATIVE NOTE                  Preoperative Diagnosis  Symptomatic carotid stenosis    Postoperative Diagnosis                 Same as above    Procedures:  Left carotid endarterectomy, eversion    Indications for Surgery:  Symptomatic carotid disease.    Surgeon(s):    Sim Castaneda MD    Assistant(s):  Anette Witt MD    Anesthesia: General    Anesthesiologist(s):    Anesthesiologist: Marilee Levine MD; Fred Ybarra MD  C-AA: CHRISTOPHER Tristan  Anesthesia Resident: Katt Ge MD     Blood Loss:   Minimal    Complications:    * No complications entered in OR log *     Findings:  Preocclusive carotid stenosis with severely calcified plaque. No SSEP/EEG changes    Postoperative Condition: stable.      Sponge, Needle, Instrument Count: were correct.      Specimen(s):   ID Type Source Tests Collected by Time   1 : Lef tCarotid Plaque Tissue [facility] SURGICAL PATHOLOGY EXAM Sim Castaneda MD 12/30/2024 1536        Drains and/or Catheters:   [REMOVED] Urethral Catheter Coude 16 Fr. (Removed)   Site Assessment Clean;Skin intact 12/30/24 2223   Collection Container Standard drainage bag 12/30/24 1715   Securement Method Securing device (Describe) 12/30/24 1715   Output (mL) 300 mL 12/31/24 0000       Operative Procedure Details:   After a plane of anesthesia was obtained, the patient was prepared and draped in the usual sterile fashion. A 5 cm long incision was made along the anterior border of the left sternocleidomastoid muscle and dissection carried through the subcutaneous tissue. The platysma was divided and the internal jugular vein retracted laterally. The facial vein was divided and the underlying carotid arteries identified. The ansa cervicalis was divided to facilitate the exposure of the arteries. The hypoglossal and the vagus nerves were identified and preserved. The glossopharyngeal nerve was not visualized.    Following systermic heparinization, control was obtained of the internal, common and external  carotid arteries in that order. No SSEP/EEG changes were noted in the first 3 minutes. The internal carotid artery was transected at its origin and eversion endarterectomy effected in the usual fashion with a nice transition zone in the internal carotid artery. External carotid artery was endarterectomized in the standard fashion. The internal was then reimplanted onto its origin with a running 7-0 Prolene suture. After appropriate foreward and back-bleeding were allowed, flow was restored to the external and internal carotid arteries in that order. The anastomosis was hemostatic.     Heparin effects were reversed with Protamine.  After hemostasis was assured, platysma was approximated with a running 3-0 Vicryl, followed by skin closure with a 5-0 Monocroy.     The patient awoke in the operating room moving all four extremities and following commands.     Sim Castaneda MD  Phone: [phone]

## 2025-01-03 ENCOUNTER — APPOINTMENT (OUTPATIENT)
Dept: VASCULAR SURGERY | Facility: HOSPITAL | Age: 85
End: 2025-01-03
Payer: MEDICARE

## 2025-01-03 ENCOUNTER — TELEPHONE (OUTPATIENT)
Dept: PRIMARY CARE | Facility: CLINIC | Age: 85
End: 2025-01-03
Payer: MEDICARE

## 2025-01-06 LAB
LABORATORY COMMENT REPORT: NORMAL
PATH REPORT.FINAL DX SPEC: NORMAL
PATH REPORT.GROSS SPEC: NORMAL
PATH REPORT.RELEVANT HX SPEC: NORMAL
PATH REPORT.TOTAL CANCER: NORMAL

## 2025-01-13 ENCOUNTER — APPOINTMENT (OUTPATIENT)
Dept: NEPHROLOGY | Facility: CLINIC | Age: 85
End: 2025-01-13
Payer: MEDICARE

## 2025-01-13 VITALS
TEMPERATURE: 98.8 F | HEART RATE: 79 BPM | WEIGHT: 149 LBS | BODY MASS INDEX: 23.95 KG/M2 | DIASTOLIC BLOOD PRESSURE: 79 MMHG | HEIGHT: 66 IN | SYSTOLIC BLOOD PRESSURE: 119 MMHG

## 2025-01-13 DIAGNOSIS — N18.32 STAGE 3B CHRONIC KIDNEY DISEASE (CKD) (MULTI): Primary | ICD-10-CM

## 2025-01-13 PROCEDURE — 1159F MED LIST DOCD IN RCRD: CPT | Performed by: INTERNAL MEDICINE

## 2025-01-13 PROCEDURE — 1123F ACP DISCUSS/DSCN MKR DOCD: CPT | Performed by: INTERNAL MEDICINE

## 2025-01-13 PROCEDURE — 3074F SYST BP LT 130 MM HG: CPT | Performed by: INTERNAL MEDICINE

## 2025-01-13 PROCEDURE — 1036F TOBACCO NON-USER: CPT | Performed by: INTERNAL MEDICINE

## 2025-01-13 PROCEDURE — 1111F DSCHRG MED/CURRENT MED MERGE: CPT | Performed by: INTERNAL MEDICINE

## 2025-01-13 PROCEDURE — 3078F DIAST BP <80 MM HG: CPT | Performed by: INTERNAL MEDICINE

## 2025-01-13 PROCEDURE — 99213 OFFICE O/P EST LOW 20 MIN: CPT | Performed by: INTERNAL MEDICINE

## 2025-01-13 NOTE — PROGRESS NOTES
"For follow up, doing well.  No complaints  Underwent carotid endarterectomy 12/30-12/31, uneventful   BP at home \"good\" 110s-130s/70s  Denies orthostatic symptoms    RoS negative for all other systems except as noted above.        12/31/2024     3:47 AM 12/31/2024     4:00 AM 12/31/2024     5:00 AM 12/31/2024     6:00 AM 12/31/2024     7:41 AM 12/31/2024    10:48 AM 1/13/2025     8:18 AM   Vitals   Systolic  106 98 108      Diastolic  59 77 68      BP Location       Right arm   Heart Rate  89 90 91  91    Temp 37 °C (98.6 °F)  37 °C (98.6 °F)  36.1 °C (97 °F) 36.6 °C (97.9 °F) 37.1 °C (98.8 °F)   Resp  14 16 16      Height       1.676 m (5' 6\")   Weight (lb)       149   BMI       24.05 kg/m2   BSA (m2)       1.77 m2   Visit Report       Report     No distress  HEENT:  moist, no pallor  No edema jyotsna LE  Breath sounds jyotsna equal, clear  S1 S2 regular, normal, no rub or murmur  Abdomen soft  AAO x3, non focal    Lab Results   Component Value Date     12/31/2024     12/24/2024     12/23/2024    K 5.4 (H) 12/31/2024    K 5.1 12/24/2024    K 5.6 (H) 12/23/2024     (H) 12/31/2024     (H) 12/24/2024     (H) 12/23/2024    CO2 18 (L) 12/31/2024    CO2 19 (L) 12/24/2024    CO2 19 (L) 12/23/2024    BUN 30 (H) 12/31/2024    BUN 39 (H) 12/24/2024    BUN 45 (H) 12/23/2024    CREATININE 2.25 (H) 12/31/2024    CREATININE 2.42 (H) 12/24/2024    CREATININE 2.71 (H) 12/23/2024    CALCIUM 8.6 12/31/2024    CALCIUM 8.8 12/24/2024    CALCIUM 8.9 12/23/2024    PHOS 3.8 12/31/2024    PHOS 4.5 08/08/2024    PHOS 4.1 06/10/2024    VITD25 94 04/11/2024    VITD25 49 08/28/2023    VITD25 80 06/05/2020    MICROALBCREA 105.2 (H) 04/11/2024    MICROALBCREA 282.7 (H) 01/03/2024    MICROALBCREA 422.8 (H) 12/18/2023    HGB 10.5 (L) 12/31/2024    HGB 10.6 (L) 12/23/2024    HGB 11.0 (L) 10/30/2024      84 year old with DM, htn and CKD       1. CKD G3bA3 : Likely due to DM and htn.  Creatinine on 12/31/2024 2.25 mg per " DL, stable.  Tolerating Farxiga well.  Continue to avoid NSAIDs     2. Anemia: Hemoglobin 10.5 g per DL, advised him to resume oral iron since stool has not been dark/black for the last 6 months.  Will check iron studies and H&H before next visit.    3. Htn; Goal /80, at goal, continue amlodipine 10 mg/day, olmesartan 40 mg/day, hydralazine 10 mg 3 times daily.  2.5 g/day sodium restricted diet.     4.  Complaints of shortness of breath and chest pain on exertion, has cardiology appointment coming up, he will discuss with cardiology.    5.  Serum bicarbonate 18 mEq/L on 12/31/2024, will repeat, if persistently low will need to start oral sodium bicarbonate.     RTC: 4 mo

## 2025-01-28 ENCOUNTER — PATIENT OUTREACH (OUTPATIENT)
Dept: PRIMARY CARE | Facility: CLINIC | Age: 85
End: 2025-01-28
Payer: MEDICARE

## 2025-01-28 NOTE — PROGRESS NOTES
Final call. Called and spoke with patient to address any questions or concerns regarding hospitalization.   Patient reports their condition has (improved).     Patient has met target of no readmission for (90) days post discharge and is graduated from Transitional Care Management program at this time.

## 2025-01-29 ENCOUNTER — HOSPITAL ENCOUNTER (OUTPATIENT)
Dept: VASCULAR MEDICINE | Facility: HOSPITAL | Age: 85
Discharge: HOME | End: 2025-01-29
Payer: MEDICARE

## 2025-01-29 ENCOUNTER — OFFICE VISIT (OUTPATIENT)
Dept: VASCULAR SURGERY | Facility: HOSPITAL | Age: 85
End: 2025-01-29
Payer: MEDICARE

## 2025-01-29 VITALS
WEIGHT: 146 LBS | HEART RATE: 93 BPM | DIASTOLIC BLOOD PRESSURE: 80 MMHG | SYSTOLIC BLOOD PRESSURE: 171 MMHG | BODY MASS INDEX: 23.46 KG/M2 | OXYGEN SATURATION: 95 % | HEIGHT: 66 IN

## 2025-01-29 DIAGNOSIS — I65.22 CAROTID ARTERY STENOSIS, SYMPTOMATIC, LEFT: ICD-10-CM

## 2025-01-29 DIAGNOSIS — I65.23 CAROTID STENOSIS, ASYMPTOMATIC, BILATERAL: Primary | ICD-10-CM

## 2025-01-29 DIAGNOSIS — I65.23 OCCLUSION AND STENOSIS OF BILATERAL CAROTID ARTERIES: ICD-10-CM

## 2025-01-29 PROCEDURE — 93880 EXTRACRANIAL BILAT STUDY: CPT

## 2025-01-29 PROCEDURE — 93880 EXTRACRANIAL BILAT STUDY: CPT | Performed by: INTERNAL MEDICINE

## 2025-01-29 PROCEDURE — 99212 OFFICE O/P EST SF 10 MIN: CPT | Performed by: NURSE PRACTITIONER

## 2025-01-29 ASSESSMENT — ENCOUNTER SYMPTOMS
OCCASIONAL FEELINGS OF UNSTEADINESS: 0
DEPRESSION: 0
LOSS OF SENSATION IN FEET: 0

## 2025-01-29 ASSESSMENT — PAIN SCALES - GENERAL: PAINLEVEL_OUTOF10: 0-NO PAIN

## 2025-02-03 ASSESSMENT — ENCOUNTER SYMPTOMS
SORE THROAT: 0
SWEATS: 0
HEADACHES: 0
RHINORRHEA: 1
SHORTNESS OF BREATH: 1
WHEEZING: 1
FEVER: 0
MYALGIAS: 0
HEARTBURN: 1
CHILLS: 0
WEIGHT LOSS: 0
HEMOPTYSIS: 0
COUGH: 1

## 2025-02-07 ENCOUNTER — OFFICE VISIT (OUTPATIENT)
Dept: CARDIOLOGY | Facility: CLINIC | Age: 85
End: 2025-02-07
Payer: MEDICARE

## 2025-02-07 VITALS
DIASTOLIC BLOOD PRESSURE: 68 MMHG | HEART RATE: 86 BPM | HEIGHT: 66 IN | SYSTOLIC BLOOD PRESSURE: 162 MMHG | WEIGHT: 140 LBS | OXYGEN SATURATION: 96 % | BODY MASS INDEX: 22.5 KG/M2

## 2025-02-07 DIAGNOSIS — I10 HYPERTENSION, UNCONTROLLED: ICD-10-CM

## 2025-02-07 DIAGNOSIS — R94.39 ABNORMAL STRESS TEST: ICD-10-CM

## 2025-02-07 DIAGNOSIS — E78.2 MIXED HYPERLIPIDEMIA: Primary | ICD-10-CM

## 2025-02-07 DIAGNOSIS — Q24.5: ICD-10-CM

## 2025-02-07 DIAGNOSIS — R07.9 CHEST PAIN, EXERTIONAL: ICD-10-CM

## 2025-02-07 DIAGNOSIS — I65.23 BILATERAL CAROTID ARTERY STENOSIS: ICD-10-CM

## 2025-02-07 LAB
ATRIAL RATE: 86 BPM
P AXIS: 63 DEGREES
P OFFSET: 205 MS
P ONSET: 155 MS
PR INTERVAL: 138 MS
Q ONSET: 224 MS
QRS COUNT: 15 BEATS
QRS DURATION: 70 MS
QT INTERVAL: 354 MS
QTC CALCULATION(BAZETT): 423 MS
QTC FREDERICIA: 399 MS
R AXIS: 53 DEGREES
T AXIS: 69 DEGREES
T OFFSET: 401 MS
VENTRICULAR RATE: 86 BPM

## 2025-02-07 PROCEDURE — 99214 OFFICE O/P EST MOD 30 MIN: CPT | Performed by: INTERNAL MEDICINE

## 2025-02-07 PROCEDURE — 1126F AMNT PAIN NOTED NONE PRSNT: CPT | Performed by: INTERNAL MEDICINE

## 2025-02-07 PROCEDURE — 1036F TOBACCO NON-USER: CPT | Performed by: INTERNAL MEDICINE

## 2025-02-07 PROCEDURE — 3078F DIAST BP <80 MM HG: CPT | Performed by: INTERNAL MEDICINE

## 2025-02-07 PROCEDURE — G2211 COMPLEX E/M VISIT ADD ON: HCPCS | Performed by: INTERNAL MEDICINE

## 2025-02-07 PROCEDURE — 1123F ACP DISCUSS/DSCN MKR DOCD: CPT | Performed by: INTERNAL MEDICINE

## 2025-02-07 PROCEDURE — 3077F SYST BP >= 140 MM HG: CPT | Performed by: INTERNAL MEDICINE

## 2025-02-07 PROCEDURE — 1159F MED LIST DOCD IN RCRD: CPT | Performed by: INTERNAL MEDICINE

## 2025-02-07 PROCEDURE — 93005 ELECTROCARDIOGRAM TRACING: CPT | Performed by: INTERNAL MEDICINE

## 2025-02-07 RX ORDER — CARVEDILOL 12.5 MG/1
12.5 TABLET ORAL
Qty: 180 TABLET | Refills: 3 | Status: SHIPPED | OUTPATIENT
Start: 2025-02-07 | End: 2026-02-07

## 2025-02-07 ASSESSMENT — PAIN SCALES - GENERAL: PAINLEVEL_OUTOF10: 0-NO PAIN

## 2025-02-07 NOTE — PROGRESS NOTES
"Chief Complaint:   Follow-up and Hospital Follow-up (Pt c/o intermittent chest burning and SOB while at rest)     History Of Present Illness:    Mann Abrams is a 84 y.o. male presenting with a pertinent medical history notable for chronic kidney disease stage III, diabetes mellitus, peripheral vascular disease, hypercholesterolemia who is seen today for follow up cardiovascular care.      Recall that Mr. Abrams is a very active individual who usually walks 2 miles every day in good weather or spends at least 30 minutes on a treadmill 6 days out of the week. He had one episode where he all of a sudden felt very tired while he was riding his lawnmower. He felt lightheaded so he laid down and rested briefly. After that he got up and finish mowing the lawn without any incident Following this he went and talked to his PCP who referred him on for cardiac stress testing. He underwent exercise echocardiography stress testing. He exercised for 9 minutes and was able to make 10.1 METS. On his EKG, he had ST depressions in the lateral leads. On his actual imaging he had a hyperdynamic precordium at baseline that then increased at maximal stress. Of note he had no symptoms during this time. Based off of this his stress test was recorded as abnormal and he was referred to cardiology.     Following that initial episode, he has not had any repeat occurrences of chest discomfort or the feeling that he had.  He has no personal history of coronary artery disease. He has reduced his carvedilol in the interim period, using it once daily instead of BID. States that he does this because he is more tired when taking it twice daily. Describe it as \"like all my energy is sucked out of me\"      Today ( 1/10/2023) He presents for follow up. He has been well, but in December had a \"cold\" and went to Urgent Care. Told them of cough, congestion and a \"Burning pain in his chest:\" He was referred to ER but \"they had over a hundred people in " "there and told me to get in line so I went home. I knew I wasn;t dying\" States that his pain occurred mostly when he was bending over to tie his shoes. No change in symptoms when he was walking, going about his daily activities. States that he thinks that \"lisinopril i making me cough and I see on TV that it causes Cancer.\" States that he has not had any recurrence of chest discomfort.         8/3/2023 -- He returns for follow up. He had seen his PCP who has been adjusting his medication. He had placed him on Doxazosin 4 mg Daily + Amlodipine 10 ( Had already been on this) and Losartan 50 mg ( Had been on this ) and told to decrease the Carvedilol. Probably doxazosin, but noted that his back \"felt like it was on fire\". He subsequently stopped it. Since these change, his blood pressure remains poorly controlled.      9/29/2023  -- He presents for follow up. He has followed up with Nephrology for CKD and HTN. He started spironolactone at 25 mg taken as 1/2 tablet twice daily. He notes improved blood pressure with this but also notes increased urination. He had tried to take it as a full dose once daily but noted heavy urination frequency and urgency that was difficult to manage. He notes that since starting the spironolactone that his SOB / PATEL has improved noticeably.     3/12/2024 -- He returns for follow up.  He was seen at [facility] For 4 days of Nausea .vomiting. A CT-CAP showed an inguianl hernia, diffuse atherosclerosis disease and stenosis of the renal arteries. He was provided with paperwork bhumi cantrell\" an abnormality was found an you should discuss this with your doctor\" however, no information regarding what was discovered was noted.  He notes that he continues to struggle with some shortness of breath.  He although it is better now that his blood pressure is better controlled, this still is occurring especially when going up and down stairs, or even taking his trash can out.  He notes that he to stop and " rest and catch his breath.  A recent coronary artery calcium score does show increased calcifications specifically within the LAD territory.  -  6/14/2024 --he returns for follow-up.  Since her last appointment, he underwent left heart catheterization after presenting with Take here [place] a chest discomfort.  This showed mildly obstructive coronary artery disease with a 70 to 80% proximal lesion within the circumflex.  It was noted that the circumflex had abnormal origin coming off the RCA.  Given findings, it was decided to be medically managed.  He then presented back to the hospital beginning June where he was noted to have acute on chronic anemia.  He was transfused blood, underwent EGD, and thereafter he was felt to be stabilized.  Since his return home, he has not had any further episodes.  He overall feels better.  He offers no complaints.      2/5/2025 --he presents for follow-up. A lot has occurred in the interim time.  He followed up with vascular medicine for further evaluation of bilateral carotid artery stenosis.  He originally was seen in the vascular surgical side for consideration of CEA.  In October, he unfortunately suffered a stroke.  He followed up with vascular, it was referred on for carotid angiography with consideration for possible stenting.  He was noted to have significant severely calcific lesions.  He was returned back to the surgical side, and ultimately underwent carotid endarterectomy.  In this time, he has had multiple presentations to the offices.  Blood pressure has been intermittently well-controlled versus significantly elevated.  Today, he returns and reports that his carvedilol has been stopped.  Previously while on this medication, he had significantly less chest tightness, heaviness, and he felt that his blood pressure was better controlled.  He is certainly surprised to see the elevation of his blood pressure in the office.  He reports that at home, his blood pressures  "usually run 120 mmHg.  I did ask him to return later with his blood pressure cuff readings so that we can confirm.     Patient denies chest pain and angina. Pt denies shortness of breath, dyspnea on exertion, orthopnea, and paroxysmal nocturnal dyspnea. Pt denies worsening lower extremity edema. Pt denies palpitations or syncope. No recent falls. No fever or chills. No cough. No change in bowel or bladder habits. No travel. No sick contacts. No recent travel     Past medical history: As above.  Medications: Reviewed.  Allergies: Reviewed.  Social history: Patient denies smoking, alcohol abuse, or illicit drug use.  Family history: No sudden cardiac death or premature coronary artery disease..     Last Recorded Vitals:  Vitals:    02/07/25 0938 02/07/25 0953   BP: (!) 184/80 162/68   BP Location: Right arm Right arm   Patient Position: Sitting Sitting   BP Cuff Size:  Adult   Pulse: 86    SpO2: 96%    Weight: 63.5 kg (140 lb)    Height: 1.676 m (5' 6\")          Past Medical History:  He has a past medical history of Anemia, Carotid stenosis, CKD (chronic kidney disease), COPD (chronic obstructive pulmonary disease) (Multi), DM (diabetes mellitus) (Multi), GERD (gastroesophageal reflux disease), History of blood transfusion, Hyperlipidemia, Hypertension, Other secondary cataract, left eye (08/01/2016), Person consulting for explanation of examination or test findings (01/07/2020), PFO (patent foramen ovale) ([facility]), PVD (peripheral vascular disease) (CMS-HCC), Stroke (Multi), and Type 2 diabetes mellitus.    Past Surgical History:  He has a past surgical history that includes Eye surgery (08/07/2014); Other surgical history (12/04/2019); Other surgical history (12/04/2019); Cardiac catheterization (N/A, 5/30/2024); and Invasive Vascular Procedure (Left, 11/6/2024).      Social History:  He reports that he quit smoking about 39 years ago. His smoking use included cigarettes. He has never used smokeless tobacco. He " "reports that he does not drink alcohol and does not use drugs.    Family History:  Family History   Problem Relation Name Age of Onset    Cancer Mother      Other (cardiac disorder) Father      Diabetes Sister          Allergies:  Doxazosin    Outpatient Medications:  Current Outpatient Medications   Medication Instructions    acetaminophen (TYLENOL) 650 mg, oral, Every 6 hours PRN    albuterol (ProAir HFA) 90 mcg/actuation inhaler 2 puffs, inhalation, Every 4 hours PRN    amLODIPine (NORVASC) 10 mg, oral, Daily    ascorbic acid (VITAMIN C) 500 mg, Daily    aspirin 81 mg, Daily    atorvastatin (LIPITOR) 80 mg, oral, Daily    carvedilol (COREG) 12.5 mg, oral, 2 times daily (morning and late afternoon)    clopidogrel (PLAVIX) 75 mg, oral, Daily    dapagliflozin propanediol (FARXIGA) 10 mg, oral, Daily    fluticasone (Flonase) 50 mcg/actuation nasal spray 1 spray, Each Nostril, Daily PRN, Shake gently. Before first use, prime pump. After use, clean tip and replace cap.    fluticasone propion-salmeteroL (Wixela Inhub) 250-50 mcg/dose diskus inhaler INHALE ONE PUFF BY MOUTH TWICE A DAY. RINSE MOUTH WITH WATER AFTER USE TO REDUCE AFTERTASTE AND INCIDENCE OF CANDIDIASIS. DO NOT SWALLOW.    hydrALAZINE (APRESOLINE) 10 mg, oral, 3 times daily, Advised to dc carvedilol to start hydralazine    isosorbide mononitrate ER (IMDUR) 30 mg, oral, Daily, Do not crush or chew.    montelukast (SINGULAIR) 10 mg, oral, Nightly    nitroglycerin (NITROSTAT) 0.4 mg, Every 5 min PRN    olmesartan (BENICAR) 40 mg, oral, Daily    omeprazole (PRILOSEC) 40 mg, oral, Daily    tamsulosin (FLOMAX) 0.4 mg, oral, Daily       Physical Exam:  PHYSICAL EXAMINATION  /68 (BP Location: Right arm, Patient Position: Sitting, BP Cuff Size: Adult)   Pulse 86   Ht 1.676 m (5' 6\")   Wt 63.5 kg (140 lb)   SpO2 96%   BMI 22.60 kg/m²   General:  Patient is awake, alert, and oriented.  Patient is in no acute distress.  HEENT:  Pupils equal and reactive.  " Normocephalic.  Moist mucosa.    Neck:  No thyromegaly.  Normal Jugular Venous Pressure.  Cardiovascular:  Regular rate and rhythm.  Normal S1 and S2.  1/6 BINH.  Pulmonary:  Clear to auscultation bilaterally.  Abdomen:  Soft. Non-tender.   Non-distended.  Positive bowel sounds.  Lower Extremities:  2+ pedal pulses. No LE edema.  Neurologic:  Cranial nerves intact.  No focal deficit.   Skin: Skin warm and dry, normal skin turgor.   Psychiatric: Normal affect.           Last Labs:  CBC -  Lab Results   Component Value Date    WBC 8.1 12/31/2024    HGB 10.5 (L) 12/31/2024    HCT 32.4 (L) 12/31/2024    MCV 97 12/31/2024     12/31/2024       CMP -  Lab Results   Component Value Date    CALCIUM 8.6 12/31/2024    PHOS 3.8 12/31/2024    PROT 6.6 10/29/2024    ALBUMIN 3.9 10/29/2024    AST 20 10/29/2024    ALT 21 10/29/2024    ALKPHOS 86 10/29/2024    BILITOT 0.4 10/29/2024       LIPID PANEL -   Lab Results   Component Value Date    CHOL 140 10/29/2024    TRIG 103 10/29/2024    HDL 52.2 10/29/2024    CHHDL 2.7 10/29/2024    LDLF 76 04/21/2022    VLDL 21 10/29/2024    NHDL 88 10/29/2024       RENAL FUNCTION PANEL -   Lab Results   Component Value Date    GLUCOSE 80 12/31/2024     12/31/2024    K 5.4 (H) 12/31/2024     (H) 12/31/2024    CO2 18 (L) 12/31/2024    ANIONGAP 15 12/31/2024    BUN 30 (H) 12/31/2024    CREATININE 2.25 (H) 12/31/2024    GFRMALE 45 (A) 09/01/2023    CALCIUM 8.6 12/31/2024    PHOS 3.8 12/31/2024    ALBUMIN 3.9 10/29/2024        Lab Results   Component Value Date    BNP 21 10/29/2024    HGBA1C 7.8 (H) 10/29/2024       Last Cardiology Tests:  ECG:      In Office EKG 3/12/2024 -- Sinus Rhythm at 82 BPM  -- NO Ischemic changes   In Office EKG 1/10/2023  -- Normal Sinus Rhythm @ 84 bpmNo results found for this or any previous visit from the past 1095 days.      Echo:  Echocardiogram 09/2023  1. Left ventricular systolic function is normal with a 60-65% estimated ejection fraction.  2.  Spectral Doppler shows an impaired relaxation pattern of left ventricular diastolic filling.  3. Moderately elevated right ventricular systolic pressure. ( RVSP ~ 52 mmHg)      Ejection Fractions:  EF   Date/Time Value Ref Range Status   10/30/2024 02:46 PM 65 %        Cath:  5/30/2024  Coronary Angiography Comments:  A 6F JL 3.5 guide catheter was used for LAD IVUS. LAD was wired using a Runthrough wire. IVUS was performed.IVUS: Reference area of proximal LAD is a 20 mmï¿½ and MLA was 14 mmï¿½]. Final angiogram with no complications.           Left Anterior Descending Coronary Artery Distribution:  The left anterior descending coronary artery is a normal caliber vessel. The LAD arises normally from the left main coronary artery. The proximal left anterior descending coronary artery showed 30% stenosis. This lesion was eccentric. The 1st diagonal branch showed no significant disease or stenosis greater than 30%. The 2nd diagonal branch demonstrated no significant disease or stenosis greater than 30%.     Circumflex Coronary Artery Distribution:  The circumflex coronary artery is a normal caliber vessel. 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 The proximal circumflex coronary artery showed 70-80%. The 1st obtuse marginal branch showed no significant disease or stenosis greater than 30%.     Right Coronary Artery Distribution:     The right coronary artery is a normal caliber vessel. The RCA arises normally from the right sinus of Valsalva. The mid right coronary artery showed 40% stenosis. The acute marginal branch showed no significant disease or stenosis greater than 30%.  The right posterolateral branch showed no significant disease or stenosis greater than 30%. The right posterior descending artery showed no significant disease or stenosis greater than 30%.     Coronary Lesion Summary:  Vessel       Stenosis   Vessel Segment  LAD        30%  stenosis    proximal  Circumflex    70-80%       proximal  RCA        40% stenosis      mid  Given the bathroom right now pressure to the    Stress Test:  Exercise echocardiogram stress testing 06/2020   1. Abnormal Stress Test.  2. ECG changes consistent with ischemia.  3. Hyperdynamic global left ventricular systolic function.  4. Patients baseline LV function was hyperdynamic with peak stress showing almost complete cavity obliteration . -- No regional wall motion changes noted at peak exercise.  5. Noted Increased in RVSP from baseline 31 mmHg to 54 mmHg at Peak Stress     Cardiac Imaging:  CT cardiac scoring wo IV contrast 01/04/2024  The score and distribution of calcium in the coronary arteries is as  follows:      .81,  .03,  LCx 0,  .93,      Total 1328.77      The visualized ascending thoracic aorta measures 3.5 cm in diameter.  Extensive atherosclerotic calcification at the level of the aortic  ductus. The heart is normal in size. No pericardial effusion is  present.      No gross evidence of mediastinal or hilar lymphadenopathy or masses  is identified. Scattered mediastinal lymph nodes are felt to be  reactive in nature. The visualized segments of the lungs demonstrates  peribronchial thickening with mild emphysematous changes. Subpleural  reticulation consistent with smoking-related interstitial changes..  Calcified granuloma within the lingula.      The main pulmonary artery, right and left pulmonary artery are normal  in size.      The visualized subdiaphragmatic structures appear intact.      Lab review: I have personally reviewed the laboratory result(s)   Diagnostic review: I have personally reviewed the result(s) of the EKG and Echocardiogram .   Imaging review: I have  personally reviewed the result(s) Stress Testing / OSH Imaging / CTA Coronary Artery Calcium Score     Assessment/Plan     Problem List Items Addressed This Visit       Abnormal stress test    Overview      "Exercise echocardiogram stress testing 06/2020   1. Abnormal Stress Test.  2. ECG changes consistent with ischemia.  3. Hyperdynamic global left ventricular systolic function.  4. Patients baseline LV function was hyperdynamic with peak stress showing almost complete cavity obliteration . -- No regional wall motion changes noted at peak exercise.  5. 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    The proximal circumflex coronary artery showed 70-80%. The 1st obtuse marginal branch showed no significant disease or stenosis greater than 30%.         Relevant Medications    carvedilol (Coreg) 12.5 mg tablet    Bilateral carotid artery stenosis    Chest pain, exertional    Overview     Worsening Chest Pain with Exertion in the setting of Elevated CAC. His symptoms are typical anginal symptoms at this time.   -- They are worsening in nature.   -- He notes that his tightness and pain is occurring when he walks < 100 feet         Relevant Medications    carvedilol (Coreg) 12.5 mg tablet    Hyperlipidemia - Primary    Overview     The ASCVD Risk score (Margareth JORDAN, et al., 2019) failed to calculate for the following reasons:    The 2019 ASCVD risk score is only valid for ages 40 to 79  Lab Results   Component Value Date    CHOL 165 09/01/2023    CHOL 133 12/28/2022    CHOL 150 04/21/2022     Lab Results   Component Value Date    HDL 68.8 09/01/2023    HDL 47.3 12/28/2022    HDL 48.8 04/21/2022     No results found for: \"LDLCALC\"  Lab Results   Component Value Date    TRIG 75 09/01/2023    TRIG 127 04/21/2022    TRIG 93 06/10/2021     No components found for: \"CHOLHDL\"           Relevant Orders    ECG 12 lead (Clinic Performed)    Hypertension, uncontrolled    Relevant " Medications    carvedilol (Coreg) 12.5 mg tablet         Brant Garcia DO

## 2025-02-09 PROBLEM — Z98.890 HISTORY OF LEFT-SIDED CAROTID ENDARTERECTOMY: Status: ACTIVE | Noted: 2025-02-09

## 2025-02-09 ASSESSMENT — ACTIVITIES OF DAILY LIVING (ADL)
DRESSING: INDEPENDENT
MANAGING_FINANCES: INDEPENDENT
BATHING: INDEPENDENT
GROCERY_SHOPPING: INDEPENDENT
DOING_HOUSEWORK: INDEPENDENT
TAKING_MEDICATION: INDEPENDENT

## 2025-02-09 ASSESSMENT — PATIENT HEALTH QUESTIONNAIRE - PHQ9
2. FEELING DOWN, DEPRESSED OR HOPELESS: NOT AT ALL
SUM OF ALL RESPONSES TO PHQ9 QUESTIONS 1 AND 2: 0
1. LITTLE INTEREST OR PLEASURE IN DOING THINGS: NOT AT ALL

## 2025-02-09 ASSESSMENT — ENCOUNTER SYMPTOMS
DEPRESSION: 0
LOSS OF SENSATION IN FEET: 0
COUGH: 0
OCCASIONAL FEELINGS OF UNSTEADINESS: 0

## 2025-02-10 ENCOUNTER — APPOINTMENT (OUTPATIENT)
Dept: PRIMARY CARE | Facility: CLINIC | Age: 85
End: 2025-02-10
Payer: MEDICARE

## 2025-02-10 VITALS
DIASTOLIC BLOOD PRESSURE: 74 MMHG | HEART RATE: 86 BPM | HEIGHT: 66 IN | BODY MASS INDEX: 22.82 KG/M2 | WEIGHT: 142 LBS | SYSTOLIC BLOOD PRESSURE: 138 MMHG

## 2025-02-10 DIAGNOSIS — E13.51: ICD-10-CM

## 2025-02-10 DIAGNOSIS — Z00.00 MEDICARE ANNUAL WELLNESS VISIT, SUBSEQUENT: Primary | Chronic | ICD-10-CM

## 2025-02-10 DIAGNOSIS — J44.89 ASTHMA WITH COPD (MULTI): ICD-10-CM

## 2025-02-10 DIAGNOSIS — N18.32 STAGE 3B CHRONIC KIDNEY DISEASE (CKD) (MULTI): ICD-10-CM

## 2025-02-10 DIAGNOSIS — Z86.73 HISTORY OF ISCHEMIC STROKE: ICD-10-CM

## 2025-02-10 DIAGNOSIS — I63.9 CEREBROVASCULAR ACCIDENT (CVA), UNSPECIFIED MECHANISM (MULTI): Chronic | ICD-10-CM

## 2025-02-10 DIAGNOSIS — Z98.890 HISTORY OF LEFT-SIDED CAROTID ENDARTERECTOMY: ICD-10-CM

## 2025-02-10 DIAGNOSIS — I65.23 BILATERAL CAROTID ARTERY STENOSIS: ICD-10-CM

## 2025-02-10 DIAGNOSIS — E11.21 DIABETES MELLITUS WITH NEPHROPATHY (MULTI): ICD-10-CM

## 2025-02-10 PROCEDURE — 3078F DIAST BP <80 MM HG: CPT | Performed by: INTERNAL MEDICINE

## 2025-02-10 PROCEDURE — 1123F ACP DISCUSS/DSCN MKR DOCD: CPT | Performed by: INTERNAL MEDICINE

## 2025-02-10 PROCEDURE — 1170F FXNL STATUS ASSESSED: CPT | Performed by: INTERNAL MEDICINE

## 2025-02-10 PROCEDURE — G0439 PPPS, SUBSEQ VISIT: HCPCS | Performed by: INTERNAL MEDICINE

## 2025-02-10 PROCEDURE — 1160F RVW MEDS BY RX/DR IN RCRD: CPT | Performed by: INTERNAL MEDICINE

## 2025-02-10 PROCEDURE — 1159F MED LIST DOCD IN RCRD: CPT | Performed by: INTERNAL MEDICINE

## 2025-02-10 PROCEDURE — 3075F SYST BP GE 130 - 139MM HG: CPT | Performed by: INTERNAL MEDICINE

## 2025-02-10 PROCEDURE — 99214 OFFICE O/P EST MOD 30 MIN: CPT | Performed by: INTERNAL MEDICINE

## 2025-02-10 PROCEDURE — 1036F TOBACCO NON-USER: CPT | Performed by: INTERNAL MEDICINE

## 2025-02-10 ASSESSMENT — ACTIVITIES OF DAILY LIVING (ADL)
BATHING: INDEPENDENT
BATHING: INDEPENDENT
TAKING_MEDICATION: INDEPENDENT
DRESSING: INDEPENDENT
MANAGING_FINANCES: INDEPENDENT
GROCERY_SHOPPING: INDEPENDENT
DRESSING: INDEPENDENT
DOING_HOUSEWORK: INDEPENDENT

## 2025-02-10 NOTE — PROGRESS NOTES
"Patient ID: Mann Abrams is a 84 y.o. male who presents for Medicare Annual Wellness Visit Subsequent and Follow-up.    /74   Pulse 86   Ht 1.676 m (5' 6\")   Wt 64.4 kg (142 lb)   BMI 22.92 kg/m²       HTN ON MEDICATIONS CONTROLLED   NO CP, NO SOB , NO PND , NO ORTHOPNEA  NO LEG SWELLING , NO PALPITATION       LEFT CAROTID ENDARTERECTOMY STABLE     HX OF ISCHEMIC STROKE       DIABETES WITH NEPHROPATHY ON FARXIGA   10MG A DAY       DIABETES WITH CKD STAGE 3b stable   PT SEEING NEPHROLOGY       COPD STABLE   USING WIXELLA INHALER         Subjective     Review of Systems   Respiratory:  Negative for cough.    All other systems reviewed and are negative.      Objective     Physical Exam  Vitals and nursing note reviewed. Exam conducted with a chaperone present.   Neck:      Vascular: Carotid bruit present.   Cardiovascular:      Rate and Rhythm: Normal rate and regular rhythm.      Pulses: Normal pulses.      Heart sounds: Normal heart sounds. No murmur heard.  Pulmonary:      Effort: Pulmonary effort is normal.      Breath sounds: Normal breath sounds.   Abdominal:      Palpations: There is no mass.   Musculoskeletal:      Right lower leg: No edema.      Left lower leg: No edema.   Neurological:      General: No focal deficit present.      Mental Status: He is oriented to person, place, and time. Mental status is at baseline.   Psychiatric:         Mood and Affect: Mood normal.         Behavior: Behavior normal.         Thought Content: Thought content normal.         Judgment: Judgment normal.         Lab Results   Component Value Date    WBC 8.1 12/31/2024    HGB 10.5 (L) 12/31/2024    HCT 32.4 (L) 12/31/2024    MCV 97 12/31/2024     12/31/2024           Problem List Items Addressed This Visit       Other emphysema (Multi)     STABLE         DM (diabetes mellitus), secondary, w/peripheral vascular complications     STABLE         Stage 3b chronic kidney disease (CKD) (Multi)    Diabetes mellitus " with nephropathy (Multi)    Cerebrovascular accident (CVA), unspecified mechanism (Multi)    Bilateral carotid artery stenosis    History of ischemic stroke    History of left-sided carotid endarterectomy     Other Visit Diagnoses       Medicare annual wellness visit, subsequent  (Chronic)   -  Primary                A/P         CONTINUE THE SAME MEDICATIONS             Advance Care Planning Note     Discussion Date: 02/10/25   Discussion Participants: patient    The patient wishes to discuss Advance Care Planning today and the following is a brief summary of our discussion.     Patient has capacity to make their own medical decisions: Yes  Health Care Agent/Surrogate Decision Maker documented in chart: Yes    Documents on file and valid:  Advance Directive/Living Will: No   Health Care Power of : Yes  Other:     Communication of Medical Status/Prognosis:        Communication of Treatment Goals/Options:     Discussed with the patient end-of-life choices patient is presently full code he does not have a living will or healthcare power of  he will think about getting it done when ready will bring it on next office visit so that it can be scanned into the chart for the purpose of documentation    Treatment Decisions  Goals of Care: survival is prioritized, if goals for quality or survival can reasonably be achieved       Follow Up Plan      Team Members      Time Statement: Total face to face time spent on advance care planning was 5 minutes with 5 minutes spent in counseling, including the explanation.    Nataliya Grant MD  2/10/2025 7:56 AM

## 2025-02-14 ENCOUNTER — APPOINTMENT (OUTPATIENT)
Dept: CARDIOLOGY | Facility: CLINIC | Age: 85
End: 2025-02-14
Payer: MEDICARE

## 2025-02-18 ENCOUNTER — APPOINTMENT (OUTPATIENT)
Dept: OPHTHALMOLOGY | Facility: CLINIC | Age: 85
End: 2025-02-18
Payer: MEDICARE

## 2025-02-18 DIAGNOSIS — E11.9 CONTROLLED TYPE 2 DIABETES MELLITUS WITHOUT COMPLICATION, UNSPECIFIED WHETHER LONG TERM INSULIN USE (MULTI): Primary | ICD-10-CM

## 2025-02-18 DIAGNOSIS — H25.811 COMBINED FORM OF AGE-RELATED CATARACT, RIGHT EYE: ICD-10-CM

## 2025-02-18 DIAGNOSIS — Z96.1 PSEUDOPHAKIA, LEFT EYE: ICD-10-CM

## 2025-02-18 PROCEDURE — 92014 COMPRE OPH EXAM EST PT 1/>: CPT | Performed by: OPHTHALMOLOGY

## 2025-02-18 ASSESSMENT — REFRACTION_WEARINGRX
OD_AXIS: 080
OD_ADD: +3.00
OD_CYLINDER: -1.00
OD_SPHERE: -1.25
OS_ADD: +3.00
OS_CYLINDER: -0.75
OS_SPHERE: +0.25
OS_AXIS: 100

## 2025-02-18 ASSESSMENT — VISUAL ACUITY
OS_CC: 20/20
OD_CC: 20/40
CORRECTION_TYPE: GLASSES
METHOD: SNELLEN - LINEAR
OS_CC+: -2

## 2025-02-18 ASSESSMENT — CONF VISUAL FIELD
OD_INFERIOR_NASAL_RESTRICTION: 0
OD_INFERIOR_TEMPORAL_RESTRICTION: 0
OD_SUPERIOR_TEMPORAL_RESTRICTION: 0
OS_INFERIOR_TEMPORAL_RESTRICTION: 0
METHOD: COUNTING FINGERS
OD_NORMAL: 1
OS_INFERIOR_NASAL_RESTRICTION: 0
OS_SUPERIOR_TEMPORAL_RESTRICTION: 0
OS_SUPERIOR_NASAL_RESTRICTION: 0
OS_NORMAL: 1
OD_SUPERIOR_NASAL_RESTRICTION: 0

## 2025-02-18 ASSESSMENT — ENCOUNTER SYMPTOMS
ENDOCRINE NEGATIVE: 0
CARDIOVASCULAR NEGATIVE: 0
RESPIRATORY NEGATIVE: 0
GASTROINTESTINAL NEGATIVE: 0
EYES NEGATIVE: 1
PSYCHIATRIC NEGATIVE: 0
NEUROLOGICAL NEGATIVE: 0
ALLERGIC/IMMUNOLOGIC NEGATIVE: 0
CONSTITUTIONAL NEGATIVE: 0
HEMATOLOGIC/LYMPHATIC NEGATIVE: 0
MUSCULOSKELETAL NEGATIVE: 0

## 2025-02-18 ASSESSMENT — REFRACTION_MANIFEST
OS_CYLINDER: -1.25
OS_ADD: +3.00
OD_SPHERE: -1.00
OS_AXIS: 120
OS_SPHERE: +0.50
OD_AXIS: 090
OD_CYLINDER: -1.75
OD_ADD: +3.00

## 2025-02-18 ASSESSMENT — TONOMETRY
OS_IOP_MMHG: 16
IOP_METHOD: GOLDMANN APPLANATION
OD_IOP_MMHG: 16

## 2025-02-18 ASSESSMENT — SLIT LAMP EXAM - LIDS
COMMENTS: GOOD POSITION
COMMENTS: GOOD POSITION

## 2025-02-18 ASSESSMENT — CUP TO DISC RATIO
OS_RATIO: .4
OD_RATIO: .4

## 2025-02-18 ASSESSMENT — EXTERNAL EXAM - RIGHT EYE: OD_EXAM: NORMAL

## 2025-02-18 ASSESSMENT — EXTERNAL EXAM - LEFT EYE: OS_EXAM: NORMAL

## 2025-02-18 NOTE — PROGRESS NOTES
Vascular Surgery Clinic Note    CC: POV carotid artery stenosis     History Of Present Illness:   Mann Abrams is a 84 y.o. male here postoperatively. He underwent a left carotid endarterectomy on 12/30/2024 by Dr. Sim Castaneda for symptomatic carotid artery stenosis. He currently denies amaurosis fugax or TIA symptoms. His blood pressure is well-controlled at home.  He does not smoke.    He denies claudication symptoms.    Medical History:  Patient Active Problem List   Diagnosis    Primary hypertension    Other emphysema (Multi)    Type 2 diabetes mellitus with stage 3b chronic kidney disease, without long-term current use of insulin (Multi)    DM (diabetes mellitus), secondary, w/peripheral vascular complications    Chronic cough    Hypertension, uncontrolled    Stage 3b chronic kidney disease (CKD) (Multi)    Abnormal stress test    Age-related nuclear cataract of right eye    Cataract    Cavus deformity of foot    Chest pain, exertional    Chronic kidney disease in type 2 diabetes mellitus    Diabetes mellitus (Multi)    GERD (gastroesophageal reflux disease)    Hammertoe    Hyperlipidemia    Ingrowing nail    Low oxygen saturation    Dyspnea on minimal exertion    Mild anemia    Myopia with astigmatism and presbyopia    Osteoarthritis    Persistent cough    SOB (shortness of breath) on exertion    Pseudophakia of left eye    Pure hypercholesterolemia    Intervertebral cervical disc disorder with myelopathy, cervical region    Non-recurrent unilateral inguinal hernia without obstruction or gangrene    Elevated coronary artery calcium score    Bruit of right carotid artery    Renal artery stenosis, native, bilateral (CMS-HCC)    Iron deficiency anemia due to chronic blood loss    Allergic rhinitis    Heartburn    Injury of left knee    Arthralgia of hip    Knee pain    Occlusion of right carotid artery    Wheezing    Angiectasia    Hiatal hernia    Chronic renal disease, stage IV (Multi)    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[facility])    Carotid artery stenosis without cerebral infarction    Asymptomatic bilateral carotid artery stenosis    Diabetes mellitus with nephropathy (Multi)    Dysarthria    Cerebrovascular accident (CVA), unspecified mechanism (Multi)    Type 2 diabetes mellitus    Essential hypertension    Bilateral carotid artery stenosis    PFO (patent foramen ovale) ([facility]-HCC)    History of ischemic stroke    Symptomatic stenosis of left carotid artery    History of left-sided carotid endarterectomy        SH:    Social Drivers of Health     Tobacco Use: Medium Risk (2/18/2025)    Patient History     Smoking Tobacco Use: Former     Smokeless Tobacco Use: Never     Passive Exposure: Not on file   Alcohol Use: Not At Risk (12/30/2024)    AUDIT-C     Frequency of Alcohol Consumption: Never     Average Number of Drinks: Patient does not drink     Frequency of Binge Drinking: Never   Financial Resource Strain: Low Risk  (12/30/2024)    Overall Financial Resource Strain (CARDIA)     Difficulty of Paying Living Expenses: Not very hard   Food Insecurity: No Food Insecurity (12/30/2024)    Hunger Vital Sign     Worried About Running Out of Food in the Last Year: Never true     Ran Out of Food in the Last Year: Never true   Transportation Needs: No Transportation Needs (12/30/2024)    PRAPARE - Transportation     Lack of Transportation (Medical): No     Lack of Transportation (Non-Medical): No   Physical Activity: Inactive (12/30/2024)    Exercise Vital Sign     Days of Exercise per Week: 0 days     Minutes of Exercise per Session: 0 min   Stress: No Stress Concern Present (12/30/2024)    [employer] - Occupational Stress Questionnaire     Feeling of Stress : Not at all   Social Connections: Unknown (12/30/2024)    Social Connection and Isolation Panel [NHANES]     Frequency of Communication with Friends and Family: Once a week     Frequency  "of Social Gatherings with Friends and Family: Once a week     Attends Confucianism Services: Not on file     Active Member of Clubs or Organizations: Not on file     Attends Club or Organization Meetings: Not on file     Marital Status: Not on file   Intimate Partner Violence: Not At Risk (12/30/2024)    Humiliation, Afraid, Rape, and Kick questionnaire     Fear of Current or Ex-Partner: No     Emotionally Abused: No     Physically Abused: No     Sexually Abused: No   Depression: Not at risk (2/10/2025)    PHQ-2     PHQ-2 Score: 0   Housing Stability: Low Risk  (12/30/2024)    Housing Stability Vital Sign     Unable to Pay for Housing in the Last Year: No     Number of Times Moved in the Last Year: 1     Homeless in the Last Year: No   Utilities: Not At Risk (12/30/2024)    [facility] Utilities     Threatened with loss of utilities: No   Digital Equity: Not on file   Health Literacy: Inadequate Health Literacy (12/30/2024)     Health Literacy     Frequency of need for help with medical instructions: Sometimes        FH:  Family History   Problem Relation Name Age of Onset    Cancer Mother      Other (cardiac disorder) Father      Diabetes Sister          Allergies:   Allergies   Allergen Reactions    Doxazosin Itching       ROS:  All systems were reviewed and noted to be negative, other than described above.     Objective:  Last Recorded Vitals  Blood pressure 171/80, pulse 93, height 1.676 m (5' 6\"), weight 66.2 kg (146 lb), SpO2 95%.    Meds:   Current Outpatient Medications   Medication Instructions    acetaminophen (TYLENOL) 650 mg, oral, Every 6 hours PRN    albuterol (ProAir HFA) 90 mcg/actuation inhaler 2 puffs, inhalation, Every 4 hours PRN    amLODIPine (NORVASC) 10 mg, oral, Daily    ascorbic acid (VITAMIN C) 500 mg, Daily    aspirin 81 mg, Daily    atorvastatin (LIPITOR) 80 mg, oral, Daily    carvedilol (COREG) 12.5 mg, oral, 2 times daily (morning and late afternoon)    clopidogrel (PLAVIX) 75 mg, oral, Daily "    dapagliflozin propanediol (FARXIGA) 10 mg, oral, Daily    fluticasone (Flonase) 50 mcg/actuation nasal spray 1 spray, Each Nostril, Daily PRN, Shake gently. Before first use, prime pump. After use, clean tip and replace cap.    fluticasone propion-salmeteroL (Wixela Inhub) 250-50 mcg/dose diskus inhaler INHALE ONE PUFF BY MOUTH TWICE A DAY. RINSE MOUTH WITH WATER AFTER USE TO REDUCE AFTERTASTE AND INCIDENCE OF CANDIDIASIS. DO NOT SWALLOW.    hydrALAZINE (APRESOLINE) 10 mg, oral, 3 times daily, Advised to dc carvedilol to start hydralazine    isosorbide mononitrate ER (IMDUR) 30 mg, oral, Daily, Do not crush or chew.    montelukast (SINGULAIR) 10 mg, oral, Nightly    nitroglycerin (NITROSTAT) 0.4 mg, Every 5 min PRN    olmesartan (BENICAR) 40 mg, oral, Daily    omeprazole (PRILOSEC) 40 mg, oral, Daily    tamsulosin (FLOMAX) 0.4 mg, oral, Daily       Exam:  Constitutional: Well appearing, NAD   PSYCH: Appropriate mood and affect  Eyes: Sclera clear  Neck: Supple, left neck incision healing well   CV: No tachycardia   RESP: Unlabored breathing,  SKIN: No lesions  NEURO: No focal deficits noted.   EXTREMITIES: Warm & well perfused.   PULSES: Normal upper extremity pulse exam    Imaging Reviewed:  Vascular US Carotid Artery Duplex Bilateral 01/29/2025    [place] [id number]  Tel [phone] and Fax [phone]      Vascular Lab Report  [facility] US CAROTID ARTERY DUPLEX BILATERAL      Patient Name:      PROSPER Reynolds Physician: [id number] Wendie Zhu MD  Study Date:        1/29/2025          Ordering           [id number] MELVA S CHO  Physician:  MRN/PID:           [id number]           Technologist:      Desiree Crawford RVT  Accession#:        [id number]       Technologist 2:  Date of Birth/Age: 1940 / 84      Encounter#:        [id number]  years  Gender:            M  Admission Status:  Outpatient         Location           [facility]  Performed:      Diagnosis/ICD: Occlusion and stenosis of bilateral carotid arteries-I65.23  CPT Codes:     10066 Cerebrovascular Carotid Duplex scan complete      CONCLUSIONS:  Right Carotid: Findings are consistent with greater than 70% stenosis of the right proximal internal carotid artery. Right external carotid artery appears patent with no evidence of stenosis. The right vertebral artery demonstrates a high resistance waveform which may be suggestive of a more distal stenosis or occlusion. No evidence of hemodynamically significant stenosis in the right subclavian artery.  Left Carotid: Findings are consistent with 50 to 69% stenosis of the left proximal internal carotid artery. The left distal internal carotid artery appears to be tortuous. Left external carotid artery appears patent with no evidence of stenosis. The left vertebral artery demonstrates a high resistance waveform which may be suggestive of a more distal stenosis or occlusion. No evidence of hemodynamically significant stenosis in the left subclavian artery.  Endarterectomy: Patent left carotid endarterectomy site following endarterectomy, with a mildly turbulent flow pattern in the proximal ICA.    Comparison:  Compared with study from 6/27/2024, The left internal carotid artery velocity has decreased from 437/131 cm/s to 140/4 cm/s status post endarterectomy 12/30/2024, rest as above    Imaging & Doppler Findings:  Right Plaque Morph: The proximal right internal carotid artery demonstrates calcified and heterogenous plaque. The proximal right external carotid artery demonstrates heterogenous and calcified plaque. The proximal right common carotid artery demonstrates heterogenous plaque. The mid right common carotid artery demonstrates heterogenous plaque. The distal right common carotid artery demonstrates heterogenous plaque. The proximal right subclavian artery demonstrates calcified and heterogenous plaque. The right carotid bulb  demonstrates heterogenous and calcified plaque.  Left Plaque Morph: The proximal left common carotid artery demonstrates heterogenous plaque. The mid left common carotid artery demonstrates heterogenous plaque.    Right                        Left  PSV      EDV                PSV      EDV  81 cm/s            CCA P    73 cm/s  60 cm/s            CCA D    68 cm/s  274 cm/s 51 cm/s   ICA P    140 cm/s 4 cm/s  77 cm/s  0 cm/s    ICA M    61 cm/s  13 cm/s  46 cm/s  17 cm/s   ICA D    61 cm/s  22 cm/s  162 cm/s            ECA     101 cm/s  58 cm/s  11 cm/s Vertebral  61 cm/s  12 cm/s  123 cm/s         Subclavian 199 cm/s    Right Left  ICA/CCA Ratio  4.5  2.1        [id number] Wendie Zhu MD  Electronically signed by [id number] Wendie Zhu MD on 1/31/2025 at 5:40:59 PM        ** Final **      Assessment & Plan:  1. Carotid stenosis, asymptomatic, bilateral  Vascular US Carotid Artery Duplex Bilateral        Carotid artery stenosis status post left carotid endarterectomy 12/30/2024.  Duplex duplex shows known >70% right ICA stenosis.   Left ICA PSV is 140 cm/s.   Blood pressure control  Continue DAPT, statin   RTC in 6 months with carotid duplex     LUMA Shaver-CNP

## 2025-02-18 NOTE — PROGRESS NOTES
Assessment/Plan   Diagnoses and all orders for this visit:  Controlled type 2 diabetes mellitus without complication, unspecified whether long term insulin use (Multi)  no retinopathy observed on exam today od/os, pt ed to continue good BGlc, blood pressure and lipid control, rtc with any changes in vision, otherwise monitor 1 year   Combined form of age-related cataract, right eye  Cataract not visually significant at this time. Discussed cataract surgery indications,20/50 or worse, glare dropping vision 2 lines or more and affecting activities of daily living  Observe for progression   Pseudophakia, left eye  Stable

## 2025-03-16 DIAGNOSIS — J43.8 OTHER EMPHYSEMA (MULTI): ICD-10-CM

## 2025-03-16 RX ORDER — FLUTICASONE PROPIONATE AND SALMETEROL 250; 50 UG/1; UG/1
POWDER RESPIRATORY (INHALATION)
Qty: 180 EACH | Refills: 3 | Status: SHIPPED | OUTPATIENT
Start: 2025-03-16

## 2025-03-19 ENCOUNTER — APPOINTMENT (OUTPATIENT)
Dept: NEUROLOGY | Facility: CLINIC | Age: 85
End: 2025-03-19
Payer: MEDICARE

## 2025-03-25 DIAGNOSIS — I10 PRIMARY HYPERTENSION: ICD-10-CM

## 2025-03-25 DIAGNOSIS — J43.8 OTHER EMPHYSEMA (MULTI): ICD-10-CM

## 2025-03-25 RX ORDER — AMLODIPINE BESYLATE 10 MG/1
10 TABLET ORAL DAILY
Qty: 90 TABLET | Refills: 1 | Status: SHIPPED | OUTPATIENT
Start: 2025-03-25

## 2025-03-25 RX ORDER — MONTELUKAST SODIUM 10 MG/1
10 TABLET ORAL NIGHTLY
Qty: 90 TABLET | Refills: 3 | Status: SHIPPED | OUTPATIENT
Start: 2025-03-25

## 2025-04-07 DIAGNOSIS — R12 HEARTBURN: ICD-10-CM

## 2025-04-08 ENCOUNTER — APPOINTMENT (OUTPATIENT)
Dept: CARDIOLOGY | Facility: CLINIC | Age: 85
End: 2025-04-08
Payer: MEDICARE

## 2025-04-08 VITALS
OXYGEN SATURATION: 94 % | WEIGHT: 145 LBS | DIASTOLIC BLOOD PRESSURE: 71 MMHG | HEART RATE: 71 BPM | BODY MASS INDEX: 23.3 KG/M2 | SYSTOLIC BLOOD PRESSURE: 148 MMHG | HEIGHT: 66 IN

## 2025-04-08 DIAGNOSIS — I10 PRIMARY HYPERTENSION: ICD-10-CM

## 2025-04-08 DIAGNOSIS — E11.22 TYPE 2 DIABETES MELLITUS WITH STAGE 3B CHRONIC KIDNEY DISEASE, WITHOUT LONG-TERM CURRENT USE OF INSULIN (MULTI): ICD-10-CM

## 2025-04-08 DIAGNOSIS — N18.32 TYPE 2 DIABETES MELLITUS WITH STAGE 3B CHRONIC KIDNEY DISEASE, WITHOUT LONG-TERM CURRENT USE OF INSULIN (MULTI): ICD-10-CM

## 2025-04-08 DIAGNOSIS — Q24.5: ICD-10-CM

## 2025-04-08 DIAGNOSIS — R07.9 CHEST PAIN, EXERTIONAL: ICD-10-CM

## 2025-04-08 DIAGNOSIS — Z79.4 TYPE 2 DIABETES MELLITUS WITH STAGE 3A CHRONIC KIDNEY DISEASE, WITH LONG-TERM CURRENT USE OF INSULIN (MULTI): ICD-10-CM

## 2025-04-08 DIAGNOSIS — R94.39 ABNORMAL STRESS TEST: ICD-10-CM

## 2025-04-08 DIAGNOSIS — I63.9 CEREBROVASCULAR ACCIDENT (CVA), UNSPECIFIED MECHANISM (MULTI): ICD-10-CM

## 2025-04-08 DIAGNOSIS — R80.9 MICROALBUMINURIA: ICD-10-CM

## 2025-04-08 DIAGNOSIS — I65.23 BILATERAL CAROTID ARTERY STENOSIS: ICD-10-CM

## 2025-04-08 DIAGNOSIS — R93.1 ELEVATED CORONARY ARTERY CALCIUM SCORE: ICD-10-CM

## 2025-04-08 DIAGNOSIS — N18.31 TYPE 2 DIABETES MELLITUS WITH STAGE 3A CHRONIC KIDNEY DISEASE, WITH LONG-TERM CURRENT USE OF INSULIN (MULTI): ICD-10-CM

## 2025-04-08 DIAGNOSIS — E78.00 PURE HYPERCHOLESTEROLEMIA: ICD-10-CM

## 2025-04-08 DIAGNOSIS — I10 HYPERTENSION, UNCONTROLLED: Primary | ICD-10-CM

## 2025-04-08 DIAGNOSIS — E78.2 MIXED HYPERLIPIDEMIA: ICD-10-CM

## 2025-04-08 DIAGNOSIS — E78.5 HYPERLIPIDEMIA, UNSPECIFIED HYPERLIPIDEMIA TYPE: ICD-10-CM

## 2025-04-08 DIAGNOSIS — Q21.12 PFO (PATENT FORAMEN OVALE) (HHS-HCC): ICD-10-CM

## 2025-04-08 DIAGNOSIS — E11.22 TYPE 2 DIABETES MELLITUS WITH STAGE 3A CHRONIC KIDNEY DISEASE, WITH LONG-TERM CURRENT USE OF INSULIN (MULTI): ICD-10-CM

## 2025-04-08 DIAGNOSIS — Z98.890 HISTORY OF LEFT-SIDED CAROTID ENDARTERECTOMY: ICD-10-CM

## 2025-04-08 DIAGNOSIS — N18.4 CHRONIC RENAL DISEASE, STAGE IV (MULTI): ICD-10-CM

## 2025-04-08 PROBLEM — I65.22 SYMPTOMATIC STENOSIS OF LEFT CAROTID ARTERY: Status: RESOLVED | Noted: 2024-12-10 | Resolved: 2025-04-08

## 2025-04-08 PROBLEM — I65.29 CAROTID ARTERY STENOSIS WITHOUT CEREBRAL INFARCTION: Status: RESOLVED | Noted: 2024-09-24 | Resolved: 2025-04-08

## 2025-04-08 LAB
ATRIAL RATE: 71 BPM
P AXIS: 64 DEGREES
P OFFSET: 200 MS
P ONSET: 151 MS
PR INTERVAL: 146 MS
Q ONSET: 224 MS
QRS COUNT: 12 BEATS
QRS DURATION: 68 MS
QT INTERVAL: 374 MS
QTC CALCULATION(BAZETT): 406 MS
QTC FREDERICIA: 395 MS
R AXIS: 40 DEGREES
T AXIS: 53 DEGREES
T OFFSET: 411 MS
VENTRICULAR RATE: 71 BPM

## 2025-04-08 PROCEDURE — 1159F MED LIST DOCD IN RCRD: CPT | Performed by: INTERNAL MEDICINE

## 2025-04-08 PROCEDURE — 1123F ACP DISCUSS/DSCN MKR DOCD: CPT | Performed by: INTERNAL MEDICINE

## 2025-04-08 PROCEDURE — 93005 ELECTROCARDIOGRAM TRACING: CPT | Performed by: INTERNAL MEDICINE

## 2025-04-08 PROCEDURE — 1126F AMNT PAIN NOTED NONE PRSNT: CPT | Performed by: INTERNAL MEDICINE

## 2025-04-08 PROCEDURE — 99214 OFFICE O/P EST MOD 30 MIN: CPT | Performed by: INTERNAL MEDICINE

## 2025-04-08 PROCEDURE — 3078F DIAST BP <80 MM HG: CPT | Performed by: INTERNAL MEDICINE

## 2025-04-08 PROCEDURE — 3077F SYST BP >= 140 MM HG: CPT | Performed by: INTERNAL MEDICINE

## 2025-04-08 PROCEDURE — G2211 COMPLEX E/M VISIT ADD ON: HCPCS | Performed by: INTERNAL MEDICINE

## 2025-04-08 RX ORDER — CARVEDILOL 25 MG/1
25 TABLET ORAL
Qty: 180 TABLET | Refills: 3 | Status: SHIPPED | OUTPATIENT
Start: 2025-04-08 | End: 2026-04-08

## 2025-04-08 RX ORDER — NITROGLYCERIN 0.4 MG/1
0.4 TABLET SUBLINGUAL EVERY 5 MIN PRN
Qty: 90 TABLET | Refills: 11 | Status: SHIPPED | OUTPATIENT
Start: 2025-04-08 | End: 2026-04-08

## 2025-04-08 RX ORDER — OLMESARTAN MEDOXOMIL 40 MG/1
40 TABLET ORAL DAILY
Qty: 90 TABLET | Refills: 3 | Status: SHIPPED | OUTPATIENT
Start: 2025-04-08 | End: 2026-04-08

## 2025-04-08 RX ORDER — ISOSORBIDE MONONITRATE 30 MG/1
30 TABLET, EXTENDED RELEASE ORAL DAILY
Qty: 90 TABLET | Refills: 3 | Status: SHIPPED | OUTPATIENT
Start: 2025-04-08 | End: 2026-04-08

## 2025-04-08 RX ORDER — DAPAGLIFLOZIN 10 MG/1
10 TABLET, FILM COATED ORAL DAILY
Qty: 90 TABLET | Refills: 1 | Status: SHIPPED | OUTPATIENT
Start: 2025-04-08

## 2025-04-08 RX ORDER — AMLODIPINE BESYLATE 10 MG/1
10 TABLET ORAL DAILY
Qty: 90 TABLET | Refills: 1 | Status: SHIPPED | OUTPATIENT
Start: 2025-04-08

## 2025-04-08 RX ORDER — OMEPRAZOLE 40 MG/1
40 CAPSULE, DELAYED RELEASE ORAL DAILY
Qty: 90 CAPSULE | Refills: 2 | Status: SHIPPED | OUTPATIENT
Start: 2025-04-08

## 2025-04-08 RX ORDER — ATORVASTATIN CALCIUM 80 MG/1
80 TABLET, FILM COATED ORAL DAILY
Qty: 90 TABLET | Refills: 3 | Status: SHIPPED | OUTPATIENT
Start: 2025-04-08 | End: 2026-04-08

## 2025-04-08 RX ORDER — CLOPIDOGREL BISULFATE 75 MG/1
75 TABLET ORAL DAILY
Qty: 90 TABLET | Refills: 1 | Status: SHIPPED | OUTPATIENT
Start: 2025-04-08

## 2025-04-08 ASSESSMENT — PAIN SCALES - GENERAL: PAINLEVEL_OUTOF10: 0-NO PAIN

## 2025-04-08 NOTE — ASSESSMENT & PLAN NOTE
Cardiac Catheterization 05/2024 showed      Left Anterior Descending Coronary Artery Distribution:  The left anterior descending coronary artery is a normal caliber vessel. The LAD arises normally from the left main coronary artery. The proximal left anterior descending coronary artery showed 30% stenosis. This lesion was eccentric. The 1st diagonal branch showed no significant disease or stenosis greater than 30%. The 2nd diagonal branch demonstrated no significant disease or stenosis greater than 30%.     Circumflex Coronary Artery Distribution:  The circumflex coronary artery is a normal caliber vessel. 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 The proximal circumflex coronary artery showed 70-80%. The 1st obtuse marginal branch showed no significant disease or stenosis greater than 30%.     Right Coronary Artery Distribution:     The right coronary artery is a normal caliber vessel. The RCA arises normally from the right sinus of Valsalva. The mid right coronary artery showed 40% stenosis. The acute marginal branch showed no significant disease or stenosis greater than 30%.  The right posterolateral branch showed no significant disease or stenosis greater than 30%. The right posterior descending artery showed no significant disease or stenosis greater than 30%.     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

## 2025-04-08 NOTE — PATIENT INSTRUCTIONS
"It was a pleasure seeing you today. I would like to see you back in clinic after your heart catheterization you can call my office if questions arise between now and our next visit.     We talked about your heart catheterization. Although the \"70-80%\" seems high, in your case, it would cause more harm than good to fix this based on the location of the blockage and how the blood vessels come off the aorta to supply the heart. This is best managed medically at this time as we are doing.       Please continue  Olmesartan 40 mg Daily. This will lower your blood pressure better    Please continue Imdur 30 mg Daily. This is a long acting Nitrate to assist with your chest discomfort    Please continue to take 81 mg of Aspirin ( Either from the Drug Store or from [employer]) We need to make sure that you can tolerate this.     Please have a Blood Count performed Next week     We also talked about your symptoms of shortness of breath and you being somewhat tired and having to rest when you are doing daily things.  In light of your coronary artery calcium score, as well as your previous stress test, I think We need to proceed with a heart catheterization.  I have reached out to Dr. Jerez who does most of the heart catheterizations for me as he has the training to allow him to fix problems when he sees them. Continue on your other current medications.    Below are some Heart Health Tips that we provide to all of our patients. I hope you fing them useful.     - If you are having problems with medications, consider looking at the following websites.   --  \"GoodRx\"   --  \"[facility]\"      - We are happy to supply written prescriptions if needed to allow you to obtain your medications from different pharmacies. Additionally, if you are having issues with mail order delivery, please let us know. We can send a limited supply of your medications to your local pharmacy.     -  We recommend you follow a heart " healthy diet. Watch food labels and try not to eat more than 2,500 mg of sodium per day. Avoid foods high in salt like processed meats (lunch meats, saini, and sausage), processed foods (boxed dinners, canned soups), fried and fast foods. Monitor serving sizes and if the sodium per serving size is more than 200 mg, avoid those foods. If the sodium per serving size is between 100-200 mg, you can use those in limited quantities. Try to choose foods where the amount of sodium per serving size is less than 100 mg. Try to eat a diet rich in fruits and vegetables, whole grains, low fat dairy products, skinless poultry and fish, nuts, beans, non-tropical vegetable oils. Limit saturated fat, trans fat, sodium, red meats, and sugar-sweetened beverages.   Limit alcohol     -The combination of a reduced-calorie diet and increased physical activity is recommended. Adults should aim to get at least 150 minutes of moderate physical activity per week (30 minutes of moderate physical activities at least 5 days per week). Examples of moderate physical activities include brisk walking, swimming, aerobic dancing, heavy gardening, jumping rope, bicycling 10 MPH or faster, tennis, hiking uphill or with a heavy backpack. Please let us know if you would like to learn more about your nutrition and calories and additional options including weight loss programs to help you reach your goal.     -If you smoke, stop smoking. If you stop smoking you can help get rid of a major source of stress to your heart. Smoking makes your heart rate and blood pressure go up and increases your risk or developing cardiovascular diseases and worsen symptoms associated with heart failure.     -Obtain a BP monitor and monitor your BP daily. Check it around the same time each day; at least 1 hour after taking your medications. Record your BP in a log and bring your log with you to your doctors appointment.     -F/u with your PCP as recommended.

## 2025-04-08 NOTE — ASSESSMENT & PLAN NOTE
Follow up with Renal Artery Studies  03/2024    Right Renal Artery: Right renal arteries demonstrate no evidence of hemodynamically significant stenosis. The right renal veins are widely patent. The right renal artery demonstrates a high resistive flow pattern.     Left Renal Artery: Left renal arteries demonstrate no evidence of hemodynamically significant stenosis. The left renal veins are widely patent. The left renal artery demonstrates a high resistive flow pattern.

## 2025-04-08 NOTE — PROGRESS NOTES
"Chief Complaint:   Follow-up     History Of Present Illness:    Mann Abrams is a 84 y.o. male presenting with a pertinent medical history notable for chronic kidney disease stage III, diabetes mellitus, peripheral vascular disease, hypercholesterolemia who is seen today for follow up cardiovascular care.      Recall that Mr. Abrams is a very active individual who usually walks 2 miles every day in good weather or spends at least 30 minutes on a treadmill 6 days out of the week. He had one episode where he all of a sudden felt very tired while he was riding his lawnmower. He felt lightheaded so he laid down and rested briefly. After that he got up and finish mowing the lawn without any incident Following this he went and talked to his PCP who referred him on for cardiac stress testing. He underwent exercise echocardiography stress testing. He exercised for 9 minutes and was able to make 10.1 METS. On his EKG, he had ST depressions in the lateral leads. On his actual imaging he had a hyperdynamic precordium at baseline that then increased at maximal stress. Of note he had no symptoms during this time. Based off of this his stress test was recorded as abnormal and he was referred to cardiology.     Following that initial episode, he has not had any repeat occurrences of chest discomfort or the feeling that he had.  He has no personal history of coronary artery disease. He has reduced his carvedilol in the interim period, using it once daily instead of BID. States that he does this because he is more tired when taking it twice daily. Describe it as \"like all my energy is sucked out of me\"      Today ( 1/10/2023) He presents for follow up. He has been well, but in December had a \"cold\" and went to Urgent Care. Told them of cough, congestion and a \"Burning pain in his chest:\" He was referred to ER but \"they had over a hundred people in there and told me to get in line so I went home. I knew I wasn;t dying\" States that " "his pain occurred mostly when he was bending over to tie his shoes. No change in symptoms when he was walking, going about his daily activities. States that he thinks that \"lisinopril i making me cough and I see on TV that it causes Cancer.\" States that he has not had any recurrence of chest discomfort.         8/3/2023 -- He returns for follow up. He had seen his PCP who has been adjusting his medication. He had placed him on Doxazosin 4 mg Daily + Amlodipine 10 ( Had already been on this) and Losartan 50 mg ( Had been on this ) and told to decrease the Carvedilol. Probably doxazosin, but noted that his back \"felt like it was on fire\". He subsequently stopped it. Since these change, his blood pressure remains poorly controlled.      9/29/2023  -- He presents for follow up. He has followed up with Nephrology for CKD and HTN. He started spironolactone at 25 mg taken as 1/2 tablet twice daily. He notes improved blood pressure with this but also notes increased urination. He had tried to take it as a full dose once daily but noted heavy urination frequency and urgency that was difficult to manage. He notes that since starting the spironolactone that his SOB / PATEL has improved noticeably.     3/12/2024 -- He returns for follow up.  He was seen at [facility] For 4 days of Nausea .vomiting. A CT-CAP showed an inguianl hernia, diffuse atherosclerosis disease and stenosis of the renal arteries. He was provided with paperwork bhumi cantrell\" an abnormality was found an you should discuss this with your doctor\" however, no information regarding what was discovered was noted.  He notes that he continues to struggle with some shortness of breath.  He although it is better now that his blood pressure is better controlled, this still is occurring especially when going up and down stairs, or even taking his trash can out.  He notes that he to stop and rest and catch his breath.  A recent coronary artery calcium score does show " increased calcifications specifically within the LAD territory.  -  6/14/2024 --he returns for follow-up.  Since her last appointment, he underwent left heart catheterization after presenting with [place] a chest discomfort.  This showed mildly obstructive coronary artery disease with a 70 to 80% proximal lesion within the circumflex.  It was noted that the circumflex had abnormal origin coming off the RCA.  Given findings, it was decided to be medically managed.  He then presented back to the hospital beginning June where he was noted to have acute on chronic anemia.  He was transfused blood, underwent EGD, and thereafter he was felt to be stabilized.  Since his return home, he has not had any further episodes.  He overall feels better.  He offers no complaints.      2/5/2025 --he presents for follow-up. A lot has occurred in the interim time.  He followed up with vascular medicine for further evaluation of bilateral carotid artery stenosis.  He originally was seen in the vascular surgical side for consideration of CEA.  In October, he unfortunately suffered a stroke.  He followed up with vascular, it was referred on for carotid angiography with consideration for possible stenting.  He was noted to have significant severely calcific lesions.  He was returned back to the surgical side, and ultimately underwent carotid endarterectomy.  In this time, he has had multiple presentations to the offices.  Blood pressure has been intermittently well-controlled versus significantly elevated.  Today, he returns and reports that his carvedilol has been stopped.  Previously while on this medication, he had significantly less chest tightness, heaviness, and he felt that his blood pressure was better controlled.  He is certainly surprised to see the elevation of his blood pressure in the office.  He reports that at home, his blood pressures usually run 120 mmHg.  I did ask him to return later with his blood pressure  "cuff readings so that we can confirm.    4/8/2025 --> he returns to follow-up.  He reports that \"this is going to be a short visit because everything is doing very good\".  He reports that he is back to walking and exercising.  He is currently walking on his treadmill daily.  He has not had any symptomatology.  He has not felt any lightheadedness, nor dizziness.  His blood pressure is improving.  He is currently taking carvedilol 12.53 times a day with good results.  He wonders if he can have either an increased dose or a more streamlined dosing pattern.     Patient denies chest pain and angina. Pt denies shortness of breath, dyspnea on exertion, orthopnea, and paroxysmal nocturnal dyspnea. Pt denies worsening lower extremity edema. Pt denies palpitations or syncope. No recent falls. No fever or chills. No cough. No change in bowel or bladder habits. No travel. No sick contacts. No recent travel     Past medical history: As above.  Medications: Reviewed.  Allergies: Reviewed.  Social history: Patient denies smoking, alcohol abuse, or illicit drug use.  Family history: No sudden cardiac death or premature coronary artery disease..     Last Recorded Vitals:  Vitals:    04/08/25 0837   BP: 148/71   BP Location: Right arm   Patient Position: Sitting   Pulse: 71   SpO2: 94%   Weight: 65.8 kg (145 lb)   Height: 1.676 m (5' 6\")         Past Medical History:  He has a past medical history of Anemia, Carotid stenosis, CKD (chronic kidney disease), COPD (chronic obstructive pulmonary disease) (Multi), DM (diabetes mellitus) (Multi), GERD (gastroesophageal reflux disease), History of blood transfusion, Hyperlipidemia, Hypertension, Other secondary cataract, left eye (08/01/2016), Person consulting for explanation of examination or test findings (01/07/2020), PFO (patent foramen ovale) ([facility]), PVD (peripheral vascular disease) (CMS-HCC), Stroke (Multi), and Type 2 diabetes mellitus.    Past Surgical History:  He has a past " surgical history that includes Eye surgery (08/07/2014); Other surgical history (12/04/2019); Other surgical history (12/04/2019); Cardiac catheterization (N/A, 5/30/2024); and Invasive Vascular Procedure (Left, 11/6/2024).      Social History:  He reports that he quit smoking about 39 years ago. His smoking use included cigarettes. He has never used smokeless tobacco. He reports that he does not drink alcohol and does not use drugs.    Family History:  Family History   Problem Relation Name Age of Onset    Cancer Mother      Other (cardiac disorder) Father      Diabetes Sister          Allergies:  Doxazosin    Outpatient Medications:  Current Outpatient Medications   Medication Instructions    acetaminophen (TYLENOL) 650 mg, oral, Every 6 hours PRN    albuterol (ProAir HFA) 90 mcg/actuation inhaler 2 puffs, inhalation, Every 4 hours PRN    amLODIPine (NORVASC) 10 mg, oral, Daily    ascorbic acid (VITAMIN C) 500 mg, Daily    aspirin 81 mg, Daily    atorvastatin (LIPITOR) 80 mg, oral, Daily    carvedilol (COREG) 25 mg, oral, 2 times daily (morning and late afternoon)    clopidogrel (PLAVIX) 75 mg, oral, Daily    dapagliflozin propanediol (FARXIGA) 10 mg, oral, Daily    fluticasone (Flonase) 50 mcg/actuation nasal spray 1 spray, Each Nostril, Daily PRN, Shake gently. Before first use, prime pump. After use, clean tip and replace cap.    isosorbide mononitrate ER (IMDUR) 30 mg, oral, Daily, Do not crush or chew.    montelukast (SINGULAIR) 10 mg, oral, Nightly    nitroglycerin (NITROSTAT) 0.4 mg, sublingual, Every 5 min PRN, place 1 tablet under tongue every 5 minutes for up to 3 doses as needed for chest pain. Call 911 if pain persists    olmesartan (BENICAR) 40 mg, oral, Daily    omeprazole (PRILOSEC) 40 mg, oral, Daily    tamsulosin (FLOMAX) 0.4 mg, oral, Daily    Wixela Inhub 250-50 mcg/dose diskus inhaler INHALE ONE PUFF BY MOUTH TWICE A DAY. RINSE MOUTH WITH WATER AFTER USE TO REDUCE AFTERTASTE AND INCIDENCE OF  "CANDIDIASIS. DO NOT SWALLOW.       Physical Exam:  PHYSICAL EXAMINATION  /71 (BP Location: Right arm, Patient Position: Sitting)   Pulse 71   Ht 1.676 m (5' 6\")   Wt 65.8 kg (145 lb)   SpO2 94%   BMI 23.40 kg/m²   General:  Patient is awake, alert, and oriented.  Patient is in no acute distress.  HEENT:  Pupils equal and reactive.  Normocephalic.  Moist mucosa.    Neck:  No thyromegaly.  Normal Jugular Venous Pressure.  Cardiovascular:  Regular rate and rhythm.  Normal S1 and S2.  1/6 BINH.  Pulmonary:  Clear to auscultation bilaterally.  Abdomen:  Soft. Non-tender.   Non-distended.  Positive bowel sounds.  Lower Extremities:  2+ pedal pulses. No LE edema.  Neurologic:  Cranial nerves intact.  No focal deficit.   Skin: Skin warm and dry, normal skin turgor.   Psychiatric: Normal affect.           Last Labs:  CBC -  Lab Results   Component Value Date    WBC 8.1 12/31/2024    HGB 10.5 (L) 12/31/2024    HCT 32.4 (L) 12/31/2024    MCV 97 12/31/2024     12/31/2024       CMP -  Lab Results   Component Value Date    CALCIUM 8.6 12/31/2024    PHOS 3.8 12/31/2024    PROT 6.6 10/29/2024    ALBUMIN 3.9 10/29/2024    AST 20 10/29/2024    ALT 21 10/29/2024    ALKPHOS 86 10/29/2024    BILITOT 0.4 10/29/2024       LIPID PANEL -   Lab Results   Component Value Date    CHOL 140 10/29/2024    TRIG 103 10/29/2024    HDL 52.2 10/29/2024    CHHDL 2.7 10/29/2024    LDLF 76 04/21/2022    VLDL 21 10/29/2024    NHDL 88 10/29/2024       RENAL FUNCTION PANEL -   Lab Results   Component Value Date    GLUCOSE 80 12/31/2024     12/31/2024    K 5.4 (H) 12/31/2024     (H) 12/31/2024    CO2 18 (L) 12/31/2024    ANIONGAP 15 12/31/2024    BUN 30 (H) 12/31/2024    CREATININE 2.25 (H) 12/31/2024    GFRMALE 45 (A) 09/01/2023    CALCIUM 8.6 12/31/2024    PHOS 3.8 12/31/2024    ALBUMIN 3.9 10/29/2024        Lab Results   Component Value Date    BNP 21 10/29/2024    HGBA1C 7.8 (H) 10/29/2024       Last Cardiology Tests:  ECG:   "    In Office EKG 3/12/2024 -- Sinus Rhythm at 82 BPM  -- NO Ischemic changes   In Office EKG 1/10/2023  -- Normal Sinus Rhythm @ 84 bpmNo results found for this or any previous visit from the past 1095 days.      Echo:  Echocardiogram 09/2023  1. Left ventricular systolic function is normal with a 60-65% estimated ejection fraction.  2. Spectral Doppler shows an impaired relaxation pattern of left ventricular diastolic filling.  3. Moderately elevated right ventricular systolic pressure. ( RVSP ~ 52 mmHg)      Ejection Fractions:  EF   Date/Time Value Ref Range Status   10/30/2024 02:46 PM 65 %        Cath:  5/30/2024  Coronary Angiography Comments:  A 6F JL 3.5 guide catheter was used for LAD IVUS. LAD was wired using a Runthrough wire. IVUS was performed.IVUS: Reference area of proximal LAD is a 20 mmï¿½ and MLA was 14 mmï¿½]. Final angiogram with no complications.           Left Anterior Descending Coronary Artery Distribution:  The left anterior descending coronary artery is a normal caliber vessel. The LAD arises normally from the left main coronary artery. The proximal left anterior descending coronary artery showed 30% stenosis. This lesion was eccentric. The 1st diagonal branch showed no significant disease or stenosis greater than 30%. The 2nd diagonal branch demonstrated no significant disease or stenosis greater than 30%.     Circumflex Coronary Artery Distribution:  The circumflex coronary artery is a normal caliber vessel. 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 The proximal circumflex coronary artery showed 70-80%. The 1st obtuse marginal branch showed no significant disease or stenosis greater than 30%.     Right Coronary Artery Distribution:     The right coronary artery is a normal caliber vessel. The RCA arises normally from the right sinus of Valsalva. The mid right coronary artery showed 40% stenosis. The acute marginal branch  showed no significant disease or stenosis greater than 30%.  The right posterolateral branch showed no significant disease or stenosis greater than 30%. The right posterior descending artery showed no significant disease or stenosis greater than 30%.     Coronary Lesion Summary:  Vessel       Stenosis   Vessel Segment  LAD        30% stenosis    proximal  Circumflex    70-80%       proximal  RCA        40% stenosis      mid  Given the bathroom right now pressure to the    Stress Test:  Exercise echocardiogram stress testing 06/2020   1. Abnormal Stress Test.  2. ECG changes consistent with ischemia.  3. Hyperdynamic global left ventricular systolic function.  4. Patients baseline LV function was hyperdynamic with peak stress showing almost complete cavity obliteration . -- No regional wall motion changes noted at peak exercise.  5. Noted Increased in RVSP from baseline 31 mmHg to 54 mmHg at Peak Stress     Cardiac Imaging:  CT cardiac scoring wo IV contrast 01/04/2024  The score and distribution of calcium in the coronary arteries is as  follows:      .81,  .03,  LCx 0,  .93,      Total 1328.77      The visualized ascending thoracic aorta measures 3.5 cm in diameter.  Extensive atherosclerotic calcification at the level of the aortic  ductus. The heart is normal in size. No pericardial effusion is  present.      No gross evidence of mediastinal or hilar lymphadenopathy or masses  is identified. Scattered mediastinal lymph nodes are felt to be  reactive in nature. The visualized segments of the lungs demonstrates  peribronchial thickening with mild emphysematous changes. Subpleural  reticulation consistent with smoking-related interstitial changes..  Calcified granuloma within the lingula.      The main pulmonary artery, right and left pulmonary artery are normal  in size.      The visualized subdiaphragmatic structures appear intact.      Lab review: I have personally reviewed the laboratory  result(s)   Diagnostic review: I have personally reviewed the result(s) of the EKG and Echocardiogram .   Imaging review: I have  personally reviewed the result(s) Stress Testing / OSH Imaging / CTA Coronary Artery Calcium Score     Assessment/Plan     Problem List Items Addressed This Visit       Abnormal stress test    Overview     Exercise echocardiogram stress testing 06/2020   1. Abnormal Stress Test.  2. ECG changes consistent with ischemia.  3. Hyperdynamic global left ventricular systolic function.  4. Patients baseline LV function was hyperdynamic with peak stress showing almost complete cavity obliteration . -- No regional wall motion changes noted at peak exercise.  5. 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    The proximal circumflex coronary artery showed 70-80%. The 1st obtuse marginal branch showed no significant disease or stenosis greater than 30%.         Current Assessment & Plan     Left Carotid Disease Treated with Open Endarectomy ( [facility] - 12/30/2024) due to severely calcified anatomy after Acute CVA 10/2024         Relevant Medications    carvedilol (Coreg) 25 mg tablet    amLODIPine (Norvasc) 10 mg tablet    clopidogrel (Plavix) 75 mg tablet    isosorbide mononitrate ER  (Imdur) 30 mg 24 hr tablet    nitroglycerin (Nitrostat) 0.4 mg SL tablet    Bilateral carotid artery stenosis    Cerebrovascular accident (CVA), unspecified mechanism (Multi)    Relevant Medications    clopidogrel (Plavix) 75 mg tablet    Other Relevant Orders    Magnesium    Chest pain, exertional    Overview     Worsening Chest Pain with Exertion in the setting of Elevated CAC. His symptoms are typical anginal symptoms at this time.   -- They are worsening in nature.   -- He notes that his tightness and pain is occurring when he walks < 100 feet         Relevant Medications    carvedilol (Coreg) 25 mg tablet    olmesartan (Benicar) 40 mg tablet    isosorbide mononitrate ER (Imdur) 30 mg 24 hr tablet    nitroglycerin (Nitrostat) 0.4 mg SL tablet    Other Relevant Orders    Magnesium    Chronic renal disease, stage IV (Multi)    Relevant Medications    dapagliflozin propanediol (Farxiga) 10 mg tablet    Diabetes mellitus (Multi)    Relevant Medications    dapagliflozin propanediol (Farxiga) 10 mg tablet    Elevated coronary artery calcium score    Overview      Coronary Artery Calcium Score 1/2024  .81,  .03,  LCx 0,  .93,      Total 1328.77         Current Assessment & Plan     Cardiac Catheterization 05/2024 showed      Left Anterior Descending Coronary Artery Distribution:  The left anterior descending coronary artery is a normal caliber vessel. The LAD arises normally from the left main coronary artery. The proximal left anterior descending coronary artery showed 30% stenosis. This lesion was eccentric. The 1st diagonal branch showed no significant disease or stenosis greater than 30%. The 2nd diagonal branch demonstrated no significant disease or stenosis greater than 30%.     Circumflex Coronary Artery Distribution:  The circumflex coronary artery is a normal caliber vessel. 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 The  proximal circumflex coronary artery showed 70-80%. The 1st obtuse marginal branch showed no significant disease or stenosis greater than 30%.     Right Coronary Artery Distribution:     The right coronary artery is a normal caliber vessel. The RCA arises normally from the right sinus of Valsalva. The mid right coronary artery showed 40% stenosis. The acute marginal branch showed no significant disease or stenosis greater than 30%.  The right posterolateral branch showed no significant disease or stenosis greater than 30%. The right posterior descending artery showed no significant disease or stenosis greater than 30%.     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    ,ostial LAD with eccentric 30% eccentric stenosis by IVUS [IVUS: Reference area of proximal LAD is a 20 mmï¿½ and MLA is 14 mmï¿½]          Relevant Medications    carvedilol (Coreg) 25 mg tablet    amLODIPine (Norvasc) 10 mg tablet    clopidogrel (Plavix) 75 mg tablet    olmesartan (Benicar) 40 mg tablet    isosorbide mononitrate ER (Imdur) 30 mg 24 hr tablet    nitroglycerin (Nitrostat) 0.4 mg SL tablet    History of left-sided carotid endarterectomy    Overview     S/P Left CEA 12/30/20214 with [facility]   -- Post operative Carotid Doppler Study 1/2025  Left Carotid: Findings are consistent with 50 to 69% stenosis of the left proximal internal carotid artery. The left distal internal carotid artery appears to be tortuous. Left external carotid artery appears patent with no evidence of stenosis. The left vertebral artery demonstrates a high resistance waveform which may be suggestive of a more distal stenosis or occlusion. No evidence of hemodynamically significant stenosis in the left subclavian artery.  Endarterectomy: Patent left carotid endarterectomy site following endarterectomy, with a mildly turbulent flow pattern  "in the proximal ICA.         Hyperlipidemia    Overview     The ASCVD Risk score (Margareth DK, et al., 2019) failed to calculate for the following reasons:    The 2019 ASCVD risk score is only valid for ages 40 to 79    Risk score cannot be calculated because patient has a medical history suggesting prior/existing ASCVD  Lab Results   Component Value Date    CHOL 140 10/29/2024    CHOL 165 09/01/2023    CHOL 133 12/28/2022     Lab Results   Component Value Date    HDL 52.2 10/29/2024    HDL 68.8 09/01/2023    HDL 47.3 12/28/2022     Lab Results   Component Value Date    LDLCALC 67 10/29/2024     Lab Results   Component Value Date    TRIG 103 10/29/2024    TRIG 75 09/01/2023    TRIG 127 04/21/2022     No components found for: \"CHOLHDL\"           Current Assessment & Plan     Aotrvastatin 80         Relevant Medications    atorvastatin (Lipitor) 80 mg tablet    olmesartan (Benicar) 40 mg tablet    Other Relevant Orders    Lipoprotein a    Lipid Panel    Hypertension, uncontrolled - Primary    Relevant Medications    carvedilol (Coreg) 25 mg tablet    nitroglycerin (Nitrostat) 0.4 mg SL tablet    Other Relevant Orders    ECG 12 lead (Clinic Performed)    PFO (patent foramen ovale) ([facility])    Relevant Medications    carvedilol (Coreg) 25 mg tablet    amLODIPine (Norvasc) 10 mg tablet    clopidogrel (Plavix) 75 mg tablet    isosorbide mononitrate ER (Imdur) 30 mg 24 hr tablet    nitroglycerin (Nitrostat) 0.4 mg SL tablet    Primary hypertension    Overview     Medications 065/2024  -- Amlodipine 10 mg daily  -- Carvedilol 12.5 mg twice daily  -- Isosorbide mononitrate 30 mg  -- Olmesartan 40 mg  -- Spironolactone 12.5 mg twice daily         Relevant Medications    amLODIPine (Norvasc) 10 mg tablet    Pure hypercholesterolemia    Type 2 diabetes mellitus with stage 3b chronic kidney disease, without long-term current use of insulin (Multi)    Overview     stable         Relevant Medications    dapagliflozin propanediol " (Farxiga) 10 mg tablet     Other Visit Diagnoses       Microalbuminuria        Relevant Medications    dapagliflozin propanediol (Farxiga) 10 mg tablet                 Brant Garcia DO   Division of Cardiovascular Medicine  [facility] [place]

## 2025-04-08 NOTE — ASSESSMENT & PLAN NOTE
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   -- Medical Management at this time

## 2025-04-08 NOTE — ASSESSMENT & PLAN NOTE
Left Carotid Disease Treated with Open Endarectomy ( [facility] - 12/30/2024) due to severely calcified anatomy after Acute CVA 10/2024

## 2025-04-25 ENCOUNTER — APPOINTMENT (OUTPATIENT)
Dept: NEUROLOGY | Facility: CLINIC | Age: 85
End: 2025-04-25
Payer: MEDICARE

## 2025-05-08 LAB
25(OH)D3+25(OH)D2 SERPL-MCNC: 52 NG/ML (ref 30–100)
ALBUMIN SERPL-MCNC: 3.8 G/DL (ref 3.6–5.1)
ALBUMIN/CREAT UR: 1410 MG/G CREAT
BUN SERPL-MCNC: 32 MG/DL (ref 7–25)
BUN/CREAT SERPL: 15 (CALC) (ref 6–22)
CALCIUM SERPL-MCNC: 8.6 MG/DL (ref 8.6–10.3)
CHLORIDE SERPL-SCNC: 110 MMOL/L (ref 98–110)
CO2 SERPL-SCNC: 19 MMOL/L (ref 20–32)
CREAT SERPL-MCNC: 2.14 MG/DL (ref 0.7–1.22)
CREAT UR-MCNC: 92 MG/DL (ref 20–320)
EGFRCR SERPLBLD CKD-EPI 2021: 30 ML/MIN/1.73M2
FERRITIN SERPL-MCNC: 92 NG/ML (ref 24–380)
GLUCOSE SERPL-MCNC: 98 MG/DL (ref 65–99)
HCT VFR BLD AUTO: 34.1 % (ref 38.5–50)
HGB BLD-MCNC: 11.2 G/DL (ref 13.2–17.1)
IRON SATN MFR SERPL: 37 % (CALC) (ref 20–48)
IRON SERPL-MCNC: 91 MCG/DL (ref 50–180)
MICROALBUMIN UR-MCNC: 129.7 MG/DL
PHOSPHATE SERPL-MCNC: 4 MG/DL (ref 2.1–4.3)
POTASSIUM SERPL-SCNC: 4.6 MMOL/L (ref 3.5–5.3)
PTH-INTACT SERPL-MCNC: 83 PG/ML (ref 16–77)
SODIUM SERPL-SCNC: 140 MMOL/L (ref 135–146)
TIBC SERPL-MCNC: 245 MCG/DL (CALC) (ref 250–425)

## 2025-05-09 LAB
ALBUMIN SERPL-MCNC: 3.9 G/DL (ref 3.6–5.1)
ALP SERPL-CCNC: 93 U/L (ref 35–144)
ALT SERPL-CCNC: 23 U/L (ref 9–46)
ANION GAP SERPL CALCULATED.4IONS-SCNC: 12 MMOL/L (CALC) (ref 7–17)
AST SERPL-CCNC: 25 U/L (ref 10–35)
BILIRUB SERPL-MCNC: 0.5 MG/DL (ref 0.2–1.2)
BUN SERPL-MCNC: 33 MG/DL (ref 7–25)
CALCIUM SERPL-MCNC: 8.7 MG/DL (ref 8.6–10.3)
CHLORIDE SERPL-SCNC: 109 MMOL/L (ref 98–110)
CHOLEST SERPL-MCNC: 166 MG/DL
CHOLEST/HDLC SERPL: 2.9 (CALC)
CO2 SERPL-SCNC: 19 MMOL/L (ref 20–32)
CREAT SERPL-MCNC: 2.11 MG/DL (ref 0.7–1.22)
EGFRCR SERPLBLD CKD-EPI 2021: 31 ML/MIN/1.73M2
ERYTHROCYTE [DISTWIDTH] IN BLOOD BY AUTOMATED COUNT: 13 % (ref 11–15)
GLUCOSE SERPL-MCNC: 93 MG/DL (ref 65–99)
HCT VFR BLD AUTO: 33.8 % (ref 38.5–50)
HDLC SERPL-MCNC: 58 MG/DL
HGB BLD-MCNC: 11.2 G/DL (ref 13.2–17.1)
LDLC SERPL CALC-MCNC: 87 MG/DL (CALC)
LPA SERPL-SCNC: 269 NMOL/L
MAGNESIUM SERPL-MCNC: 2.5 MG/DL (ref 1.5–2.5)
MCH RBC QN AUTO: 31.5 PG (ref 27–33)
MCHC RBC AUTO-ENTMCNC: 33.1 G/DL (ref 32–36)
MCV RBC AUTO: 94.9 FL (ref 80–100)
NONHDLC SERPL-MCNC: 108 MG/DL (CALC)
PLATELET # BLD AUTO: 205 THOUSAND/UL (ref 140–400)
PMV BLD REES-ECKER: 9.8 FL (ref 7.5–12.5)
POTASSIUM SERPL-SCNC: 4.6 MMOL/L (ref 3.5–5.3)
PROT SERPL-MCNC: 6.5 G/DL (ref 6.1–8.1)
RBC # BLD AUTO: 3.56 MILLION/UL (ref 4.2–5.8)
SODIUM SERPL-SCNC: 140 MMOL/L (ref 135–146)
TRIGL SERPL-MCNC: 118 MG/DL
WBC # BLD AUTO: 5.1 THOUSAND/UL (ref 3.8–10.8)

## 2025-05-12 ENCOUNTER — APPOINTMENT (OUTPATIENT)
Dept: NEPHROLOGY | Facility: CLINIC | Age: 85
End: 2025-05-12
Payer: MEDICARE

## 2025-05-12 VITALS
HEIGHT: 66 IN | BODY MASS INDEX: 23.5 KG/M2 | WEIGHT: 146.2 LBS | HEART RATE: 72 BPM | SYSTOLIC BLOOD PRESSURE: 152 MMHG | DIASTOLIC BLOOD PRESSURE: 77 MMHG | TEMPERATURE: 96.8 F | OXYGEN SATURATION: 94 %

## 2025-05-12 DIAGNOSIS — N18.32 STAGE 3B CHRONIC KIDNEY DISEASE (CKD) (MULTI): Primary | ICD-10-CM

## 2025-05-12 PROCEDURE — 3078F DIAST BP <80 MM HG: CPT | Performed by: INTERNAL MEDICINE

## 2025-05-12 PROCEDURE — 99213 OFFICE O/P EST LOW 20 MIN: CPT | Performed by: INTERNAL MEDICINE

## 2025-05-12 PROCEDURE — 1036F TOBACCO NON-USER: CPT | Performed by: INTERNAL MEDICINE

## 2025-05-12 PROCEDURE — 1126F AMNT PAIN NOTED NONE PRSNT: CPT | Performed by: INTERNAL MEDICINE

## 2025-05-12 PROCEDURE — 3077F SYST BP >= 140 MM HG: CPT | Performed by: INTERNAL MEDICINE

## 2025-05-12 RX ORDER — SODIUM BICARBONATE 650 MG/1
650 TABLET ORAL 3 TIMES DAILY
Qty: 270 TABLET | Refills: 3 | Status: SHIPPED | OUTPATIENT
Start: 2025-05-12 | End: 2026-05-12

## 2025-05-12 ASSESSMENT — PAIN SCALES - GENERAL: PAINLEVEL_OUTOF10: 0-NO PAIN

## 2025-05-12 NOTE — PROGRESS NOTES
"For follow up, doing well.  No complaints  No hospitalizations/illness since last visit   BP at home 130s/80s, no higher than 140/80  Denies orthostatic symptoms    RoS negative for all other systems except as noted above.        2/7/2025     9:38 AM 2/7/2025     9:53 AM 2/10/2025     7:40 AM 2/10/2025     7:56 AM 4/8/2025     8:37 AM 5/12/2025     8:15 AM 5/12/2025     8:19 AM   Vitals   Systolic 184 162 146 138 148 160 152   Diastolic 80 68 79 74 71 79 77   BP Location Right arm Right arm   Right arm Right arm Left arm   Heart Rate 86  86  71 66 72   Temp      36 °C (96.8 °F)    Height 1.676 m (5' 6\")  1.676 m (5' 6\")  1.676 m (5' 6\") 1.676 m (5' 6\")    Weight (lb) 140  142  145 146.2    BMI 22.6 kg/m2  22.92 kg/m2  23.4 kg/m2 23.6 kg/m2    BSA (m2) 1.72 m2  1.73 m2  1.75 m2 1.76 m2    Visit Report Report Report Report Report Report Report Report     No distress  HEENT:  moist, no pallor  trace edema jyotsna ankles  Breath sounds jyotsna equal, clear  S1 S2 regular, normal, no rub or murmur  Abdomen soft  AAO x3, non focal    Lab Results   Component Value Date     05/07/2025     05/07/2025     12/31/2024    K 4.6 05/07/2025    K 4.6 05/07/2025    K 5.4 (H) 12/31/2024     05/07/2025     05/07/2025     (H) 12/31/2024    CO2 19 (L) 05/07/2025    CO2 19 (L) 05/07/2025    CO2 18 (L) 12/31/2024    BUN 33 (H) 05/07/2025    BUN 32 (H) 05/07/2025    BUN 30 (H) 12/31/2024    CREATININE 2.11 (H) 05/07/2025    CREATININE 2.14 (H) 05/07/2025    CREATININE 2.25 (H) 12/31/2024    CALCIUM 8.7 05/07/2025    CALCIUM 8.6 05/07/2025    CALCIUM 8.6 12/31/2024    PHOS 4.0 05/07/2025    PHOS 3.8 12/31/2024    PHOS 4.5 08/08/2024    VITD25 52 05/07/2025    VITD25 94 04/11/2024    VITD25 49 08/28/2023    MICROALBCREA 1,410 (H) 05/07/2025    MICROALBCREA 105.2 (H) 04/11/2024    MICROALBCREA 282.7 (H) 01/03/2024    HGB 11.2 (L) 05/07/2025    HGB 11.2 (L) 05/07/2025    HGB 10.5 (L) 12/31/2024      84 year old with " DM, htn and CKD       1. CKD G3bA3 : Likely due to DM and htn.  Creatinine on 5/7/25 was  2.1 mg per DL, stable.  Tolerating Farxiga well.  Continue to avoid NSAIDs     2. Anemia: Hemoglobin 11.5 g per DL, iron replete, ct PO iron     3. Htn; Goal /80, at goal, continue amlodipine 10 mg/day, olmesartan 40 mg/day, carvedilol 25 mg PO BID, ct  2.5 g/day sodium restricted diet.    4.  Serum bicarbonate 19 mEq/L on 5/7/25, will repeat,  will  start oral sodium bicarbonate 650 mg TID.     RTC: 4 mo

## 2025-06-09 ENCOUNTER — APPOINTMENT (OUTPATIENT)
Dept: PRIMARY CARE | Facility: CLINIC | Age: 85
End: 2025-06-09
Payer: MEDICARE

## 2025-06-09 VITALS
OXYGEN SATURATION: 96 % | BODY MASS INDEX: 24.37 KG/M2 | DIASTOLIC BLOOD PRESSURE: 84 MMHG | SYSTOLIC BLOOD PRESSURE: 160 MMHG | HEART RATE: 70 BPM | WEIGHT: 151 LBS

## 2025-06-09 DIAGNOSIS — N18.32 TYPE 2 DIABETES MELLITUS WITH STAGE 3B CHRONIC KIDNEY DISEASE, WITHOUT LONG-TERM CURRENT USE OF INSULIN (MULTI): ICD-10-CM

## 2025-06-09 DIAGNOSIS — I63.9 CEREBROVASCULAR ACCIDENT (CVA), UNSPECIFIED MECHANISM (MULTI): ICD-10-CM

## 2025-06-09 DIAGNOSIS — E87.20 METABOLIC ACIDOSIS: ICD-10-CM

## 2025-06-09 DIAGNOSIS — N18.32 STAGE 3B CHRONIC KIDNEY DISEASE (CKD) (MULTI): ICD-10-CM

## 2025-06-09 DIAGNOSIS — Z98.890 HISTORY OF LEFT-SIDED CAROTID ENDARTERECTOMY: ICD-10-CM

## 2025-06-09 DIAGNOSIS — M79.641 PAIN OF RIGHT HAND: ICD-10-CM

## 2025-06-09 DIAGNOSIS — E13.51: ICD-10-CM

## 2025-06-09 DIAGNOSIS — I10 PRIMARY HYPERTENSION: Primary | Chronic | ICD-10-CM

## 2025-06-09 DIAGNOSIS — I65.23 BILATERAL CAROTID ARTERY STENOSIS: ICD-10-CM

## 2025-06-09 DIAGNOSIS — J43.8 OTHER EMPHYSEMA (MULTI): ICD-10-CM

## 2025-06-09 DIAGNOSIS — I10 HYPERTENSION, UNCONTROLLED: ICD-10-CM

## 2025-06-09 DIAGNOSIS — E11.22 TYPE 2 DIABETES MELLITUS WITH STAGE 3B CHRONIC KIDNEY DISEASE, WITHOUT LONG-TERM CURRENT USE OF INSULIN (MULTI): ICD-10-CM

## 2025-06-09 DIAGNOSIS — Z86.73 HISTORY OF ISCHEMIC STROKE: ICD-10-CM

## 2025-06-09 DIAGNOSIS — E11.21 DIABETES MELLITUS WITH NEPHROPATHY (MULTI): ICD-10-CM

## 2025-06-09 PROCEDURE — 1159F MED LIST DOCD IN RCRD: CPT | Performed by: INTERNAL MEDICINE

## 2025-06-09 PROCEDURE — 99215 OFFICE O/P EST HI 40 MIN: CPT | Performed by: INTERNAL MEDICINE

## 2025-06-09 PROCEDURE — 1170F FXNL STATUS ASSESSED: CPT | Performed by: INTERNAL MEDICINE

## 2025-06-09 PROCEDURE — 1158F ADVNC CARE PLAN TLK DOCD: CPT | Performed by: INTERNAL MEDICINE

## 2025-06-09 PROCEDURE — 1036F TOBACCO NON-USER: CPT | Performed by: INTERNAL MEDICINE

## 2025-06-09 PROCEDURE — 3078F DIAST BP <80 MM HG: CPT | Performed by: INTERNAL MEDICINE

## 2025-06-09 PROCEDURE — G2211 COMPLEX E/M VISIT ADD ON: HCPCS | Performed by: INTERNAL MEDICINE

## 2025-06-09 PROCEDURE — 1160F RVW MEDS BY RX/DR IN RCRD: CPT | Performed by: INTERNAL MEDICINE

## 2025-06-09 PROCEDURE — 3077F SYST BP >= 140 MM HG: CPT | Performed by: INTERNAL MEDICINE

## 2025-06-09 RX ORDER — HYDRALAZINE HYDROCHLORIDE 10 MG/1
10 TABLET, FILM COATED ORAL 3 TIMES DAILY
Qty: 90 TABLET | Refills: 1 | Status: SHIPPED | OUTPATIENT
Start: 2025-06-09 | End: 2026-06-09

## 2025-06-09 RX ORDER — SODIUM BICARBONATE 650 MG/1
650 TABLET ORAL 3 TIMES DAILY
Qty: 270 TABLET | Refills: 3 | Status: SHIPPED | OUTPATIENT
Start: 2025-06-09 | End: 2026-06-09

## 2025-06-09 ASSESSMENT — ACTIVITIES OF DAILY LIVING (ADL)
GROCERY_SHOPPING: INDEPENDENT
BATHING: INDEPENDENT
DRESSING: INDEPENDENT
TAKING_MEDICATION: INDEPENDENT
MANAGING_FINANCES: INDEPENDENT
DOING_HOUSEWORK: INDEPENDENT

## 2025-06-09 ASSESSMENT — ENCOUNTER SYMPTOMS
DEPRESSION: 0
LOSS OF SENSATION IN FEET: 0
OCCASIONAL FEELINGS OF UNSTEADINESS: 0
CONSTITUTIONAL NEGATIVE: 1

## 2025-06-09 NOTE — PROGRESS NOTES
Patient ID: Mann Abrams is a 85 y.o. male who presents for Follow-up.    /84   Pulse 70   Wt 68.5 kg (151 lb)   SpO2 96%   BMI 24.37 kg/m²     HPI      REVIEWED NEPHROLOGY NOTE   REVIEWED CARDIOLOGY NOTE       HTN ON MEDICATIONS UNCONTROLLED   NO CP, NO SOB , NO PND , NO PALPITATION   NO HEADACHE , NO LEG SWELLING         COPD STABLE  USING WIXELA INHALER  NEEDED         I HAVE SWELLING RT HAND   DURATION 2-3 WKS , NO PAIN         I HAVING RT FOOT SWELLING TOO   NO PAIN IN THE FOOT       CKD STAGE 3BA3  I AM URINATING GOOD     PT NOTED TO HAVE METABOLIC ACIDOSIS   FROM LAST BMP TEST ON 05/07/2025   THOUGH HE HAS NOT BEEN TAKING SODI-  BICARB SINCE HE DID NOT GET THE PRESCRIPTION         CKD STAGE 3BA3   WITH NEPHROPATHY     HX OF STROKE      BILATERAL CAROTID OCCLUSION       HX OF LEFT CAROTID ENDARTERECTOMY       DIABETES WITH NEPHROPATHY             Subjective     Review of Systems   Constitutional: Negative.    All other systems reviewed and are negative.      Objective     Physical Exam  Vitals and nursing note reviewed.   Constitutional:       Appearance: Normal appearance.   Neck:      Vascular: No carotid bruit.   Cardiovascular:      Rate and Rhythm: Normal rate and regular rhythm.      Pulses: Normal pulses.      Heart sounds: Normal heart sounds. No murmur heard.  Pulmonary:      Effort: Pulmonary effort is normal.      Breath sounds: Normal breath sounds.   Abdominal:      Palpations: There is no mass.   Musculoskeletal:      Right lower leg: No edema.      Left lower leg: No edema.   Skin:     Capillary Refill: Capillary refill takes more than 3 seconds.   Neurological:      General: No focal deficit present.      Mental Status: He is oriented to person, place, and time. Mental status is at baseline.   Psychiatric:         Mood and Affect: Mood normal.         Behavior: Behavior normal.         Thought Content: Thought content normal.         Judgment: Judgment normal.         Lab Results    Component Value Date    WBC 5.1 05/07/2025    HGB 11.2 (L) 05/07/2025    HCT 33.8 (L) 05/07/2025    MCV 94.9 05/07/2025     05/07/2025           Problem List Items Addressed This Visit       Primary hypertension - Primary    Other emphysema (Multi)    Relevant Orders    Referral to Pulmonology    Type 2 diabetes mellitus with stage 3b chronic kidney disease, without long-term current use of insulin (Multi)    Relevant Orders    Hemoglobin A1c    DM (diabetes mellitus), secondary, w/peripheral vascular complications    Relevant Orders    Hemoglobin A1c    Hypertension, uncontrolled    Relevant Medications    hydrALAZINE (Apresoline) 10 mg tablet    Stage 3b chronic kidney disease (CKD) (Multi)    Relevant Medications    sodium bicarbonate 650 mg tablet    Diabetes mellitus with nephropathy (Multi)    Cerebrovascular accident (CVA), unspecified mechanism (Multi)    Bilateral carotid artery stenosis    History of ischemic stroke    History of left-sided carotid endarterectomy     Other Visit Diagnoses         Metabolic acidosis        Relevant Medications    sodium bicarbonate 650 mg tablet    Other Relevant Orders    Basic metabolic panel      Pain of right hand        Relevant Orders    Uric acid                   A/P         WILL START HIM ON HYDRALAZINE 120MG 1 TAB THREE TIMES A DAY   SODIBICARB 650MG 1 TAB THREE TIMES A DAY FILLED   BMP, URIC ACID , A1C   FOLLOW UP WITH ME IN 1 MONTH TIME FOR BLOOD PRESSURE RECHECK   ADVISED TO AVOID EXTRA SALT   ADVISED TO AVOID NSAIDS USE

## 2025-06-10 ENCOUNTER — TELEPHONE (OUTPATIENT)
Dept: NEUROSURGERY | Facility: CLINIC | Age: 85
End: 2025-06-10
Payer: MEDICARE

## 2025-06-11 LAB
ANION GAP SERPL CALCULATED.4IONS-SCNC: 8 MMOL/L (CALC) (ref 7–17)
BUN SERPL-MCNC: 32 MG/DL (ref 7–25)
BUN/CREAT SERPL: 15 (CALC) (ref 6–22)
CALCIUM SERPL-MCNC: 8.8 MG/DL (ref 8.6–10.3)
CHLORIDE SERPL-SCNC: 109 MMOL/L (ref 98–110)
CO2 SERPL-SCNC: 23 MMOL/L (ref 20–32)
CREAT SERPL-MCNC: 2.14 MG/DL (ref 0.7–1.22)
EGFRCR SERPLBLD CKD-EPI 2021: 30 ML/MIN/1.73M2
EST. AVERAGE GLUCOSE BLD GHB EST-MCNC: 154 MG/DL
EST. AVERAGE GLUCOSE BLD GHB EST-SCNC: 8.5 MMOL/L
GLUCOSE SERPL-MCNC: 101 MG/DL (ref 65–99)
HBA1C MFR BLD: 7 %
POTASSIUM SERPL-SCNC: 4.7 MMOL/L (ref 3.5–5.3)
SODIUM SERPL-SCNC: 140 MMOL/L (ref 135–146)
URATE SERPL-MCNC: 5.9 MG/DL (ref 4–8)

## 2025-06-13 ENCOUNTER — OFFICE VISIT (OUTPATIENT)
Dept: PULMONOLOGY | Facility: CLINIC | Age: 85
End: 2025-06-13
Payer: MEDICARE

## 2025-06-13 VITALS
DIASTOLIC BLOOD PRESSURE: 78 MMHG | SYSTOLIC BLOOD PRESSURE: 159 MMHG | HEIGHT: 66 IN | OXYGEN SATURATION: 93 % | WEIGHT: 148 LBS | HEART RATE: 72 BPM | RESPIRATION RATE: 20 BRPM | BODY MASS INDEX: 23.78 KG/M2 | TEMPERATURE: 98.1 F

## 2025-06-13 DIAGNOSIS — Q21.12 PFO (PATENT FORAMEN OVALE) (HHS-HCC): ICD-10-CM

## 2025-06-13 DIAGNOSIS — J43.8 OTHER EMPHYSEMA (MULTI): ICD-10-CM

## 2025-06-13 DIAGNOSIS — R06.09 DOE (DYSPNEA ON EXERTION): ICD-10-CM

## 2025-06-13 DIAGNOSIS — J42 CHRONIC BRONCHITIS, UNSPECIFIED CHRONIC BRONCHITIS TYPE (MULTI): ICD-10-CM

## 2025-06-13 DIAGNOSIS — I27.20 PULMONARY HYPERTENSION (MULTI): Primary | ICD-10-CM

## 2025-06-13 PROCEDURE — 3078F DIAST BP <80 MM HG: CPT | Performed by: INTERNAL MEDICINE

## 2025-06-13 PROCEDURE — 3077F SYST BP >= 140 MM HG: CPT | Performed by: INTERNAL MEDICINE

## 2025-06-13 PROCEDURE — 99212 OFFICE O/P EST SF 10 MIN: CPT | Performed by: INTERNAL MEDICINE

## 2025-06-13 PROCEDURE — 1126F AMNT PAIN NOTED NONE PRSNT: CPT | Performed by: INTERNAL MEDICINE

## 2025-06-13 PROCEDURE — 99205 OFFICE O/P NEW HI 60 MIN: CPT | Performed by: INTERNAL MEDICINE

## 2025-06-13 PROCEDURE — 1160F RVW MEDS BY RX/DR IN RCRD: CPT | Performed by: INTERNAL MEDICINE

## 2025-06-13 PROCEDURE — 1159F MED LIST DOCD IN RCRD: CPT | Performed by: INTERNAL MEDICINE

## 2025-06-13 ASSESSMENT — ENCOUNTER SYMPTOMS
DYSPHORIC MOOD: 0
DIARRHEA: 0
ARTHRALGIAS: 1
VOMITING: 0
NAUSEA: 0
EYE PAIN: 0
NECK PAIN: 0
APPETITE CHANGE: 1
SINUS PRESSURE: 0
SINUS PAIN: 0
COLOR CHANGE: 1
LIGHT-HEADEDNESS: 0
FEVER: 0
FATIGUE: 0
COUGH: 1
CONFUSION: 0
WOUND: 0
UNEXPECTED WEIGHT CHANGE: 0
MYALGIAS: 1
ABDOMINAL PAIN: 0
CHEST TIGHTNESS: 0
SEIZURES: 0
EYE REDNESS: 0
FREQUENCY: 0
NERVOUS/ANXIOUS: 0
SORE THROAT: 0
BACK PAIN: 0
CONSTIPATION: 0
CHILLS: 0
RHINORRHEA: 0
DIZZINESS: 0
HEADACHES: 0
EYE ITCHING: 0
WHEEZING: 1
SHORTNESS OF BREATH: 1
DYSURIA: 0
PALPITATIONS: 0

## 2025-06-13 ASSESSMENT — COPD QUESTIONNAIRES
QUESTION2_CHESTPHLEGM: 4
QUESTION3_CHESTTIGHTNESS: 0 - MY CHEST DOES NOT FEEL TIGHT AT ALL
QUESTION6_LEAVINGHOUSE: 0 - I AM CONFIDENT LEAVING MY HOME DESPITE MY LUNG CONDITION
QUESTION4_WALKINCLINE: 1
QUESTION1_COUGHFREQUENCY: 1
QUESTION8_ENERGYLEVEL: 1
QUESTION5_HOMEACTIVITIES: 0 - I AM NOT LIMITED DOING ANY ACTIVITIES AT HOME
CAT_TOTALSCORE: 8
QUESTION7_SLEEPQUALITY: 1

## 2025-06-13 ASSESSMENT — PAIN SCALES - GENERAL: PAINLEVEL_OUTOF10: 0-NO PAIN

## 2025-06-13 NOTE — PROGRESS NOTES
Subjective   Patient ID: Mann Abrams is a 85 y.o. male who is referred to my office for COPD.   HPI  Patient h/o HTN, DM, DLP, CKD IV, GERD, PVD, CVA, PFO, ? COPD due to h/o smoking who now is being referred to my office. States he had PFT done two years ago, but was not informed about the results. Currently he is on Wixela, BID and rescue inhaler that he uses it infrequently due to not helping with his symptoms.   His main symptom is SOB associated with feeling tired quickly. SOB only with activity. PATEL after walking 400 feet, or climbing 1-2 FOS. No orthopnea or PND. +ve LE edema but also hands edema (might be due to his kidney issues). At times associated with wheezing but no CP. SOB started two years ago, gradual onset, but now stable.   +ve wheezing, on average 1-2 times a week.  +ve rare coughing, at times productive of clear sputum. No h/o hemoptysis.     Denies any sleeping issues. Feels tired in am, but no need to take a nap during the day.   Past Medical History:   Diagnosis Date   • Anemia    • Carotid stenosis    • CKD (chronic kidney disease)     stage 3b   • COPD (chronic obstructive pulmonary disease) (Multi)    • DM (diabetes mellitus) (Multi)    • GERD (gastroesophageal reflux disease)    • History of blood transfusion    • Hyperlipidemia    • Hypertension    • Other secondary cataract, left eye 08/01/2016    Posterior capsular opacification visually significant of left eye   • Person consulting for explanation of examination or test findings 01/07/2020    Encounter to discuss test results   • PFO (patent foramen ovale) ([facility])    • PVD (peripheral vascular disease)    • Stroke (Multi)    • Type 2 diabetes mellitus      Past Surgical History:   Procedure Laterality Date   • CARDIAC CATHETERIZATION N/A 5/30/2024    Procedure: Left Heart Cath with Coronary Angiography and LV;  Surgeon: Denny Riley MD;  Location: [facility] Cardiac Cath Lab;  Service: Cardiovascular;  Laterality: N/A;   • EYE SURGERY   08/07/2014    Eye Surgery   • INVASIVE VASCULAR PROCEDURE Left 11/6/2024    Procedure: Carotid Angiogram;  Surgeon: Chuy Stahl DO;  Location: [place] [address] Cardiac Cath Lab;  Service: Cardiovascular;  Laterality: Left;  Pre-hydration needed   • OTHER SURGICAL HISTORY  12/04/2019    Foot surgery   • OTHER SURGICAL HISTORY  12/04/2019    Back surgery     Family History   Problem Relation Name Age of Onset   • Cancer Mother     • Other (cardiac disorder) Father     • Diabetes Sister     Exposures: quit smoking in 1986, 1..5 PPD x 25 years h/o smoking. Worked as a , no known exposures.   Review of Systems   Constitutional:  Positive for appetite change. Negative for chills, fatigue, fever and unexpected weight change.   HENT:  Negative for congestion, postnasal drip, rhinorrhea, sinus pressure, sinus pain and sore throat.    Eyes:  Negative for pain, redness, itching and visual disturbance.   Respiratory:  Positive for cough, shortness of breath and wheezing. Negative for chest tightness.    Cardiovascular:  Positive for leg swelling. Negative for chest pain and palpitations.   Gastrointestinal:  Negative for abdominal pain, constipation, diarrhea, nausea and vomiting.   Genitourinary:  Positive for enuresis. Negative for dysuria and frequency.   Musculoskeletal:  Positive for arthralgias and myalgias. Negative for back pain and neck pain.   Skin:  Positive for color change (discoloration around the eyes.). Negative for pallor, rash and wound.   Neurological:  Negative for dizziness, seizures, syncope, light-headedness and headaches.   Psychiatric/Behavioral:  Negative for confusion and dysphoric mood. The patient is not nervous/anxious.    Objective       2/10/2025     7:56 AM 4/8/2025     8:37 AM 5/12/2025     8:15 AM 5/12/2025     8:19 AM 6/9/2025     7:49 AM 6/9/2025     8:54 AM 6/13/2025    11:27 AM   Vitals   Systolic 138 148 160 152 185 160 159   Diastolic 74 71 79 77 76 84 78   BP Location  Right  "arm Right arm Left arm Left arm     Heart Rate  71 66 72 70  72   Temp   36 °C (96.8 °F)    36.7 °C (98.1 °F)   Resp       20   Height  1.676 m (5' 6\") 1.676 m (5' 6\")    1.676 m (5' 6\")   Weight (lb)  145 146.2  151  148   BMI  23.4 kg/m2 23.6 kg/m2  24.37 kg/m2  23.89 kg/m2   BSA (m2)  1.75 m2 1.76 m2  1.79 m2  1.77 m2   Visit Report Report Report Report Report Report Report Report   Physical Exam  Constitutional:       General: He is not in acute distress.     Appearance: He is normal weight. He is not ill-appearing or toxic-appearing.   HENT:      Head: Normocephalic and atraumatic.      Nose:      Comments: On RA.      Mouth/Throat:      Mouth: Mucous membranes are moist.      Comments: Mallampati 3-4.   Eyes:      General: No scleral icterus.     Extraocular Movements: Extraocular movements intact.      Pupils: Pupils are equal, round, and reactive to light.   Cardiovascular:      Rate and Rhythm: Normal rate and regular rhythm.      Heart sounds: No murmur heard.     No friction rub. No gallop.   Pulmonary:      Effort: Pulmonary effort is normal. No respiratory distress.      Breath sounds: No wheezing or rales.      Comments: Clear lung fields bilaterally with fair air entry.   Abdominal:      General: Bowel sounds are normal. There is no distension.      Palpations: Abdomen is soft.      Tenderness: There is no abdominal tenderness.   Musculoskeletal:         General: No tenderness. Normal range of motion.      Cervical back: Normal range of motion and neck supple. No rigidity or tenderness.      Right lower leg: Edema (trace) present.      Left lower leg: Edema (trace.) present.   Lymphadenopathy:      Cervical: No cervical adenopathy.   Skin:     General: Skin is warm and dry.      Coloration: Skin is not jaundiced.   Neurological:      General: No focal deficit present.      Mental Status: He is alert and oriented to person, place, and time.      Cranial Nerves: No cranial nerve deficit.      Motor: No " weakness.   Psychiatric:         Mood and Affect: Mood normal.         Behavior: Behavior normal.   Relevant Results:   I personally interpreted the PFT from 6/2023 that showed: FEV1/FVC 51%, FEV1 56-->62% (+ve bronchodilator response, 1.33L), %, %, RV/%, DLCO 37-->48        Assessment/Plan       ACOS:       Wixela      Continue albuterol    PFO  Pulmonary HTN: moderate   Possible hypoxia:        6 MWT.       RTC in 1-2 months with the result of the 6 minutes walk test.     Edouard Rosario MD 06/13/25 11:48 AM

## 2025-06-20 ENCOUNTER — HOSPITAL ENCOUNTER (OUTPATIENT)
Dept: RESPIRATORY THERAPY | Facility: HOSPITAL | Age: 85
Discharge: HOME | End: 2025-06-20
Payer: MEDICARE

## 2025-06-20 DIAGNOSIS — I27.20 PULMONARY HYPERTENSION (MULTI): ICD-10-CM

## 2025-06-20 PROCEDURE — 94618 PULMONARY STRESS TESTING: CPT

## 2025-06-20 PROCEDURE — 94618 PULMONARY STRESS TESTING: CPT | Performed by: INTERNAL MEDICINE

## 2025-06-23 NOTE — PATIENT INSTRUCTIONS
Mr. Abrams,    It was a pleasure to see you in the office today. We discussed the followings:     COPD/Asthma: your breathing test from 2023 showed a mix of asthma and COPD.. For now please continue Wixela and albuterol  Elevated pressure on the right side of the heart: this might be secondary to your COPD or other heart diseases. Please follow up with your cardiologist for optimization of your heart conditions.   Hole in your heart: to further assess if this is causing a drop of your oxygenation, please have a six minute walk test done.     Please return to the office in 1-2 months with the result of the six minute walk test.

## 2025-07-03 ASSESSMENT — ENCOUNTER SYMPTOMS
SHORTNESS OF BREATH: 1
LEG PAIN: 1
ORTHOPNEA: 1

## 2025-07-07 ENCOUNTER — TELEPHONE (OUTPATIENT)
Dept: CARDIOLOGY | Facility: CLINIC | Age: 85
End: 2025-07-07
Payer: MEDICARE

## 2025-07-07 NOTE — TELEPHONE ENCOUNTER
Pt called earlier stating that his insurance company notified him that his Carvedilol may have been recalled and pt wanted something else ordered ASAP. This nurse returned pt's call and informed him, per Dr Garcia, that pt should check with his pharmacy to confirm whether or not their specific supply of Carvedilol has been recalled or not and if it has the pharmacy needs to replace his supply with medication that was not recalled. Pt verbalized understanding.  
19-Apr-2018

## 2025-07-09 ENCOUNTER — APPOINTMENT (OUTPATIENT)
Dept: PRIMARY CARE | Facility: CLINIC | Age: 85
End: 2025-07-09
Payer: MEDICARE

## 2025-07-09 VITALS
DIASTOLIC BLOOD PRESSURE: 70 MMHG | HEART RATE: 70 BPM | BODY MASS INDEX: 24.5 KG/M2 | OXYGEN SATURATION: 94 % | WEIGHT: 151.8 LBS | SYSTOLIC BLOOD PRESSURE: 166 MMHG

## 2025-07-09 DIAGNOSIS — E11.22 TYPE 2 DIABETES MELLITUS WITH STAGE 3B CHRONIC KIDNEY DISEASE, WITHOUT LONG-TERM CURRENT USE OF INSULIN (MULTI): ICD-10-CM

## 2025-07-09 DIAGNOSIS — E11.21 DIABETES MELLITUS WITH NEPHROPATHY (MULTI): ICD-10-CM

## 2025-07-09 DIAGNOSIS — I10 PRIMARY HYPERTENSION: ICD-10-CM

## 2025-07-09 DIAGNOSIS — E78.00 PURE HYPERCHOLESTEROLEMIA: ICD-10-CM

## 2025-07-09 DIAGNOSIS — I65.23 BILATERAL CAROTID ARTERY STENOSIS: Primary | ICD-10-CM

## 2025-07-09 DIAGNOSIS — Z98.890 HISTORY OF LEFT-SIDED CAROTID ENDARTERECTOMY: ICD-10-CM

## 2025-07-09 DIAGNOSIS — N18.32 TYPE 2 DIABETES MELLITUS WITH STAGE 3B CHRONIC KIDNEY DISEASE, WITHOUT LONG-TERM CURRENT USE OF INSULIN (MULTI): ICD-10-CM

## 2025-07-09 DIAGNOSIS — R80.9 PROTEINURIA, UNSPECIFIED TYPE: ICD-10-CM

## 2025-07-09 DIAGNOSIS — J43.8 OTHER EMPHYSEMA (MULTI): ICD-10-CM

## 2025-07-09 PROCEDURE — G2211 COMPLEX E/M VISIT ADD ON: HCPCS | Performed by: INTERNAL MEDICINE

## 2025-07-09 PROCEDURE — 3077F SYST BP >= 140 MM HG: CPT | Performed by: INTERNAL MEDICINE

## 2025-07-09 PROCEDURE — 1036F TOBACCO NON-USER: CPT | Performed by: INTERNAL MEDICINE

## 2025-07-09 PROCEDURE — 1160F RVW MEDS BY RX/DR IN RCRD: CPT | Performed by: INTERNAL MEDICINE

## 2025-07-09 PROCEDURE — 99215 OFFICE O/P EST HI 40 MIN: CPT | Performed by: INTERNAL MEDICINE

## 2025-07-09 PROCEDURE — 3078F DIAST BP <80 MM HG: CPT | Performed by: INTERNAL MEDICINE

## 2025-07-09 PROCEDURE — 1159F MED LIST DOCD IN RCRD: CPT | Performed by: INTERNAL MEDICINE

## 2025-07-09 ASSESSMENT — ENCOUNTER SYMPTOMS
OCCASIONAL FEELINGS OF UNSTEADINESS: 0
ORTHOPNEA: 1
CONSTITUTIONAL NEGATIVE: 1
DEPRESSION: 0
SHORTNESS OF BREATH: 1
LEG PAIN: 1
LOSS OF SENSATION IN FEET: 0

## 2025-07-09 ASSESSMENT — PATIENT HEALTH QUESTIONNAIRE - PHQ9
1. LITTLE INTEREST OR PLEASURE IN DOING THINGS: NOT AT ALL
1. LITTLE INTEREST OR PLEASURE IN DOING THINGS: NOT AT ALL
2. FEELING DOWN, DEPRESSED OR HOPELESS: NOT AT ALL
2. FEELING DOWN, DEPRESSED OR HOPELESS: NOT AT ALL
SUM OF ALL RESPONSES TO PHQ9 QUESTIONS 1 AND 2: 0
SUM OF ALL RESPONSES TO PHQ9 QUESTIONS 1 AND 2: 0
1. LITTLE INTEREST OR PLEASURE IN DOING THINGS: NOT AT ALL
SUM OF ALL RESPONSES TO PHQ9 QUESTIONS 1 AND 2: 0
2. FEELING DOWN, DEPRESSED OR HOPELESS: NOT AT ALL

## 2025-07-10 ENCOUNTER — APPOINTMENT (OUTPATIENT)
Dept: LAB | Facility: HOSPITAL | Age: 85
End: 2025-07-10
Payer: MEDICARE

## 2025-07-10 ENCOUNTER — OFFICE VISIT (OUTPATIENT)
Dept: PULMONOLOGY | Facility: CLINIC | Age: 85
End: 2025-07-10
Payer: MEDICARE

## 2025-07-10 VITALS
SYSTOLIC BLOOD PRESSURE: 158 MMHG | BODY MASS INDEX: 24.86 KG/M2 | TEMPERATURE: 97.6 F | HEART RATE: 75 BPM | DIASTOLIC BLOOD PRESSURE: 79 MMHG | OXYGEN SATURATION: 91 % | WEIGHT: 154 LBS

## 2025-07-10 DIAGNOSIS — J42 CHRONIC BRONCHITIS, UNSPECIFIED CHRONIC BRONCHITIS TYPE (MULTI): ICD-10-CM

## 2025-07-10 DIAGNOSIS — Q21.12 PFO (PATENT FORAMEN OVALE) (HHS-HCC): ICD-10-CM

## 2025-07-10 DIAGNOSIS — R06.09 DOE (DYSPNEA ON EXERTION): ICD-10-CM

## 2025-07-10 DIAGNOSIS — I27.20 PULMONARY HYPERTENSION (MULTI): ICD-10-CM

## 2025-07-10 DIAGNOSIS — J43.8 OTHER EMPHYSEMA (MULTI): Primary | ICD-10-CM

## 2025-07-10 LAB
ANION GAP SERPL CALCULATED.4IONS-SCNC: 8 MMOL/L (CALC) (ref 7–17)
BUN SERPL-MCNC: 32 MG/DL (ref 7–25)
BUN/CREAT SERPL: 14 (CALC) (ref 6–22)
CALCIUM SERPL-MCNC: 8.6 MG/DL (ref 8.6–10.3)
CHLORIDE SERPL-SCNC: 106 MMOL/L (ref 98–110)
CO2 SERPL-SCNC: 24 MMOL/L (ref 20–32)
CREAT SERPL-MCNC: 2.23 MG/DL (ref 0.7–1.22)
EGFRCR SERPLBLD CKD-EPI 2021: 29 ML/MIN/1.73M2
GLUCOSE SERPL-MCNC: 127 MG/DL (ref 65–99)
POTASSIUM SERPL-SCNC: 4.8 MMOL/L (ref 3.5–5.3)
PROT SERPL-MCNC: 6.1 G/DL (ref 6.4–8.2)
SODIUM SERPL-SCNC: 138 MMOL/L (ref 135–146)

## 2025-07-10 PROCEDURE — G2211 COMPLEX E/M VISIT ADD ON: HCPCS | Performed by: INTERNAL MEDICINE

## 2025-07-10 PROCEDURE — 1160F RVW MEDS BY RX/DR IN RCRD: CPT | Performed by: INTERNAL MEDICINE

## 2025-07-10 PROCEDURE — 3078F DIAST BP <80 MM HG: CPT | Performed by: INTERNAL MEDICINE

## 2025-07-10 PROCEDURE — 84155 ASSAY OF PROTEIN SERUM: CPT

## 2025-07-10 PROCEDURE — 86334 IMMUNOFIX E-PHORESIS SERUM: CPT

## 2025-07-10 PROCEDURE — 1036F TOBACCO NON-USER: CPT | Performed by: INTERNAL MEDICINE

## 2025-07-10 PROCEDURE — 84165 PROTEIN E-PHORESIS SERUM: CPT

## 2025-07-10 PROCEDURE — 83521 IG LIGHT CHAINS FREE EACH: CPT

## 2025-07-10 PROCEDURE — 1159F MED LIST DOCD IN RCRD: CPT | Performed by: INTERNAL MEDICINE

## 2025-07-10 PROCEDURE — 99215 OFFICE O/P EST HI 40 MIN: CPT | Performed by: INTERNAL MEDICINE

## 2025-07-10 PROCEDURE — 1126F AMNT PAIN NOTED NONE PRSNT: CPT | Performed by: INTERNAL MEDICINE

## 2025-07-10 PROCEDURE — 3077F SYST BP >= 140 MM HG: CPT | Performed by: INTERNAL MEDICINE

## 2025-07-10 PROCEDURE — 99212 OFFICE O/P EST SF 10 MIN: CPT

## 2025-07-10 ASSESSMENT — ENCOUNTER SYMPTOMS
SORE THROAT: 0
LIGHT-HEADEDNESS: 0
SINUS PRESSURE: 0
FREQUENCY: 0
ARTHRALGIAS: 1
WHEEZING: 1
SINUS PAIN: 0
CHEST TIGHTNESS: 0
NERVOUS/ANXIOUS: 0
APPETITE CHANGE: 0
DIARRHEA: 0
NECK PAIN: 0
RHINORRHEA: 0
MYALGIAS: 1
NAUSEA: 0
HEADACHES: 0
CONFUSION: 0
EYE PAIN: 0
WOUND: 0
DYSURIA: 0
COUGH: 1
EYE REDNESS: 0
FATIGUE: 0
COLOR CHANGE: 1
FEVER: 0
PALPITATIONS: 0
DYSPHORIC MOOD: 0
BACK PAIN: 0
HEMATURIA: 0
SEIZURES: 0
DIZZINESS: 0
EYE ITCHING: 0
VOMITING: 0
ABDOMINAL PAIN: 0
CONSTIPATION: 0
SHORTNESS OF BREATH: 1
CHILLS: 0
UNEXPECTED WEIGHT CHANGE: 0

## 2025-07-10 ASSESSMENT — COPD QUESTIONNAIRES
QUESTION5_HOMEACTIVITIES: 3
CAT_TOTALSCORE: 10
QUESTION2_CHESTPHLEGM: 0 - I HAVE NO PHLEGM (MUCUS) IN MY CHEST AT ALL
QUESTION1_COUGHFREQUENCY: 0 - I NEVER COUGH
QUESTION4_WALKINCLINE: 0 - WHEN I WALK UP A HILL OR ONE FLIGHT OF STAIRS I AM NOT BREATHLESS
QUESTION3_CHESTTIGHTNESS: 0 - MY CHEST DOES NOT FEEL TIGHT AT ALL
QUESTION6_LEAVINGHOUSE: 0 - I AM CONFIDENT LEAVING MY HOME DESPITE MY LUNG CONDITION
QUESTION7_SLEEPQUALITY: 2
QUESTION8_ENERGYLEVEL: 5 - I HAVE NO ENERGY AT ALL

## 2025-07-10 ASSESSMENT — PAIN SCALES - GENERAL: PAINLEVEL_OUTOF10: 0-NO PAIN

## 2025-07-10 NOTE — PATIENT INSTRUCTIONS
Mr. Abrams,    It was a pleasure to see you in the office today. We discussed the followings:     COPD/Asthma: your breathing test from 2023 showed a mix of asthma and COPD.. For now please continue Wixela and albuterol  Elevated pressure on the right side of the heart: this might be secondary to your COPD or other heart diseases. Please follow up with your cardiologist for optimization of your heart conditions.   Hole in your heart: to further assess if this is causing a drop of your oxygenation.  Hypoxia: your six minute walk test showed that your oxygen level drops with walking. For that reason you need 3 L of supplemental oxygen with activities. I am ordering it now.     Please return to the office in 6 months.

## 2025-07-10 NOTE — PROGRESS NOTES
Subjective   Patient ID: Mann Abrams is a 85 y.o. male who is referred to my office for COPD.   HPI  Patient h/o HTN, DM, DLP, CKD IV, GERD, PVD, CVA, PFO, ? COPD due to h/o smoking who now is being referred to my office for management of his COPD.   Last visit 4 weeks ago, since then had a six minute walk test done. No other events since then.   States he had PFT done two years ago, but was not informed about the results. Currently he is on Wixela, BID and rescue inhaler that he uses it infrequently due to not helping with his symptoms. Using it on average 2-3 times a week.   His main symptom is SOB associated with feeling tired quickly. SOB only with activity. PATEL after walking 1/4 mile, or climbing 1-2 FOS. No orthopnea or PND. +ve LE edema but also hands edema (might be due to his kidney issues). At times associated with wheezing but no CP. SOB started two years ago, gradual onset, but now stable.   +ve wheezing, on average 1-2 times a week.  +ve rare coughing, at times productive of clear sputum. No h/o hemoptysis.     Denies any sleeping issues. Feels tired in am, but no need to take a nap during the day.     Exposures: quit smoking in 1986, 1..5 PPD x 25 years h/o smoking. Worked as a , no known exposures.   Current Outpatient Medications   Medication Instructions    acetaminophen (TYLENOL) 650 mg, oral, Every 6 hours PRN    albuterol (ProAir HFA) 90 mcg/actuation inhaler 2 puffs, inhalation, Every 4 hours PRN    amLODIPine (NORVASC) 10 mg, oral, Daily    ascorbic acid (VITAMIN C) 500 mg, Daily    aspirin 81 mg, Daily    atorvastatin (LIPITOR) 80 mg, oral, Daily    carvedilol (COREG) 25 mg, oral, 2 times daily (morning and late afternoon)    clopidogrel (PLAVIX) 75 mg, oral, Daily    dapagliflozin propanediol (FARXIGA) 10 mg, oral, Daily    fluticasone (Flonase) 50 mcg/actuation nasal spray 1 spray, Each Nostril, Daily PRN, Shake gently. Before first use, prime pump. After use, clean tip and  replace cap.    hydrALAZINE (APRESOLINE) 10 mg, oral, 3 times daily    isosorbide mononitrate ER (IMDUR) 30 mg, oral, Daily, Do not crush or chew.    montelukast (SINGULAIR) 10 mg, oral, Nightly    nitroglycerin (NITROSTAT) 0.4 mg, sublingual, Every 5 min PRN, place 1 tablet under tongue every 5 minutes for up to 3 doses as needed for chest pain. Call 911 if pain persists    olmesartan (BENICAR) 40 mg, oral, Daily    omeprazole (PRILOSEC) 40 mg, oral, Daily    sodium bicarbonate 650 mg, oral, 3 times daily    tamsulosin (FLOMAX) 0.4 mg, oral, Daily    Wixela Inhub 250-50 mcg/dose diskus inhaler INHALE ONE PUFF BY MOUTH TWICE A DAY. RINSE MOUTH WITH WATER AFTER USE TO REDUCE AFTERTASTE AND INCIDENCE OF CANDIDIASIS. DO NOT SWALLOW.   Review of Systems   Constitutional:  Negative for appetite change, chills, fatigue, fever and unexpected weight change.   HENT:  Negative for congestion, postnasal drip, rhinorrhea, sinus pressure, sinus pain and sore throat.    Eyes:  Negative for pain, redness, itching and visual disturbance.   Respiratory:  Positive for cough, shortness of breath and wheezing. Negative for chest tightness.    Cardiovascular:  Positive for leg swelling. Negative for chest pain and palpitations.   Gastrointestinal:  Negative for abdominal pain, constipation, diarrhea, nausea and vomiting.   Genitourinary:  Positive for enuresis. Negative for dysuria, frequency and hematuria.   Musculoskeletal:  Positive for arthralgias and myalgias. Negative for back pain and neck pain.   Skin:  Positive for color change (discoloration around the eyes.). Negative for pallor, rash and wound.   Neurological:  Negative for dizziness, seizures, syncope, light-headedness and headaches.   Psychiatric/Behavioral:  Negative for confusion and dysphoric mood. The patient is not nervous/anxious.    Objective       5/12/2025     8:15 AM 5/12/2025     8:19 AM 6/9/2025     7:49 AM 6/9/2025     8:54 AM 6/13/2025    11:27 AM 7/9/2025     " 7:34 AM 7/10/2025    11:21 AM   Vitals   Systolic 160 152 185 160 159 166 158   Diastolic 79 77 76 84 78 70 79   BP Location Right arm Left arm Left arm   Left arm    Heart Rate 66 72 70  72 70 75   Temp 36 °C (96.8 °F)    36.7 °C (98.1 °F)  36.4 °C (97.6 °F)   Resp     20     Height 1.676 m (5' 6\")    1.676 m (5' 6\")     Weight (lb) 146.2  151  148 151.8 154   BMI 23.6 kg/m2  24.37 kg/m2  23.89 kg/m2 24.5 kg/m2 24.86 kg/m2   BSA (m2) 1.76 m2  1.79 m2  1.77 m2 1.79 m2 1.8 m2   Visit Report Report Report Report Report Report Report Report   Physical Exam  Constitutional:       General: He is not in acute distress.     Appearance: He is normal weight. He is not ill-appearing or toxic-appearing.   HENT:      Head: Normocephalic and atraumatic.      Nose:      Comments: On RA.      Mouth/Throat:      Mouth: Mucous membranes are moist.      Comments: Mallampati 3-4.   Eyes:      General: No scleral icterus.     Extraocular Movements: Extraocular movements intact.      Pupils: Pupils are equal, round, and reactive to light.   Cardiovascular:      Rate and Rhythm: Normal rate and regular rhythm.      Heart sounds: No murmur heard.     No friction rub. No gallop.   Pulmonary:      Effort: Pulmonary effort is normal. No respiratory distress.      Breath sounds: No wheezing or rales.      Comments: Clear lung fields bilaterally with fair air entry.   Abdominal:      General: Bowel sounds are normal. There is no distension.      Palpations: Abdomen is soft.      Tenderness: There is no abdominal tenderness.   Musculoskeletal:         General: No tenderness. Normal range of motion.      Cervical back: Normal range of motion and neck supple. No rigidity or tenderness.      Right lower leg: Edema (1+) present.      Left lower leg: Edema (1+) present.   Lymphadenopathy:      Cervical: No cervical adenopathy.   Skin:     General: Skin is warm and dry.      Coloration: Skin is not jaundiced.   Neurological:      General: No focal " deficit present.      Mental Status: He is alert and oriented to person, place, and time.      Cranial Nerves: No cranial nerve deficit.      Motor: No weakness.   Psychiatric:         Mood and Affect: Mood normal.         Behavior: Behavior normal.     Relevant Results:   I personally interpreted the 6MWT result from 6/2025 that showed: walked 336 meters (78% of predicted) consistent with mild functional impairment. There was a significant and abnormal desaturation with walking requiring 3L NC.     The following were reviewed during previous visits.   I personally interpreted the PFT from 6/2023 that showed: FEV1/FVC 51%, FEV1 56-->62% (+ve bronchodilator response, 1.33L), %, %, RV/%, DLCO 37-->48  I personally reviewed the images for the CT cardiac scoring from 1/2024 that showed emphysematous changes but no clear pulmonary nodule.   Echo from 10/2024 reviewed that showed normal EF, normal RVSF and RVSP 56. Also a large PFO.  Assessment/Plan   85 YOM h/o HTN, DM, DLP, CKD IV, GERD, PVD, CVA, PFO, ? COPD due to h/o smoking who now is being referred to my office for management of his COPD.     ACOS: PFT from 6/2023 showed moderate obstruction with partial bronchodilator response, consistent with ACOS. Currently being managed by his PCP, on LABA + ICS and albuterol. Albuterol need infrequent.       Continue Wixela      Continue albuterol      Consider Repeating PFT after the next visit.     PFO: large with significant R to L shunt. Might be contributing to his hypoxia.        Given elevated RVSP but no cor pulmonale, will need cardiology evaluation for closure.     Pulmonary HTN: moderate. Likely group 3 +/- 2.         Heart issues management as per cardiology        COPD management as above        Evaluation for hypoxia done, qualifies for 3L with exertion    Possible hypoxia: patient with significant exertional dyspnea and tiredness. Likely multifactorial, COPD, PFO, CAD, possible CHF. Given PFO  concern for exertional hypoxia.        6 MWT to assess for hypoxia, done result as above.       RTC in 6 months.    Edouard Rosario MD 07/10/25 11:33 AM

## 2025-07-11 LAB
ALBUMIN/CREAT UR: 2796 MG/G CREAT
CREAT UR-MCNC: 52 MG/DL (ref 20–320)
KAPPA LC SERPL-MCNC: 6.02 MG/DL (ref 0.33–1.94)
KAPPA LC/LAMBDA SER: 1.33 {RATIO} (ref 0.26–1.65)
LAMBDA LC SERPL-MCNC: 4.52 MG/DL (ref 0.57–2.63)
MICROALBUMIN UR-MCNC: 145.4 MG/DL

## 2025-07-15 LAB
ALBUMIN: 3.4 G/DL (ref 3.4–5)
ALPHA 1 GLOBULIN: 0.3 G/DL (ref 0.2–0.6)
ALPHA 2 GLOBULIN: 0.7 G/DL (ref 0.4–1.1)
BETA GLOBULIN: 0.8 G/DL (ref 0.5–1.2)
GAMMA GLOBULIN: 0.8 G/DL (ref 0.5–1.4)
IMMUNOFIXATION COMMENT: NORMAL
PATH REVIEW - SERUM IMMUNOFIXATION: NORMAL
PATH REVIEW-SERUM PROTEIN ELECTROPHORESIS: NORMAL
PROTEIN ELECTROPHORESIS COMMENT: NORMAL

## 2025-07-23 ENCOUNTER — TELEPHONE (OUTPATIENT)
Dept: PRIMARY CARE | Facility: CLINIC | Age: 85
End: 2025-07-23
Payer: MEDICARE

## 2025-07-24 DIAGNOSIS — I10 HYPERTENSION, UNCONTROLLED: ICD-10-CM

## 2025-07-24 RX ORDER — HYDRALAZINE HYDROCHLORIDE 10 MG/1
10 TABLET, FILM COATED ORAL 3 TIMES DAILY
Qty: 90 TABLET | Refills: 1 | Status: SHIPPED | OUTPATIENT
Start: 2025-07-24 | End: 2026-07-24

## 2025-07-31 ENCOUNTER — TELEPHONE (OUTPATIENT)
Dept: PULMONOLOGY | Facility: HOSPITAL | Age: 85
End: 2025-07-31
Payer: MEDICARE

## 2025-08-01 ENCOUNTER — HOSPITAL ENCOUNTER (OUTPATIENT)
Dept: VASCULAR MEDICINE | Facility: HOSPITAL | Age: 85
Discharge: HOME | End: 2025-08-01
Payer: MEDICARE

## 2025-08-01 ENCOUNTER — OFFICE VISIT (OUTPATIENT)
Dept: VASCULAR SURGERY | Facility: HOSPITAL | Age: 85
End: 2025-08-01
Payer: MEDICARE

## 2025-08-01 VITALS
HEIGHT: 66 IN | OXYGEN SATURATION: 93 % | HEART RATE: 72 BPM | SYSTOLIC BLOOD PRESSURE: 153 MMHG | BODY MASS INDEX: 24.11 KG/M2 | WEIGHT: 150 LBS | DIASTOLIC BLOOD PRESSURE: 74 MMHG

## 2025-08-01 DIAGNOSIS — I65.23 CAROTID STENOSIS, ASYMPTOMATIC, BILATERAL: ICD-10-CM

## 2025-08-01 DIAGNOSIS — I65.23 CAROTID STENOSIS, ASYMPTOMATIC, BILATERAL: Primary | ICD-10-CM

## 2025-08-01 PROCEDURE — 93880 EXTRACRANIAL BILAT STUDY: CPT | Performed by: INTERNAL MEDICINE

## 2025-08-01 PROCEDURE — 99213 OFFICE O/P EST LOW 20 MIN: CPT | Mod: 25 | Performed by: NURSE PRACTITIONER

## 2025-08-01 PROCEDURE — 3078F DIAST BP <80 MM HG: CPT | Performed by: NURSE PRACTITIONER

## 2025-08-01 PROCEDURE — 93880 EXTRACRANIAL BILAT STUDY: CPT

## 2025-08-01 PROCEDURE — 99213 OFFICE O/P EST LOW 20 MIN: CPT | Performed by: NURSE PRACTITIONER

## 2025-08-01 PROCEDURE — 3077F SYST BP >= 140 MM HG: CPT | Performed by: NURSE PRACTITIONER

## 2025-08-01 NOTE — PROGRESS NOTES
Vascular Surgery Clinic Note    Date of visit: 08/01/2025  9:00 AM EDT  Location of visit: [facility]    CC: FUV carotid artery stenosis    History Of Present Illness:   Mann Abrams is a 85 y.o. male here for routine follow-up of his carotid artery stenosis.  He underwent a left carotid endarterectomy in December 2024 by Dr. Sim Castaneda.  He denies amaurosis fugax, unilateral weakness/numbness or speech impairments.  His blood pressure is well-controlled and he does not smoke.  He remains on aspirin and a statin daily.    He recently was put on 2 L of oxygen as needed.  He denies claudication symptoms or wounds.    Medical History:  Problem List[1]     SH:    Social Drivers of Health     Tobacco Use: Medium Risk (7/10/2025)    Patient History     Smoking Tobacco Use: Former     Smokeless Tobacco Use: Former     Passive Exposure: Not on file   Alcohol Use: Not At Risk (12/30/2024)    AUDIT-C     Frequency of Alcohol Consumption: Never     Average Number of Drinks: Patient does not drink     Frequency of Binge Drinking: Never   Financial Resource Strain: Low Risk  (12/30/2024)    Overall Financial Resource Strain (CARDIA)     Difficulty of Paying Living Expenses: Not very hard   Food Insecurity: No Food Insecurity (12/30/2024)    Hunger Vital Sign     Worried About Running Out of Food in the Last Year: Never true     Ran Out of Food in the Last Year: Never true   Transportation Needs: No Transportation Needs (12/30/2024)    PRAPARE - Transportation     Lack of Transportation (Medical): No     Lack of Transportation (Non-Medical): No   Physical Activity: Inactive (12/30/2024)    Exercise Vital Sign     Days of Exercise per Week: 0 days     Minutes of Exercise per Session: 0 min   Stress: No Stress Concern Present (12/30/2024)    [employer] - Occupational Stress Questionnaire     Feeling of Stress : Not at all   Social Connections: Unknown (12/30/2024)    Social Connection and  "Isolation Panel     Frequency of Communication with Friends and Family: Once a week     Frequency of Social Gatherings with Friends and Family: Once a week     Attends Mandaen Services: Not on file     Active Member of Clubs or Organizations: Not on file     Attends Club or Organization Meetings: Not on file     Marital Status: Not on file   Intimate Partner Violence: Not At Risk (12/30/2024)    Humiliation, Afraid, Rape, and Kick questionnaire     Fear of Current or Ex-Partner: No     Emotionally Abused: No     Physically Abused: No     Sexually Abused: No   Depression: Not at risk (7/9/2025)    PHQ-2     PHQ-2 Score: 0   Housing Stability: Low Risk  (12/30/2024)    Housing Stability Vital Sign     Unable to Pay for Housing in the Last Year: No     Number of Times Moved in the Last Year: 1     Homeless in the Last Year: No   Utilities: Not At Risk (12/30/2024)    [facility] Utilities     Threatened with loss of utilities: No   Digital Equity: Not on file   Health Literacy: Inadequate Health Literacy (12/30/2024)     Health Literacy     Frequency of need for help with medical instructions: Sometimes        FH:  Family History[2]     Allergies:   RX Allergies[3]    ROS:  All systems were reviewed and noted to be negative, other than described above.     Objective:  Last Recorded Vitals  Vitals:    08/01/25 0849 08/01/25 0850   BP: 168/77 153/74   BP Location: Right arm Left arm   Patient Position: Sitting Sitting   BP Cuff Size: Adult Adult   Pulse: 72    SpO2: 93%    Weight: 68 kg (150 lb)    Height: 1.676 m (5' 6\")        Meds:   Current Outpatient Medications   Medication Instructions    acetaminophen (TYLENOL) 650 mg, oral, Every 6 hours PRN    albuterol (ProAir HFA) 90 mcg/actuation inhaler 2 puffs, inhalation, Every 4 hours PRN    amLODIPine (NORVASC) 10 mg, oral, Daily    ascorbic acid (VITAMIN C) 500 mg, Daily    aspirin 81 mg, Daily    atorvastatin (LIPITOR) 80 mg, oral, Daily    carvedilol (COREG) 25 mg, " oral, 2 times daily (morning and late afternoon)    clopidogrel (PLAVIX) 75 mg, oral, Daily    dapagliflozin propanediol (FARXIGA) 10 mg, oral, Daily    fluticasone (Flonase) 50 mcg/actuation nasal spray 1 spray, Each Nostril, Daily PRN, Shake gently. Before first use, prime pump. After use, clean tip and replace cap.    hydrALAZINE (APRESOLINE) 10 mg, oral, 3 times daily    isosorbide mononitrate ER (IMDUR) 30 mg, oral, Daily, Do not crush or chew.    montelukast (SINGULAIR) 10 mg, oral, Nightly    nitroglycerin (NITROSTAT) 0.4 mg, sublingual, Every 5 min PRN, place 1 tablet under tongue every 5 minutes for up to 3 doses as needed for chest pain. Call 911 if pain persists    olmesartan (BENICAR) 40 mg, oral, Daily    omeprazole (PRILOSEC) 40 mg, oral, Daily    sodium bicarbonate 650 mg, oral, 3 times daily    tamsulosin (FLOMAX) 0.4 mg, oral, Daily    Wixela Inhub 250-50 mcg/dose diskus inhaler INHALE ONE PUFF BY MOUTH TWICE A DAY. RINSE MOUTH WITH WATER AFTER USE TO REDUCE AFTERTASTE AND INCIDENCE OF CANDIDIASIS. DO NOT SWALLOW.       Exam:  Constitutional: Well appearing, NAD   PSYCH: Appropriate mood and affect  Eyes: Sclera clear  Neck: Supple, healed left neck incision   CV: No tachycardia  RESP: Unlabored breathing  GI: Soft, nontender, non-distended  SKIN: No lesions  NEURO: No focal deficits noted   EXTREMITIES: Warm & well perfused. 1-2+ bilateral leg edema. No evidence of arterial ischemia.   PULSES: normal upper extremity pulse exam     Imaging Reviewed:  Vascular US Carotid Artery Duplex Bilateral 08/01/2025 (Preliminary)  This result has not been signed. Information might be incomplete.    Narrative  Preliminary Cardiology Report    [place] [id number]  Tel [phone] and Fax [phone]      Preliminary Vascular Lab Report    [facility]      Patient Name:      PROSPER Reynolds Physician:  [id number] Julia Tineo MD  Study  Date:        8/1/2025        Ordering Physician: [id number] JULEE CASAREZ  MRN/PID:           [id number]        Technologist:       Breanne Jin RVT  Accession#:        [id number]    Technologist 2:  Date of Birth/Age: 1940        Encounter#:         [id number]  Gender:            M  Admission Status:  Outpatient      Location Performed: [facility]      Diagnosis/ICD: Occlusion and stenosis of bilateral carotid arteries-I65.23  Procedure/CPT: 14152 Cerebrovascular Carotid Duplex scan complete      PRELIMINARY CONCLUSIONS:  Right Carotid: Findings are consistent with 50 to 69% stenosis of the right proximal internal carotid artery. Post stenotic turbulent flow seen by color Doppler. There are elevated velocities in the right ECA that are suggestive of disease. The right vertebral artery is patent with antegrade flow. No evidence of hemodynamically significant stenosis in the right subclavian artery.  Left Carotid: Findings are consistent with less than 50% stenosis of the left proximal internal carotid artery. Laminar flow seen by color Doppler. Left external carotid artery appears patent with no evidence of stenosis. The left vertebral artery is patent with antegrade flow. No evidence of hemodynamically significant stenosis in the left subclavian artery.  Endarterectomy: Patent left carotid endarterectomy site following endarterectomy. Left patch measures 0.9 cm.    Imaging & Doppler Findings:  Right Plaque Morph: The proximal right internal carotid artery demonstrates calcified plaque. The proximal right external carotid artery demonstrates calcified plaque. The proximal right common carotid artery demonstrates calcified plaque. The mid right common carotid artery demonstrates calcified plaque. The distal right common carotid artery demonstrates calcified plaque. The proximal right subclavian artery demonstrates calcified plaque.  Left Plaque Morph: The mid left common carotid artery demonstrates  calcified plaque. The distal left common carotid artery demonstrates calcified plaque.    Right                        Left  PSV      EDV                PSV      EDV  64 cm/s            CCA P    54 cm/s  62 cm/s            CCA D    54 cm/s  191 cm/s 35 cm/s   ICA P    84 cm/s  11 cm/s  63 cm/s            ICA M    58 cm/s  37 cm/s            ICA D    40 cm/s  260 cm/s            ECA     101 cm/s  58 cm/s          Vertebral  29 cm/s  90 cm/s          Subclavian 144 cm/s      Right Left  ICA/CCA Ratio  3.1  1.6          VASCULAR PRELIMINARY REPORT  completed by Breanne Jin RVCRISTY on 8/1/2025 at 8:29:34 AM        ** Final **      Assessment & Plan:  1. Carotid stenosis, asymptomatic, bilateral  Vascular US Carotid Artery Duplex Bilateral        #Carotid artery stenosis s/p L CEA 12/2024   Duplex today shows less than 50% L ICA stenosis and 50-69% R ICA stenosis.   Asymptomatic.   Blood pressure control  Continue aspirin & statin   RTC in 6 months with carotid duplex       LUMA Shaver-CNP         [1]   Patient Active Problem List  Diagnosis    Primary hypertension    Other emphysema (Multi)    Type 2 diabetes mellitus with stage 3b chronic kidney disease, without long-term current use of insulin (Multi)    DM (diabetes mellitus), secondary, w/peripheral vascular complications    Chronic cough    Hypertension, uncontrolled    Stage 3b chronic kidney disease (CKD) (Multi)    Abnormal stress test    Age-related nuclear cataract of right eye    Cataract    Cavus deformity of foot    Chest pain, exertional    Chronic kidney disease in type 2 diabetes mellitus    Diabetes mellitus (Multi)    GERD (gastroesophageal reflux disease)    Hammertoe    Hyperlipidemia    Ingrowing nail    Low oxygen saturation    Dyspnea on minimal exertion    Mild anemia    Myopia with astigmatism and presbyopia    Osteoarthritis    Persistent cough    SOB (shortness of breath) on exertion    Pseudophakia of left eye    Pure  hypercholesterolemia    Intervertebral cervical disc disorder with myelopathy, cervical region    Non-recurrent unilateral inguinal hernia without obstruction or gangrene    Elevated coronary artery calcium score    Bruit of right carotid artery    Renal artery stenosis, native, bilateral    Iron deficiency anemia due to chronic blood loss    Allergic rhinitis    Heartburn    Injury of left knee    Arthralgia of hip    Knee pain    Occlusion of right carotid artery    Wheezing    Angiectasia    Hiatal hernia    Chronic renal disease, stage IV (Multi)    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[facility])    Asymptomatic bilateral carotid artery stenosis    Diabetes mellitus with nephropathy (Multi)    Dysarthria    Cerebrovascular accident (CVA), unspecified mechanism (Multi)    Type 2 diabetes mellitus    Bilateral carotid artery stenosis    PFO (patent foramen ovale) ([facility])    History of ischemic stroke    History of left-sided carotid endarterectomy   [2]   Family History  Problem Relation Name Age of Onset    Cancer Mother      Other (cardiac disorder) Father      Diabetes Sister      Asthma Daughter Mary abrams     Kidney disease Brother Sushant Abrams jr.     Asthma Daughter Mary abrams     Kidney disease Brother None     Asthma Brother None     Arthritis Brother None    [3]   Allergies  Allergen Reactions    Doxazosin Itching

## 2025-08-14 ENCOUNTER — TELEPHONE (OUTPATIENT)
Dept: PULMONOLOGY | Facility: HOSPITAL | Age: 85
End: 2025-08-14
Payer: MEDICARE

## 2025-08-15 ENCOUNTER — APPOINTMENT (OUTPATIENT)
Dept: CARDIOLOGY | Facility: CLINIC | Age: 85
End: 2025-08-15
Payer: MEDICARE

## 2025-08-15 VITALS
SYSTOLIC BLOOD PRESSURE: 177 MMHG | DIASTOLIC BLOOD PRESSURE: 75 MMHG | HEART RATE: 78 BPM | OXYGEN SATURATION: 95 % | HEIGHT: 66 IN | BODY MASS INDEX: 24.11 KG/M2 | WEIGHT: 150 LBS

## 2025-08-15 DIAGNOSIS — R06.02 SOB (SHORTNESS OF BREATH) ON EXERTION: ICD-10-CM

## 2025-08-15 DIAGNOSIS — I10 PRIMARY HYPERTENSION: Primary | ICD-10-CM

## 2025-08-15 DIAGNOSIS — Q21.12 PFO (PATENT FORAMEN OVALE) (HHS-HCC): ICD-10-CM

## 2025-08-15 DIAGNOSIS — Q24.5: ICD-10-CM

## 2025-08-15 LAB
ATRIAL RATE: 78 BPM
P AXIS: 59 DEGREES
P OFFSET: 204 MS
P ONSET: 150 MS
PR INTERVAL: 150 MS
Q ONSET: 225 MS
QRS COUNT: 13 BEATS
QRS DURATION: 64 MS
QT INTERVAL: 386 MS
QTC CALCULATION(BAZETT): 440 MS
QTC FREDERICIA: 421 MS
R AXIS: 50 DEGREES
T AXIS: 65 DEGREES
T OFFSET: 418 MS
VENTRICULAR RATE: 78 BPM

## 2025-08-15 PROCEDURE — 1126F AMNT PAIN NOTED NONE PRSNT: CPT | Performed by: INTERNAL MEDICINE

## 2025-08-15 PROCEDURE — 3077F SYST BP >= 140 MM HG: CPT | Performed by: INTERNAL MEDICINE

## 2025-08-15 PROCEDURE — 1159F MED LIST DOCD IN RCRD: CPT | Performed by: INTERNAL MEDICINE

## 2025-08-15 PROCEDURE — 99212 OFFICE O/P EST SF 10 MIN: CPT

## 2025-08-15 PROCEDURE — 93005 ELECTROCARDIOGRAM TRACING: CPT | Performed by: INTERNAL MEDICINE

## 2025-08-15 PROCEDURE — 3078F DIAST BP <80 MM HG: CPT | Performed by: INTERNAL MEDICINE

## 2025-08-15 PROCEDURE — 99215 OFFICE O/P EST HI 40 MIN: CPT | Performed by: INTERNAL MEDICINE

## 2025-08-15 PROCEDURE — G2211 COMPLEX E/M VISIT ADD ON: HCPCS | Performed by: INTERNAL MEDICINE

## 2025-08-15 ASSESSMENT — ENCOUNTER SYMPTOMS
OCCASIONAL FEELINGS OF UNSTEADINESS: 0
DEPRESSION: 0

## 2025-08-15 ASSESSMENT — PAIN SCALES - GENERAL: PAINLEVEL_OUTOF10: 0-NO PAIN

## 2025-08-17 LAB
ALBUMIN SERPL-MCNC: 3.4 G/DL (ref 3.6–5.1)
ALP SERPL-CCNC: 100 U/L (ref 35–144)
ALT SERPL-CCNC: 20 U/L (ref 9–46)
ANION GAP SERPL CALCULATED.4IONS-SCNC: 6 MMOL/L (CALC) (ref 7–17)
AST SERPL-CCNC: 21 U/L (ref 10–35)
BILIRUB SERPL-MCNC: 0.3 MG/DL (ref 0.2–1.2)
BNP SERPL-MCNC: NORMAL PG/ML
BUN SERPL-MCNC: 27 MG/DL (ref 7–25)
CALCIUM SERPL-MCNC: 8.6 MG/DL (ref 8.6–10.3)
CHLORIDE SERPL-SCNC: 107 MMOL/L (ref 98–110)
CHOLEST SERPL-MCNC: 169 MG/DL
CHOLEST/HDLC SERPL: 2.4 (CALC)
CO2 SERPL-SCNC: 28 MMOL/L (ref 20–32)
CREAT SERPL-MCNC: 2.12 MG/DL (ref 0.7–1.22)
EGFRCR SERPLBLD CKD-EPI 2021: 30 ML/MIN/1.73M2
ERYTHROCYTE [DISTWIDTH] IN BLOOD BY AUTOMATED COUNT: 13 % (ref 11–15)
GLUCOSE SERPL-MCNC: 115 MG/DL (ref 65–99)
HCT VFR BLD AUTO: 32.7 % (ref 38.5–50)
HDLC SERPL-MCNC: 69 MG/DL
HGB BLD-MCNC: 10.4 G/DL (ref 13.2–17.1)
LDLC SERPL CALC-MCNC: 83 MG/DL (CALC)
LPA SERPL-SCNC: NORMAL NMOL/L
MCH RBC QN AUTO: 31.7 PG (ref 27–33)
MCHC RBC AUTO-ENTMCNC: 31.8 G/DL (ref 32–36)
MCV RBC AUTO: 99.7 FL (ref 80–100)
NONHDLC SERPL-MCNC: 100 MG/DL (CALC)
PLATELET # BLD AUTO: 195 THOUSAND/UL (ref 140–400)
PMV BLD REES-ECKER: 9.4 FL (ref 7.5–12.5)
POTASSIUM SERPL-SCNC: 4.7 MMOL/L (ref 3.5–5.3)
PROT SERPL-MCNC: 5.5 G/DL (ref 6.1–8.1)
RBC # BLD AUTO: 3.28 MILLION/UL (ref 4.2–5.8)
SODIUM SERPL-SCNC: 141 MMOL/L (ref 135–146)
TRIGL SERPL-MCNC: 76 MG/DL
WBC # BLD AUTO: 5.4 THOUSAND/UL (ref 3.8–10.8)

## 2025-08-19 ENCOUNTER — ANCILLARY PROCEDURE (OUTPATIENT)
Dept: CARDIOLOGY | Facility: CLINIC | Age: 85
End: 2025-08-19
Payer: MEDICARE

## 2025-08-19 DIAGNOSIS — Q21.12 PFO (PATENT FORAMEN OVALE) (HHS-HCC): ICD-10-CM

## 2025-08-19 DIAGNOSIS — R06.02 SOB (SHORTNESS OF BREATH) ON EXERTION: ICD-10-CM

## 2025-08-19 LAB
AORTIC VALVE PEAK VELOCITY: 1.56 M/S
AV PEAK GRADIENT: 10 MMHG
AVA (PEAK VEL): 2.4 CM2
EJECTION FRACTION APICAL 4 CHAMBER: 70.8
EJECTION FRACTION: 67 %
LEFT ATRIUM VOLUME AREA LENGTH INDEX BSA: 39.1 ML/M2
LEFT VENTRICLE INTERNAL DIMENSION DIASTOLE: 4.31 CM (ref 3.5–6)
LEFT VENTRICULAR OUTFLOW TRACT DIAMETER: 2 CM
MITRAL VALVE E/A RATIO: 0.82
RIGHT VENTRICLE FREE WALL PEAK S': 22 CM/S
RIGHT VENTRICLE PEAK SYSTOLIC PRESSURE: 54 MMHG
TRICUSPID ANNULAR PLANE SYSTOLIC EXCURSION: 2.3 CM

## 2025-08-19 PROCEDURE — 93306 TTE W/DOPPLER COMPLETE: CPT | Performed by: INTERNAL MEDICINE

## 2025-08-19 PROCEDURE — 93306 TTE W/DOPPLER COMPLETE: CPT

## 2025-08-21 LAB
ALBUMIN SERPL-MCNC: 3.4 G/DL (ref 3.6–5.1)
ALP SERPL-CCNC: 100 U/L (ref 35–144)
ALT SERPL-CCNC: 20 U/L (ref 9–46)
ANION GAP SERPL CALCULATED.4IONS-SCNC: 6 MMOL/L (CALC) (ref 7–17)
AST SERPL-CCNC: 21 U/L (ref 10–35)
BILIRUB SERPL-MCNC: 0.3 MG/DL (ref 0.2–1.2)
BNP SERPL-MCNC: 205 PG/ML
BUN SERPL-MCNC: 27 MG/DL (ref 7–25)
CALCIUM SERPL-MCNC: 8.6 MG/DL (ref 8.6–10.3)
CHLORIDE SERPL-SCNC: 107 MMOL/L (ref 98–110)
CHOLEST SERPL-MCNC: 169 MG/DL
CHOLEST/HDLC SERPL: 2.4 (CALC)
CO2 SERPL-SCNC: 28 MMOL/L (ref 20–32)
CREAT SERPL-MCNC: 2.12 MG/DL (ref 0.7–1.22)
EGFRCR SERPLBLD CKD-EPI 2021: 30 ML/MIN/1.73M2
ERYTHROCYTE [DISTWIDTH] IN BLOOD BY AUTOMATED COUNT: 13 % (ref 11–15)
GLUCOSE SERPL-MCNC: 115 MG/DL (ref 65–99)
HCT VFR BLD AUTO: 32.7 % (ref 38.5–50)
HDLC SERPL-MCNC: 69 MG/DL
HGB BLD-MCNC: 10.4 G/DL (ref 13.2–17.1)
LDLC SERPL CALC-MCNC: 83 MG/DL (CALC)
LPA SERPL-SCNC: 293 NMOL/L
MCH RBC QN AUTO: 31.7 PG (ref 27–33)
MCHC RBC AUTO-ENTMCNC: 31.8 G/DL (ref 32–36)
MCV RBC AUTO: 99.7 FL (ref 80–100)
NONHDLC SERPL-MCNC: 100 MG/DL (CALC)
PLATELET # BLD AUTO: 195 THOUSAND/UL (ref 140–400)
PMV BLD REES-ECKER: 9.4 FL (ref 7.5–12.5)
POTASSIUM SERPL-SCNC: 4.7 MMOL/L (ref 3.5–5.3)
PROT SERPL-MCNC: 5.5 G/DL (ref 6.1–8.1)
RBC # BLD AUTO: 3.28 MILLION/UL (ref 4.2–5.8)
SODIUM SERPL-SCNC: 141 MMOL/L (ref 135–146)
TRIGL SERPL-MCNC: 76 MG/DL
WBC # BLD AUTO: 5.4 THOUSAND/UL (ref 3.8–10.8)

## 2025-09-04 ENCOUNTER — APPOINTMENT (OUTPATIENT)
Dept: PULMONOLOGY | Facility: CLINIC | Age: 85
End: 2025-09-04
Payer: MEDICARE

## 2025-09-15 ENCOUNTER — APPOINTMENT (OUTPATIENT)
Dept: CARDIOLOGY | Facility: CLINIC | Age: 85
End: 2025-09-15
Payer: MEDICARE

## 2025-09-22 ENCOUNTER — APPOINTMENT (OUTPATIENT)
Dept: NEPHROLOGY | Facility: CLINIC | Age: 85
End: 2025-09-22
Payer: MEDICARE

## 2025-12-08 ENCOUNTER — APPOINTMENT (OUTPATIENT)
Dept: CARDIOLOGY | Facility: CLINIC | Age: 85
End: 2025-12-08
Payer: MEDICARE

## 2026-02-26 ENCOUNTER — APPOINTMENT (OUTPATIENT)
Dept: OPHTHALMOLOGY | Facility: CLINIC | Age: 86
End: 2026-02-26
Payer: MEDICARE

## (undated) DEVICE — TR BAND, RADIAL COMPRESSION, STANDARD, 24CM

## (undated) DEVICE — Device

## (undated) DEVICE — ADHESIVE, SKIN, DERMABOND ADVANCED, 15CM, PEN-STYLE

## (undated) DEVICE — STIMULATOR, NERVE LOCATOR, HEAD&NECK

## (undated) DEVICE — MANIFOLD, 4 PORT NEPTUNE STANDARD

## (undated) DEVICE — PROTECTOR, NERVE, ULNAR, PINK

## (undated) DEVICE — TAPE, UMBILICAL, 1/8 X 30 IN, MULTIPACK, COTTON, WHITE

## (undated) DEVICE — SUTURE, VICRYL, 3-0, 27 IN, SH

## (undated) DEVICE — CLOSURE DEVICE, VASCULAR, ANGIO-SEAL, VIP, 6FR, LF

## (undated) DEVICE — CATHETER, GUIDING, LAUNCHER, 6FR, JL 3.5

## (undated) DEVICE — GUIDEWIRE, HI-TORQUE, VERSACORE, 260CM, FLOPPY

## (undated) DEVICE — DRAPE, MAGENTIC INSTRUMENT, 12X16

## (undated) DEVICE — SPONGE, HEMOSTATIC, GELATIN, SURGIFOAM, 2 X 6 CM X 7 MM

## (undated) DEVICE — CATHETER TRAY, SURESTEP, 16FR, URINE METER W/STATLOCK

## (undated) DEVICE — GUIDEWIRE, RUN THROUGH WIRE, 180CM

## (undated) DEVICE — SEALANT, EVARREST FIBRIN PATCH  2 X 4

## (undated) DEVICE — COVER, CART, 45 X 27 X 48 IN, CLEAR

## (undated) DEVICE — WAX, BONE, 2.5 GM

## (undated) DEVICE — SHEATH, GLIDESHEATH, SLENDER, 6FR 10CM

## (undated) DEVICE — CATHETER, ANGIO, IMPULSE, FL3.5, 5 FR X 100 CM

## (undated) DEVICE — 5FR X 125CM PERFORMA DIAGNOSTIC CATHETER, JUDKINS RIGHT, JR4.0, 2.5CM TIP, 0.038IN MAX GUIDEWIRE

## (undated) DEVICE — CATHETER, ANGIO, IMPULSE, FR4, 5 FR X 100 CM

## (undated) DEVICE — CORD, BIPOLAR,  12 FT, DISPOSABLE, LF

## (undated) DEVICE — SUTURE, MONOCRYL, 4-0, 18 IN, PS2, UNDYED

## (undated) DEVICE — BLADE, OPHTHALMIC, MINI, STRAIGHT, DOUBLE BEVEL, SHARP ALL AROUND

## (undated) DEVICE — SUTURE, SILK, 2-0, 30 IN, SH, BLACK

## (undated) DEVICE — KIT, TOURNIQUET, 7"

## (undated) DEVICE — SUTURE, PROLENE, 7-0, 30 IN, BV1, DA, BLUE

## (undated) DEVICE — SHEATH, PINNACLE, 10 CM,  5FR INTRODUCER, 5FR DIA, 2.5 CM DIALATOR

## (undated) DEVICE — SHEATH, PINNACLE, 10 CM,  6FR INTRODUCER, 6FR DIA, 2.5 CM DIALATOR

## (undated) DEVICE — DRAPE, SHEET, FAN FOLDED, HALF, 44 X 58 IN, DISPOSABLE, LF, STERILE

## (undated) DEVICE — COVER, TABLE, 44 X 75 IN, DISPOSABLE, LF, STERILE

## (undated) DEVICE — ACCESS KIT, S-MAK MINI, 4FR 10CM 0.018IN 40CM, NT/PT, ECHO ENHANCE NEEDLE

## (undated) DEVICE — CATHETER, OPTICROSS 6HD, 3.6F X 135CM

## (undated) DEVICE — DRESSING, ISLAND, ADHESIVE, TELFA, 4 X 8 IN

## (undated) DEVICE — INSERT, CLAMP, SURGICAL, SOFT/TRACTION, STEALTH, 1 MM

## (undated) DEVICE — BANDAGE, HEMOSTATIC, D-STAT DRY, CLEAR

## (undated) DEVICE — NEEDLE, SPINAL, LUMBAR PUNCTURE, NEONATAL, 22 G X 2.5 IN, BLACK HUB

## (undated) DEVICE — SUTURE, PROLENE, 5-0, 36 IN, C-1, CV-11, BLUE

## (undated) DEVICE — ELECTRODE, GROUND PLATE

## (undated) DEVICE — ELECTRODE, CORKSCREW NEEDLE 1.5M LENGTH

## (undated) DEVICE — CATHETER, ANGIO, IMPULSE, A MOD, 5 FR X 100 CM

## (undated) DEVICE — GLOVE, SURGICAL, PI ORTHO PRO, BIOGEL, SZ 7.5

## (undated) DEVICE — CATHETER, URETHRAL, FOLEY, 2 WAY, CARSON, COUDE, BARDEX IC, 16 FR, 5 CC, SILVER,LATEX

## (undated) DEVICE — APPLICATOR, PREP, CHLORAPREP, W/ORANGE TINT, 10.5ML

## (undated) DEVICE — DRESSING, ADHESIVE, ISLAND, TELFA, 2 X 3.75 IN, LF

## (undated) DEVICE — NEEDLE, ELECTRODE, SUBDERMAL, PAIRED, 2.0 LEAD, DISP

## (undated) DEVICE — CATHETER, ANGIO, IMPULSE, 5 FR X 110 CM, PIGTAIL CURVE 155 DEG

## (undated) DEVICE — DRAPE, TOWEL, STERI DRAPE, 17 X 11 IN, PLASTIC, STERILE

## (undated) DEVICE — FLOSEAL, MATRIX, HEMOSTATIC, FULL STERILE PREP, 5ML

## (undated) DEVICE — CONTAINER, SPECIMEN, 120 ML, STERILE

## (undated) DEVICE — COVER, TABLE, UHC